# Patient Record
Sex: FEMALE | Race: BLACK OR AFRICAN AMERICAN | NOT HISPANIC OR LATINO | Employment: FULL TIME | URBAN - METROPOLITAN AREA
[De-identification: names, ages, dates, MRNs, and addresses within clinical notes are randomized per-mention and may not be internally consistent; named-entity substitution may affect disease eponyms.]

---

## 2017-01-05 ENCOUNTER — LAB (OUTPATIENT)
Dept: LAB | Facility: HOSPITAL | Age: 37
End: 2017-01-05

## 2017-01-05 ENCOUNTER — OFFICE VISIT (OUTPATIENT)
Dept: NEUROLOGY | Facility: CLINIC | Age: 37
End: 2017-01-05

## 2017-01-05 VITALS
OXYGEN SATURATION: 99 % | BODY MASS INDEX: 55.32 KG/M2 | HEIGHT: 61 IN | SYSTOLIC BLOOD PRESSURE: 120 MMHG | HEART RATE: 82 BPM | DIASTOLIC BLOOD PRESSURE: 82 MMHG | WEIGHT: 293 LBS

## 2017-01-05 DIAGNOSIS — H53.9 TRANSIENT VISION DISTURBANCE OF BOTH EYES: ICD-10-CM

## 2017-01-05 DIAGNOSIS — R20.2 PARESTHESIA: ICD-10-CM

## 2017-01-05 DIAGNOSIS — Z86.69 H/O ATYPICAL MIGRAINE: ICD-10-CM

## 2017-01-05 DIAGNOSIS — G43.809 MIGRAINE VARIANT WITH HEADACHE: Primary | ICD-10-CM

## 2017-01-05 DIAGNOSIS — G43.809 MIGRAINE VARIANT WITH HEADACHE: Primary | Chronic | ICD-10-CM

## 2017-01-05 LAB
ALBUMIN SERPL-MCNC: 4.2 G/DL (ref 3.2–4.8)
ALBUMIN/GLOB SERPL: 1.4 G/DL (ref 1.5–2.5)
ALP SERPL-CCNC: 83 U/L (ref 25–100)
ALT SERPL W P-5'-P-CCNC: 14 U/L (ref 7–40)
ANION GAP SERPL CALCULATED.3IONS-SCNC: 3 MMOL/L (ref 3–11)
AST SERPL-CCNC: 16 U/L (ref 0–33)
BASOPHILS # BLD AUTO: 0.02 10*3/MM3 (ref 0–0.2)
BASOPHILS NFR BLD AUTO: 0.3 % (ref 0–1)
BILIRUB SERPL-MCNC: 0.4 MG/DL (ref 0.3–1.2)
BUN BLD-MCNC: 13 MG/DL (ref 9–23)
BUN/CREAT SERPL: 18.6 (ref 7–25)
CALCIUM SPEC-SCNC: 9.9 MG/DL (ref 8.7–10.4)
CHLORIDE SERPL-SCNC: 106 MMOL/L (ref 99–109)
CO2 SERPL-SCNC: 28 MMOL/L (ref 20–31)
CREAT BLD-MCNC: 0.7 MG/DL (ref 0.6–1.3)
DEPRECATED RDW RBC AUTO: 49.6 FL (ref 37–54)
EOSINOPHIL # BLD AUTO: 0.06 10*3/MM3 (ref 0.1–0.3)
EOSINOPHIL NFR BLD AUTO: 0.8 % (ref 0–3)
ERYTHROCYTE [DISTWIDTH] IN BLOOD BY AUTOMATED COUNT: 19.3 % (ref 11.3–14.5)
GFR SERPL CREATININE-BSD FRML MDRD: 115 ML/MIN/1.73
GLOBULIN UR ELPH-MCNC: 3 GM/DL
GLUCOSE BLD-MCNC: 99 MG/DL (ref 70–100)
HCT VFR BLD AUTO: 33.1 % (ref 34.5–44)
HGB BLD-MCNC: 10.3 G/DL (ref 11.5–15.5)
IMM GRANULOCYTES # BLD: 0.02 10*3/MM3 (ref 0–0.03)
IMM GRANULOCYTES NFR BLD: 0.3 % (ref 0–0.6)
LYMPHOCYTES # BLD AUTO: 2.27 10*3/MM3 (ref 0.6–4.8)
LYMPHOCYTES NFR BLD AUTO: 32.1 % (ref 24–44)
MCH RBC QN AUTO: 21.7 PG (ref 27–31)
MCHC RBC AUTO-ENTMCNC: 31.1 G/DL (ref 32–36)
MCV RBC AUTO: 69.8 FL (ref 80–99)
MONOCYTES # BLD AUTO: 0.59 10*3/MM3 (ref 0–1)
MONOCYTES NFR BLD AUTO: 8.3 % (ref 0–12)
NEUTROPHILS # BLD AUTO: 4.12 10*3/MM3 (ref 1.5–8.3)
NEUTROPHILS NFR BLD AUTO: 58.2 % (ref 41–71)
PLATELET # BLD AUTO: 346 10*3/MM3 (ref 150–450)
PMV BLD AUTO: 10.1 FL (ref 6–12)
POTASSIUM BLD-SCNC: 4.5 MMOL/L (ref 3.5–5.5)
PROT SERPL-MCNC: 7.2 G/DL (ref 5.7–8.2)
RBC # BLD AUTO: 4.74 10*6/MM3 (ref 3.89–5.14)
SODIUM BLD-SCNC: 137 MMOL/L (ref 132–146)
VIT B12 BLD-MCNC: 495 PG/ML (ref 211–911)
WBC NRBC COR # BLD: 7.08 10*3/MM3 (ref 3.5–10.8)

## 2017-01-05 PROCEDURE — 83921 ORGANIC ACID SINGLE QUANT: CPT | Performed by: NURSE PRACTITIONER

## 2017-01-05 PROCEDURE — 83550 IRON BINDING TEST: CPT | Performed by: NURSE PRACTITIONER

## 2017-01-05 PROCEDURE — 99214 OFFICE O/P EST MOD 30 MIN: CPT | Performed by: NURSE PRACTITIONER

## 2017-01-05 PROCEDURE — 85025 COMPLETE CBC W/AUTO DIFF WBC: CPT | Performed by: NURSE PRACTITIONER

## 2017-01-05 PROCEDURE — 36415 COLL VENOUS BLD VENIPUNCTURE: CPT | Performed by: NURSE PRACTITIONER

## 2017-01-05 PROCEDURE — 82728 ASSAY OF FERRITIN: CPT | Performed by: NURSE PRACTITIONER

## 2017-01-05 PROCEDURE — 83540 ASSAY OF IRON: CPT | Performed by: NURSE PRACTITIONER

## 2017-01-05 PROCEDURE — 80053 COMPREHEN METABOLIC PANEL: CPT | Performed by: NURSE PRACTITIONER

## 2017-01-05 PROCEDURE — 82607 VITAMIN B-12: CPT | Performed by: NURSE PRACTITIONER

## 2017-01-05 RX ORDER — SUMATRIPTAN 50 MG/1
50 TABLET, FILM COATED ORAL ONCE AS NEEDED
Qty: 9 TABLET | Refills: 5 | Status: SHIPPED | OUTPATIENT
Start: 2017-01-05 | End: 2017-09-22 | Stop reason: SDUPTHER

## 2017-01-05 RX ORDER — ERGOCALCIFEROL 1.25 MG/1
50000 CAPSULE ORAL WEEKLY
COMMUNITY
End: 2017-04-06

## 2017-01-05 RX ORDER — TOPIRAMATE 50 MG/1
50 TABLET, FILM COATED ORAL NIGHTLY
Qty: 30 TABLET | Refills: 5 | Status: SHIPPED | OUTPATIENT
Start: 2017-01-05 | End: 2017-03-21

## 2017-01-05 RX ORDER — PREDNISONE 10 MG/1
TABLET ORAL
Qty: 42 TABLET | Refills: 0 | Status: SHIPPED | OUTPATIENT
Start: 2017-01-05 | End: 2017-01-09

## 2017-01-05 NOTE — PROGRESS NOTES
Subjective:     Patient ID: Nury Pineda is a 36 y.o. female.    History of Present Illness     Here for 5.5 month follow up on migraines with a history of complex migraines in 2015. She was seen in our office on 7/21/2016 for acute intractable headache for 1 month and had an OP migraine infusion and then she did not return for follow up until today. She also missed her Opthalmology appointment to evaluate her intermittent blurred vision in both eyes. She has not been taking her aspirin. She had about 1 month of bad headaches starting from right temple to across forehead and straight back with photophobia and phonophobia but no N/V again from late November 2016 to last week and she also had tingling in hands and feet with the pain as well as once in her face which tingling in face resolved in 30 minutes and she took Ibuprofen or Advil and this week her headache has been only minimal. She feels like overall her headaches are getting worse. She has a minimal headache today mild with no other symptoms. She has not taken a preventive migraine medication. Prior imaging studies have showed no acute process and MRIs have been limited due to artifact from braces and plans to repeat MRI/MRA brain once braces removed soon. She has been diagnosed with mild sleep apnea and was told to try and lose weight or she could use an oral appliance. She continues to have frequent numbness and tingling in bilateral hands and feet with known CTS.     The following portions of the patient's history were reviewed and updated as appropriate: allergies, current medications, past family history, past medical history, past social history, past surgical history and problem list.    Review of Systems   Constitutional: Negative for chills, fatigue, fever and unexpected weight change.   HENT: Positive for voice change. Negative for ear pain, hearing loss, nosebleeds, rhinorrhea and sore throat.    Eyes: Negative for photophobia, pain, discharge,  itching and visual disturbance.   Respiratory: Negative for cough, chest tightness, shortness of breath and wheezing.    Cardiovascular: Negative for chest pain, palpitations and leg swelling.   Gastrointestinal: Negative for abdominal pain, blood in stool, constipation, diarrhea, nausea and vomiting.   Genitourinary: Negative for dysuria, frequency, hematuria and urgency.   Musculoskeletal: Positive for joint swelling (& STIFFNESS). Negative for arthralgias, back pain, gait problem, myalgias, neck pain and neck stiffness.   Skin: Negative for rash and wound.   Allergic/Immunologic: Negative for environmental allergies and food allergies.   Neurological: Positive for dizziness, numbness and headaches. Negative for tremors, seizures, syncope, speech difficulty, weakness and light-headedness.        TINGLING   Hematological: Negative for adenopathy. Does not bruise/bleed easily.   Psychiatric/Behavioral: Negative for agitation, confusion, decreased concentration, hallucinations, sleep disturbance and suicidal ideas. The patient is not nervous/anxious.         Objective:    Neurologic Exam     Mental Status   Oriented to person, place, and time.   Registration: recalls 3 of 3 objects. Recall at 5 minutes: recalls 3 of 3 objects. Follows 3 step commands.   Attention: normal. Concentration: normal.   Speech: speech is normal   Level of consciousness: alert  Knowledge: good and consistent with education. Able to perform simple calculations.   Able to name object. Able to read. Able to repeat. Able to write. Normal comprehension.     Cranial Nerves   Cranial nerves II through XII intact.     Motor Exam   Muscle bulk: normal  Overall muscle tone: normal    Strength   Strength 5/5 throughout.     Sensory Exam   Light touch normal.   Vibration normal.   Proprioception normal.   Pinprick normal.     Gait, Coordination, and Reflexes     Gait  Gait: normal    Coordination   Romberg: negative  Finger to nose coordination:  normal  Heel to shin coordination: normal    Tremor   Resting tremor: absent  Intention tremor: absent  Action tremor: absent    Reflexes   Right brachioradialis: 2+  Left brachioradialis: 2+  Right biceps: 2+  Left biceps: 2+  Right triceps: 2+  Left triceps: 2+  Right patellar: 2+  Left patellar: 2+  Right achilles: 2+  Left achilles: 2+  Right : 2+  Left : 2+  Right plantar: normal  Left plantar: normal  Right White: absent  Left White: absent  Right ankle clonus: absent  Left ankle clonus: absent      Physical Exam   Constitutional: She is oriented to person, place, and time.   Eyes:   Fundoscopic exam:       The right eye shows no AV nicking, no exudate, no hemorrhage and no papilledema. The right eye shows red reflex.        The left eye shows no AV nicking, no exudate, no hemorrhage and no papilledema. The left eye shows red reflex.   Neurological: She is oriented to person, place, and time. She has normal strength. She has a normal Finger-Nose-Finger Test, a normal Heel to Harris Test and a normal Romberg Test. Gait normal.   Reflex Scores:       Tricep reflexes are 2+ on the right side and 2+ on the left side.       Bicep reflexes are 2+ on the right side and 2+ on the left side.       Brachioradialis reflexes are 2+ on the right side and 2+ on the left side.       Patellar reflexes are 2+ on the right side and 2+ on the left side.       Achilles reflexes are 2+ on the right side and 2+ on the left side.  Psychiatric: She has a normal mood and affect. Her speech is normal and behavior is normal. Judgment and thought content normal. Cognition and memory are normal.       Assessment/Plan:     Nury was seen today for headache.    Diagnoses and all orders for this visit:    Migraine variant with headache  -     topiramate (TOPAMAX) 50 MG tablet; Take 1 tablet by mouth Every Night.  -     Vitamin B12  -     Methylmalonic Acid, Serum  -     CBC & Differential  -     Fe+TIBC+Elmer  -     Comprehensive  Metabolic Panel  -     predniSONE (DELTASONE) 10 MG tablet; Take 6 tabs x 2 days, Take 5 tabs x 2 days, take 4 tabs x 2 days, take 3 tabs x 2 days, take 2 tabs x 2 days and 1 tab x 2 days and stop  -     SUMAtriptan (IMITREX) 50 MG tablet; Take 1 tablet by mouth 1 (One) Time As Needed for migraine for up to 1 dose. May repeat dose one time in 2 hours if headache not relieved.    H/O atypical migraine    Paresthesia  -     Vitamin B12  -     Methylmalonic Acid, Serum  -     CBC & Differential  -     Fe+TIBC+Elmer  -     Comprehensive Metabolic Panel    Transient vision disturbance of both eyes       Recommended she follow up with sleep medicine about treating her mild sleep apnea. Reschedule Opthalmology appointment. Start Topamax for migraine prevention. May use NSAIDs no more than 15 doses a month to prevent MOH. Imitrex PRN. Labs today. F/U in 3 months and hopefully she will have her braces off so we can repeat MRI/MRA brain (very poor quality studies due to artifact). CT head and CTA head 1/2016 were WNL. Reviewed medications, potential side effects and signs and symptoms to report. Discussed risk versus benefits of treatment plan with patient and/or family-including medications, labs and radiology that may be ordered. Addressed questions and concerns during visit. Patient and/or family verbalized understanding and agree with plan.    During this visit the following were done:  Labs Reviewed []    Labs Ordered [x]    Radiology Reports Reviewed []    Radiology Ordered []    PCP Records Reviewed []    Referring Provider Records Reviewed []    ER Records Reviewed []    Hospital Records Reviewed []    History Obtained From Family []    Radiology Images Reviewed []    Other Reviewed []    Records Requested []

## 2017-01-05 NOTE — LETTER
January 5, 2017     Acacia Lobato DO  [address]  [facility] [address]    Patient: Nury Pineda   YOB: 1980   Date of Visit: 1/5/2017       Dear Dr. Luis Alfredo DO:    Nury Pineda was in my office today. Below is a copy of my note.    If you have questions, please do not hesitate to call me. I look forward to following Nury along with you.         Sincerely,        SYBIL Macario        CC: No Recipients    Subjective:     Patient ID: Nury Pineda is a 36 y.o. female.    History of Present Illness     Here for 5.5 month follow up on migraines with a history of complex migraines in 2015. She was seen in our office on 7/21/2016 for acute intractable headache for 1 month and had an OP migraine infusion and then she did not return for follow up until today. She also missed her Opthalmology appointment to evaluate her intermittent blurred vision in both eyes. She has not been taking her aspirin. She had about 1 month of bad headaches starting from right temple to across forehead and straight back with photophobia and phonophobia but no N/V again from late November 2016 to last week and she also had tingling in hands and feet with the pain as well as once in her face which tingling in face resolved in 30 minutes and she took Ibuprofen or Advil and this week her headache has been only minimal. She feels like overall her headaches are getting worse. She has a minimal headache today mild with no other symptoms. She has not taken a preventive migraine medication. Prior imaging studies have showed no acute process and MRIs have been limited due to artifact from braces and plans to repeat MRI/MRA brain once braces removed soon. She has been diagnosed with mild sleep apnea and was told to try and lose weight or she could use an oral appliance. She continues to have frequent numbness and tingling in bilateral hands and feet with known CTS.     The following portions of the patient's  history were reviewed and updated as appropriate: allergies, current medications, past family history, past medical history, past social history, past surgical history and problem list.    Review of Systems   Constitutional: Negative for chills, fatigue, fever and unexpected weight change.   HENT: Positive for voice change. Negative for ear pain, hearing loss, nosebleeds, rhinorrhea and sore throat.    Eyes: Negative for photophobia, pain, discharge, itching and visual disturbance.   Respiratory: Negative for cough, chest tightness, shortness of breath and wheezing.    Cardiovascular: Negative for chest pain, palpitations and leg swelling.   Gastrointestinal: Negative for abdominal pain, blood in stool, constipation, diarrhea, nausea and vomiting.   Genitourinary: Negative for dysuria, frequency, hematuria and urgency.   Musculoskeletal: Positive for joint swelling (& STIFFNESS). Negative for arthralgias, back pain, gait problem, myalgias, neck pain and neck stiffness.   Skin: Negative for rash and wound.   Allergic/Immunologic: Negative for environmental allergies and food allergies.   Neurological: Positive for dizziness, numbness and headaches. Negative for tremors, seizures, syncope, speech difficulty, weakness and light-headedness.        TINGLING   Hematological: Negative for adenopathy. Does not bruise/bleed easily.   Psychiatric/Behavioral: Negative for agitation, confusion, decreased concentration, hallucinations, sleep disturbance and suicidal ideas. The patient is not nervous/anxious.         Objective:    Neurologic Exam     Mental Status   Oriented to person, place, and time.   Registration: recalls 3 of 3 objects. Recall at 5 minutes: recalls 3 of 3 objects. Follows 3 step commands.   Attention: normal. Concentration: normal.   Speech: speech is normal   Level of consciousness: alert  Knowledge: good and consistent with education. Able to perform simple calculations.   Able to name object. Able to  read. Able to repeat. Able to write. Normal comprehension.     Cranial Nerves   Cranial nerves II through XII intact.     Motor Exam   Muscle bulk: normal  Overall muscle tone: normal    Strength   Strength 5/5 throughout.     Sensory Exam   Light touch normal.   Vibration normal.   Proprioception normal.   Pinprick normal.     Gait, Coordination, and Reflexes     Gait  Gait: normal    Coordination   Romberg: negative  Finger to nose coordination: normal  Heel to shin coordination: normal    Tremor   Resting tremor: absent  Intention tremor: absent  Action tremor: absent    Reflexes   Right brachioradialis: 2+  Left brachioradialis: 2+  Right biceps: 2+  Left biceps: 2+  Right triceps: 2+  Left triceps: 2+  Right patellar: 2+  Left patellar: 2+  Right achilles: 2+  Left achilles: 2+  Right : 2+  Left : 2+  Right plantar: normal  Left plantar: normal  Right White: absent  Left White: absent  Right ankle clonus: absent  Left ankle clonus: absent      Physical Exam   Constitutional: She is oriented to person, place, and time.   Eyes:   Fundoscopic exam:       The right eye shows no AV nicking, no exudate, no hemorrhage and no papilledema. The right eye shows red reflex.        The left eye shows no AV nicking, no exudate, no hemorrhage and no papilledema. The left eye shows red reflex.   Neurological: She is oriented to person, place, and time. She has normal strength. She has a normal Finger-Nose-Finger Test, a normal Heel to Harris Test and a normal Romberg Test. Gait normal.   Reflex Scores:       Tricep reflexes are 2+ on the right side and 2+ on the left side.       Bicep reflexes are 2+ on the right side and 2+ on the left side.       Brachioradialis reflexes are 2+ on the right side and 2+ on the left side.       Patellar reflexes are 2+ on the right side and 2+ on the left side.       Achilles reflexes are 2+ on the right side and 2+ on the left side.  Psychiatric: She has a normal mood and affect.  Her speech is normal and behavior is normal. Judgment and thought content normal. Cognition and memory are normal.       Assessment/Plan:     Nury was seen today for headache.    Diagnoses and all orders for this visit:    Migraine variant with headache  -     topiramate (TOPAMAX) 50 MG tablet; Take 1 tablet by mouth Every Night.  -     Vitamin B12  -     Methylmalonic Acid, Serum  -     CBC & Differential  -     Fe+TIBC+Elmer  -     Comprehensive Metabolic Panel  -     predniSONE (DELTASONE) 10 MG tablet; Take 6 tabs x 2 days, Take 5 tabs x 2 days, take 4 tabs x 2 days, take 3 tabs x 2 days, take 2 tabs x 2 days and 1 tab x 2 days and stop  -     SUMAtriptan (IMITREX) 50 MG tablet; Take 1 tablet by mouth 1 (One) Time As Needed for migraine for up to 1 dose. May repeat dose one time in 2 hours if headache not relieved.    H/O atypical migraine    Paresthesia  -     Vitamin B12  -     Methylmalonic Acid, Serum  -     CBC & Differential  -     Fe+TIBC+Elmer  -     Comprehensive Metabolic Panel    Transient vision disturbance of both eyes       Recommended she follow up with sleep medicine about treating her mild sleep apnea. Reschedule Opthalmology appointment. Start Topamax for migraine prevention. May use NSAIDs no more than 15 doses a month to prevent MOH. Imitrex PRN. Labs today. F/U in 3 months and hopefully she will have her braces off so we can repeat MRI/MRA brain (very poor quality studies due to artifact). CT head and CTA head 1/2016 were WNL. Reviewed medications, potential side effects and signs and symptoms to report. Discussed risk versus benefits of treatment plan with patient and/or family-including medications, labs and radiology that may be ordered. Addressed questions and concerns during visit. Patient and/or family verbalized understanding and agree with plan.    During this visit the following were done:  Labs Reviewed []    Labs Ordered [x]    Radiology Reports Reviewed []    Radiology Ordered []     PCP Records Reviewed []    Referring Provider Records Reviewed []    ER Records Reviewed []    Hospital Records Reviewed []    History Obtained From Family []    Radiology Images Reviewed []    Other Reviewed []    Records Requested []

## 2017-01-05 NOTE — Clinical Note
January 5, 2017     Acacia Lobato DO  [address] [address]    Patient: Nury Pineda   YOB: 1980   Date of Visit: 1/5/2017       Dear Dr. Luis Alfredo DO:    Thank you for referring Nury Pineda to me for evaluation. Below are the relevant portions of my assessment and plan of care.       Nury was seen today for headache.    Diagnoses and all orders for this visit:    Migraine variant with headache  -     topiramate (TOPAMAX) 50 MG tablet; Take 1 tablet by mouth Every Night.  -     Vitamin B12  -     Methylmalonic Acid, Serum  -     CBC & Differential  -     Fe+TIBC+Elmer  -     Comprehensive Metabolic Panel  -     predniSONE (DELTASONE) 10 MG tablet; Take 6 tabs x 2 days, Take 5 tabs x 2 days, take 4 tabs x 2 days, take 3 tabs x 2 days, take 2 tabs x 2 days and 1 tab x 2 days and stop  -     SUMAtriptan (IMITREX) 50 MG tablet; Take 1 tablet by mouth 1 (One) Time As Needed for migraine for up to 1 dose. May repeat dose one time in 2 hours if headache not relieved.    H/O atypical migraine    Paresthesia  -     Vitamin B12  -     Methylmalonic Acid, Serum  -     CBC & Differential  -     Fe+TIBC+Elmer  -     Comprehensive Metabolic Panel               If you have questions, please do not hesitate to call me. I look forward to following Nury along with you.         Sincerely,        SYBIL Macario        CC: No Recipients

## 2017-01-05 NOTE — MR AVS SNAPSHOT
Nury Pineda   1/5/2017 8:30 AM   Office Visit    Dept Phone:  [phone]   Encounter #:  [id number]    Provider:  SYBIL Macario   Department:  [facility] NEUROLOGY                Your Full Care Plan              Today's Medication Changes          These changes are accurate as of: 1/5/17  9:10 AM.  If you have any questions, ask your nurse or doctor.               New Medication(s)Ordered:     predniSONE 10 MG tablet   Commonly known as:  DELTASONE   Take 6 tabs x 2 days, Take 5 tabs x 2 days, take 4 tabs x 2 days, take 3 tabs x 2 days, take 2 tabs x 2 days and 1 tab x 2 days and stop   Replaces:  PredniSONE 10 MG (21) tablet pack   Started by:  SYBIL Macario       SUMAtriptan 50 MG tablet   Commonly known as:  IMITREX   Take 1 tablet by mouth 1 (One) Time As Needed for migraine for up to 1 dose. May repeat dose one time in 2 hours if headache not relieved.   Started by:  SYBIL Macario       topiramate 50 MG tablet   Commonly known as:  TOPAMAX   Take 1 tablet by mouth Every Night.   Started by:  SYBIL Macario         Stop taking medication(s)listed here:     budesonide-formoterol 160-4.5 MCG/ACT inhaler   Commonly known as:  SYMBICORT   Stopped by:  SYBIL Macario           cetirizine 10 MG tablet   Commonly known as:  zyrTEC   Stopped by:  SYBIL Macario           MULTIVITAMIN ADULT PO   Stopped by:  SYBIL Macario           PredniSONE 10 MG (21) tablet pack   Commonly known as:  DELTASONE   Replaced by:  predniSONE 10 MG tablet   Stopped by:  SYBIL Macario                Where to Get Your Medications      These medications were sent to [facility] [place], [place] - [address] [facility] - [phone]  - [phone]   [address] [facility] [address]     Phone:  [phone]     predniSONE 10 MG tablet    SUMAtriptan 50 MG tablet    topiramate 50 MG tablet                  Your Updated Medication List          This list is accurate as of: 1/5/17  9:10 AM.  Always use your most recent med list.                aspirin 81 MG tablet       montelukast 10 MG tablet   Commonly known as:  SINGULAIR   Take 1 tablet by mouth every night.       predniSONE 10 MG tablet   Commonly known as:  DELTASONE   Take 6 tabs x 2 days, Take 5 tabs x 2 days, take 4 tabs x 2 days, take 3 tabs x 2 days, take 2 tabs x 2 days and 1 tab x 2 days and stop       SUMAtriptan 50 MG tablet   Commonly known as:  IMITREX   Take 1 tablet by mouth 1 (One) Time As Needed for migraine for up to 1 dose. May repeat dose one time in 2 hours if headache not relieved.       topiramate 50 MG tablet   Commonly known as:  TOPAMAX   Take 1 tablet by mouth Every Night.       vitamin D 04310 UNITS capsule capsule   Commonly known as:  ERGOCALCIFEROL               We Performed the Following     CBC & Differential     Comprehensive Metabolic Panel     Fe+TIBC+Elmer     Methylmalonic Acid, Serum     Vitamin B12       You Were Diagnosed With        Codes Comments    Migraine variant with headache    -  Primary ICD-10-CM: G43.809  ICD-9-CM: 346.20     H/O atypical migraine     ICD-10-CM: Z86.69  ICD-9-CM: V12.59     Paresthesia     ICD-10-CM: R20.2  ICD-9-CM: 782.0       Instructions     None    Patient Instructions History      Upcoming Appointments     Visit Type Date Time Department    FOLLOW UP 1/5/2017  8:30 AM [facility]    FOLLOW UP 4/6/2017  3:00 PM [facility]    PHYSICAL 9/25/2017  9:00 AM [facility]      DLShart Signup     Our records indicate that you have declined [facility] [employer] signup. If you would like to sign up for [facility], please email [email or web address] or call [phone] to obtain an activation code.             Other Info from Your Visit           Your Appointments     Apr 06, 2017  3:00 PM EDT   Follow Up with Ele Thorne,  "SYBIL   [facility] NEUROLOGY (--)    [address]  [facility] [address] [phone]           Arrive 15 minutes prior to appointment.            Sep 25, 2017  9:00 AM EDT   Physical with Acacia Lobato DO   [facility] (--)    [address]  [facility] [address] [phone]           Arrive 15 minutes prior to appointment.              Allergies     Amoxicillin      TABS.    Penicillins        Reason for Visit     Headache           Vital Signs     Blood Pressure Pulse Height Weight Oxygen Saturation Body Mass Index    120/82 82 61\" (154.9 cm) 310 lb (141 kg) 99% 58.57 kg/m2    Smoking Status                   Never Smoker           Problems and Diagnoses Noted     H/O atypical migraine    Migraine variant with headache    Numbness and tingling        "

## 2017-01-06 ENCOUNTER — TELEPHONE (OUTPATIENT)
Dept: NEUROLOGY | Facility: CLINIC | Age: 37
End: 2017-01-06

## 2017-01-06 LAB
FERRITIN SERPL-MCNC: 39 NG/ML (ref 10–291)
IRON 24H UR-MRATE: 11 MCG/DL (ref 50–175)
IRON SATN MFR SERPL: 3 % (ref 15–50)
TIBC SERPL-MCNC: 351 MCG/DL (ref 250–450)

## 2017-01-07 LAB — METHYLMALONATE SERPL-SCNC: 135 NMOL/L (ref 0–378)

## 2017-01-09 ENCOUNTER — OFFICE VISIT (OUTPATIENT)
Dept: INTERNAL MEDICINE | Facility: CLINIC | Age: 37
End: 2017-01-09

## 2017-01-09 VITALS
SYSTOLIC BLOOD PRESSURE: 90 MMHG | HEART RATE: 79 BPM | OXYGEN SATURATION: 100 % | WEIGHT: 293 LBS | DIASTOLIC BLOOD PRESSURE: 70 MMHG | BODY MASS INDEX: 57.23 KG/M2

## 2017-01-09 DIAGNOSIS — M25.561 ACUTE PAIN OF RIGHT KNEE: Primary | ICD-10-CM

## 2017-01-09 DIAGNOSIS — D50.9 IRON DEFICIENCY ANEMIA, UNSPECIFIED IRON DEFICIENCY ANEMIA TYPE: Primary | ICD-10-CM

## 2017-01-09 PROCEDURE — 99213 OFFICE O/P EST LOW 20 MIN: CPT | Performed by: INTERNAL MEDICINE

## 2017-01-09 NOTE — PROGRESS NOTES
Subjective   Nury Pineda is a 36 y.o. female.   Chief Complaint   Patient presents with   • Right Knee Pain       History of Present Illness   6 week hx of right knee pain. Seen at [facility] 2.5 weeks ago. Xray done. Given Nsaids and steroids. Pain getting worst. Trouble bending it. Gives out when walking.  The following portions of the patient's history were reviewed and updated as appropriate: allergies, current medications, past family history, past medical history, past social history, past surgical history and problem list.    Review of Systems   Constitutional: Negative for activity change, appetite change, chills, diaphoresis, fatigue, fever and unexpected weight change.   HENT: Negative for congestion, ear discharge, ear pain, mouth sores, nosebleeds, sinus pressure, sneezing and sore throat.    Eyes: Negative for pain, discharge and itching.   Respiratory: Negative for cough, chest tightness, shortness of breath and wheezing.    Cardiovascular: Negative for chest pain, palpitations and leg swelling.   Gastrointestinal: Negative for abdominal pain, constipation, diarrhea, nausea and vomiting.   Endocrine: Negative for cold intolerance, heat intolerance, polydipsia and polyphagia.   Genitourinary: Negative for dysuria, flank pain, frequency, hematuria and urgency.   Musculoskeletal: Negative for arthralgias, back pain, gait problem, myalgias, neck pain and neck stiffness.        Knee pain   Skin: Negative for color change, pallor and rash.   Neurological: Negative for seizures, speech difficulty, numbness and headaches.   Psychiatric/Behavioral: Negative for agitation, confusion, decreased concentration and sleep disturbance. The patient is not nervous/anxious.      Visit Vitals   • BP 90/70   • Pulse 79   • Wt (!) 302 lb 14.4 oz (137 kg)   • SpO2 100%   • BMI 57.23 kg/m2         Objective   Physical Exam   Constitutional: She is oriented to person, place, and time. She appears well-developed.   HENT:   Head:  Normocephalic.   Right Ear: External ear normal.   Left Ear: External ear normal.   Nose: Nose normal.   Mouth/Throat: Oropharynx is clear and moist.   Eyes: Conjunctivae are normal. Pupils are equal, round, and reactive to light.   Neck: No JVD present. No thyromegaly present.   Cardiovascular: Normal rate, regular rhythm and normal heart sounds.  Exam reveals no friction rub.    No murmur heard.  Pulmonary/Chest: Effort normal and breath sounds normal. No respiratory distress. She has no wheezes. She has no rales.   Abdominal: Soft. Bowel sounds are normal. She exhibits no distension. There is no tenderness. There is no guarding.   Musculoskeletal: She exhibits no edema or tenderness.   Lymphadenopathy:     She has no cervical adenopathy.   Neurological: She is alert and oriented to person, place, and time. She has normal reflexes. She displays normal reflexes. No cranial nerve deficit.   Skin: No rash noted.   Psychiatric: She has a normal mood and affect. Her behavior is normal.   Nursing note and vitals reviewed.      Assessment/Plan   Nury was seen today for right knee pain.    Diagnoses and all orders for this visit:    Acute pain of right knee  -     MRI Knee Right Without Contrast; Future

## 2017-01-09 NOTE — MR AVS SNAPSHOT
Nury Edwin   1/9/2017 8:00 AM   Office Visit    Dept Phone:  [phone]   Encounter #:  [id number]    Provider:  Acacia Lobato DO   Department:  [facility] INTERNAL MEDICINE AND ENDOCRINOLOGY [place]                Your Full Care Plan              Today's Medication Changes          These changes are accurate as of: 1/9/17  8:17 AM.  If you have any questions, ask your nurse or doctor.               Stop taking medication(s)listed here:     predniSONE 10 MG tablet   Commonly known as:  DELTASONE   Stopped by:  Acacia Lobato DO           sulfamethoxazole-trimethoprim 800-160 MG per tablet   Commonly known as:  BACTRIM DS,SEPTRA DS   Stopped by:  Acacia Lobato DO                      Your Updated Medication List          This list is accurate as of: 1/9/17  8:17 AM.  Always use your most recent med list.                aspirin 81 MG tablet       montelukast 10 MG tablet   Commonly known as:  SINGULAIR   Take 1 tablet by mouth every night.       SUMAtriptan 50 MG tablet   Commonly known as:  IMITREX   Take 1 tablet by mouth 1 (One) Time As Needed for migraine for up to 1 dose. May repeat dose one time in 2 hours if headache not relieved.       topiramate 50 MG tablet   Commonly known as:  TOPAMAX   Take 1 tablet by mouth Every Night.       vitamin D 76888 UNITS capsule capsule   Commonly known as:  ERGOCALCIFEROL               You Were Diagnosed With        Codes Comments    Acute pain of right knee    -  Primary ICD-10-CM: M25.561  ICD-9-CM: 719.46       Instructions     None    Patient Instructions History      Upcoming Appointments     Visit Type Date Time Department    FOLLOW UP 1/9/2017  8:00 AM [facility]    FOLLOW UP 4/6/2017  3:00 PM [facility]    PHYSICAL 9/25/2017  9:00 AM [facility]      MyChart Signup     Our records indicate that you have declined [facility] MyChart signup. If you would like to sign up for [facility], please email  [email or web address] or call [phone] to obtain an activation code.             Other Info from Your Visit           Your Appointments     Apr 06, 2017  3:00 PM EDT   Follow Up with SYBIL Macario   [facility] (--)    [address]  [facility] [id number] [phone]           Arrive 15 minutes prior to appointment.            Sep 25, 2017  9:00 AM EDT   Physical with Acacia Lobato DO   [facility] INTER[facility] MEDICINE AND ENDOCRINOLOGY [place] (--)    [address]  [facility] [address] [phone]           Arrive 15 minutes prior to appointment.              Allergies     Amoxicillin      TABS.    Penicillins        Reason for Visit     Right Knee Pain           Vital Signs     Blood Pressure Pulse Weight Oxygen Saturation Body Mass Index Smoking Status    90/70 79 302 lb 14.4 oz (137 kg) 100% 57.23 kg/m2 Never Smoker      Problems and Diagnoses Noted     Acute pain of right knee    -  Primary

## 2017-01-16 PROBLEM — D50.9 IRON DEFICIENCY ANEMIA: Status: ACTIVE | Noted: 2017-01-16

## 2017-01-17 ENCOUNTER — CONSULT (OUTPATIENT)
Dept: ONCOLOGY | Facility: CLINIC | Age: 37
End: 2017-01-17

## 2017-01-17 VITALS
RESPIRATION RATE: 24 BRPM | DIASTOLIC BLOOD PRESSURE: 74 MMHG | HEART RATE: 82 BPM | TEMPERATURE: 97.8 F | HEIGHT: 60 IN | WEIGHT: 293 LBS | SYSTOLIC BLOOD PRESSURE: 150 MMHG | OXYGEN SATURATION: 100 % | BODY MASS INDEX: 57.52 KG/M2

## 2017-01-17 DIAGNOSIS — D50.0 IRON DEFICIENCY ANEMIA DUE TO CHRONIC BLOOD LOSS: Primary | ICD-10-CM

## 2017-01-17 PROCEDURE — 99214 OFFICE O/P EST MOD 30 MIN: CPT | Performed by: INTERNAL MEDICINE

## 2017-01-17 RX ORDER — SODIUM CHLORIDE 9 MG/ML
250 INJECTION, SOLUTION INTRAVENOUS ONCE
Status: CANCELLED | OUTPATIENT
Start: 2017-01-31

## 2017-01-17 RX ORDER — SODIUM CHLORIDE 9 MG/ML
250 INJECTION, SOLUTION INTRAVENOUS ONCE
Status: CANCELLED | OUTPATIENT
Start: 2017-01-24

## 2017-01-17 NOTE — MR AVS SNAPSHOT
Nury Pineda   1/17/2017 10:30 AM   Consult    Dept Phone:  [phone]   Encounter #:  [id number]    Provider:  Vick Swanson MD   Department:  [facility] HEMATOLOGY  AND ONCOLOGY                Your Full Care Plan              Your Updated Medication List          This list is accurate as of: 1/17/17 11:18 AM.  Always use your most recent med list.                aspirin 81 MG tablet       montelukast 10 MG tablet   Commonly known as:  SINGULAIR   Take 1 tablet by mouth every night.       SUMAtriptan 50 MG tablet   Commonly known as:  IMITREX   Take 1 tablet by mouth 1 (One) Time As Needed for migraine for up to 1 dose. May repeat dose one time in 2 hours if headache not relieved.       topiramate 50 MG tablet   Commonly known as:  TOPAMAX   Take 1 tablet by mouth Every Night.       vitamin D 41883 UNITS capsule capsule   Commonly known as:  ERGOCALCIFEROL               Instructions     None    Patient Instructions History      MyChart Signup     Our records indicate that you have declined [facility] signup. If you would like to sign up for [email or web address], please email [email or web address] or call [phone] to obtain an activation code.             Other Info from Your Visit           Your appointments     Date & Time Provider Appointment Department    Jan 24, 2017  8:00 AM EST CHAIR 8 INFUSION [facility] OUTPATIENT ONCOLOGY    Mar 21, 2017  9:45 AM EDT Vick Swanson MD FOLLOW UP [facility] HEMATOLOGY  AND ONCOLOGY    Apr 06, 2017  3:00 PM EDT SYBIL Macario Follow Up [facility] NEUROLOGY    Arrive 15 minutes prior to appointment.    Sep 25, 2017  9:00 AM EDT Acacia Lobato DO Physical [facility] INTERNAL MEDICINE AND ENDOCRINOLOGY [place]    Arrive 15 minutes prior to appointment.        [facility] OUTPATIENT ONCOLOGY  [facility]  [address]  [address]  [facility] [id number] [phone] [facility] HEMATOLOGY  AND ONCOLOGY  [place]  [address]  [facility] [id number] [phone] [facility] NEUROLOGY  [address]  [facility] [id number] [phone]        [facility] [place] INTERNAL MEDICINE AND ENDOCRINOLOGY [place]  [address]  [facility] [id number] [phone]            Vital Signs     Blood Pressure Pulse Temperature Respirations Height Weight    150/74 82 97.8 °F (36.6 °C) (Temporal Artery ) 24 60\" (152.4 cm) 304 lb (138 kg)    Oxygen Saturation Body Mass Index Smoking Status             100% 59.37 kg/m2 Never Smoker         Problems and Diagnoses Noted     Iron deficiency anemia    Iron deficiency anemia due to chronic blood loss        "

## 2017-01-17 NOTE — LETTER
January 17, 2017     No Recipients    Patient: Nury Pineda   YOB: 1980   Date of Visit: 1/17/2017       Dear Dr. Ragsdale Recipients:    Nury Pineda was in my office today. Below is a copy of my note.    If you have questions, please do not hesitate to call me. I look forward to following Nury along with you.         Sincerely,        Vick Swanson MD        CC: No Recipients    CHIEF COMPLAINT: Microcytic anemia   REASON FOR REFERRAL: Same      RECORDS OBTAINED  Records of the patients history including those obtained from  Acacia Lobato were reviewed and summarized in detail.    HISTORY OF PRESENT ILLNESS:  The patient is a 36 y.o.  female, referred for microcytic anemia.  She's been feeling fine and says she's been anemic her entire adult life as far back as she can remember.  She hasn't a 3 day menstrual cycle.  The first day as light the second is medium in the third day as light.  He is tried oral iron in the past along with MiraLAX and despite that she became obstipated.On 1/5/17 her hemoglobin was 10.3 with MCV of 69.8.  On 3/22/16 her hemoglobin was 9.6 with an MCV of 71.2.  On 1/5/17, her iron was low at 11 with an iron saturation of 3 and a ferritin on the lower end of the range of normal at 39 with a normal total iron binding capacity 351.  Her methylmalonic acid level was normal as was her B12 level.  Her CMP was normal.  Her differential is normal.  Denies any change in the color caliber consistency of her stools.  Had a colonoscopy last year that was benign according to the patient.  She has a history of complicated migraines hard to control with asymptomatic anemia of iron deficiency and intolerance to oral iron and hence comes for discussion of IV iron.    REVIEW OF SYSTEMS:  A 14 point review of systems was performed and is negative except as noted above.    Past Medical History   Diagnosis Date   • Absolute anemia    • Carpal tunnel syndrome      RIGHT WRIST   • Cholelithiasis     Nausea related to stones has phenergan. Also has related RUQ pain.   • Dizziness    • Elevated C-reactive protein (CRP)    • Elevated erythrocyte sedimentation rate    • Influenza    • Iron deficiency      Will not take iron supplement but will take MVI   • Left hip pain    • Numbness and tingling in right hand      Has nerve conductions 3/23/2016, awaiting results. Along with Paresthesia.   • Pain and swelling of right forearm      Check u/s to r/o clot. Rash and redness are resolving, will continue to monitor.   • Pain of left arm    • Sleep disturbances    • Vitamin D deficiency      25 oh.     Past Surgical History   Procedure Laterality Date   • Tonsillectomy     • Urachal cyst incision       History of Excision Of Urachal Cyst.   •  section     • Cholecystectomy     • Cyst removal       behind belly button   • Colonoscopy         Current Outpatient Prescriptions on File Prior to Visit   Medication Sig Dispense Refill   • aspirin 81 MG tablet Take 81 mg by mouth Daily.     • montelukast (SINGULAIR) 10 MG tablet Take 1 tablet by mouth every night. 30 tablet 0   • SUMAtriptan (IMITREX) 50 MG tablet Take 1 tablet by mouth 1 (One) Time As Needed for migraine for up to 1 dose. May repeat dose one time in 2 hours if headache not relieved. 9 tablet 5   • topiramate (TOPAMAX) 50 MG tablet Take 1 tablet by mouth Every Night. 30 tablet 5   • vitamin D (ERGOCALCIFEROL) 55407 UNITS capsule capsule Take 50,000 Units by mouth 1 (One) Time Per Week.       No current facility-administered medications on file prior to visit.        Allergies   Allergen Reactions   • Amoxicillin      TABS.   • Penicillins        Social History     Social History   • Marital status: Single     Spouse name: N/A   • Number of children: N/A   • Years of education: N/A     Social History Main Topics   • Smoking status: Never Smoker   • Smokeless tobacco: Never Used   • Alcohol use 1.2 oz/week     2 Glasses of wine per week  "  • Drug use: No   • Sexual activity: Not Currently     Other Topics Concern   • None     Social History Narrative    single       Family History   Problem Relation Age of Onset   • Stroke Mother    • Hypertension Mother    • Migraines Mother    • Osteoarthritis Mother    • Cervical cancer Mother    • Heart disease Mother    • Hyperlipidemia Maternal Grandmother    • Osteoarthritis Maternal Grandmother    • Birth defects Maternal Grandmother    • Ovarian cancer Maternal Grandmother      PREMENOPAUSAL    • Stroke Other      CVA x 2. and TIA   • Cancer Other      Cervical, malignant neoplasm x2 , ovarian   • Hypertension Other    • Migraines Other    • Osteoarthritis Other    • Transient ischemic attack Other        PHYSICAL EXAM:    Visit Vitals   • /74   • Pulse 82   • Temp 97.8 °F (36.6 °C) (Temporal Artery )   • Resp 24   • Ht 60\" (152.4 cm)   • Wt (!) 304 lb (138 kg)   • SpO2 100%   • BMI 59.37 kg/m2       ECOG: (0) Fully active, able to carry on all predisease performance without restriction  General: well appearing obese black female in no acute distress  HEENT: sclera anicteric, oropharynx clear  Lymphatics: no cervical, supraclavicular, inguinal, or axillary adenopathy  Cardiovascular: regular rate and rhythm, no murmurs  Neck: Supple; No thyromegaly  Lungs: clear to auscultation bilaterally. No respiratory distress.   Abdomen: soft, nontender, nondistended.  No palpable organomegaly  Extremities: no cyanosis, clubbing, edema, or cords  Skin: no rashes, lesions, bruising, or petechiae  Neuro: Alert and oriented x 4; Moving all extremities.  Psych: No anxiety or depression        Assessment/Plan     1. Microcytic anemia   2. Iron deficiency   3. Migraines     Discussion: We discussed the 1% risk of anaphylaxis from IV iron.  There is no such risk with oral iron but oral iron definitively and quite symptomatically caused obstipation despite MiraLAX.  Her hemoglobin is modestly low but she is completely " asymptomatic for that and given there is a minor risk of anaphylaxis from IV iron I'll emphatically made her aware of this potential life-threatening albeit rare risk.  Nonetheless she is significantly iron deficient and her neurologist and primary care feel that might improve the migraine side of life if we were able to bring her hemoglobin up a bit.  The degree of iron deficiency is a little unusual given the modest menses.  It is reassuring that she's had an endoscopy of her colon.  We will give her Feraheme day 1 and day 8 and repeat her blood counts and iron indices and a couple of months and if her iron stores are replete but her anemia and microcytosis does not respond, then we may need to look for hemoglobinopathies such as thalassemia that could cause this degree of microcytosis with mild anemia.    Errors in dictation may reflect use of voice recognition software and not all errors in transcription may have been detected prior to signing.    Vick Swanson MD    1/17/2017

## 2017-01-17 NOTE — PROGRESS NOTES
CHIEF COMPLAINT: Microcytic anemia   REASON FOR REFERRAL: Same      RECORDS OBTAINED  Records of the patients history including those obtained from  Acacia Lobato were reviewed and summarized in detail.    HISTORY OF PRESENT ILLNESS:  The patient is a 36 y.o.  female, referred for microcytic anemia.  She's been feeling fine and says she's been anemic her entire adult life as far back as she can remember.  She hasn't a 3 day menstrual cycle.  The first day as light the second is medium in the third day as light.  He is tried oral iron in the past along with MiraLAX and despite that she became obstipated.On 1/5/17 her hemoglobin was 10.3 with MCV of 69.8.  On 3/22/16 her hemoglobin was 9.6 with an MCV of 71.2.  On 1/5/17, her iron was low at 11 with an iron saturation of 3 and a ferritin on the lower end of the range of normal at 39 with a normal total iron binding capacity 351.  Her methylmalonic acid level was normal as was her B12 level.  Her CMP was normal.  Her differential is normal.  Denies any change in the color caliber consistency of her stools.  Had a colonoscopy last year that was benign according to the patient.  She has a history of complicated migraines hard to control with asymptomatic anemia of iron deficiency and intolerance to oral iron and hence comes for discussion of IV iron.    REVIEW OF SYSTEMS:  A 14 point review of systems was performed and is negative except as noted above.    Past Medical History   Diagnosis Date   • Absolute anemia    • Carpal tunnel syndrome      RIGHT WRIST   • Cholelithiasis      Nausea related to stones has phenergan. Also has related RUQ pain.   • Dizziness    • Elevated C-reactive protein (CRP)    • Elevated erythrocyte sedimentation rate    • Influenza    • Iron deficiency      Will not take iron supplement but will take MVI   • Left hip pain    • Numbness and tingling in right hand      Has nerve conductions 3/23/2016, awaiting results. Along with Paresthesia.    • Pain and swelling of right forearm      Check u/s to r/o clot. Rash and redness are resolving, will continue to monitor.   • Pain of left arm    • Sleep disturbances    • Vitamin D deficiency      25 oh.     Past Surgical History   Procedure Laterality Date   • Tonsillectomy     • Urachal cyst incision       History of Excision Of Urachal Cyst.   •  section     • Cholecystectomy     • Cyst removal       behind belly button   • Colonoscopy         Current Outpatient Prescriptions on File Prior to Visit   Medication Sig Dispense Refill   • aspirin 81 MG tablet Take 81 mg by mouth Daily.     • montelukast (SINGULAIR) 10 MG tablet Take 1 tablet by mouth every night. 30 tablet 0   • SUMAtriptan (IMITREX) 50 MG tablet Take 1 tablet by mouth 1 (One) Time As Needed for migraine for up to 1 dose. May repeat dose one time in 2 hours if headache not relieved. 9 tablet 5   • topiramate (TOPAMAX) 50 MG tablet Take 1 tablet by mouth Every Night. 30 tablet 5   • vitamin D (ERGOCALCIFEROL) 18239 UNITS capsule capsule Take 50,000 Units by mouth 1 (One) Time Per Week.       No current facility-administered medications on file prior to visit.        Allergies   Allergen Reactions   • Amoxicillin      TABS.   • Penicillins        Social History     Social History   • Marital status: Single     Spouse name: N/A   • Number of children: N/A   • Years of education: N/A     Social History Main Topics   • Smoking status: Never Smoker   • Smokeless tobacco: Never Used   • Alcohol use 1.2 oz/week     2 Glasses of wine per week   • Drug use: No   • Sexual activity: Not Currently     Other Topics Concern   • None     Social History Narrative    single       Family History   Problem Relation Age of Onset   • Stroke Mother    • Hypertension Mother    • Migraines Mother    • Osteoarthritis Mother    • Cervical cancer Mother    • Heart disease Mother    • Hyperlipidemia Maternal Grandmother    • Osteoarthritis  "Maternal Grandmother    • Birth defects Maternal Grandmother    • Ovarian cancer Maternal Grandmother      PREMENOPAUSAL    • Stroke Other      CVA x 2. and TIA   • Cancer Other      Cervical, malignant neoplasm x2 , ovarian   • Hypertension Other    • Migraines Other    • Osteoarthritis Other    • Transient ischemic attack Other        PHYSICAL EXAM:    Visit Vitals   • /74   • Pulse 82   • Temp 97.8 °F (36.6 °C) (Temporal Artery )   • Resp 24   • Ht 60\" (152.4 cm)   • Wt (!) 304 lb (138 kg)   • SpO2 100%   • BMI 59.37 kg/m2       ECOG: (0) Fully active, able to carry on all predisease performance without restriction  General: well appearing obese black female in no acute distress  HEENT: sclera anicteric, oropharynx clear  Lymphatics: no cervical, supraclavicular, inguinal, or axillary adenopathy  Cardiovascular: regular rate and rhythm, no murmurs  Neck: Supple; No thyromegaly  Lungs: clear to auscultation bilaterally. No respiratory distress.   Abdomen: soft, nontender, nondistended.  No palpable organomegaly  Extremities: no cyanosis, clubbing, edema, or cords  Skin: no rashes, lesions, bruising, or petechiae  Neuro: Alert and oriented x 4; Moving all extremities.  Psych: No anxiety or depression        Assessment/Plan     1. Microcytic anemia   2. Iron deficiency   3. Migraines     Discussion: We discussed the 1% risk of anaphylaxis from IV iron.  There is no such risk with oral iron but oral iron definitively and quite symptomatically caused obstipation despite MiraLAX.  Her hemoglobin is modestly low but she is completely asymptomatic for that and given there is a minor risk of anaphylaxis from IV iron I'll emphatically made her aware of this potential life-threatening albeit rare risk.  Nonetheless she is significantly iron deficient and her neurologist and primary care feel that might improve the migraine side of life if we were able to bring her hemoglobin up a bit.  The degree of iron deficiency is " a little unusual given the modest menses.  It is reassuring that she's had an endoscopy of her colon.  We will give her Feraheme day 1 and day 8 and repeat her blood counts and iron indices and a couple of months and if her iron stores are replete but her anemia and microcytosis does not respond, then we may need to look for hemoglobinopathies such as thalassemia that could cause this degree of microcytosis with mild anemia.    Errors in dictation may reflect use of voice recognition software and not all errors in transcription may have been detected prior to signing.    Vick Swanson MD    1/17/2017

## 2017-01-18 DIAGNOSIS — M25.561 ACUTE PAIN OF RIGHT KNEE: Primary | ICD-10-CM

## 2017-01-24 ENCOUNTER — INFUSION (OUTPATIENT)
Dept: ONCOLOGY | Facility: HOSPITAL | Age: 37
End: 2017-01-24

## 2017-01-24 VITALS
HEIGHT: 60 IN | BODY MASS INDEX: 57.52 KG/M2 | TEMPERATURE: 97 F | WEIGHT: 293 LBS | HEART RATE: 80 BPM | SYSTOLIC BLOOD PRESSURE: 123 MMHG | DIASTOLIC BLOOD PRESSURE: 63 MMHG | RESPIRATION RATE: 20 BRPM

## 2017-01-24 DIAGNOSIS — D50.0 IRON DEFICIENCY ANEMIA DUE TO CHRONIC BLOOD LOSS: Primary | ICD-10-CM

## 2017-01-24 PROCEDURE — 25010000002 FERUMOXYTOL 510 MG/17ML SOLUTION 510 MG VIAL: Performed by: INTERNAL MEDICINE

## 2017-01-24 PROCEDURE — 96374 THER/PROPH/DIAG INJ IV PUSH: CPT

## 2017-01-24 RX ORDER — SODIUM CHLORIDE 9 MG/ML
250 INJECTION, SOLUTION INTRAVENOUS ONCE
Status: COMPLETED | OUTPATIENT
Start: 2017-01-24 | End: 2017-01-24

## 2017-01-24 RX ADMIN — FERUMOXYTOL 510 MG: 510 INJECTION INTRAVENOUS at 08:50

## 2017-01-24 RX ADMIN — SODIUM CHLORIDE 250 ML: 9 INJECTION, SOLUTION INTRAVENOUS at 08:50

## 2017-01-31 ENCOUNTER — INFUSION (OUTPATIENT)
Dept: ONCOLOGY | Facility: HOSPITAL | Age: 37
End: 2017-01-31

## 2017-01-31 ENCOUNTER — APPOINTMENT (OUTPATIENT)
Dept: ONCOLOGY | Facility: HOSPITAL | Age: 37
End: 2017-01-31

## 2017-01-31 VITALS
DIASTOLIC BLOOD PRESSURE: 75 MMHG | HEART RATE: 73 BPM | RESPIRATION RATE: 18 BRPM | TEMPERATURE: 97.1 F | SYSTOLIC BLOOD PRESSURE: 134 MMHG

## 2017-01-31 DIAGNOSIS — D50.0 IRON DEFICIENCY ANEMIA DUE TO CHRONIC BLOOD LOSS: Primary | ICD-10-CM

## 2017-01-31 PROCEDURE — 25010000002 FERUMOXYTOL 510 MG/17ML SOLUTION 510 MG VIAL: Performed by: INTERNAL MEDICINE

## 2017-01-31 PROCEDURE — 96374 THER/PROPH/DIAG INJ IV PUSH: CPT

## 2017-01-31 RX ADMIN — FERUMOXYTOL 510 MG: 510 INJECTION INTRAVENOUS at 08:56

## 2017-02-28 ENCOUNTER — TELEPHONE (OUTPATIENT)
Dept: OBSTETRICS AND GYNECOLOGY | Facility: CLINIC | Age: 37
End: 2017-02-28

## 2017-02-28 RX ORDER — PROMETHAZINE HYDROCHLORIDE 25 MG/1
25 TABLET ORAL EVERY 6 HOURS PRN
Qty: 12 TABLET | Refills: 0 | OUTPATIENT
Start: 2017-02-28 | End: 2017-04-06

## 2017-02-28 NOTE — TELEPHONE ENCOUNTER
Former Dr. Cotter patient  [phone] Patient called stating she woke up this morning with nausea and vomiting not sure of last menstrual period. She will call back when she is feeling a little better to schedule an appointment with Dr. Hughes. Pharmacy [facility] [address] Phenergan 25mg Q 6 hrs as needed for nausea #12 no refills

## 2017-03-13 ENCOUNTER — TELEPHONE (OUTPATIENT)
Dept: OBSTETRICS AND GYNECOLOGY | Facility: CLINIC | Age: 37
End: 2017-03-13

## 2017-03-13 DIAGNOSIS — B37.9 YEAST INFECTION: Primary | ICD-10-CM

## 2017-03-13 RX ORDER — FLUCONAZOLE 150 MG/1
150 TABLET ORAL DAILY
Qty: 1 TABLET | Refills: 0 | Status: SHIPPED | OUTPATIENT
Start: 2017-03-13 | End: 2017-03-21
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
## 2017-03-21 ENCOUNTER — LAB (OUTPATIENT)
Dept: LAB | Facility: HOSPITAL | Age: 37
End: 2017-03-21

## 2017-03-21 ENCOUNTER — OFFICE VISIT (OUTPATIENT)
Dept: ONCOLOGY | Facility: CLINIC | Age: 37
End: 2017-03-21

## 2017-03-21 VITALS
DIASTOLIC BLOOD PRESSURE: 70 MMHG | BODY MASS INDEX: 57.52 KG/M2 | HEIGHT: 60 IN | SYSTOLIC BLOOD PRESSURE: 116 MMHG | HEART RATE: 77 BPM | RESPIRATION RATE: 18 BRPM | TEMPERATURE: 97.1 F | WEIGHT: 293 LBS

## 2017-03-21 DIAGNOSIS — D50.0 IRON DEFICIENCY ANEMIA DUE TO CHRONIC BLOOD LOSS: Primary | ICD-10-CM

## 2017-03-21 DIAGNOSIS — D50.0 IRON DEFICIENCY ANEMIA DUE TO CHRONIC BLOOD LOSS: ICD-10-CM

## 2017-03-21 LAB
ALBUMIN SERPL-MCNC: 4.1 G/DL (ref 3.2–4.8)
ALBUMIN/GLOB SERPL: 1.2 G/DL (ref 1.5–2.5)
ALP SERPL-CCNC: 80 U/L (ref 25–100)
ALT SERPL W P-5'-P-CCNC: 17 U/L (ref 7–40)
ANION GAP SERPL CALCULATED.3IONS-SCNC: 0 MMOL/L (ref 3–11)
AST SERPL-CCNC: 18 U/L (ref 0–33)
BILIRUB SERPL-MCNC: 0.5 MG/DL (ref 0.3–1.2)
BUN BLD-MCNC: 9 MG/DL (ref 9–23)
BUN/CREAT SERPL: 15 (ref 7–25)
CALCIUM SPEC-SCNC: 9.8 MG/DL (ref 8.7–10.4)
CHLORIDE SERPL-SCNC: 105 MMOL/L (ref 99–109)
CO2 SERPL-SCNC: 34 MMOL/L (ref 20–31)
CREAT BLD-MCNC: 0.6 MG/DL (ref 0.6–1.3)
ERYTHROCYTE [DISTWIDTH] IN BLOOD BY AUTOMATED COUNT: 23.1 % (ref 11.3–14.5)
FERRITIN SERPL-MCNC: 262 NG/ML (ref 10–291)
GFR SERPL CREATININE-BSD FRML MDRD: 138 ML/MIN/1.73
GLOBULIN UR ELPH-MCNC: 3.3 GM/DL
GLUCOSE BLD-MCNC: 93 MG/DL (ref 70–100)
HCT VFR BLD AUTO: 34.7 % (ref 34.5–44)
HGB BLD-MCNC: 10.8 G/DL (ref 11.5–15.5)
IRON 24H UR-MRATE: 42 MCG/DL (ref 50–175)
IRON 24H UR-MRATE: 42 MCG/DL (ref 50–175)
IRON SATN MFR SERPL: 16 % (ref 15–50)
IRON SATN SFR SERPL: 16.41 % (ref 15–50)
LYMPHOCYTES # BLD AUTO: 2.6 10*3/MM3 (ref 0.6–4.8)
LYMPHOCYTES NFR BLD AUTO: 39.2 % (ref 24–44)
MCH RBC QN AUTO: 23 PG (ref 27–31)
MCHC RBC AUTO-ENTMCNC: 31.1 G/DL (ref 32–36)
MCV RBC AUTO: 73.7 FL (ref 80–99)
MONOCYTES # BLD AUTO: 0.3 10*3/MM3 (ref 0–1)
MONOCYTES NFR BLD AUTO: 5.1 % (ref 0–12)
NEUTROPHILS # BLD AUTO: 3.7 10*3/MM3 (ref 1.5–8.3)
NEUTROPHILS NFR BLD AUTO: 55.7 % (ref 41–71)
PLATELET # BLD AUTO: 257 10*3/MM3 (ref 150–450)
PMV BLD AUTO: 9.3 FL (ref 6–12)
POTASSIUM BLD-SCNC: 4.4 MMOL/L (ref 3.5–5.5)
PROT SERPL-MCNC: 7.4 G/DL (ref 5.7–8.2)
RBC # BLD AUTO: 4.71 10*6/MM3 (ref 3.89–5.14)
SODIUM BLD-SCNC: 139 MMOL/L (ref 132–146)
TIBC SERPL-MCNC: 255 MCG/DL (ref 250–450)
TIBC SERPL-MCNC: 256 MCG/DL (ref 250–450)
WBC NRBC COR # BLD: 6.6 10*3/MM3 (ref 3.5–10.8)

## 2017-03-21 PROCEDURE — 83540 ASSAY OF IRON: CPT | Performed by: INTERNAL MEDICINE

## 2017-03-21 PROCEDURE — 99213 OFFICE O/P EST LOW 20 MIN: CPT | Performed by: INTERNAL MEDICINE

## 2017-03-21 PROCEDURE — 36415 COLL VENOUS BLD VENIPUNCTURE: CPT

## 2017-03-21 PROCEDURE — 83550 IRON BINDING TEST: CPT | Performed by: INTERNAL MEDICINE

## 2017-03-21 PROCEDURE — 85025 COMPLETE CBC W/AUTO DIFF WBC: CPT

## 2017-03-21 PROCEDURE — 82728 ASSAY OF FERRITIN: CPT | Performed by: INTERNAL MEDICINE

## 2017-03-21 PROCEDURE — 80053 COMPREHEN METABOLIC PANEL: CPT | Performed by: INTERNAL MEDICINE

## 2017-03-21 NOTE — PROGRESS NOTES
CHIEF COMPLAINT: Microcytic anemia    Problem List:  1.)  Microcytic anemia:  · Hemoglobin 10.3 on 17 with MCV of 70 iron low at 11 with iron saturation low at 3% and ferritin on the low end of normal at 39 with normal total iron binding capacity of 351 and normal B12 495 and normal methylmalonic acid level of 135.  Received Feraheme on  and 17 without complication.  CBC on 3/21/17 10.8    HISTORY OF PRESENT ILLNESS:  The patient is a 36 y.o. female, here for follow up on management of iron deficiency anemia.  She had a period just before her first dose of Feraheme and then had 1 immediately following the first dose of Feraheme and then again following the second week of Feraheme.  Since then her periods are back to being regular.  Usually they just last 3 days.  Past Medical History   Diagnosis Date   • Absolute anemia    • Carpal tunnel syndrome      RIGHT WRIST   • Cholelithiasis      Nausea related to stones has phenergan. Also has related RUQ pain.   • Dizziness    • Elevated C-reactive protein (CRP)    • Elevated erythrocyte sedimentation rate    • Influenza    • Iron deficiency      Will not take iron supplement but will take MVI   • Left hip pain    • Numbness and tingling in right hand      Has nerve conductions 3/23/2016, awaiting results. Along with Paresthesia.   • Pain and swelling of right forearm      Check u/s to r/o clot. Rash and redness are resolving, will continue to monitor.   • Pain of left arm    • Sleep disturbances    • Vitamin D deficiency      25 oh.     Past Surgical History   Procedure Laterality Date   • Tonsillectomy     • Urachal cyst incision       History of Excision Of Urachal Cyst.   •  section     • Cholecystectomy     • Cyst removal       behind belly button   • Colonoscopy  2016       Allergies   Allergen Reactions   • Amoxicillin      TABS.   • Penicillins        Family History and Social History reviewed and changed as  "necessary      REVIEW OF SYSTEM:   Review of Systems   Constitutional: Negative for appetite change, chills, diaphoresis, fatigue, fever and unexpected weight change.   HENT:   Negative for mouth sores, sore throat and trouble swallowing.    Eyes: Negative for icterus.   Respiratory: Negative for cough, hemoptysis and shortness of breath.    Cardiovascular: Negative for chest pain, leg swelling and palpitations.   Gastrointestinal: Negative for abdominal distention, abdominal pain, blood in stool, constipation, diarrhea, nausea and vomiting.   Endocrine: Negative for hot flashes.   Genitourinary: Negative for bladder incontinence, difficulty urinating, dysuria, frequency and hematuria.    Musculoskeletal: Negative for gait problem, neck pain and neck stiffness.   Skin: Negative for rash.   Neurological: Negative for dizziness, gait problem, headaches, light-headedness and numbness.   Hematological: Negative for adenopathy. Does not bruise/bleed easily.   Psychiatric/Behavioral: Negative for depression. The patient is not nervous/anxious.    All other systems reviewed and are negative.       PHYSICAL EXAM    Vitals:    03/21/17 1023   BP: 116/70   Pulse: 77   Resp: 18   Temp: 97.1 °F (36.2 °C)   Weight: (!) 301 lb (137 kg)   Height: 60\" (152.4 cm)     Constitutional: Appears well-developed and well-nourished. No distress.   ECOG: (1) Restricted in physically strenuous activity, ambulatory and able to do work of light nature  HENT:   Head: Normocephalic.   Mouth/Throat: Oropharynx is clear and moist.   Eyes: Conjunctivae are normal. Pupils are equal, round, and reactive to light. No scleral icterus.   Neck: Neck supple. No JVD present. No thyromegaly present.   Cardiovascular: Normal rate, regular rhythm and normal heart sounds.    Pulmonary/Chest: Breath sounds normal. No respiratory distress.   Abdominal: Soft. Exhibits no distension and no mass. There is no hepatosplenomegaly. There is no tenderness. There is no " rebound and no guarding.   Musculoskeletal:Exhibits no edema, tenderness or deformity.   Neurological: Alert and oriented to person, place, and time. Exhibits normal muscle tone.   Skin: No ecchymosis, no petechiae and no rash noted. Not diaphoretic. No cyanosis. Nails show no clubbing.   Psychiatric: Normal mood and affect.   Vitals reviewed.      Lab on 03/21/2017   Component Date Value Ref Range Status   • WBC 03/21/2017 6.60  3.50 - 10.80 10*3/mm3 Final   • RBC 03/21/2017 4.71  3.89 - 5.14 10*6/mm3 Final   • Hemoglobin 03/21/2017 10.8* 11.5 - 15.5 g/dL Final   • Hematocrit 03/21/2017 34.7  34.5 - 44.0 % Final   • RDW 03/21/2017 23.1* 11.3 - 14.5 % Final   • MCV 03/21/2017 73.7* 80.0 - 99.0 fL Final   • MCH 03/21/2017 23.0* 27.0 - 31.0 pg Final   • MCHC 03/21/2017 31.1* 32.0 - 36.0 g/dL Final   • MPV 03/21/2017 9.3  6.0 - 12.0 fL Final   • Platelets 03/21/2017 257  150 - 450 10*3/mm3 Final   • Neutrophil % 03/21/2017 55.7  41.0 - 71.0 % Final   • Lymphocyte % 03/21/2017 39.2  24.0 - 44.0 % Final   • Monocyte % 03/21/2017 5.1  0.0 - 12.0 % Final   • Neutrophils, Absolute 03/21/2017 3.70  1.50 - 8.30 10*3/mm3 Final   • Lymphocytes, Absolute 03/21/2017 2.60  0.60 - 4.80 10*3/mm3 Final   • Monocytes, Absolute 03/21/2017 0.30  0.00 - 1.00 10*3/mm3 Final       Assessment/Plan     1.  Iron deficiency anemia  2. Migraine headaches    Discussion: She will continue to follow with neurology regarding her migraines.  I'm going to get her iron indices today and given her MCV is still 74 albeit that's better than it was previously and the hemoglobin is a little higher, if her iron levels have not started to come up then we may will need to evaluate for hemoglobinopathies and thalassemia..  She states that her periods are not particularly heavy and usually only last about 3 days.  However, some of the benefit of Feraheme may have been blunted by the extra days of menses following her Feraheme.  I'm not aware of this being a  side effect of Feraheme.       Errors in dictation may reflect use of voice recognition software and not all errors in transcription may have been detected prior to signing.    Vick Swanson MD    03/21/2017

## 2017-03-22 PROCEDURE — 99283 EMERGENCY DEPT VISIT LOW MDM: CPT

## 2017-03-23 ENCOUNTER — HOSPITAL ENCOUNTER (EMERGENCY)
Facility: HOSPITAL | Age: 37
Discharge: HOME | End: 2017-03-23
Attending: EMERGENCY MEDICINE | Admitting: EMERGENCY MEDICINE

## 2017-03-23 VITALS
HEART RATE: 77 BPM | DIASTOLIC BLOOD PRESSURE: 51 MMHG | HEIGHT: 61 IN | SYSTOLIC BLOOD PRESSURE: 100 MMHG | TEMPERATURE: 97.5 F | BODY MASS INDEX: 55.32 KG/M2 | OXYGEN SATURATION: 99 % | WEIGHT: 293 LBS | RESPIRATION RATE: 18 BRPM

## 2017-03-23 DIAGNOSIS — M25.561 ACUTE PAIN OF RIGHT KNEE: Primary | ICD-10-CM

## 2017-03-23 RX ORDER — TRAMADOL HYDROCHLORIDE 50 MG/1
50 TABLET ORAL EVERY 6 HOURS PRN
Qty: 20 TABLET | Refills: 0 | Status: SHIPPED | OUTPATIENT
Start: 2017-03-23 | End: 2017-04-06

## 2017-03-23 NOTE — ED PROVIDER NOTES
Subjective   HPI Comments: 36 y.o. female presents to the ED w/ c/o leg pain and swelling for 3 months. Pt has had pain and swelling behind the right knee which has been constant since onset. She has had gradually worsening pain behind and around the knee with increased surrounding swelling. Pt has been seen by PCP for this with negative xrays. Seen at [facility] today and advised to present to the ED for ultrasound.    Pt works as a  and is on her feet most of the day.    No history of DVT/PE. Pt is not on BCP. No recent immobilizations.    Patient is a 36 y.o. female presenting with lower extremity pain.   History provided by:  Patient  Lower Extremity Issue   Location:  Knee  Time since incident:  3 months  Injury: no    Knee location:  R knee  Pain details:     Radiates to:  Does not radiate    Severity:  Moderate    Onset quality:  Gradual    Duration:  3 months    Timing:  Constant    Progression:  Worsening  Chronicity:  New  Dislocation: no    Prior injury to area:  No  Associated symptoms: swelling    Associated symptoms: no fever, no muscle weakness, no numbness and no tingling    Risk factors: obesity        Review of Systems   Constitutional: Negative for chills and fever.   Musculoskeletal: Positive for arthralgias and joint swelling.   All other systems reviewed and are negative.      Past Medical History:   Diagnosis Date   • Absolute anemia    • Carpal tunnel syndrome     RIGHT WRIST   • Cholelithiasis     Nausea related to stones has phenergan. Also has related RUQ pain.   • Dizziness    • Elevated C-reactive protein (CRP)    • Elevated erythrocyte sedimentation rate    • Influenza    • Iron deficiency     Will not take iron supplement but will take MVI   • Left hip pain    • Numbness and tingling in right hand     Has nerve conductions 3/23/2016, awaiting results. Along with Paresthesia.   • Pain and swelling of right forearm     Check u/s to r/o clot. Rash and redness are resolving, will  continue to monitor.   • Pain of left arm    • Sleep disturbances    • Vitamin D deficiency     25 oh.       Allergies   Allergen Reactions   • Amoxicillin      TABS.   • Penicillins        Past Surgical History:   Procedure Laterality Date   •  SECTION     • CHOLECYSTECTOMY     • COLONOSCOPY  2016   • CYST REMOVAL      behind belly button   • TONSILLECTOMY     • URACHAL CYST INCISION      History of Excision Of Urachal Cyst.       Family History   Problem Relation Age of Onset   • Stroke Mother    • Hypertension Mother    • Migraines Mother    • Osteoarthritis Mother    • Cervical cancer Mother    • Heart disease Mother    • Hyperlipidemia Maternal Grandmother    • Osteoarthritis Maternal Grandmother    • Birth defects Maternal Grandmother    • Ovarian cancer Maternal Grandmother      PREMENOPAUSAL    • Stroke Other      CVA x 2. and TIA   • Cancer Other      Cervical, malignant neoplasm x2 , ovarian   • Hypertension Other    • Migraines Other    • Osteoarthritis Other    • Transient ischemic attack Other        Social History     Social History   • Marital status: Single     Spouse name: N/A   • Number of children: N/A   • Years of education: N/A     Social History Main Topics   • Smoking status: Never Smoker   • Smokeless tobacco: Never Used   • Alcohol use 1.2 oz/week     2 Glasses of wine per week   • Drug use: No   • Sexual activity: Not Currently     Other Topics Concern   • None     Social History Narrative    single         Objective   Physical Exam   Constitutional: She is oriented to person, place, and time. She appears well-developed and well-nourished.   HENT:   Head: Normocephalic and atraumatic.   Eyes: Conjunctivae are normal. Pupils are equal, round, and reactive to light.   Neck: Normal range of motion. Neck supple.   Cardiovascular: Normal rate, regular rhythm and normal heart sounds.    Pulmonary/Chest: Effort normal and breath sounds normal. No respiratory distress. She  "exhibits no tenderness.   Abdominal: Soft. There is no tenderness.   Musculoskeletal: She exhibits edema (1+ BLE with equal calf sizes) and tenderness (Tenderness over the medial and lateral right knee at areas of tendon attachment.).   No palpable cords over popliteal region or posterior calf.   Neurological: She is alert and oriented to person, place, and time.   Skin: Skin is warm and dry.   Nursing note and vitals reviewed.      Procedures  Bedside Ultrasound  Date/Time: 1:29 AM  Performed by: Fabian Castillo MD  Authorized by: Fabian Castillo MD    Procedure details:  Bedside ultrasound reveals easily compressible popliteal and femoral veins with good flow. No Slater cysts.         ED Course  ED Course       No results found for this or any previous visit (from the past 24 hour(s)).  Note: In addition to lab results from this visit, the labs listed above may include labs taken at another facility or during a different encounter within the last 24 hours. Please correlate lab times with ED admission and discharge times for further clarification of the services performed during this visit.    No orders to display     Vitals:    03/22/17 2131 03/23/17 0033   BP: 171/84 121/64   BP Location: Left arm    Patient Position: Sitting    Pulse: 80 77   Resp: 16 18   Temp: 97.5 °F (36.4 °C)    TempSrc: Oral    SpO2: 100% 99%   Weight: 300 lb (136 kg)    Height: 61\" (154.9 cm)      Medications - No data to display  ECG/EMG Results (last 24 hours)     ** No results found for the last 24 hours. **                      MDM  Number of Diagnoses or Management Options  Acute pain of right knee: new and requires workup  Diagnosis management comments: Patient advised to decrease weight bearing on right knee, due to concern for arthritis as contributor.     Bedside US showed easily compressible right femoral and popliteal veins, good flow.     DC home with ultram for pain.        Amount and/or Complexity of Data " Reviewed  Review and summarize past medical records: yes  Independent visualization of images, tracings, or specimens: yes    Patient Progress  Patient progress: stable      Final diagnoses:   Acute pain of right knee       Documentation assistance provided by grady Stringer.  Information recorded by the scribe was done at my direction and has been verified and validated by me.     Sylvester Stringer  03/23/17 0141       Fabian Castillo MD  03/23/17 0203

## 2017-03-28 ENCOUNTER — OFFICE VISIT (OUTPATIENT)
Dept: ONCOLOGY | Facility: CLINIC | Age: 37
End: 2017-03-28

## 2017-03-28 ENCOUNTER — LAB (OUTPATIENT)
Dept: LAB | Facility: HOSPITAL | Age: 37
End: 2017-03-28

## 2017-03-28 VITALS
HEIGHT: 61 IN | SYSTOLIC BLOOD PRESSURE: 134 MMHG | RESPIRATION RATE: 16 BRPM | HEART RATE: 78 BPM | WEIGHT: 293 LBS | DIASTOLIC BLOOD PRESSURE: 74 MMHG | BODY MASS INDEX: 55.32 KG/M2 | TEMPERATURE: 97.8 F

## 2017-03-28 DIAGNOSIS — D50.0 IRON DEFICIENCY ANEMIA DUE TO CHRONIC BLOOD LOSS: ICD-10-CM

## 2017-03-28 DIAGNOSIS — G89.29 CHRONIC PAIN OF RIGHT KNEE: Primary | ICD-10-CM

## 2017-03-28 DIAGNOSIS — M25.561 CHRONIC PAIN OF RIGHT KNEE: Primary | ICD-10-CM

## 2017-03-28 PROBLEM — M25.569 KNEE PAIN: Status: ACTIVE | Noted: 2017-03-28

## 2017-03-28 LAB
FERRITIN SERPL-MCNC: 219 NG/ML (ref 10–291)
IRON 24H UR-MRATE: 43 MCG/DL (ref 50–175)
IRON SATN MFR SERPL: 16 % (ref 15–50)
TIBC SERPL-MCNC: 272 MCG/DL (ref 250–450)

## 2017-03-28 PROCEDURE — 36415 COLL VENOUS BLD VENIPUNCTURE: CPT

## 2017-03-28 PROCEDURE — 82728 ASSAY OF FERRITIN: CPT | Performed by: INTERNAL MEDICINE

## 2017-03-28 PROCEDURE — 99214 OFFICE O/P EST MOD 30 MIN: CPT | Performed by: INTERNAL MEDICINE

## 2017-03-28 PROCEDURE — 83540 ASSAY OF IRON: CPT | Performed by: INTERNAL MEDICINE

## 2017-03-28 PROCEDURE — 83550 IRON BINDING TEST: CPT | Performed by: INTERNAL MEDICINE

## 2017-03-28 NOTE — PROGRESS NOTES
CHIEF COMPLAINT: Right knee pain   Problem List:  1.) Microcytic anemia:  · Hemoglobin 10.3 on 1/5/17 with MCV of 70 iron low at 11 with iron saturation low at 3% and ferritin on the low end of normal at 39 with normal total iron binding capacity of 351 and normal B12 495 and normal methylmalonic acid level of 135. Received Feraheme on 1/24 and 1/31/17 without complication. CBC on 3/21/17 10.8 after the Feraheme this iron was up to 42 though still on the low end of normal and the ferritin was up to 262 with an iron saturation still low at 16 and a total iron binding capacity normal at 256.    HISTORY OF PRESENT ILLNESS:  The patient is a 36 y.o. female, here for follow up on management of partially treated iron deficiency.  Her main complaint is of right knee pain she's had for 4 months.  She went to the emergency room and had an ultrasound and x-ray according to her report to me that were unremarkable.I do not have those reports but I do see an x-ray of her knee from December that showed osteoarthritis.  I see where Acacia Koehler tried to order an MRI of her knee back in January but that was never done that I could see.  I do not see evidence of an ultrasound at least from our system.      Past Medical History:   Diagnosis Date   • Absolute anemia    • Carpal tunnel syndrome     RIGHT WRIST   • Cholelithiasis     Nausea related to stones has phenergan. Also has related RUQ pain.   • Dizziness    • Elevated C-reactive protein (CRP)    • Elevated erythrocyte sedimentation rate    • Influenza    • Iron deficiency     Will not take iron supplement but will take MVI   • Left hip pain    • Numbness and tingling in right hand     Has nerve conductions 3/23/2016, awaiting results. Along with Paresthesia.   • Pain and swelling of right forearm     Check u/s to r/o clot. Rash and redness are resolving, will continue to monitor.   • Pain of left arm    • Sleep disturbances    • Vitamin D deficiency     25 oh.     Past  "Surgical History:   Procedure Laterality Date   •  SECTION  2006   • CHOLECYSTECTOMY  2014   • COLONOSCOPY  2016   • CYST REMOVAL  2011    behind belly button   • TONSILLECTOMY     • URACHAL CYST INCISION      History of Excision Of Urachal Cyst.       Allergies   Allergen Reactions   • Amoxicillin      TABS.   • Penicillins        Family History and Social History reviewed and changed as necessary      REVIEW OF SYSTEM:   Review of Systems   Constitutional: Negative for appetite change, chills, diaphoresis, fatigue, fever and unexpected weight change.   HENT:   Negative for mouth sores, sore throat and trouble swallowing.    Eyes: Negative for icterus.   Respiratory: Negative for cough, hemoptysis and shortness of breath.    Cardiovascular: Negative for chest pain, leg swelling and palpitations.   Gastrointestinal: Negative for abdominal distention, abdominal pain, blood in stool, constipation, diarrhea, nausea and vomiting.   Endocrine: Negative for hot flashes.   Genitourinary: Negative for bladder incontinence, difficulty urinating, dysuria, frequency and hematuria.    Musculoskeletal: Negative for gait problem, neck pain and neck stiffness.   Skin: Negative for rash.   Neurological: Negative for dizziness, gait problem, headaches, light-headedness and numbness.   Hematological: Negative for adenopathy. Does not bruise/bleed easily.   Psychiatric/Behavioral: Negative for depression. The patient is not nervous/anxious.    All other systems reviewed and are negative.       PHYSICAL EXAM    Vitals:    17 1450   BP: 134/74   Pulse: 78   Resp: 16   Temp: 97.8 °F (36.6 °C)   TempSrc: Temporal Artery    Weight: 300 lb (136 kg)   Height: 61\" (154.9 cm)     Constitutional: Appears well-developed and overly nourished. No distress.   ECOG: (2) Ambulatory and capable of self care, unable to carry out work activity, up and about > 50% or waking hours  HENT:   Head: Normocephalic.   Mouth/Throat: Oropharynx " is clear and moist.   Eyes: Conjunctivae are normal. Pupils are equal, round, and reactive to light. No scleral icterus.   Neck: Neck supple. No JVD present. No thyromegaly present.   Cardiovascular: Normal rate, regular rhythm and normal heart sounds.    Pulmonary/Chest: Breath sounds normal. No respiratory distress.   Abdominal: Soft. Exhibits no distension and no mass. There is no hepatosplenomegaly. There is no tenderness. There is no rebound and no guarding.   Musculoskeletal:Exhibits no edema, tenderness or deformity.   Neurological: Alert and oriented to person, place, and time. Exhibits normal muscle tone.   Skin: No ecchymosis, no petechiae and no rash noted. Not diaphoretic. No cyanosis. Nails show no clubbing.   Psychiatric: Normal mood and affect.   Vitals reviewed.      Lab on 03/21/2017   Component Date Value Ref Range Status   • Glucose 03/21/2017 93  70 - 100 mg/dL Final   • BUN 03/21/2017 9  9 - 23 mg/dL Final   • Creatinine 03/21/2017 0.60  0.60 - 1.30 mg/dL Final   • Sodium 03/21/2017 139  132 - 146 mmol/L Final   • Potassium 03/21/2017 4.4  3.5 - 5.5 mmol/L Final   • Chloride 03/21/2017 105  99 - 109 mmol/L Final   • CO2 03/21/2017 34.0* 20.0 - 31.0 mmol/L Final   • Calcium 03/21/2017 9.8  8.7 - 10.4 mg/dL Final   • Total Protein 03/21/2017 7.4  5.7 - 8.2 g/dL Final   • Albumin 03/21/2017 4.10  3.20 - 4.80 g/dL Final   • ALT (SGPT) 03/21/2017 17  7 - 40 U/L Final   • AST (SGOT) 03/21/2017 18  0 - 33 U/L Final   • Alkaline Phosphatase 03/21/2017 80  25 - 100 U/L Final   • Total Bilirubin 03/21/2017 0.5  0.3 - 1.2 mg/dL Final   • eGFR  African Amer 03/21/2017 138  >60 mL/min/1.73 Final   • Globulin 03/21/2017 3.3  gm/dL Final   • A/G Ratio 03/21/2017 1.2* 1.5 - 2.5 g/dL Final   • BUN/Creatinine Ratio 03/21/2017 15.0  7.0 - 25.0 Final   • Anion Gap 03/21/2017 0.0* 3.0 - 11.0 mmol/L Final   • Ferritin 03/21/2017 262.00  10.00 - 291.00 ng/mL Final   • Iron 03/21/2017 42* 50 - 175 mcg/dL Final   • TIBC  03/21/2017 255  250 - 450 mcg/dL Final   • Iron Saturation 03/21/2017 16  15 - 50 % Final   • WBC 03/21/2017 6.60  3.50 - 10.80 10*3/mm3 Final   • RBC 03/21/2017 4.71  3.89 - 5.14 10*6/mm3 Final   • Hemoglobin 03/21/2017 10.8* 11.5 - 15.5 g/dL Final   • Hematocrit 03/21/2017 34.7  34.5 - 44.0 % Final   • RDW 03/21/2017 23.1* 11.3 - 14.5 % Final   • MCV 03/21/2017 73.7* 80.0 - 99.0 fL Final   • MCH 03/21/2017 23.0* 27.0 - 31.0 pg Final   • MCHC 03/21/2017 31.1* 32.0 - 36.0 g/dL Final   • MPV 03/21/2017 9.3  6.0 - 12.0 fL Final   • Platelets 03/21/2017 257  150 - 450 10*3/mm3 Final   • Neutrophil % 03/21/2017 55.7  41.0 - 71.0 % Final   • Lymphocyte % 03/21/2017 39.2  24.0 - 44.0 % Final   • Monocyte % 03/21/2017 5.1  0.0 - 12.0 % Final   • Neutrophils, Absolute 03/21/2017 3.70  1.50 - 8.30 10*3/mm3 Final   • Lymphocytes, Absolute 03/21/2017 2.60  0.60 - 4.80 10*3/mm3 Final   • Monocytes, Absolute 03/21/2017 0.30  0.00 - 1.00 10*3/mm3 Final   • Iron 03/21/2017 42* 50 - 175 mcg/dL Final   • TIBC 03/21/2017 256  250 - 450 mcg/dL Final   • Transferrin % 03/21/2017 16.41  15.00 - 50.00 % Final       Assessment/Plan     1.  Right knee pain: I'll get the MRI of her knee and a Doppler of her lower extremity.  2. Iron deficiency: I still think her microcytosis is due to iron deficiency anemia given the slow rise of her iron and ferritin.  I will give her another infusion of Feraheme to see if this continues to help given that her microcytosis has improved and her hemoglobin has improved slightly.  I will have her see my nurse practitioner back in a week to go over the MRI of her knee and the Doppler of her lower extremity at [facility] and to give her Feraheme.  This will be day 1 and day 15 of this course.  We'll then repeat her iron indices, ferritin, and CBC about 2 months out from that.       Errors in dictation may reflect use of voice recognition software and not all errors in transcription may have been  detected prior to signing.    Vick Swanson MD    03/28/2017

## 2017-03-29 ENCOUNTER — TELEPHONE (OUTPATIENT)
Dept: NEUROLOGY | Facility: CLINIC | Age: 37
End: 2017-03-29

## 2017-03-29 RX ORDER — ASPIRIN 81 MG/1
81 TABLET ORAL DAILY
Qty: 30 TABLET | Refills: 11 | Status: SHIPPED | OUTPATIENT
Start: 2017-03-29 | End: 2018-03-29

## 2017-03-29 NOTE — TELEPHONE ENCOUNTER
----- Message from Shira Lizama sent at 3/28/2017  2:03 PM EDT -----  Regarding: KARMA  Contact: [phone]  Please call patient regarding her aspirin refill.

## 2017-04-03 ENCOUNTER — HOSPITAL ENCOUNTER (OUTPATIENT)
Dept: MRI IMAGING | Facility: HOSPITAL | Age: 37
Discharge: HOME | End: 2017-04-03
Attending: INTERNAL MEDICINE

## 2017-04-03 DIAGNOSIS — D50.0 IRON DEFICIENCY ANEMIA DUE TO CHRONIC BLOOD LOSS: ICD-10-CM

## 2017-04-03 RX ORDER — SODIUM CHLORIDE 9 MG/ML
250 INJECTION, SOLUTION INTRAVENOUS ONCE
Status: CANCELLED | OUTPATIENT
Start: 2017-04-11

## 2017-04-03 RX ORDER — SODIUM CHLORIDE 9 MG/ML
250 INJECTION, SOLUTION INTRAVENOUS ONCE
Status: CANCELLED | OUTPATIENT
Start: 2017-04-05

## 2017-04-04 ENCOUNTER — INFUSION (OUTPATIENT)
Dept: ONCOLOGY | Facility: HOSPITAL | Age: 37
End: 2017-04-04

## 2017-04-04 ENCOUNTER — OFFICE VISIT (OUTPATIENT)
Dept: ONCOLOGY | Facility: CLINIC | Age: 37
End: 2017-04-04

## 2017-04-04 VITALS
RESPIRATION RATE: 18 BRPM | WEIGHT: 293 LBS | BODY MASS INDEX: 55.32 KG/M2 | HEIGHT: 61 IN | SYSTOLIC BLOOD PRESSURE: 133 MMHG | HEART RATE: 74 BPM | DIASTOLIC BLOOD PRESSURE: 73 MMHG | TEMPERATURE: 97.2 F

## 2017-04-04 VITALS
BODY MASS INDEX: 55.32 KG/M2 | HEIGHT: 61 IN | SYSTOLIC BLOOD PRESSURE: 133 MMHG | HEART RATE: 74 BPM | RESPIRATION RATE: 18 BRPM | DIASTOLIC BLOOD PRESSURE: 73 MMHG | TEMPERATURE: 97.2 F | WEIGHT: 293 LBS

## 2017-04-04 DIAGNOSIS — G89.29 CHRONIC PAIN OF RIGHT KNEE: Primary | ICD-10-CM

## 2017-04-04 DIAGNOSIS — M25.561 CHRONIC PAIN OF RIGHT KNEE: Primary | ICD-10-CM

## 2017-04-04 DIAGNOSIS — D50.0 IRON DEFICIENCY ANEMIA DUE TO CHRONIC BLOOD LOSS: ICD-10-CM

## 2017-04-04 DIAGNOSIS — D50.0 IRON DEFICIENCY ANEMIA DUE TO CHRONIC BLOOD LOSS: Primary | ICD-10-CM

## 2017-04-04 PROCEDURE — 25010000002 FERUMOXYTOL 510 MG/17ML SOLUTION 510 MG VIAL: Performed by: INTERNAL MEDICINE

## 2017-04-04 PROCEDURE — 96374 THER/PROPH/DIAG INJ IV PUSH: CPT

## 2017-04-04 PROCEDURE — 99213 OFFICE O/P EST LOW 20 MIN: CPT | Performed by: INTERNAL MEDICINE

## 2017-04-04 RX ORDER — SODIUM CHLORIDE 9 MG/ML
250 INJECTION, SOLUTION INTRAVENOUS ONCE
Status: COMPLETED | OUTPATIENT
Start: 2017-04-04 | End: 2017-04-04

## 2017-04-04 RX ADMIN — FERUMOXYTOL 510 MG: 510 INJECTION INTRAVENOUS at 10:36

## 2017-04-04 RX ADMIN — SODIUM CHLORIDE 250 ML: 9 INJECTION, SOLUTION INTRAVENOUS at 10:36

## 2017-04-04 NOTE — PROGRESS NOTES
CHIEF COMPLAINT: Right knee pain    HISTORY OF PRESENT ILLNESS:  The patient is a 36 y.o. female, here for follow up on management of iron deficiency anemia and knee pain.  Her ultrasound of the lower extremities did not show clot but does have chronic venous valvular insufficiency.  MRI was ordered for yesterday but it has not been done by the patient yet.  I rescheduled.  She's received day 1 of course 2 of Feraheme today.  Past Medical History:   Diagnosis Date   • Absolute anemia    • Carpal tunnel syndrome     RIGHT WRIST   • Cholelithiasis     Nausea related to stones has phenergan. Also has related RUQ pain.   • Dizziness    • Elevated C-reactive protein (CRP)    • Elevated erythrocyte sedimentation rate    • Influenza    • Iron deficiency     Will not take iron supplement but will take MVI   • Left hip pain    • Numbness and tingling in right hand     Has nerve conductions 3/23/2016, awaiting results. Along with Paresthesia.   • Pain and swelling of right forearm     Check u/s to r/o clot. Rash and redness are resolving, will continue to monitor.   • Pain of left arm    • Sleep disturbances    • Vitamin D deficiency     25 oh.     Past Surgical History:   Procedure Laterality Date   •  SECTION     • CHOLECYSTECTOMY     • COLONOSCOPY  2016   • CYST REMOVAL      behind belly button   • TONSILLECTOMY     • URACHAL CYST INCISION      History of Excision Of Urachal Cyst.       Allergies   Allergen Reactions   • Amoxicillin      TABS.   • Penicillins        Family History and Social History reviewed and changed as necessary      REVIEW OF SYSTEM:   Review of Systems   Constitutional: Negative for appetite change, chills, diaphoresis, fatigue, fever and unexpected weight change.   HENT:   Negative for mouth sores, sore throat and trouble swallowing.    Eyes: Negative for icterus.   Respiratory: Negative for cough, hemoptysis and shortness of breath.    Cardiovascular: Negative for chest  "pain, leg swelling and palpitations.   Gastrointestinal: Negative for abdominal distention, abdominal pain, blood in stool, constipation, diarrhea, nausea and vomiting.   Endocrine: Negative for hot flashes.   Genitourinary: Negative for bladder incontinence, difficulty urinating, dysuria, frequency and hematuria.    Musculoskeletal: Negative for gait problem, neck pain and neck stiffness.   Skin: Negative for rash.   Neurological: Negative for dizziness, gait problem, headaches, light-headedness and numbness.   Hematological: Negative for adenopathy. Does not bruise/bleed easily.   Psychiatric/Behavioral: Negative for depression. The patient is not nervous/anxious.    All other systems reviewed and are negative.       PHYSICAL EXAM    Vitals:    04/04/17 1301   BP: 133/73   Pulse: 74   Resp: 18   Temp: 97.2 °F (36.2 °C)   Weight: 298 lb (135 kg)   Height: 61\" (154.9 cm)     Constitutional: Appears well-developed and well-nourished. No distress.   ECOG: (1) Restricted in physically strenuous activity, ambulatory and able to do work of light nature  HENT:   Head: Normocephalic.   Mouth/Throat: Oropharynx is clear and moist.   Eyes: Conjunctivae are normal. Pupils are equal, round, and reactive to light. No scleral icterus.   Neck: Neck supple. No JVD present. No thyromegaly present.   Cardiovascular: Normal rate, regular rhythm and normal heart sounds.    Pulmonary/Chest: Breath sounds normal. No respiratory distress.   Abdominal: Soft. Exhibits no distension and no mass. There is no hepatosplenomegaly. There is no tenderness. There is no rebound and no guarding.   Musculoskeletal:Exhibits no edema, tenderness or deformity.   Neurological: Alert and oriented to person, place, and time. Exhibits normal muscle tone.   Skin: No ecchymosis, no petechiae and no rash noted. Not diaphoretic. No cyanosis. Nails show no clubbing.   Psychiatric: Normal mood and affect.   Vitals reviewed.      Lab on 03/28/2017   Component Date " Value Ref Range Status   • Iron 03/28/2017 43* 50 - 175 mcg/dL Final   • TIBC 03/28/2017 272  250 - 450 mcg/dL Final   • Iron Saturation 03/28/2017 16  15 - 50 % Final   • Ferritin 03/28/2017 219.00  10.00 - 291.00 ng/mL Final   Lab on 03/21/2017   Component Date Value Ref Range Status   • Glucose 03/21/2017 93  70 - 100 mg/dL Final   • BUN 03/21/2017 9  9 - 23 mg/dL Final   • Creatinine 03/21/2017 0.60  0.60 - 1.30 mg/dL Final   • Sodium 03/21/2017 139  132 - 146 mmol/L Final   • Potassium 03/21/2017 4.4  3.5 - 5.5 mmol/L Final   • Chloride 03/21/2017 105  99 - 109 mmol/L Final   • CO2 03/21/2017 34.0* 20.0 - 31.0 mmol/L Final   • Calcium 03/21/2017 9.8  8.7 - 10.4 mg/dL Final   • Total Protein 03/21/2017 7.4  5.7 - 8.2 g/dL Final   • Albumin 03/21/2017 4.10  3.20 - 4.80 g/dL Final   • ALT (SGPT) 03/21/2017 17  7 - 40 U/L Final   • AST (SGOT) 03/21/2017 18  0 - 33 U/L Final   • Alkaline Phosphatase 03/21/2017 80  25 - 100 U/L Final   • Total Bilirubin 03/21/2017 0.5  0.3 - 1.2 mg/dL Final   • eGFR  African Amer 03/21/2017 138  >60 mL/min/1.73 Final   • Globulin 03/21/2017 3.3  gm/dL Final   • A/G Ratio 03/21/2017 1.2* 1.5 - 2.5 g/dL Final   • BUN/Creatinine Ratio 03/21/2017 15.0  7.0 - 25.0 Final   • Anion Gap 03/21/2017 0.0* 3.0 - 11.0 mmol/L Final   • Ferritin 03/21/2017 262.00  10.00 - 291.00 ng/mL Final   • Iron 03/21/2017 42* 50 - 175 mcg/dL Final   • TIBC 03/21/2017 255  250 - 450 mcg/dL Final   • Iron Saturation 03/21/2017 16  15 - 50 % Final   • WBC 03/21/2017 6.60  3.50 - 10.80 10*3/mm3 Final   • RBC 03/21/2017 4.71  3.89 - 5.14 10*6/mm3 Final   • Hemoglobin 03/21/2017 10.8* 11.5 - 15.5 g/dL Final   • Hematocrit 03/21/2017 34.7  34.5 - 44.0 % Final   • RDW 03/21/2017 23.1* 11.3 - 14.5 % Final   • MCV 03/21/2017 73.7* 80.0 - 99.0 fL Final   • MCH 03/21/2017 23.0* 27.0 - 31.0 pg Final   • MCHC 03/21/2017 31.1* 32.0 - 36.0 g/dL Final   • MPV 03/21/2017 9.3  6.0 - 12.0 fL Final   • Platelets 03/21/2017 257   150 - 450 10*3/mm3 Final   • Neutrophil % 03/21/2017 55.7  41.0 - 71.0 % Final   • Lymphocyte % 03/21/2017 39.2  24.0 - 44.0 % Final   • Monocyte % 03/21/2017 5.1  0.0 - 12.0 % Final   • Neutrophils, Absolute 03/21/2017 3.70  1.50 - 8.30 10*3/mm3 Final   • Lymphocytes, Absolute 03/21/2017 2.60  0.60 - 4.80 10*3/mm3 Final   • Monocytes, Absolute 03/21/2017 0.30  0.00 - 1.00 10*3/mm3 Final   • Iron 03/21/2017 42* 50 - 175 mcg/dL Final   • TIBC 03/21/2017 256  250 - 450 mcg/dL Final   • Transferrin % 03/21/2017 16.41  15.00 - 50.00 % Final       Assessment/Plan     1.  Iron deficiency anemia  2. Right knee pain    Discussion: She does have venous valvular insufficiency and that could give her her chronic pain in the legs.  We will check an MRI of her knee for completeness sake and I been trying to get that done is actually scheduled for yesterday but the patient did not get it done and I reschedule that.  We will see her back in a week or 2 to go over the MRI and in the meantime she is finishing up her iron next week.  This will be day 8 of course 2 of Feraheme.       Errors in dictation may reflect use of voice recognition software and not all errors in transcription may have been detected prior to signing.    Vick Swanson MD    04/04/2017

## 2017-04-05 ENCOUNTER — APPOINTMENT (OUTPATIENT)
Dept: ONCOLOGY | Facility: HOSPITAL | Age: 37
End: 2017-04-05

## 2017-04-06 ENCOUNTER — OFFICE VISIT (OUTPATIENT)
Dept: NEUROLOGY | Facility: CLINIC | Age: 37
End: 2017-04-06

## 2017-04-06 ENCOUNTER — HOSPITAL ENCOUNTER (OUTPATIENT)
Dept: MRI IMAGING | Facility: HOSPITAL | Age: 37
Discharge: HOME | End: 2017-04-06
Attending: INTERNAL MEDICINE | Admitting: INTERNAL MEDICINE

## 2017-04-06 VITALS
WEIGHT: 293 LBS | OXYGEN SATURATION: 99 % | BODY MASS INDEX: 55.32 KG/M2 | HEIGHT: 61 IN | HEART RATE: 81 BPM | DIASTOLIC BLOOD PRESSURE: 82 MMHG | SYSTOLIC BLOOD PRESSURE: 130 MMHG

## 2017-04-06 DIAGNOSIS — M25.561 CHRONIC PAIN OF RIGHT KNEE: ICD-10-CM

## 2017-04-06 DIAGNOSIS — G89.29 CHRONIC PAIN OF RIGHT KNEE: ICD-10-CM

## 2017-04-06 DIAGNOSIS — G43.809 MIGRAINE VARIANT WITH HEADACHE: Primary | Chronic | ICD-10-CM

## 2017-04-06 PROCEDURE — A9577 INJ MULTIHANCE: HCPCS | Performed by: INTERNAL MEDICINE

## 2017-04-06 PROCEDURE — 0 GADOBENATE DIMEGLUMINE 529 MG/ML SOLUTION: Performed by: INTERNAL MEDICINE

## 2017-04-06 PROCEDURE — 73723 MRI JOINT LWR EXTR W/O&W/DYE: CPT

## 2017-04-06 PROCEDURE — 99213 OFFICE O/P EST LOW 20 MIN: CPT | Performed by: NURSE PRACTITIONER

## 2017-04-06 RX ADMIN — GADOBENATE DIMEGLUMINE 20 ML: 529 INJECTION, SOLUTION INTRAVENOUS at 18:15

## 2017-04-06 NOTE — PROGRESS NOTES
Subjective:     Patient ID: Nury Pineda is a 36 y.o. female.    History of Present Illness   Here for 3 month follow up on a history of complex migraines in 2015 with episodic migraines. She tells me she continues to have a very slight headache daily, but she has had no migraines in the past 3 months. She did not start the Topamax ordered last visit, since her migraines resolved with the Prednisone taper. She has not used the Imitrex in the past 3 months. She has gotten 3 Iron Infusions and she does get a headache with these, but they resolve with Tylenol and rest. She still has her braces and is not sure when these will be taken off. She is followed by Dr. Vick Swanson for ROWDY and is undergoing further testing. She does tell me over the past 1.5 weeks she has had 2 second pain from right neck to right lower head and to neck again and this is quick and has only occurred 9 times and goes away very quickly. She is wondering if she slept on her neck wrong. Has no other symptoms. Prior imaging studies have showed no acute process and MRIs have been limited due to artifact from braces and plans to repeat MRI/MRA brain once braces removed soon.    The following portions of the patient's history were reviewed and updated as appropriate: allergies, current medications, past family history, past medical history, past social history, past surgical history and problem list.    Review of Systems   Constitutional: Negative for chills, fatigue, fever and unexpected weight change.   HENT: Positive for rhinorrhea and sore throat. Negative for ear pain, hearing loss and nosebleeds.    Eyes: Positive for itching. Negative for photophobia, pain, discharge and visual disturbance.   Respiratory: Positive for cough. Negative for chest tightness, shortness of breath and wheezing.    Cardiovascular: Negative for chest pain, palpitations and leg swelling.   Gastrointestinal: Negative for abdominal pain, blood in stool, constipation, diarrhea,  nausea and vomiting.   Genitourinary: Negative for dysuria, frequency, hematuria and urgency.   Musculoskeletal: Negative for arthralgias, back pain, gait problem, joint swelling, myalgias, neck pain and neck stiffness.        JOINT STIFFNESS, LIMB PAIN & SWELLING   Skin: Negative for rash and wound.   Allergic/Immunologic: Negative for environmental allergies and food allergies.   Neurological: Positive for dizziness, numbness and headaches. Negative for tremors, seizures, syncope, speech difficulty, weakness and light-headedness.        TINGLING   Hematological: Negative for adenopathy. Does not bruise/bleed easily.   Psychiatric/Behavioral: Negative for agitation, confusion, decreased concentration, hallucinations, sleep disturbance and suicidal ideas. The patient is not nervous/anxious.         Objective:    Neurologic Exam     Mental Status   Oriented to person, place, and time.   Registration: recalls 3 of 3 objects. Recall at 5 minutes: recalls 3 of 3 objects. Follows 3 step commands.   Attention: normal. Concentration: normal.   Speech: speech is normal   Level of consciousness: alert  Knowledge: good and consistent with education. Able to perform simple calculations.   Able to name object. Able to read. Able to repeat. Able to write. Normal comprehension.     Cranial Nerves   Cranial nerves II through XII intact.     Motor Exam   Muscle bulk: normal  Overall muscle tone: normal    Strength   Strength 5/5 throughout.     Sensory Exam   Light touch normal.   Vibration normal.   Proprioception normal.   Pinprick normal.     Gait, Coordination, and Reflexes     Gait  Gait: normal    Coordination   Romberg: negative  Finger to nose coordination: normal  Heel to shin coordination: normal    Tremor   Resting tremor: absent  Intention tremor: absent  Action tremor: absent    Reflexes   Right brachioradialis: 2+  Left brachioradialis: 2+  Right biceps: 2+  Left biceps: 2+  Right triceps: 2+  Left triceps: 2+  Right  patellar: 2+  Left patellar: 2+  Right achilles: 2+  Left achilles: 2+  Right : 2+  Left : 2+  Right plantar: normal  Left plantar: normal  Right White: absent  Left White: absent  Right ankle clonus: absent  Left ankle clonus: absent      Physical Exam   Constitutional: She is oriented to person, place, and time.   Neurological: She is oriented to person, place, and time. She has normal strength. She has a normal Finger-Nose-Finger Test, a normal Heel to Harris Test and a normal Romberg Test. Gait normal.   Reflex Scores:       Tricep reflexes are 2+ on the right side and 2+ on the left side.       Bicep reflexes are 2+ on the right side and 2+ on the left side.       Brachioradialis reflexes are 2+ on the right side and 2+ on the left side.       Patellar reflexes are 2+ on the right side and 2+ on the left side.       Achilles reflexes are 2+ on the right side and 2+ on the left side.  Psychiatric: Her speech is normal.       Assessment/Plan:     Nury was seen today for headache.    Diagnoses and all orders for this visit:    Migraine variant with headache         Continue Imitrex PRN. She is to call if this does not resolve her migraine in 24-48 hours. She will f/u in 4 months. Will reorder MRI/MRA once braces are off completely. F/U with PCP and Hem/Onc as scheduled for iron infusions. Reviewed medications, potential side effects and signs and symptoms to report. Discussed risk versus benefits of treatment plan with patient and/or family-including medications, labs and radiology that may be ordered. Addressed questions and concerns during visit. Patient and/or family verbalized understanding and agree with plan.

## 2017-04-10 ENCOUNTER — OFFICE VISIT (OUTPATIENT)
Dept: ONCOLOGY | Facility: CLINIC | Age: 37
End: 2017-04-10

## 2017-04-10 VITALS
SYSTOLIC BLOOD PRESSURE: 135 MMHG | HEART RATE: 80 BPM | TEMPERATURE: 97.2 F | DIASTOLIC BLOOD PRESSURE: 73 MMHG | BODY MASS INDEX: 56.5 KG/M2 | WEIGHT: 293 LBS | RESPIRATION RATE: 18 BRPM

## 2017-04-10 DIAGNOSIS — G89.29 CHRONIC PAIN OF RIGHT KNEE: ICD-10-CM

## 2017-04-10 DIAGNOSIS — M25.561 CHRONIC PAIN OF RIGHT KNEE: ICD-10-CM

## 2017-04-10 DIAGNOSIS — D50.0 IRON DEFICIENCY ANEMIA DUE TO CHRONIC BLOOD LOSS: Primary | ICD-10-CM

## 2017-04-10 PROCEDURE — 99213 OFFICE O/P EST LOW 20 MIN: CPT | Performed by: INTERNAL MEDICINE

## 2017-04-10 NOTE — PROGRESS NOTES
CHIEF COMPLAINT: Anemia and knee pain      HISTORY OF PRESENT ILLNESS:  The patient is a 36 y.o. female, here for follow up on management of anemia and knee pain.  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  She is due for another dose of Feraheme tomorrow.      Past Medical History:   Diagnosis Date   • Absolute anemia    • Carpal tunnel syndrome     RIGHT WRIST   • Cholelithiasis     Nausea related to stones has phenergan. Also has related RUQ pain.   • Dizziness    • Elevated C-reactive protein (CRP)    • Elevated erythrocyte sedimentation rate    • Influenza    • Iron deficiency     Will not take iron supplement but will take MVI   • Left hip pain    • Numbness and tingling in right hand     Has nerve conductions 3/23/2016, awaiting results. Along with Paresthesia.   • Pain and swelling of right forearm     Check u/s to r/o clot. Rash and redness are resolving, will continue to monitor.   • Pain of left arm    • Sleep disturbances    • Vitamin D deficiency     25 oh.     Past Surgical History:   Procedure Laterality Date   •  SECTION     • CHOLECYSTECTOMY     • COLONOSCOPY     • CYST REMOVAL      behind belly button   • TONSILLECTOMY     • URACHAL CYST INCISION      History of Excision Of Urachal Cyst.       Allergies   Allergen Reactions   • Amoxicillin      TABS.   • Penicillins        Family History and Social History reviewed and changed as necessary      REVIEW OF SYSTEM:   Review of Systems   Constitutional: Negative for appetite change, chills, diaphoresis, fatigue, fever and unexpected weight change.   HENT:   Negative for mouth sores, sore throat and trouble swallowing.    Eyes: Negative for icterus.   Respiratory: Negative for cough, hemoptysis and shortness of breath.    Cardiovascular: Negative for chest pain, leg swelling and palpitations.   Gastrointestinal: Negative for abdominal distention, abdominal pain, blood in stool, constipation, diarrhea,  nausea and vomiting.   Endocrine: Negative for hot flashes.   Genitourinary: Negative for bladder incontinence, difficulty urinating, dysuria, frequency and hematuria.    Musculoskeletal: Negative for gait problem, neck pain and neck stiffness.   Skin: Negative for rash.   Neurological: Negative for dizziness, gait problem, headaches, light-headedness and numbness.   Hematological: Negative for adenopathy. Does not bruise/bleed easily.   Psychiatric/Behavioral: Negative for depression. The patient is not nervous/anxious.    All other systems reviewed and are negative.       PHYSICAL EXAM    Vitals:    04/10/17 1559   BP: 135/73   Pulse: 80   Resp: 18   Temp: 97.2 °F (36.2 °C)   TempSrc: Temporal Artery    Weight: 299 lb (136 kg)     Constitutional: Appears well-developed and well-nourished. No distress.   ECOG: (1) Restricted in physically strenuous activity, ambulatory and able to do work of light nature  HENT:   Head: Normocephalic.   Mouth/Throat: Oropharynx is clear and moist.   Eyes: Conjunctivae are normal. Pupils are equal, round, and reactive to light. No scleral icterus.   Neck: Neck supple. No JVD present. No thyromegaly present.   Cardiovascular: Normal rate, regular rhythm and normal heart sounds.    Pulmonary/Chest: Breath sounds normal. No respiratory distress.   Abdominal: Soft. Exhibits no distension and no mass. There is no hepatosplenomegaly. There is no tenderness. There is no rebound and no guarding.   Musculoskeletal:Exhibits no edema, tenderness or deformity.   Neurological: Alert and oriented to person, place, and time. Exhibits normal muscle tone.   Skin: No ecchymosis, no petechiae and no rash noted. Not diaphoretic. No cyanosis. Nails show no clubbing.   Psychiatric: Normal mood and affect.   Vitals reviewed.      Lab on 03/28/2017   Component Date Value Ref Range Status   • Iron 03/28/2017 43* 50 - 175 mcg/dL Final   • TIBC 03/28/2017 272  250 - 450 mcg/dL Final   • Iron Saturation  03/28/2017 16  15 - 50 % Final   • Ferritin 03/28/2017 219.00  10.00 - 291.00 ng/mL Final   Lab on 03/21/2017   Component Date Value Ref Range Status   • Glucose 03/21/2017 93  70 - 100 mg/dL Final   • BUN 03/21/2017 9  9 - 23 mg/dL Final   • Creatinine 03/21/2017 0.60  0.60 - 1.30 mg/dL Final   • Sodium 03/21/2017 139  132 - 146 mmol/L Final   • Potassium 03/21/2017 4.4  3.5 - 5.5 mmol/L Final   • Chloride 03/21/2017 105  99 - 109 mmol/L Final   • CO2 03/21/2017 34.0* 20.0 - 31.0 mmol/L Final   • Calcium 03/21/2017 9.8  8.7 - 10.4 mg/dL Final   • Total Protein 03/21/2017 7.4  5.7 - 8.2 g/dL Final   • Albumin 03/21/2017 4.10  3.20 - 4.80 g/dL Final   • ALT (SGPT) 03/21/2017 17  7 - 40 U/L Final   • AST (SGOT) 03/21/2017 18  0 - 33 U/L Final   • Alkaline Phosphatase 03/21/2017 80  25 - 100 U/L Final   • Total Bilirubin 03/21/2017 0.5  0.3 - 1.2 mg/dL Final   • eGFR  African Amer 03/21/2017 138  >60 mL/min/1.73 Final   • Globulin 03/21/2017 3.3  gm/dL Final   • A/G Ratio 03/21/2017 1.2* 1.5 - 2.5 g/dL Final   • BUN/Creatinine Ratio 03/21/2017 15.0  7.0 - 25.0 Final   • Anion Gap 03/21/2017 0.0* 3.0 - 11.0 mmol/L Final   • Ferritin 03/21/2017 262.00  10.00 - 291.00 ng/mL Final   • Iron 03/21/2017 42* 50 - 175 mcg/dL Final   • TIBC 03/21/2017 255  250 - 450 mcg/dL Final   • Iron Saturation 03/21/2017 16  15 - 50 % Final   • WBC 03/21/2017 6.60  3.50 - 10.80 10*3/mm3 Final   • RBC 03/21/2017 4.71  3.89 - 5.14 10*6/mm3 Final   • Hemoglobin 03/21/2017 10.8* 11.5 - 15.5 g/dL Final   • Hematocrit 03/21/2017 34.7  34.5 - 44.0 % Final   • RDW 03/21/2017 23.1* 11.3 - 14.5 % Final   • MCV 03/21/2017 73.7* 80.0 - 99.0 fL Final   • MCH 03/21/2017 23.0* 27.0 - 31.0 pg Final   • MCHC 03/21/2017 31.1* 32.0 - 36.0 g/dL Final   • MPV 03/21/2017 9.3  6.0 - 12.0 fL Final   • Platelets 03/21/2017 257  150 - 450 10*3/mm3 Final   • Neutrophil % 03/21/2017 55.7  41.0 - 71.0 % Final   • Lymphocyte % 03/21/2017 39.2  24.0 - 44.0 % Final   •  Monocyte % 03/21/2017 5.1  0.0 - 12.0 % Final   • Neutrophils, Absolute 03/21/2017 3.70  1.50 - 8.30 10*3/mm3 Final   • Lymphocytes, Absolute 03/21/2017 2.60  0.60 - 4.80 10*3/mm3 Final   • Monocytes, Absolute 03/21/2017 0.30  0.00 - 1.00 10*3/mm3 Final   • Iron 03/21/2017 42* 50 - 175 mcg/dL Final   • TIBC 03/21/2017 256  250 - 450 mcg/dL Final   • Transferrin % 03/21/2017 16.41  15.00 - 50.00 % Final       Assessment/Plan     1.  Right knee pain  2. Anemia    Discussion: We'll get her iron indices and blood counts again in a couple of months and hopefully at that point her hemoglobin has improved along with her MCV and iron indices.  In the meantime, I will get her to [facility] regarding her knee problems.       Errors in dictation may reflect use of voice recognition software and not all errors in transcription may have been detected prior to signing.    Vick Swanson MD    04/10/2017

## 2017-04-11 ENCOUNTER — INFUSION (OUTPATIENT)
Dept: ONCOLOGY | Facility: HOSPITAL | Age: 37
End: 2017-04-11

## 2017-04-11 VITALS
DIASTOLIC BLOOD PRESSURE: 81 MMHG | TEMPERATURE: 96.9 F | RESPIRATION RATE: 16 BRPM | HEIGHT: 61 IN | SYSTOLIC BLOOD PRESSURE: 136 MMHG | BODY MASS INDEX: 55.32 KG/M2 | HEART RATE: 78 BPM | WEIGHT: 293 LBS

## 2017-04-11 DIAGNOSIS — D50.0 IRON DEFICIENCY ANEMIA DUE TO CHRONIC BLOOD LOSS: Primary | ICD-10-CM

## 2017-04-11 PROCEDURE — 96365 THER/PROPH/DIAG IV INF INIT: CPT

## 2017-04-11 PROCEDURE — 96374 THER/PROPH/DIAG INJ IV PUSH: CPT

## 2017-04-11 PROCEDURE — 25010000002 FERUMOXYTOL 510 MG/17ML SOLUTION 510 MG VIAL: Performed by: INTERNAL MEDICINE

## 2017-04-11 RX ORDER — SODIUM CHLORIDE 9 MG/ML
250 INJECTION, SOLUTION INTRAVENOUS ONCE
Status: COMPLETED | OUTPATIENT
Start: 2017-04-11 | End: 2017-04-11

## 2017-04-11 RX ADMIN — SODIUM CHLORIDE 250 ML: 9 INJECTION, SOLUTION INTRAVENOUS at 10:25

## 2017-04-11 RX ADMIN — FERUMOXYTOL 510 MG: 510 INJECTION INTRAVENOUS at 10:26

## 2017-06-12 ENCOUNTER — TELEPHONE (OUTPATIENT)
Dept: NEUROLOGY | Facility: CLINIC | Age: 37
End: 2017-06-12

## 2017-06-12 DIAGNOSIS — G43.809 MIGRAINE VARIANT WITH HEADACHE: Primary | Chronic | ICD-10-CM

## 2017-06-14 NOTE — TELEPHONE ENCOUNTER
Ok she can start the topamax, but it is not fast acting. Does she want to try the prednisone too and I can send this to pharmacy? Then you need to schedule her a sooner appt. For when she can come in. thanks

## 2017-06-14 NOTE — TELEPHONE ENCOUNTER
CALLED PATIENT AND SHE CAN'T COME IN TOMORROW BECAUSE HER BEST FRIEND IS HAVING SURGERY TOMORROW. SHE STATED THAT SHE IS GOING TO START THE TOPAMAX

## 2017-06-14 NOTE — TELEPHONE ENCOUNTER
She can either start taking the Preventive Migraine medication I have recommended she take-Topamax which she has never started. She can have a prednisone taper called in OR she can have an OP migraine infusion scheduled. Let me know which she wants. If she develops weakness she needs to go to ER. Thanks!

## 2017-06-14 NOTE — TELEPHONE ENCOUNTER
CALLED PT BACK AND SHE STATED THAT'S SHE'S HAD A HEADACHE FOR THE PAST 7 DAYS SHE CAN TOLERATE IT. PT ALSO STATES THAT HER LEFT FOOT IS TINGLING AND LEFT SIDE OF HER FACE/CHEEK IS GOING NUMB. PLEASE ADVISE.

## 2017-06-15 RX ORDER — PREDNISONE 10 MG/1
TABLET ORAL
Qty: 42 TABLET | Refills: 0 | Status: SHIPPED | OUTPATIENT
Start: 2017-06-15 | End: 2017-07-03

## 2017-06-15 NOTE — TELEPHONE ENCOUNTER
CALLED PT BACK AND AND LET HER KNOW THAT SHE CAN START THE TOPAMAX. PT ALSO STATED THAT SHE WOULD TRY THE PREDNISONE AS WELL. RESCHEDULED PT FOR SOONER APPOINTMENT.

## 2017-06-15 NOTE — TELEPHONE ENCOUNTER
Sent prednisone to the pharmacy for patient to take for acute migraine. Then she should start the topamax and we will see her for sooner appointment as scheduled. thanks

## 2017-07-03 ENCOUNTER — OFFICE VISIT (OUTPATIENT)
Dept: NEUROLOGY | Facility: CLINIC | Age: 37
End: 2017-07-03

## 2017-07-03 VITALS
HEIGHT: 61 IN | BODY MASS INDEX: 55.32 KG/M2 | SYSTOLIC BLOOD PRESSURE: 122 MMHG | RESPIRATION RATE: 18 BRPM | DIASTOLIC BLOOD PRESSURE: 62 MMHG | WEIGHT: 293 LBS

## 2017-07-03 DIAGNOSIS — G43.809 MIGRAINE VARIANT WITH HEADACHE: Primary | Chronic | ICD-10-CM

## 2017-07-03 PROCEDURE — 99214 OFFICE O/P EST MOD 30 MIN: CPT | Performed by: NURSE PRACTITIONER

## 2017-07-03 RX ORDER — AMPICILLIN TRIHYDRATE 250 MG
200 CAPSULE ORAL NIGHTLY
Qty: 30 CAPSULE | Refills: 5 | Status: SHIPPED | OUTPATIENT
Start: 2017-07-03 | End: 2017-09-22

## 2017-07-03 RX ORDER — METOCLOPRAMIDE 10 MG/1
5-10 TABLET ORAL EVERY 6 HOURS PRN
Qty: 30 TABLET | Refills: 1 | Status: SHIPPED | OUTPATIENT
Start: 2017-07-03 | End: 2017-09-25

## 2017-07-03 RX ORDER — TOPIRAMATE 50 MG/1
TABLET, FILM COATED ORAL
Qty: 30 TABLET | Refills: 5 | Status: SHIPPED | OUTPATIENT
Start: 2017-07-03 | End: 2017-09-22

## 2017-07-03 RX ORDER — MAGNESIUM OXIDE 400 MG/1
400 TABLET ORAL NIGHTLY
Qty: 30 TABLET | Refills: 5 | Status: SHIPPED | OUTPATIENT
Start: 2017-07-03 | End: 2017-09-22

## 2017-07-03 NOTE — PATIENT INSTRUCTIONS
TOPIRAMATE 50MG TAKE 1/2 TABLET AT NIGHT FOR 1 WEEK THEN INCREASE TO 1 TABLET AT NIGHT AND CONTINUE-FOR MIGRAINE PREVENTION EVERY DAY.    MAGNESIUM 400MG, RIBOFLAVIN 200MG AND COQ10 200MG 1 PILL OF EACH EVERY NIGHT. MIGRAINE PREVENTION.    AS NEEDED OPTIONS FOR MIGRAINES:    SUMATRIPTAN (IMITREX) 50MG TAKE 1 TABLET AT ONSET OF MIGRAINE. MAY REPEAT ONCE IN 2 HOURS A MAX 2/DAY AND MAX 9/MONTH ONLY AS NEEDED.    CAMBIA POWDER 50MG MIX WITH 1-2 OUNCES OF WATER AT ONSET OF MIGRAINE. MAY REPEAT ONCE IN 12 HOURS. MAX 3 TIMES A WEEK AND 9 IN A MONTH.    METOCLOPRAMIDE (REGLAN)-TAKE 1/2-1 TABLET AT ONSET OF MIGRAINE SYMPTOMS. MAY REPEAT ONCE IN 6 HOURS. MAX 3 TIMES A WEEK.

## 2017-07-03 NOTE — PROGRESS NOTES
Subjective:     Patient ID: Nury Pineda is a 36 y.o. female.    History of Present Illness   Here for 3 month follow up on episodic migraine headaches with a history of complex migraines in 2015 with hospitalization at . Most recently she had a 17 day migraine with left temporal mild to moderate pressure, throbbing, no N/V, +photophobia and +phonophobia and visual disturbances with central sensitization with left arm tingling sensations. She tells me she took no medications for her migraines. She has not started the Topamax for migraine prevention because she read the possible side effects and was afraid to try this. She also has not taken the Imitrex which is PRN. She did finally call our office about 3 weeks ago and had another Prednisone taper and this resolved the migraine. She also Prior imaging studies have showed no acute process and MRIs have been limited due to artifact from braces. Will need imaging repeated once braces are removed. She now for the past 6 days has had an intermittent left arm twitch which comes and goes and only last 2-3 minutes and resolves on its own with no other symptoms. She is drinking water well and eating well.     The following portions of the patient's history were reviewed and updated as appropriate: allergies, current medications, past family history, past medical history, past social history, past surgical history and problem list.    Review of Systems   Constitutional: Positive for appetite change. Negative for chills, fatigue, fever and unexpected weight change.   HENT: Negative for ear pain, hearing loss, nosebleeds, rhinorrhea and sore throat.    Eyes: Negative for photophobia, pain, discharge, itching and visual disturbance.   Respiratory: Negative for cough, chest tightness, shortness of breath and wheezing.    Cardiovascular: Negative for chest pain, palpitations and leg swelling.   Gastrointestinal: Negative for abdominal pain, blood in stool, constipation,  diarrhea, nausea and vomiting.   Endocrine: Positive for polydipsia.   Genitourinary: Negative for dysuria, frequency, hematuria and urgency.   Musculoskeletal: Negative for arthralgias, back pain, gait problem, joint swelling, myalgias, neck pain and neck stiffness.   Skin: Negative for rash and wound.   Allergic/Immunologic: Negative for environmental allergies and food allergies.   Neurological: Positive for tremors, light-headedness, numbness and headaches. Negative for dizziness, seizures, syncope, speech difficulty and weakness.   Hematological: Negative for adenopathy. Does not bruise/bleed easily.   Psychiatric/Behavioral: Negative for agitation, confusion, decreased concentration, hallucinations, sleep disturbance and suicidal ideas. The patient is not nervous/anxious.         Objective:    Neurologic Exam     Mental Status   Oriented to person, place, and time.   Level of consciousness: alert    Cranial Nerves   Cranial nerves II through XII intact.     Motor Exam   Muscle bulk: normal  Overall muscle tone: normal    Strength   Strength 5/5 throughout.     Gait, Coordination, and Reflexes     Gait  Gait: normal    Coordination   Romberg: negative  Finger to nose coordination: normal  Heel to shin coordination: normal    Tremor   Resting tremor: absent  Intention tremor: absent  Action tremor: absent    Reflexes   Right brachioradialis: 2+  Left brachioradialis: 2+  Right biceps: 2+  Left biceps: 2+  Right triceps: 2+  Left triceps: 2+  Right patellar: 2+  Left patellar: 2+  Right achilles: 2+  Left achilles: 2+  Right : 2+  Left : 2+      Physical Exam   Constitutional: She is oriented to person, place, and time.   Neurological: She is oriented to person, place, and time. She has normal strength. She has a normal Finger-Nose-Finger Test, a normal Heel to Harris Test and a normal Romberg Test. Gait normal.   Reflex Scores:       Tricep reflexes are 2+ on the right side and 2+ on the left side.        Bicep reflexes are 2+ on the right side and 2+ on the left side.       Brachioradialis reflexes are 2+ on the right side and 2+ on the left side.       Patellar reflexes are 2+ on the right side and 2+ on the left side.       Achilles reflexes are 2+ on the right side and 2+ on the left side.      Assessment/Plan:     Nury was seen today for migraine.    Diagnoses and all orders for this visit:    Migraine variant with headache  -     topiramate (TOPAMAX) 50 MG tablet; TAKE 1/2 TABLET BY MOUTH EVERY NIGHT FOR 1 WEEK AND THEN INCREASE TO 1 TABLET BY MOUTH EVERY NIGHT  -     magnesium oxide (MAG-OX) 400 MG tablet; Take 1 tablet by mouth Every Night.  -     Riboflavin 100 MG tablet; Take 200 mg by mouth Every Night.  -     Coenzyme Q10 (COQ10) 200 MG capsule; Take 200 mg by mouth Every Night.  -     metoclopramide (REGLAN) 10 MG tablet; Take 0.5-1 tablets by mouth Every 6 (Six) Hours As Needed (migraines).         We have talked in depth about the importance of migraine prevention and the benefits of this. She is now willing to try preventive medications. I have typed very specific instructions for her to take the medications daily, plus preventive options. Gave her 5 samples of Cambia Powder as well. She will f/u in 5-6 weeks for re-evaluation. We will order MRI/MRA brain and consider MRI C spine once braces are removed or if symptoms do not improve. We reviewed possible headache triggers, stress relief techniques, and alternative treatments for headaches. The patient was in understanding and all questions were addressed. We reviewed the diagnosis and treatment plan and medication side effect profile. The patient will follow up as scheduled. Total time of visit 25 minutes face to face with 20 minutes spent in discussion and counseling on POC. She would also benefit from a sleep study d/t obesity and she was scheduled for this in 2016 and did not go to appointment; will revisit this recommendation next visit as  well.      TOPIRAMATE 50MG TAKE 1/2 TABLET AT NIGHT FOR 1 WEEK THEN INCREASE TO 1 TABLET AT NIGHT AND CONTINUE-FOR MIGRAINE PREVENTION EVERY DAY.    MAGNESIUM 400MG, RIBOFLAVIN 200MG AND COQ10 200MG 1 PILL OF EACH EVERY NIGHT. MIGRAINE PREVENTION.    AS NEEDED OPTIONS FOR MIGRAINES:    SUMATRIPTAN (IMITREX) 50MG TAKE 1 TABLET AT ONSET OF MIGRAINE. MAY REPEAT ONCE IN 2 HOURS A MAX 2/DAY AND MAX 9/MONTH ONLY AS NEEDED.    CAMBIA POWDER 50MG MIX WITH 1-2 OUNCES OF WATER AT ONSET OF MIGRAINE. MAY REPEAT ONCE IN 12 HOURS. MAX 3 TIMES A WEEK AND 9 IN A MONTH.    METOCLOPRAMIDE (REGLAN)-TAKE 1/2-1 TABLET AT ONSET OF MIGRAINE SYMPTOMS. MAY REPEAT ONCE IN 6 HOURS. MAX 3 TIMES A WEEK.

## 2017-07-05 ENCOUNTER — TELEPHONE (OUTPATIENT)
Dept: NEUROLOGY | Facility: CLINIC | Age: 37
End: 2017-07-05

## 2017-07-05 NOTE — TELEPHONE ENCOUNTER
[facility] called, stated that they could not get the riboflavin and was wondering if you wanted to change it to something else. Please advise. Thanks!!

## 2017-07-06 NOTE — TELEPHONE ENCOUNTER
Called patient pharmacy, spoke with Antwon, advised him that the provider stated she could just take the magnesium and coq10. Verbalized understanding.

## 2017-07-18 ENCOUNTER — OFFICE VISIT (OUTPATIENT)
Dept: ONCOLOGY | Facility: CLINIC | Age: 37
End: 2017-07-18

## 2017-07-18 ENCOUNTER — LAB (OUTPATIENT)
Dept: LAB | Facility: HOSPITAL | Age: 37
End: 2017-07-18

## 2017-07-18 VITALS
DIASTOLIC BLOOD PRESSURE: 64 MMHG | RESPIRATION RATE: 18 BRPM | SYSTOLIC BLOOD PRESSURE: 117 MMHG | BODY MASS INDEX: 55.32 KG/M2 | TEMPERATURE: 97.9 F | HEART RATE: 84 BPM | HEIGHT: 61 IN | WEIGHT: 293 LBS

## 2017-07-18 DIAGNOSIS — D50.0 IRON DEFICIENCY ANEMIA DUE TO CHRONIC BLOOD LOSS: Primary | ICD-10-CM

## 2017-07-18 DIAGNOSIS — D50.0 IRON DEFICIENCY ANEMIA DUE TO CHRONIC BLOOD LOSS: ICD-10-CM

## 2017-07-18 DIAGNOSIS — G89.29 CHRONIC PAIN OF RIGHT KNEE: ICD-10-CM

## 2017-07-18 DIAGNOSIS — M25.561 CHRONIC PAIN OF RIGHT KNEE: ICD-10-CM

## 2017-07-18 LAB
ALBUMIN SERPL-MCNC: 3.8 G/DL (ref 3.2–4.8)
ALBUMIN/GLOB SERPL: 1.1 G/DL (ref 1.5–2.5)
ALP SERPL-CCNC: 85 U/L (ref 25–100)
ALT SERPL W P-5'-P-CCNC: 13 U/L (ref 7–40)
ANION GAP SERPL CALCULATED.3IONS-SCNC: 1 MMOL/L (ref 3–11)
AST SERPL-CCNC: 13 U/L (ref 0–33)
BILIRUB SERPL-MCNC: 0.5 MG/DL (ref 0.3–1.2)
BUN BLD-MCNC: 10 MG/DL (ref 9–23)
BUN/CREAT SERPL: 16.7 (ref 7–25)
CALCIUM SPEC-SCNC: 9.6 MG/DL (ref 8.7–10.4)
CHLORIDE SERPL-SCNC: 103 MMOL/L (ref 99–109)
CO2 SERPL-SCNC: 33 MMOL/L (ref 20–31)
CREAT BLD-MCNC: 0.6 MG/DL (ref 0.6–1.3)
ERYTHROCYTE [DISTWIDTH] IN BLOOD BY AUTOMATED COUNT: 15.2 % (ref 11.3–14.5)
FERRITIN SERPL-MCNC: 443 NG/ML (ref 10–291)
GFR SERPL CREATININE-BSD FRML MDRD: 138 ML/MIN/1.73
GLOBULIN UR ELPH-MCNC: 3.4 GM/DL
GLUCOSE BLD-MCNC: 87 MG/DL (ref 70–100)
HCT VFR BLD AUTO: 34.5 % (ref 34.5–44)
HGB BLD-MCNC: 10.8 G/DL (ref 11.5–15.5)
IRON 24H UR-MRATE: 51 MCG/DL (ref 50–175)
IRON SATN MFR SERPL: 22 % (ref 15–50)
LYMPHOCYTES # BLD AUTO: 2.6 10*3/MM3 (ref 0.6–4.8)
LYMPHOCYTES NFR BLD AUTO: 34.1 % (ref 24–44)
MCH RBC QN AUTO: 25.4 PG (ref 27–31)
MCHC RBC AUTO-ENTMCNC: 31.4 G/DL (ref 32–36)
MCV RBC AUTO: 80.9 FL (ref 80–99)
MONOCYTES # BLD AUTO: 0.5 10*3/MM3 (ref 0–1)
MONOCYTES NFR BLD AUTO: 6.1 % (ref 0–12)
NEUTROPHILS # BLD AUTO: 4.5 10*3/MM3 (ref 1.5–8.3)
NEUTROPHILS NFR BLD AUTO: 59.8 % (ref 41–71)
PLATELET # BLD AUTO: 253 10*3/MM3 (ref 150–450)
PMV BLD AUTO: 8.7 FL (ref 6–12)
POTASSIUM BLD-SCNC: 4.5 MMOL/L (ref 3.5–5.5)
PROT SERPL-MCNC: 7.2 G/DL (ref 5.7–8.2)
RBC # BLD AUTO: 4.26 10*6/MM3 (ref 3.89–5.14)
SODIUM BLD-SCNC: 137 MMOL/L (ref 132–146)
TIBC SERPL-MCNC: 229 MCG/DL (ref 250–450)
WBC NRBC COR # BLD: 7.5 10*3/MM3 (ref 3.5–10.8)

## 2017-07-18 PROCEDURE — 80053 COMPREHEN METABOLIC PANEL: CPT | Performed by: INTERNAL MEDICINE

## 2017-07-18 PROCEDURE — 82728 ASSAY OF FERRITIN: CPT | Performed by: INTERNAL MEDICINE

## 2017-07-18 PROCEDURE — 83550 IRON BINDING TEST: CPT | Performed by: INTERNAL MEDICINE

## 2017-07-18 PROCEDURE — 99213 OFFICE O/P EST LOW 20 MIN: CPT | Performed by: NURSE PRACTITIONER

## 2017-07-18 PROCEDURE — 85025 COMPLETE CBC W/AUTO DIFF WBC: CPT

## 2017-07-18 PROCEDURE — 83540 ASSAY OF IRON: CPT | Performed by: INTERNAL MEDICINE

## 2017-07-18 PROCEDURE — 36415 COLL VENOUS BLD VENIPUNCTURE: CPT

## 2017-07-18 RX ORDER — CETIRIZINE HYDROCHLORIDE 10 MG/1
TABLET ORAL
Refills: 0 | COMMUNITY
Start: 2017-06-30 | End: 2017-09-22

## 2017-07-18 NOTE — PROGRESS NOTES
CHIEF COMPLAINT: Follow-up for iron deficiency anemia    Problem List:  Oncology/Hematology History    1.  Iron deficiency anemia intolerant of oral iron   a) colonoscopy in 2016        Iron deficiency anemia     Initial Diagnosis    Iron deficiency anemia      2017 - 2017 Treatment    Received Feraheme infusion 2017, 2017, 2017, 2017.         HISTORY OF PRESENT ILLNESS:  The patient is a 36 y.o. female, here for follow up on management of Iron deficiency anemia.  The patient states that after receiving 2 courses of Feraheme she feels a little bit better.  She still reports that she has occasions where she is more fatigued that last about 5 days, these are not associated with her menstrual cycle or any other condition.  They resolved spontaneously.  She has not had any unusual cough or shortness of breath.  Menstrual cycles remain regular and reportedly not heavy.  States that her knee pain has actually improved.  Has had no intervention.      Past Medical History:   Diagnosis Date   • Absolute anemia    • Carpal tunnel syndrome     RIGHT WRIST   • Cholelithiasis     Nausea related to stones has phenergan. Also has related RUQ pain.   • Dizziness    • Elevated C-reactive protein (CRP)    • Elevated erythrocyte sedimentation rate    • Influenza    • Iron deficiency     Will not take iron supplement but will take MVI   • Left hip pain    • Numbness and tingling in right hand     Has nerve conductions 3/23/2016, awaiting results. Along with Paresthesia.   • Pain and swelling of right forearm     Check u/s to r/o clot. Rash and redness are resolving, will continue to monitor.   • Pain of left arm    • Sleep disturbances    • Vitamin D deficiency     25 oh.     Past Surgical History:   Procedure Laterality Date   •  SECTION     • CHOLECYSTECTOMY     • COLONOSCOPY     • CYST REMOVAL      behind belly button   • TONSILLECTOMY     • URACHAL CYST INCISION      History  "of Excision Of Urachal Cyst.       Allergies   Allergen Reactions   • Amoxicillin      TABS.   • Penicillins        Family History and Social History reviewed and changed as necessary      REVIEW OF SYSTEM:   Review of Systems   Constitutional: Negative for appetite change, chills, diaphoresis, fatigue, fever and unexpected weight change.   HENT:   Negative for mouth sores, sore throat and trouble swallowing.    Eyes: Negative for icterus.   Respiratory: Negative for cough, hemoptysis and shortness of breath.    Cardiovascular: Negative for chest pain, leg swelling and palpitations.   Gastrointestinal: Negative for abdominal distention, abdominal pain, blood in stool, constipation, diarrhea, nausea and vomiting.   Endocrine: Negative for hot flashes.   Genitourinary: Negative for bladder incontinence, difficulty urinating, dysuria, frequency and hematuria.    Musculoskeletal: Negative for gait problem, neck pain and neck stiffness.   Skin: Negative for rash.   Neurological: Negative for dizziness, gait problem, headaches, light-headedness and numbness.   Hematological: Negative for adenopathy. Does not bruise/bleed easily.   Psychiatric/Behavioral: Negative for depression. The patient is not nervous/anxious.    All other systems reviewed and are negative.       PHYSICAL EXAM    Vitals:    07/18/17 1128   BP: 117/64   Pulse: 84   Resp: 18   Temp: 97.9 °F (36.6 °C)   Weight: (!) 302 lb (137 kg)   Height: 61\" (154.9 cm)     Constitutional: Appears well-developed and well-nourished. No distress.   ECOG: (0) Fully active, able to carry on all predisease performance without restriction  HENT:   Head: Normocephalic.   Mouth/Throat: Oropharynx is clear and moist.   Eyes: Conjunctivae are normal. Pupils are equal, round, and reactive to light. No scleral icterus.   Neck: Neck supple. No JVD present. No thyromegaly present.   Cardiovascular: Normal rate, regular rhythm and normal heart sounds.    Pulmonary/Chest: Breath " sounds normal. No respiratory distress.   Abdominal: Soft. Exhibits no distension and no mass. There is no hepatosplenomegaly. There is no tenderness. There is no rebound and no guarding.   Musculoskeletal:Exhibits no edema, tenderness or deformity.   Neurological: Alert and oriented to person, place, and time. Exhibits normal muscle tone.   Skin: No ecchymosis, no petechiae and no rash noted. Not diaphoretic. No cyanosis. Nails show no clubbing.   Psychiatric: Normal mood and affect.   Vitals reviewed.    Diagnostic data: All previous office notes and current laboratory data were reviewed at time of visit.  CBC 7/18/2017 WBC 7500, hemoglobin 10.8, hematocrit 34.5%, MCV 80.9, MCH 25.4, MCHC 31.4.  Ferritin and iron indices pending at time of visit.    Assessment/Plan     1.  Microcytic anemia  2.  Iron deficiency    Discussion: The patient has received 2 courses of Feraheme, once in January and again in April.  Current CBC shows hemoglobin stable at 10.8, hematocrit normal at 34.5% with an improved MCV of 80.9.  Her ferritin and iron indices are pending today however previous ferritin in March was up to 219.  She is overall feeling well with no new concerns.  We will hold off on further Feraheme, I will call her with the results of her ferritin today.  We will see her back in 4 months for follow-up with CBC, ferritin and iron indices on return.  She will notify us in the interim if she has any symptoms such as fatigue or shortness of breath and we will check her lab sooner.      Andra Roberts, SYBIL    07/18/2017

## 2017-09-22 ENCOUNTER — OFFICE VISIT (OUTPATIENT)
Dept: NEUROLOGY | Facility: CLINIC | Age: 37
End: 2017-09-22

## 2017-09-22 VITALS
WEIGHT: 293 LBS | BODY MASS INDEX: 55.32 KG/M2 | RESPIRATION RATE: 18 BRPM | HEIGHT: 61 IN | DIASTOLIC BLOOD PRESSURE: 68 MMHG | SYSTOLIC BLOOD PRESSURE: 103 MMHG | HEART RATE: 79 BPM

## 2017-09-22 DIAGNOSIS — R42 DIZZINESS, NONSPECIFIC: ICD-10-CM

## 2017-09-22 DIAGNOSIS — Z86.69 H/O ATYPICAL MIGRAINE: ICD-10-CM

## 2017-09-22 DIAGNOSIS — G43.809 MIGRAINE VARIANT WITH HEADACHE: Primary | Chronic | ICD-10-CM

## 2017-09-22 PROCEDURE — 99213 OFFICE O/P EST LOW 20 MIN: CPT | Performed by: NURSE PRACTITIONER

## 2017-09-22 RX ORDER — SUMATRIPTAN 50 MG/1
50 TABLET, FILM COATED ORAL ONCE AS NEEDED
Qty: 9 TABLET | Refills: 5 | Status: SHIPPED | OUTPATIENT
Start: 2017-09-22 | End: 2019-01-09

## 2017-09-22 RX ORDER — TOPIRAMATE 50 MG/1
50 CAPSULE, EXTENDED RELEASE ORAL NIGHTLY
Qty: 30 CAPSULE | Refills: 11 | Status: SHIPPED | OUTPATIENT
Start: 2017-09-22 | End: 2019-01-09 | Stop reason: SINTOL

## 2017-09-22 NOTE — PROGRESS NOTES
Subjective:     Patient ID: Nury Pineda is a 37 y.o. female.    HPI:   History of Present Illness   Here for 2.5 month follow up on episodic migraine headaches with a history of complex migraines in 2015 with hospitalization at . Most migraines occur in left temporal mild to moderate pressure, throbbing, no N/V, +photophobia and +phonophobia and visual disturbances with central sensitization with left arm tingling sensations, normally lasts several hours to several days previously.  After this last visit she did finally decided to take the topiramate 50 mg at night and she has been taking this consistently with only a few missed doses.  She tells me that it does make her drowsy and she sleeps well through the night.  She tells me she has had no migraines in the past 2-1/2 months.  She has had a few very mild headaches.  She did have one day where she had a sudden onset sharp pain in the right occipital region which lasted about 20 minutes and resolved on its own.  She has had no more of these symptoms.  She does complain of chronic, intermittent dizziness and feeling off balance.  She has had these symptoms for several years on and off.  She does not wish to pursue physical therapy for gait training at this time.  She tells me that some days she has very mild symptoms and has only had one day where she felt dizzy throughout the day.  She tells me it's regardless of positioning.  She drinks and eats well.  She feels like the topiramate is helping but her dizziness may slightly be more significant on the medication.     Prior imaging studies have showed no acute process and MRIs brain/C spine and MRA brain have been limited due to artifact from braces 1/2016 & 12/2015. Will need imaging repeated once braces are removed. She still has braces at this time.    The following portions of the patient's history were reviewed and updated as appropriate: allergies, current medications, past family history, past medical  history, past social history, past surgical history and problem list.    Past Medical History:   Diagnosis Date   • Absolute anemia    • Carpal tunnel syndrome     RIGHT WRIST   • Cholelithiasis     Nausea related to stones has phenergan. Also has related RUQ pain.   • Dizziness    • Elevated C-reactive protein (CRP)    • Elevated erythrocyte sedimentation rate    • Influenza    • Iron deficiency     Will not take iron supplement but will take MVI   • Left hip pain    • Numbness and tingling in right hand     Has nerve conductions 3/23/2016, awaiting results. Along with Paresthesia.   • Pain and swelling of right forearm     Check u/s to r/o clot. Rash and redness are resolving, will continue to monitor.   • Pain of left arm    • Sleep disturbances    • Vitamin D deficiency     25 oh.       Past Surgical History:   Procedure Laterality Date   •  SECTION     • CHOLECYSTECTOMY     • COLONOSCOPY     • CYST REMOVAL      behind belly button   • TONSILLECTOMY     • URACHAL CYST INCISION      History of Excision Of Urachal Cyst.       Social History     Social History   • Marital status: Single     Spouse name: N/A   • Number of children: N/A   • Years of education: N/A     Occupational History   • Not on file.     Social History Main Topics   • Smoking status: Never Smoker   • Smokeless tobacco: Never Used   • Alcohol use 1.2 oz/week     2 Glasses of wine per week   • Drug use: No   • Sexual activity: Not Currently     Other Topics Concern   • Not on file     Social History Narrative    single       Family History   Problem Relation Age of Onset   • Stroke Mother    • Hypertension Mother    • Migraines Mother    • Osteoarthritis Mother    • Cervical cancer Mother    • Heart disease Mother    • Hyperlipidemia Maternal Grandmother    • Osteoarthritis Maternal Grandmother    • Birth defects Maternal Grandmother    • Ovarian cancer Maternal Grandmother      PREMENOPAUSAL    • Stroke Other      CVA  x 2. and TIA   • Cancer Other      Cervical, malignant neoplasm x2 , ovarian   • Hypertension Other    • Migraines Other    • Osteoarthritis Other    • Transient ischemic attack Other         Review of Systems   Constitutional: Negative for chills, fatigue, fever and unexpected weight change.   HENT: Negative for ear pain, hearing loss, nosebleeds, rhinorrhea and sore throat.    Eyes: Negative for photophobia, pain, discharge, itching and visual disturbance.   Respiratory: Negative for cough, chest tightness, shortness of breath and wheezing.    Cardiovascular: Negative for chest pain, palpitations and leg swelling.   Gastrointestinal: Negative for abdominal pain, blood in stool, constipation, diarrhea, nausea and vomiting.   Genitourinary: Negative for dysuria, frequency, hematuria and urgency.   Musculoskeletal: Positive for gait problem. Negative for arthralgias, back pain, joint swelling, myalgias, neck pain and neck stiffness.   Skin: Negative for rash and wound.   Allergic/Immunologic: Negative for environmental allergies and food allergies.   Neurological: Positive for dizziness and light-headedness. Negative for tremors, seizures, syncope, speech difficulty, weakness, numbness and headaches.        OFF BALANCE   Hematological: Negative for adenopathy. Does not bruise/bleed easily.   Psychiatric/Behavioral: Negative for agitation, confusion, decreased concentration, hallucinations, sleep disturbance and suicidal ideas. The patient is not nervous/anxious.         Objective:    Neurologic Exam     Mental Status   Oriented to person, place, and time.   Level of consciousness: alert    Cranial Nerves   Cranial nerves II through XII intact.     Motor Exam   Muscle bulk: normal  Overall muscle tone: normal    Strength   Strength 5/5 throughout.     Gait, Coordination, and Reflexes     Gait  Gait: normal    Coordination   Romberg: negative (feels dizzy and off balance but is able to maintain her balance during  tests)  Finger to nose coordination: normal  Heel to shin coordination: normal  Tandem walking coordination: normal    Tremor   Resting tremor: absent  Intention tremor: absent  Action tremor: absent    Reflexes   Right brachioradialis: 2+  Left brachioradialis: 2+  Right biceps: 2+  Left biceps: 2+  Right triceps: 2+  Left triceps: 2+  Right patellar: 2+  Left patellar: 2+  Right achilles: 2+  Left achilles: 2+  Right : 2+  Left : 2+      Physical Exam   Constitutional: She is oriented to person, place, and time.   Neurological: She is oriented to person, place, and time. She has normal strength. She has a normal Finger-Nose-Finger Test, a normal Heel to Shin Test, a normal Romberg Test (feels dizzy and off balance but is able to maintain her balance during tests) and a normal Tandem Gait Test. Gait normal.   Reflex Scores:       Tricep reflexes are 2+ on the right side and 2+ on the left side.       Bicep reflexes are 2+ on the right side and 2+ on the left side.       Brachioradialis reflexes are 2+ on the right side and 2+ on the left side.       Patellar reflexes are 2+ on the right side and 2+ on the left side.       Achilles reflexes are 2+ on the right side and 2+ on the left side.      Assessment/Plan:       Nury was seen today for migraine.    Diagnoses and all orders for this visit:    Migraine variant with headache  -     Topiramate ER 50 MG capsule sustained-release 24 hr; Take 50 mg by mouth Every Night.  -     SUMAtriptan (IMITREX) 50 MG tablet; Take 1 tablet by mouth 1 (One) Time As Needed for Migraine for up to 1 dose. May repeat dose one time in 2 hours if headache not relieved.    Dizziness, nonspecific  Comments:  Intermittent. Continue to monitor. Declines PT or other treatments at this time.    H/O atypical migraine  -     Topiramate ER 50 MG capsule sustained-release 24 hr; Take 50 mg by mouth Every Night.         We will switch her to topiramate ER 50 mg at night to decrease side  effects.  Her migraines have significantly improved.  She still has not taken Imitrex but have encouraged her to use this at onset of any migraine symptoms.  For her dizziness and she prefers no treatment at this time and wants to just monitor for now.  She will let us to have her dizziness symptoms worsen.  What she does have removal of her braces we would recommend repeat MRI of the brain and MRA of the brain.  We'll follow-up in 5 months for reevaluation or sooner if needed. Reviewed medications, potential side effects and signs and symptoms to report. Discussed risk versus benefits of treatment plan with patient and/or family-including medications, labs and radiology that may be ordered. Addressed questions and concerns during visit. Patient and/or family verbalized understanding and agree with plan.    EMR Dragon/Transcription Disclaimer:  Much of this encounter note is an electronic transcription of spoken language to printed text. Electronic transcription of spoken language may permit erroneous words or phrases to be inadvertently transcribed. Although I have reviewed the note for such errors, some may still exist in this documentation.      Ele Thorne, APRN  9/22/2017

## 2017-09-25 ENCOUNTER — OFFICE VISIT (OUTPATIENT)
Dept: INTERNAL MEDICINE | Facility: CLINIC | Age: 37
End: 2017-09-25

## 2017-09-25 VITALS
HEART RATE: 79 BPM | WEIGHT: 293 LBS | BODY MASS INDEX: 53.92 KG/M2 | HEIGHT: 62 IN | OXYGEN SATURATION: 99 % | DIASTOLIC BLOOD PRESSURE: 70 MMHG | SYSTOLIC BLOOD PRESSURE: 90 MMHG

## 2017-09-25 DIAGNOSIS — E66.01 MORBID OBESITY DUE TO EXCESS CALORIES (HCC): ICD-10-CM

## 2017-09-25 DIAGNOSIS — Z00.00 HEALTH MAINTENANCE EXAMINATION: Primary | ICD-10-CM

## 2017-09-25 LAB
25(OH)D3 SERPL-MCNC: 11.3 NG/ML
ALBUMIN SERPL-MCNC: 4.2 G/DL (ref 3.2–4.8)
ALBUMIN/GLOB SERPL: 1.3 G/DL (ref 1.5–2.5)
ALP SERPL-CCNC: 92 U/L (ref 25–100)
ALT SERPL W P-5'-P-CCNC: 17 U/L (ref 7–40)
ANION GAP SERPL CALCULATED.3IONS-SCNC: 5 MMOL/L (ref 3–11)
ARTICHOKE IGE QN: 90 MG/DL (ref 0–130)
AST SERPL-CCNC: 17 U/L (ref 0–33)
BASOPHILS # BLD AUTO: 0.01 10*3/MM3 (ref 0–0.2)
BASOPHILS NFR BLD AUTO: 0.1 % (ref 0–1)
BILIRUB SERPL-MCNC: 0.6 MG/DL (ref 0.3–1.2)
BUN BLD-MCNC: 12 MG/DL (ref 9–23)
BUN/CREAT SERPL: 15 (ref 7–25)
CALCIUM SPEC-SCNC: 9.1 MG/DL (ref 8.7–10.4)
CHLORIDE SERPL-SCNC: 107 MMOL/L (ref 99–109)
CHOLEST SERPL-MCNC: 177 MG/DL (ref 0–200)
CO2 SERPL-SCNC: 27 MMOL/L (ref 20–31)
CREAT BLD-MCNC: 0.8 MG/DL (ref 0.6–1.3)
DEPRECATED RDW RBC AUTO: 43.8 FL (ref 37–54)
EOSINOPHIL # BLD AUTO: 0.1 10*3/MM3 (ref 0–0.3)
EOSINOPHIL NFR BLD AUTO: 1.4 % (ref 0–3)
ERYTHROCYTE [DISTWIDTH] IN BLOOD BY AUTOMATED COUNT: 14.6 % (ref 11.3–14.5)
GFR SERPL CREATININE-BSD FRML MDRD: 98 ML/MIN/1.73
GLOBULIN UR ELPH-MCNC: 3.2 GM/DL
GLUCOSE BLD-MCNC: 95 MG/DL (ref 70–100)
HCT VFR BLD AUTO: 37.4 % (ref 34.5–44)
HDLC SERPL-MCNC: 78 MG/DL (ref 40–60)
HGB BLD-MCNC: 11.9 G/DL (ref 11.5–15.5)
IMM GRANULOCYTES # BLD: 0.01 10*3/MM3 (ref 0–0.03)
IMM GRANULOCYTES NFR BLD: 0.1 % (ref 0–0.6)
LYMPHOCYTES # BLD AUTO: 2.33 10*3/MM3 (ref 0.6–4.8)
LYMPHOCYTES NFR BLD AUTO: 32.1 % (ref 24–44)
MCH RBC QN AUTO: 26.2 PG (ref 27–31)
MCHC RBC AUTO-ENTMCNC: 31.8 G/DL (ref 32–36)
MCV RBC AUTO: 82.4 FL (ref 80–99)
MONOCYTES # BLD AUTO: 0.74 10*3/MM3 (ref 0–1)
MONOCYTES NFR BLD AUTO: 10.2 % (ref 0–12)
NEUTROPHILS # BLD AUTO: 4.06 10*3/MM3 (ref 1.5–8.3)
NEUTROPHILS NFR BLD AUTO: 56.1 % (ref 41–71)
PLATELET # BLD AUTO: 272 10*3/MM3 (ref 150–450)
PMV BLD AUTO: 11 FL (ref 6–12)
POTASSIUM BLD-SCNC: 4.2 MMOL/L (ref 3.5–5.5)
PROT SERPL-MCNC: 7.4 G/DL (ref 5.7–8.2)
RBC # BLD AUTO: 4.54 10*6/MM3 (ref 3.89–5.14)
SODIUM BLD-SCNC: 139 MMOL/L (ref 132–146)
TRIGL SERPL-MCNC: 68 MG/DL (ref 0–150)
TSH SERPL DL<=0.05 MIU/L-ACNC: 1.43 MIU/ML (ref 0.35–5.35)
WBC NRBC COR # BLD: 7.25 10*3/MM3 (ref 3.5–10.8)

## 2017-09-25 PROCEDURE — 84443 ASSAY THYROID STIM HORMONE: CPT | Performed by: INTERNAL MEDICINE

## 2017-09-25 PROCEDURE — 80053 COMPREHEN METABOLIC PANEL: CPT | Performed by: INTERNAL MEDICINE

## 2017-09-25 PROCEDURE — 80061 LIPID PANEL: CPT | Performed by: INTERNAL MEDICINE

## 2017-09-25 PROCEDURE — 85025 COMPLETE CBC W/AUTO DIFF WBC: CPT | Performed by: INTERNAL MEDICINE

## 2017-09-25 PROCEDURE — 82306 VITAMIN D 25 HYDROXY: CPT | Performed by: INTERNAL MEDICINE

## 2017-09-25 PROCEDURE — 99395 PREV VISIT EST AGE 18-39: CPT | Performed by: INTERNAL MEDICINE

## 2017-09-25 RX ORDER — PHENTERMINE HYDROCHLORIDE 37.5 MG/1
37.5 CAPSULE ORAL EVERY MORNING
Qty: 30 CAPSULE | Refills: 0 | Status: SHIPPED | OUTPATIENT
Start: 2017-09-25 | End: 2017-10-23 | Stop reason: SDUPTHER

## 2017-09-25 RX ORDER — MONTELUKAST SODIUM 10 MG/1
10 TABLET ORAL NIGHTLY
Qty: 90 TABLET | Refills: 1 | Status: SHIPPED | OUTPATIENT
Start: 2017-09-25 | End: 2018-05-16 | Stop reason: SDUPTHER

## 2017-09-25 NOTE — PROGRESS NOTES
Chief Complaint   Patient presents with   • Annual Exam           Reported Health  Good Yes  FairNo  PoorNo      Dental,Vision,Hearing  Regular dental visitsYes  Vision ProblemsYes  Hearing LossNo      Immunization Status:  Up To DateNo        Lifestyle  Healthy DietYes  Weight ConcernsYes  Regular ExerciseNo  Tobacco UseNo  Alcohol UseNo  Drug AbuseNo      Screening  Cancer ScreeningYes  Metabolic ScreeningYes  Risk ScreeningYes  Past Medical History:   Diagnosis Date   • Absolute anemia    • Carpal tunnel syndrome     RIGHT WRIST   • Cholelithiasis     Nausea related to stones has phenergan. Also has related RUQ pain.   • Dizziness    • Elevated C-reactive protein (CRP)    • Elevated erythrocyte sedimentation rate    • Influenza    • Iron deficiency     Will not take iron supplement but will take MVI   • Left hip pain    • Numbness and tingling in right hand     Has nerve conductions 3/23/2016, awaiting results. Along with Paresthesia.   • Pain and swelling of right forearm     Check u/s to r/o clot. Rash and redness are resolving, will continue to monitor.   • Pain of left arm    • Sleep disturbances    • Vitamin D deficiency     25 oh.     Past Surgical History:   Procedure Laterality Date   •  SECTION     • CHOLECYSTECTOMY     • COLONOSCOPY     • CYST REMOVAL      behind belly button   • TONSILLECTOMY     • URACHAL CYST INCISION      History of Excision Of Urachal Cyst.     Family History   Problem Relation Age of Onset   • Stroke Mother    • Hypertension Mother    • Migraines Mother    • Osteoarthritis Mother    • Cervical cancer Mother    • Heart disease Mother    • Hyperlipidemia Maternal Grandmother    • Osteoarthritis Maternal Grandmother    • Birth defects Maternal Grandmother    • Ovarian cancer Maternal Grandmother      PREMENOPAUSAL    • Stroke Other      CVA x 2. and TIA   • Cancer Other      Cervical, malignant neoplasm x2 , ovarian   • Hypertension Other    • Migraines  "Other    • Osteoarthritis Other    • Transient ischemic attack Other        Social History     Social History   • Marital status: Single     Spouse name: N/A   • Number of children: N/A   • Years of education: N/A     Occupational History   • Not on file.     Social History Main Topics   • Smoking status: Never Smoker   • Smokeless tobacco: Never Used   • Alcohol use 1.2 oz/week     2 Glasses of wine per week   • Drug use: No   • Sexual activity: Not Currently     Other Topics Concern   • Not on file     Social History Narrative    single       Review of Systems   Constitutional: Negative for activity change, appetite change, chills, diaphoresis, fatigue, fever and unexpected weight change.   HENT: Negative for congestion, ear discharge, ear pain, mouth sores, nosebleeds, sinus pressure, sneezing and sore throat.    Eyes: Negative for pain, discharge and itching.   Respiratory: Negative for cough, chest tightness, shortness of breath and wheezing.    Cardiovascular: Negative for chest pain, palpitations and leg swelling.   Gastrointestinal: Negative for abdominal pain, constipation, diarrhea, nausea and vomiting.   Endocrine: Negative for cold intolerance, heat intolerance, polydipsia and polyphagia.   Genitourinary: Negative for dysuria, flank pain, frequency, hematuria and urgency.   Musculoskeletal: Negative for arthralgias, back pain, gait problem, myalgias, neck pain and neck stiffness.   Skin: Negative for color change, pallor and rash.   Neurological: Negative for seizures, speech difficulty, numbness and headaches.   Psychiatric/Behavioral: Negative for agitation, confusion, decreased concentration and sleep disturbance. The patient is not nervous/anxious.    BP 90/70  Pulse 79  Ht 61.5\" (156.2 cm)  Wt (!) 300 lb 1.6 oz (136 kg)  SpO2 99%  BMI 55.79 kg/m2      Physical Exam   Constitutional: She is oriented to person, place, and time.   Morbid obesity   HENT:   Head: Normocephalic.   Right Ear: External " ear normal.   Left Ear: External ear normal.   Nose: Nose normal.   Mouth/Throat: Oropharynx is clear and moist.   Eyes: Conjunctivae are normal. Pupils are equal, round, and reactive to light.   Neck: No JVD present. No thyromegaly present.   Cardiovascular: Normal rate, regular rhythm and normal heart sounds.  Exam reveals no friction rub.    No murmur heard.  Pulmonary/Chest: Effort normal and breath sounds normal. No respiratory distress. She has no wheezes. She has no rales.   Abdominal: Soft. Bowel sounds are normal. She exhibits no distension. There is no tenderness. There is no guarding.   Musculoskeletal: She exhibits no edema or tenderness.   Lymphadenopathy:     She has no cervical adenopathy.   Neurological: She is oriented to person, place, and time. She displays normal reflexes. No cranial nerve deficit.   Skin: No rash noted.   Psychiatric: Her behavior is normal.   Nursing note and vitals reviewed.                Diet and Exercise    Healthy Diet No  Adequate DietYes  Poor DietNo  Adequate Exercise RegimenNo  Inadequate Exercise RegimenYes      Cervical Cancer Screening    Risks and benefits discussedYes  Screening currentYes  PAP Done Today OrderedNo  Screening Not IndicatedNo  Pap Every 3 yearsYes  Screening Done By GYNYes    Breast Cancer screening  Not indicated til 40      STD Testing  ChlamydiaNo  GonorrheaNo  HIVNo      Osteoporosis Screening  Not indicated    Colorectal Cancer Screening  Had recent colonoscopy that was normal.     Metabolic Screening  GlucoseYes  LipidsYes  CBCYes  TSHYes  UAYes  CMPYes  25OHYes      Immunizations  Risks and benefits discussedYes  Immunizations Up To DateNo  Immunizations NeededYes  Immunizations Per OrdersNo  Patient DYeseclines      Preventative Counseling  NutritionYes  Aerobic ExerciseYes  Weight Bearing ExerciseYes  Weight LossYes  Calcium SupplementsYes  Vitamin D SupplementsYes  Reproductive HealthYes  Cardiovascular Risk ReductionYes  Tobacco  CessationNo  Alcohol UseYes  Sunscreen UseYes  Self Skin ExaminationNo  Helmet UseNo  Seat Belt UseYes  Fall Risk ReductionNo  Advanced Directive PlanningNo      Patient Discussion  PatientYes  FamilyNo  CounselingYes  Nury was seen today for annual exam.    Diagnoses and all orders for this visit:    Health maintenance examination  -     CBC & Differential  -     Comprehensive Metabolic Panel  -     Lipid Panel  -     Vitamin D 25 Hydroxy  -     TSH    Morbid obesity due to excess calories  -     phentermine 37.5 MG capsule; Take 1 capsule by mouth Every Morning.    Other orders  -     montelukast (SINGULAIR) 10 MG tablet; Take 1 tablet by mouth Every Night.  1 m f/u on weight.

## 2017-09-26 ENCOUNTER — TELEPHONE (OUTPATIENT)
Dept: INTERNAL MEDICINE | Facility: CLINIC | Age: 37
End: 2017-09-26

## 2017-09-26 RX ORDER — ERGOCALCIFEROL 1.25 MG/1
CAPSULE ORAL
Qty: 12 CAPSULE | Refills: 0 | Status: SHIPPED | OUTPATIENT
Start: 2017-09-26 | End: 2018-05-16

## 2017-09-26 NOTE — TELEPHONE ENCOUNTER
----- Message from Acacia Lobato DO sent at 9/26/2017  7:36 AM EDT -----  vitamin d very low. Call in 50,000 vit d2 once a week x 12 weeks.

## 2017-10-23 ENCOUNTER — OFFICE VISIT (OUTPATIENT)
Dept: INTERNAL MEDICINE | Facility: CLINIC | Age: 37
End: 2017-10-23

## 2017-10-23 VITALS
OXYGEN SATURATION: 99 % | BODY MASS INDEX: 54.22 KG/M2 | HEART RATE: 77 BPM | WEIGHT: 291.7 LBS | SYSTOLIC BLOOD PRESSURE: 100 MMHG | DIASTOLIC BLOOD PRESSURE: 70 MMHG

## 2017-10-23 DIAGNOSIS — E66.01 MORBID OBESITY WITH BMI OF 50.0-59.9, ADULT (HCC): Primary | Chronic | ICD-10-CM

## 2017-10-23 DIAGNOSIS — E66.01 MORBID OBESITY DUE TO EXCESS CALORIES (HCC): ICD-10-CM

## 2017-10-23 PROCEDURE — 99213 OFFICE O/P EST LOW 20 MIN: CPT | Performed by: INTERNAL MEDICINE

## 2017-10-23 RX ORDER — PHENTERMINE HYDROCHLORIDE 37.5 MG/1
37.5 CAPSULE ORAL EVERY MORNING
Qty: 30 CAPSULE | Refills: 0 | Status: SHIPPED | OUTPATIENT
Start: 2017-10-23 | End: 2017-11-22

## 2017-10-23 NOTE — PROGRESS NOTES
Subjective   Nury Pineda is a 37 y.o. female.   Chief Complaint   Patient presents with   • Obesity         Obesity   This is a chronic problem. The current episode started more than 1 year ago. Pertinent negatives include no abdominal pain, arthralgias, chest pain, chills, congestion, coughing, diaphoresis, fatigue, fever, headaches, myalgias, nausea, neck pain, numbness, rash, sore throat, vomiting or weakness. Treatments tried: phentermine. The treatment provided moderate relief.       Lost 9 lbs over last 4 weeks.   The following portions of the patient's history were reviewed and updated as appropriate: allergies, current medications, past family history, past medical history, past social history, past surgical history and problem list.    Review of Systems   Constitutional: Negative for activity change, appetite change, chills, diaphoresis, fatigue, fever and unexpected weight change.   HENT: Negative for congestion, ear discharge, ear pain, mouth sores, nosebleeds, sinus pressure, sneezing and sore throat.    Eyes: Negative for pain, discharge and itching.   Respiratory: Negative for cough, chest tightness, shortness of breath and wheezing.    Cardiovascular: Negative for chest pain, palpitations and leg swelling.   Gastrointestinal: Negative for abdominal pain, constipation, diarrhea, nausea and vomiting.   Endocrine: Negative for cold intolerance, heat intolerance, polydipsia and polyphagia.   Genitourinary: Negative for dysuria, flank pain, frequency, hematuria and urgency.   Musculoskeletal: Negative for arthralgias, back pain, gait problem, myalgias, neck pain and neck stiffness.   Skin: Negative for color change, pallor and rash.   Neurological: Negative for seizures, speech difficulty, weakness, numbness and headaches.   Psychiatric/Behavioral: Negative for agitation, confusion, decreased concentration and sleep disturbance. The patient is not nervous/anxious.      /70  Pulse 77  Wt 291 lb  11.2 oz (132 kg)  SpO2 99%  BMI 54.22 kg/m2    Objective   Physical Exam   Constitutional: She is oriented to person, place, and time. She appears well-developed.   HENT:   Head: Normocephalic.   Right Ear: External ear normal.   Left Ear: External ear normal.   Nose: Nose normal.   Mouth/Throat: Oropharynx is clear and moist.   Eyes: Conjunctivae are normal. Pupils are equal, round, and reactive to light.   Neck: No JVD present. No thyromegaly present.   Cardiovascular: Normal rate, regular rhythm and normal heart sounds.  Exam reveals no friction rub.    No murmur heard.  Pulmonary/Chest: Effort normal and breath sounds normal. No respiratory distress. She has no wheezes. She has no rales.   Abdominal: Soft. Bowel sounds are normal. She exhibits no distension. There is no tenderness. There is no guarding.   Musculoskeletal: She exhibits no edema or tenderness.   Lymphadenopathy:     She has no cervical adenopathy.   Neurological: She is oriented to person, place, and time. She displays normal reflexes. No cranial nerve deficit.   Skin: No rash noted.   Psychiatric: Her behavior is normal.   Nursing note and vitals reviewed.      Assessment/Plan   Diagnoses and all orders for this visit:    Morbid obesity due to excess calories  -     phentermine 37.5 MG capsule; Take 1 capsule by mouth Every Morning.      Refill month 2/3 of phentermine. 1 month f/u

## 2017-11-01 ENCOUNTER — RESULTS ENCOUNTER (OUTPATIENT)
Dept: ONCOLOGY | Facility: CLINIC | Age: 37
End: 2017-11-01

## 2017-11-01 DIAGNOSIS — D50.0 IRON DEFICIENCY ANEMIA DUE TO CHRONIC BLOOD LOSS: ICD-10-CM

## 2017-11-03 ENCOUNTER — TELEPHONE (OUTPATIENT)
Dept: INTERNAL MEDICINE | Facility: CLINIC | Age: 37
End: 2017-11-03

## 2017-11-03 NOTE — TELEPHONE ENCOUNTER
Advised pt that the pharmacy will not take them back and if we send in another prescription she will have to pay for it, suggested putting them in applesauce, she is also going to contact the pharmacy

## 2017-11-03 NOTE — TELEPHONE ENCOUNTER
PT CALLED REGARDING MEDICATION: PHENTERMINE.     PT PICKED HER MEDICATION UP AND SAID IT WAS SUPPOSED TO BE SENT IN AS A TABLET INSTEAD OF A CAPSULE BECAUSE THE CAPSULE IS TOO BIG FOR HER TO TAKE.     PLEASE ADVISE. THANKS.

## 2017-11-22 ENCOUNTER — OFFICE VISIT (OUTPATIENT)
Dept: INTERNAL MEDICINE | Facility: CLINIC | Age: 37
End: 2017-11-22

## 2017-11-22 VITALS
DIASTOLIC BLOOD PRESSURE: 78 MMHG | SYSTOLIC BLOOD PRESSURE: 130 MMHG | OXYGEN SATURATION: 99 % | RESPIRATION RATE: 18 BRPM | HEART RATE: 89 BPM | HEIGHT: 61 IN | BODY MASS INDEX: 55.32 KG/M2 | WEIGHT: 293 LBS

## 2017-11-22 DIAGNOSIS — E66.01 MORBID OBESITY WITH BMI OF 50.0-59.9, ADULT (HCC): Primary | Chronic | ICD-10-CM

## 2017-11-22 PROCEDURE — 99213 OFFICE O/P EST LOW 20 MIN: CPT | Performed by: INTERNAL MEDICINE

## 2017-11-22 RX ORDER — PHENTERMINE HYDROCHLORIDE 37.5 MG/1
37.5 TABLET ORAL
Qty: 30 TABLET | Refills: 0 | Status: SHIPPED | OUTPATIENT
Start: 2017-11-22 | End: 2018-01-15 | Stop reason: SDUPTHER

## 2017-11-22 NOTE — PROGRESS NOTES
"Subjective   Nury Pineda is a 37 y.o. female.   Chief Complaint   Patient presents with   • Obesity     4 week follow up         Obesity   This is a chronic problem. The current episode started more than 1 year ago. Pertinent negatives include no abdominal pain, arthralgias, chest pain, chills, congestion, coughing, diaphoresis, fatigue, fever, headaches, myalgias, nausea, neck pain, numbness, rash, sore throat, vomiting or weakness. Treatments tried: phentermine. The treatment provided moderate relief.      .   The following portions of the patient's history were reviewed and updated as appropriate: allergies, current medications, past family history, past medical history, past social history, past surgical history and problem list.    Review of Systems   Constitutional: Negative for activity change, appetite change, chills, diaphoresis, fatigue, fever and unexpected weight change.   HENT: Negative for congestion, ear discharge, ear pain, mouth sores, nosebleeds, sinus pressure, sneezing and sore throat.    Eyes: Negative for pain, discharge and itching.   Respiratory: Negative for cough, chest tightness, shortness of breath and wheezing.    Cardiovascular: Negative for chest pain, palpitations and leg swelling.   Gastrointestinal: Negative for abdominal pain, constipation, diarrhea, nausea and vomiting.   Endocrine: Negative for cold intolerance, heat intolerance, polydipsia and polyphagia.   Genitourinary: Negative for dysuria, flank pain, frequency, hematuria and urgency.   Musculoskeletal: Negative for arthralgias, back pain, gait problem, myalgias, neck pain and neck stiffness.   Skin: Negative for color change, pallor and rash.   Neurological: Negative for seizures, speech difficulty, weakness, numbness and headaches.   Psychiatric/Behavioral: Negative for agitation, confusion, decreased concentration and sleep disturbance. The patient is not nervous/anxious.      /78  Pulse 89  Resp 18  Ht 61\" " (154.9 cm)  Wt 296 lb (134 kg)  SpO2 99%  BMI 55.93 kg/m2    Objective   Physical Exam   Constitutional: She is oriented to person, place, and time. She appears well-developed.   HENT:   Head: Normocephalic.   Right Ear: External ear normal.   Left Ear: External ear normal.   Nose: Nose normal.   Mouth/Throat: Oropharynx is clear and moist.   Eyes: Conjunctivae are normal. Pupils are equal, round, and reactive to light.   Neck: No JVD present. No thyromegaly present.   Cardiovascular: Normal rate, regular rhythm and normal heart sounds.  Exam reveals no friction rub.    No murmur heard.  Pulmonary/Chest: Effort normal and breath sounds normal. No respiratory distress. She has no wheezes. She has no rales.   Abdominal: Soft. Bowel sounds are normal. She exhibits no distension. There is no tenderness. There is no guarding.   Musculoskeletal: She exhibits no edema or tenderness.   Lymphadenopathy:     She has no cervical adenopathy.   Neurological: She is oriented to person, place, and time. She displays normal reflexes. No cranial nerve deficit.   Skin: No rash noted.   Psychiatric: Her behavior is normal.   Nursing note and vitals reviewed.      Assessment/Plan   Nury was seen today for obesity.    Diagnoses and all orders for this visit:    Morbid obesity with BMI of 50.0-59.9, adult    Other orders  -     phentermine (ADIPEX-P) 37.5 MG tablet; Take 1 tablet by mouth Every Morning Before Breakfast.    Refill month 3/3 of phentermine. 1 month f/u

## 2017-11-30 ENCOUNTER — APPOINTMENT (OUTPATIENT)
Dept: LAB | Facility: HOSPITAL | Age: 37
End: 2017-11-30

## 2017-11-30 ENCOUNTER — TELEPHONE (OUTPATIENT)
Dept: NEUROLOGY | Facility: CLINIC | Age: 37
End: 2017-11-30

## 2017-11-30 ENCOUNTER — OFFICE VISIT (OUTPATIENT)
Dept: OBSTETRICS AND GYNECOLOGY | Facility: CLINIC | Age: 37
End: 2017-11-30

## 2017-11-30 VITALS
HEIGHT: 61 IN | SYSTOLIC BLOOD PRESSURE: 126 MMHG | RESPIRATION RATE: 14 BRPM | DIASTOLIC BLOOD PRESSURE: 100 MMHG | WEIGHT: 285.8 LBS | BODY MASS INDEX: 53.96 KG/M2

## 2017-11-30 DIAGNOSIS — R53.82 CHRONIC FATIGUE: ICD-10-CM

## 2017-11-30 DIAGNOSIS — Z01.419 ENCOUNTER FOR GYNECOLOGICAL EXAMINATION WITHOUT ABNORMAL FINDING: Primary | ICD-10-CM

## 2017-11-30 LAB
25(OH)D3 SERPL-MCNC: 6.6 NG/ML
DEPRECATED RDW RBC AUTO: 45.6 FL (ref 37–54)
ERYTHROCYTE [DISTWIDTH] IN BLOOD BY AUTOMATED COUNT: 15.2 % (ref 11.3–14.5)
FOLATE SERPL-MCNC: 5.27 NG/ML (ref 3.2–20)
HCT VFR BLD AUTO: 37.7 % (ref 34.5–44)
HGB BLD-MCNC: 12 G/DL (ref 11.5–15.5)
MCH RBC QN AUTO: 26.1 PG (ref 27–31)
MCHC RBC AUTO-ENTMCNC: 31.8 G/DL (ref 32–36)
MCV RBC AUTO: 82.1 FL (ref 80–99)
PLATELET # BLD AUTO: 308 10*3/MM3 (ref 150–450)
PMV BLD AUTO: 10.7 FL (ref 6–12)
RBC # BLD AUTO: 4.59 10*6/MM3 (ref 3.89–5.14)
VIT B12 BLD-MCNC: 482 PG/ML (ref 211–911)
WBC NRBC COR # BLD: 8.19 10*3/MM3 (ref 3.5–10.8)

## 2017-11-30 PROCEDURE — 82746 ASSAY OF FOLIC ACID SERUM: CPT | Performed by: OBSTETRICS & GYNECOLOGY

## 2017-11-30 PROCEDURE — 36415 COLL VENOUS BLD VENIPUNCTURE: CPT | Performed by: OBSTETRICS & GYNECOLOGY

## 2017-11-30 PROCEDURE — 82607 VITAMIN B-12: CPT | Performed by: OBSTETRICS & GYNECOLOGY

## 2017-11-30 PROCEDURE — 82306 VITAMIN D 25 HYDROXY: CPT | Performed by: OBSTETRICS & GYNECOLOGY

## 2017-11-30 PROCEDURE — 99395 PREV VISIT EST AGE 18-39: CPT | Performed by: OBSTETRICS & GYNECOLOGY

## 2017-11-30 PROCEDURE — 85027 COMPLETE CBC AUTOMATED: CPT | Performed by: OBSTETRICS & GYNECOLOGY

## 2017-11-30 NOTE — PROGRESS NOTES
"  Subjective     Chief Complaint   Patient presents with   • Gynecologic Exam     Tired and fatigue       Nury Pineda is a 37 y.o. female who presents for an annual exam. The patient has no complaints today. The patient is not currently sexually active. GYN screening history: last pap: was normal. The patient wears seatbelts: yes. The patient participates in regular exercise: no. Has the patient ever been transfused or tattooed?: yes. The patient reports that there is not domestic violence in her life.     Menstrual History:  OB History      Para Term  AB Living    2 1 1  1 1    SAB TAB Ectopic Multiple Live Births    1             Menarche age: 15  Patient's last menstrual period was 2017 (exact date).         The following portions of the patient's history were reviewed and updated as appropriate:vital signs, allergies, current medications, past medical history, past social history, past surgical history and problem list    Review of Systems   Pertinent items are noted in HPI.     Objective     /100  Resp 14  Ht 61\" (154.9 cm)  Wt 285 lb 12.8 oz (130 kg)  LMP 2017 (Exact Date)  Breastfeeding? No  BMI 54 kg/m2      Physical Exam    General:   alert, appears stated age, no distress and morbidly obese   Heart: regular rate and rhythm, S1, S2 normal, no murmur, click, rub or gallop   Lungs: clear to auscultation bilaterally   Breast: normal appearance, no masses or tenderness, Inspection negative, No nipple retraction or dimpling, No nipple discharge or bleeding, No axillary or supraclavicular adenopathy, Normal to palpation without dominant masses, Taught monthly breast self examination   Abdomen: {soft, non-tender. Bowel sounds normal. No masses,  no organomegaly   Vulva: normal, Bartholin's, Urethra, [place]'s normal, female escutcheon   Vagina: normal mucosa, normal discharge   Cervix: multiparous appearance, no bleeding following Pap, no cervical motion tenderness and no " lesions   Uterus: Non palpable   Adnexa: Non palpable   Rectal: Not performed today       Lab Review   Labs: No data reviewed     Imaging   No data reviewed    Assessment/Plan     ASSESSMENT  Healthy female exam.    1. Encounter for gynecological examination without abnormal finding    2. Chronic fatigue         PLAN  1.   Orders Placed This Encounter   Procedures   • CBC (No Diff)   • Vitamin D 25 Hydroxy   • Vitamin B12   • Folate       2. Medications prescribed this encounter:    No orders of the defined types were placed in this encounter.      3. Await pap results for plan of care    Follow up: 1 year(s)    Ta Hughes MD  11/30/2017

## 2017-12-01 ENCOUNTER — TELEPHONE (OUTPATIENT)
Dept: NEUROLOGY | Facility: CLINIC | Age: 37
End: 2017-12-01

## 2017-12-01 ENCOUNTER — TELEPHONE (OUTPATIENT)
Dept: OBSTETRICS AND GYNECOLOGY | Facility: CLINIC | Age: 37
End: 2017-12-01

## 2017-12-01 NOTE — TELEPHONE ENCOUNTER
Checked voicemail, patient left a message for a call back, Called patient back, spoke with the patient, patient stated that she has been having numbness and tingling in her hands. Stated that it started in both hands, feet, arms, and legs. Patient stated that it is now on the left side. I spoke with Dr Stearns, he stated that he would advise her to stop the toperimate ER and see if the symptoms go away and if not he would suggest her call back or go get check out. Once I got back on with the patient, advised the patient what Dr Stearns stated, patient then told me that she had stopped the medication a while ago. I advised the patient that she needed to go get checked out if her head was not hurting and she was having symptoms now on just the left side. Patient stated that she did not want to go to the ER due to them keeping her all night. Also stated that this happened once before when her Iron was low, patient had labs drawn today with her OB office and wanted to wait for those results. I advised her that she needed to go to ER or [facility] to get blood pressure checked and get a full check up just to be on the safe side if the labs do come back NORMAL. Patient verbalized understanding.

## 2017-12-01 NOTE — TELEPHONE ENCOUNTER
Pt stated that after taking the Imitrex her hands are still numb and she developed a slight headache

## 2017-12-03 NOTE — TELEPHONE ENCOUNTER
Did the Imitrex help her migraine? Is she doing better now? If yes, try taking one half dose next time, call for problems. Thanks.

## 2017-12-05 ENCOUNTER — TELEPHONE (OUTPATIENT)
Dept: NEUROLOGY | Facility: CLINIC | Age: 37
End: 2017-12-05

## 2017-12-05 DIAGNOSIS — G43.809 MIGRAINE VARIANT WITH HEADACHE: Primary | Chronic | ICD-10-CM

## 2017-12-05 DIAGNOSIS — G95.9 CERVICAL MYELOPATHY (HCC): Chronic | ICD-10-CM

## 2017-12-05 NOTE — TELEPHONE ENCOUNTER
It still has not completely stopped. She is not as tired but she is worried because it has been a couple days. She feels better. It is a little better.

## 2017-12-06 NOTE — TELEPHONE ENCOUNTER
Lvm letting pt know that CS would be calling her to schedule 3 MRI's and if she continued to feel bad/worse she needed to go to the ER.

## 2018-01-15 ENCOUNTER — OFFICE VISIT (OUTPATIENT)
Dept: INTERNAL MEDICINE | Facility: CLINIC | Age: 38
End: 2018-01-15

## 2018-01-15 VITALS
DIASTOLIC BLOOD PRESSURE: 80 MMHG | HEART RATE: 79 BPM | OXYGEN SATURATION: 99 % | BODY MASS INDEX: 52.19 KG/M2 | SYSTOLIC BLOOD PRESSURE: 110 MMHG | WEIGHT: 276.2 LBS

## 2018-01-15 DIAGNOSIS — E66.01 MORBID OBESITY WITH BMI OF 50.0-59.9, ADULT (HCC): Primary | Chronic | ICD-10-CM

## 2018-01-15 PROCEDURE — 99213 OFFICE O/P EST LOW 20 MIN: CPT | Performed by: INTERNAL MEDICINE

## 2018-01-15 RX ORDER — PHENTERMINE HYDROCHLORIDE 37.5 MG/1
37.5 TABLET ORAL
Qty: 30 TABLET | Refills: 0 | Status: SHIPPED | OUTPATIENT
Start: 2018-01-15 | End: 2018-05-16

## 2018-01-15 NOTE — PROGRESS NOTES
Subjective   Nury Pineda is a 37 y.o. female.   Chief Complaint   Patient presents with   • Obesity         History of Present Illness   Obesity and is working on diet and exercise. Taking phentermine short term.  The following portions of the patient's history were reviewed and updated as appropriate: allergies, current medications, past family history, past medical history, past social history, past surgical history and problem list.    Review of Systems   Constitutional: Negative for activity change, appetite change, chills, diaphoresis, fatigue, fever and unexpected weight change.   HENT: Negative for congestion, ear discharge, ear pain, mouth sores, nosebleeds, sinus pressure, sneezing and sore throat.    Eyes: Negative for pain, discharge and itching.   Respiratory: Negative for cough, chest tightness, shortness of breath and wheezing.    Cardiovascular: Negative for chest pain, palpitations and leg swelling.   Gastrointestinal: Negative for abdominal pain, constipation, diarrhea, nausea and vomiting.   Endocrine: Negative for cold intolerance, heat intolerance, polydipsia and polyphagia.   Genitourinary: Negative for dysuria, flank pain, frequency, hematuria and urgency.   Musculoskeletal: Negative for arthralgias, back pain, gait problem, myalgias, neck pain and neck stiffness.   Skin: Negative for color change, pallor and rash.   Neurological: Negative for seizures, speech difficulty, numbness and headaches.   Psychiatric/Behavioral: Negative for agitation, confusion, decreased concentration and sleep disturbance. The patient is not nervous/anxious.      /80  Pulse 79  Wt 125 kg (276 lb 3.2 oz)  SpO2 99%  BMI 52.19 kg/m2    Objective   Physical Exam   Constitutional: She appears well-developed.   HENT:   Head: Normocephalic.   Right Ear: External ear normal.   Left Ear: External ear normal.   Nose: Nose normal.   Mouth/Throat: Oropharynx is clear and moist.   Eyes: Conjunctivae are normal. Pupils  are equal, round, and reactive to light.   Neck: No JVD present. No thyromegaly present.   Cardiovascular: Normal rate, regular rhythm and normal heart sounds.  Exam reveals no friction rub.    No murmur heard.  Pulmonary/Chest: Effort normal and breath sounds normal. No respiratory distress. She has no wheezes. She has no rales.   Abdominal: Soft. Bowel sounds are normal. She exhibits no distension. There is no tenderness. There is no guarding.   Musculoskeletal: She exhibits no edema or tenderness.   Lymphadenopathy:     She has no cervical adenopathy.   Neurological: She displays normal reflexes. No cranial nerve deficit.   Skin: No rash noted.   Psychiatric: Her behavior is normal.   Nursing note and vitals reviewed.      Assessment/Plan   Nury was seen today for obesity.    Diagnoses and all orders for this visit:    Morbid obesity with BMI of 50.0-59.9, adult  Month 3 of 3 of phentermine  Other orders  -     phentermine (ADIPEX-P) 37.5 MG tablet; Take 1 tablet by mouth Every Morning Before Breakfast.

## 2018-03-15 ENCOUNTER — OFFICE VISIT (OUTPATIENT)
Dept: OBSTETRICS AND GYNECOLOGY | Facility: CLINIC | Age: 38
End: 2018-03-15

## 2018-03-15 ENCOUNTER — APPOINTMENT (OUTPATIENT)
Dept: LAB | Facility: HOSPITAL | Age: 38
End: 2018-03-15
Attending: OBSTETRICS & GYNECOLOGY

## 2018-03-15 VITALS
DIASTOLIC BLOOD PRESSURE: 74 MMHG | SYSTOLIC BLOOD PRESSURE: 120 MMHG | WEIGHT: 270.8 LBS | RESPIRATION RATE: 14 BRPM | HEART RATE: 83 BPM | HEIGHT: 61 IN | OXYGEN SATURATION: 99 % | BODY MASS INDEX: 51.13 KG/M2

## 2018-03-15 DIAGNOSIS — Z11.3 ROUTINE SCREENING FOR STI (SEXUALLY TRANSMITTED INFECTION): Primary | ICD-10-CM

## 2018-03-15 DIAGNOSIS — Z11.3 ROUTINE SCREENING FOR STI (SEXUALLY TRANSMITTED INFECTION): ICD-10-CM

## 2018-03-15 LAB
HBV SURFACE AG SERPL QL IA: NORMAL
HCV AB SER DONR QL: NORMAL
HIV1+2 AB SER QL: NORMAL

## 2018-03-15 PROCEDURE — G0432 EIA HIV-1/HIV-2 SCREEN: HCPCS | Performed by: OBSTETRICS & GYNECOLOGY

## 2018-03-15 PROCEDURE — 86592 SYPHILIS TEST NON-TREP QUAL: CPT | Performed by: OBSTETRICS & GYNECOLOGY

## 2018-03-15 PROCEDURE — 99214 OFFICE O/P EST MOD 30 MIN: CPT | Performed by: OBSTETRICS & GYNECOLOGY

## 2018-03-15 PROCEDURE — 36415 COLL VENOUS BLD VENIPUNCTURE: CPT | Performed by: OBSTETRICS & GYNECOLOGY

## 2018-03-15 PROCEDURE — 86803 HEPATITIS C AB TEST: CPT | Performed by: OBSTETRICS & GYNECOLOGY

## 2018-03-15 PROCEDURE — 87340 HEPATITIS B SURFACE AG IA: CPT | Performed by: OBSTETRICS & GYNECOLOGY

## 2018-03-16 LAB — RPR SER QL: NORMAL

## 2018-03-19 RX ORDER — METRONIDAZOLE 7.5 MG/G
GEL VAGINAL 2 TIMES DAILY
Qty: 70 G | Refills: 1 | Status: SHIPPED | OUTPATIENT
Start: 2018-03-19 | End: 2018-07-19

## 2018-03-20 NOTE — PROGRESS NOTES
"Subjective   Chief Complaint   Patient presents with   • Follow-up     Desires STD screen     Nury Pineda is a 37 y.o. year old .  No LMP recorded.  She presents to be seen for STI screening.  Patient recently discovered a former partner was diagnosed with HSV type II and desires STI screening she has no acute complaints    OTHER COMPLAINTS:  Nothing else    The following portions of the patient's history were reviewed and updated as appropriate:current medications and allergies    Smoking status: Never Smoker                                                              Smokeless tobacco: Never Used                        Review of Systems  Constitutional POS: nothing reported    NEG: anorexia or night sweats   Gastointestinal POS: nothing reported    NEG: bloating, change in bowel habits, melena or reflux symptoms   Genitourinary POS: nothing reported    NEG: dysuria or hematuria   Integument POS: nothing reported    NEG: moles that are changing in size, shape, color or rashes   Breast POS: nothing reported    NEG: persistent breast lump, skin dimpling or nipple discharge         Objective   /74   Pulse 83   Resp 14   Ht 154.9 cm (61\")   Wt 123 kg (270 lb 12.8 oz)   SpO2 99%   Breastfeeding? No   BMI 51.17 kg/m²     General:  well developed; well nourished  no acute distress   Skin:  No suspicious lesions seen   Thyroid: normal to inspection and palpation   Lungs:  breathing is unlabored   Heart:  regular rate and rhythm, S1, S2 normal, no murmur, click, rub or gallop   Breasts:  Not performed.   Abdomen: soft, non-tender; no masses  no umbilical or inginual hernias are present  no hepato-splenomegaly   Pelvis: Clinical staff was present for exam  External genitalia:  normal appearance of the external genitalia including Bartholin's and [place]'s glands.  :  urethral meatus normal;  Vaginal:  normal pink mucosa without prolapse or lesions. One swabs collected     Lab Review   No data " reviewed    Imaging   No data reviewed        Assessment   1. STI screening     Plan   1. Await blood and culture results for plan of care.  Patient will return to clinic as previously scheduled or on an as-needed basis      No orders of the defined types were placed in this encounter.         This note was electronically signed.    Ta Hughes M.D. [facility]

## 2018-03-21 ENCOUNTER — TELEPHONE (OUTPATIENT)
Dept: OBSTETRICS AND GYNECOLOGY | Facility: CLINIC | Age: 38
End: 2018-03-21

## 2018-04-09 ENCOUNTER — HOSPITAL ENCOUNTER (EMERGENCY)
Facility: HOSPITAL | Age: 38
Discharge: HOME | End: 2018-04-09
Attending: EMERGENCY MEDICINE | Admitting: EMERGENCY MEDICINE

## 2018-04-09 ENCOUNTER — APPOINTMENT (OUTPATIENT)
Dept: CT IMAGING | Facility: HOSPITAL | Age: 38
End: 2018-04-09

## 2018-04-09 VITALS
SYSTOLIC BLOOD PRESSURE: 109 MMHG | TEMPERATURE: 97.5 F | RESPIRATION RATE: 24 BRPM | BODY MASS INDEX: 49.09 KG/M2 | WEIGHT: 260 LBS | HEIGHT: 61 IN | DIASTOLIC BLOOD PRESSURE: 45 MMHG | HEART RATE: 94 BPM | OXYGEN SATURATION: 96 %

## 2018-04-09 DIAGNOSIS — R10.9 ABDOMINAL CRAMPING: Primary | ICD-10-CM

## 2018-04-09 LAB
ALBUMIN SERPL-MCNC: 4 G/DL (ref 3.2–4.8)
ALBUMIN/GLOB SERPL: 1.3 G/DL (ref 1.5–2.5)
ALP SERPL-CCNC: 71 U/L (ref 25–100)
ALT SERPL W P-5'-P-CCNC: 20 U/L (ref 7–40)
ANION GAP SERPL CALCULATED.3IONS-SCNC: 5 MMOL/L (ref 3–11)
AST SERPL-CCNC: 22 U/L (ref 0–33)
B-HCG UR QL: NEGATIVE
BASOPHILS # BLD AUTO: 0.02 10*3/MM3 (ref 0–0.2)
BASOPHILS NFR BLD AUTO: 0.2 % (ref 0–1)
BILIRUB SERPL-MCNC: 0.2 MG/DL (ref 0.3–1.2)
BILIRUB UR QL STRIP: NEGATIVE
BUN BLD-MCNC: 15 MG/DL (ref 9–23)
BUN/CREAT SERPL: 16.7 (ref 7–25)
CALCIUM SPEC-SCNC: 9.1 MG/DL (ref 8.7–10.4)
CHLORIDE SERPL-SCNC: 105 MMOL/L (ref 99–109)
CLARITY UR: CLEAR
CO2 SERPL-SCNC: 27 MMOL/L (ref 20–31)
COLOR UR: YELLOW
CREAT BLD-MCNC: 0.9 MG/DL (ref 0.6–1.3)
DEPRECATED RDW RBC AUTO: 46.8 FL (ref 37–54)
EOSINOPHIL # BLD AUTO: 0.06 10*3/MM3 (ref 0–0.3)
EOSINOPHIL NFR BLD AUTO: 0.7 % (ref 0–3)
ERYTHROCYTE [DISTWIDTH] IN BLOOD BY AUTOMATED COUNT: 15.8 % (ref 11.3–14.5)
GFR SERPL CREATININE-BSD FRML MDRD: 86 ML/MIN/1.73
GLOBULIN UR ELPH-MCNC: 3.1 GM/DL
GLUCOSE BLD-MCNC: 113 MG/DL (ref 70–100)
GLUCOSE UR STRIP-MCNC: NEGATIVE MG/DL
HCT VFR BLD AUTO: 36.2 % (ref 34.5–44)
HGB BLD-MCNC: 11.4 G/DL (ref 11.5–15.5)
HGB UR QL STRIP.AUTO: NEGATIVE
IMM GRANULOCYTES # BLD: 0.02 10*3/MM3 (ref 0–0.03)
IMM GRANULOCYTES NFR BLD: 0.2 % (ref 0–0.6)
INTERNAL NEGATIVE CONTROL: NEGATIVE
INTERNAL POSITIVE CONTROL: POSITIVE
KETONES UR QL STRIP: ABNORMAL
LEUKOCYTE ESTERASE UR QL STRIP.AUTO: NEGATIVE
LYMPHOCYTES # BLD AUTO: 2.63 10*3/MM3 (ref 0.6–4.8)
LYMPHOCYTES NFR BLD AUTO: 31.3 % (ref 24–44)
Lab: NORMAL
MCH RBC QN AUTO: 25.4 PG (ref 27–31)
MCHC RBC AUTO-ENTMCNC: 31.5 G/DL (ref 32–36)
MCV RBC AUTO: 80.6 FL (ref 80–99)
MONOCYTES # BLD AUTO: 0.62 10*3/MM3 (ref 0–1)
MONOCYTES NFR BLD AUTO: 7.4 % (ref 0–12)
NEUTROPHILS # BLD AUTO: 5.04 10*3/MM3 (ref 1.5–8.3)
NEUTROPHILS NFR BLD AUTO: 60.2 % (ref 41–71)
NITRITE UR QL STRIP: NEGATIVE
PH UR STRIP.AUTO: 6 [PH] (ref 5–8)
PLATELET # BLD AUTO: 246 10*3/MM3 (ref 150–450)
PMV BLD AUTO: 11.6 FL (ref 6–12)
POTASSIUM BLD-SCNC: 4.5 MMOL/L (ref 3.5–5.5)
PROT SERPL-MCNC: 7.1 G/DL (ref 5.7–8.2)
PROT UR QL STRIP: NEGATIVE
RBC # BLD AUTO: 4.49 10*6/MM3 (ref 3.89–5.14)
SODIUM BLD-SCNC: 137 MMOL/L (ref 132–146)
SP GR UR STRIP: >=1.03 (ref 1–1.03)
UROBILINOGEN UR QL STRIP: ABNORMAL
WBC NRBC COR # BLD: 8.39 10*3/MM3 (ref 3.5–10.8)

## 2018-04-09 PROCEDURE — 81003 URINALYSIS AUTO W/O SCOPE: CPT | Performed by: NURSE PRACTITIONER

## 2018-04-09 PROCEDURE — 25010000002 HYDROMORPHONE PER 4 MG: Performed by: EMERGENCY MEDICINE

## 2018-04-09 PROCEDURE — 25010000002 IOPAMIDOL 61 % SOLUTION: Performed by: EMERGENCY MEDICINE

## 2018-04-09 PROCEDURE — 99283 EMERGENCY DEPT VISIT LOW MDM: CPT

## 2018-04-09 PROCEDURE — 96361 HYDRATE IV INFUSION ADD-ON: CPT

## 2018-04-09 PROCEDURE — 25010000002 ONDANSETRON PER 1 MG: Performed by: NURSE PRACTITIONER

## 2018-04-09 PROCEDURE — 85025 COMPLETE CBC W/AUTO DIFF WBC: CPT | Performed by: NURSE PRACTITIONER

## 2018-04-09 PROCEDURE — 74177 CT ABD & PELVIS W/CONTRAST: CPT

## 2018-04-09 PROCEDURE — 96374 THER/PROPH/DIAG INJ IV PUSH: CPT

## 2018-04-09 PROCEDURE — 96375 TX/PRO/DX INJ NEW DRUG ADDON: CPT

## 2018-04-09 PROCEDURE — 80053 COMPREHEN METABOLIC PANEL: CPT | Performed by: NURSE PRACTITIONER

## 2018-04-09 RX ORDER — HYDROMORPHONE HYDROCHLORIDE 1 MG/ML
1 INJECTION, SOLUTION INTRAMUSCULAR; INTRAVENOUS; SUBCUTANEOUS ONCE
Status: COMPLETED | OUTPATIENT
Start: 2018-04-09 | End: 2018-04-09

## 2018-04-09 RX ORDER — SODIUM CHLORIDE 0.9 % (FLUSH) 0.9 %
10 SYRINGE (ML) INJECTION AS NEEDED
Status: DISCONTINUED | OUTPATIENT
Start: 2018-04-09 | End: 2018-04-09 | Stop reason: HOSPADM

## 2018-04-09 RX ORDER — ONDANSETRON 2 MG/ML
4 INJECTION INTRAMUSCULAR; INTRAVENOUS ONCE
Status: COMPLETED | OUTPATIENT
Start: 2018-04-09 | End: 2018-04-09

## 2018-04-09 RX ADMIN — HYDROMORPHONE HYDROCHLORIDE 1 MG: 1 INJECTION, SOLUTION INTRAMUSCULAR; INTRAVENOUS; SUBCUTANEOUS at 01:41

## 2018-04-09 RX ADMIN — IOPAMIDOL 100 ML: 612 INJECTION, SOLUTION INTRAVENOUS at 02:05

## 2018-04-09 RX ADMIN — SODIUM CHLORIDE 1000 ML: 9 INJECTION, SOLUTION INTRAVENOUS at 01:41

## 2018-04-09 RX ADMIN — ONDANSETRON 4 MG: 2 INJECTION INTRAMUSCULAR; INTRAVENOUS at 01:41

## 2018-04-09 NOTE — ED PROVIDER NOTES
Subjective   Presents to the emergency department with complaint of acute abdominal pain in the lower midline region onset spontaneously approximately 30 minutes prior to arrival.  His pain awoke the patient and caused her to experience cramping with diaphoresis and nausea area 8 she had a bowel movement without relief.  She's had no vomiting.  Patient went to bed this evening feeling completely normal.        History provided by:  Patient   used: No    Abdominal Pain   Pain location:  Suprapubic  Pain quality: aching and cramping    Pain radiates to:  Does not radiate  Pain severity:  Moderate  Onset quality:  Sudden  Duration:  1 hour  Timing:  Constant  Progression:  Unchanged  Chronicity:  New  Context: awakening from sleep    Context: not alcohol use, not recent illness, not suspicious food intake and not trauma    Relieved by:  Nothing  Worsened by:  Nothing  Ineffective treatments: Bowel movement.  Associated symptoms: no anorexia, no chest pain, no constipation, no cough, no diarrhea, no dysuria, no fever, no hematuria, no shortness of breath and no vomiting    Risk factors: obesity    Risk factors: has not had multiple surgeries and not pregnant        Review of Systems   Constitutional: Negative.  Negative for fever.   HENT: Negative.    Eyes: Negative.    Respiratory: Negative for cough and shortness of breath.    Cardiovascular: Negative.  Negative for chest pain.   Gastrointestinal: Positive for abdominal pain. Negative for anorexia, constipation, diarrhea and vomiting.   Endocrine: Negative.    Genitourinary: Positive for pelvic pain. Negative for dysuria, flank pain, frequency, hematuria and vaginal pain.   Musculoskeletal: Negative.    Skin: Negative.  Negative for color change.   Allergic/Immunologic: Negative.    Psychiatric/Behavioral: Negative.    All other systems reviewed and are negative.      Past Medical History:   Diagnosis Date   • Absolute anemia    • Arthritis      Right knee   • Carpal tunnel syndrome     RIGHT WRIST   • Cholelithiasis     Nausea related to stones has phenergan. Also has related RUQ pain.   • Dizziness    • Elevated C-reactive protein (CRP)    • Elevated erythrocyte sedimentation rate    • Influenza    • Iron deficiency     Will not take iron supplement but will take MVI   • Left hip pain    • Migraines    • Numbness and tingling in right hand     Has nerve conductions 3/23/2016, awaiting results. Along with Paresthesia.   • Pain and swelling of right forearm     Check u/s to r/o clot. Rash and redness are resolving, will continue to monitor.   • Pain of left arm    • Sleep disturbances    • Vitamin D deficiency     25 oh.       Allergies   Allergen Reactions   • Amoxicillin      TABS.   • Penicillins        Past Surgical History:   Procedure Laterality Date   •  SECTION     • CHOLECYSTECTOMY     • COLONOSCOPY     • CYST REMOVAL      behind belly button   • TONSILLECTOMY     • URACHAL CYST INCISION      History of Excision Of Urachal Cyst.       Family History   Problem Relation Age of Onset   • Stroke Mother    • Hypertension Mother    • Migraines Mother    • Osteoarthritis Mother    • Cervical cancer Mother    • Heart disease Mother    • Hyperlipidemia Maternal Grandmother    • Osteoarthritis Maternal Grandmother    • Birth defects Maternal Grandmother    • Ovarian cancer Maternal Grandmother      PREMENOPAUSAL    • Transient ischemic attack Maternal Grandmother    • Hypertension Maternal Grandmother    • Stroke Other      CVA x 2. and TIA   • Cancer Other      Cervical, malignant neoplasm x2 , ovarian       Social History     Social History   • Marital status: Single     Social History Main Topics   • Smoking status: Never Smoker   • Smokeless tobacco: Never Used   • Alcohol use 1.2 oz/week     2 Glasses of wine per week   • Drug use: No   • Sexual activity: Yes     Partners: Male     Birth control/ protection: Condom     Other  Topics Concern   • Not on file     Social History Narrative    single           Objective   Physical Exam   Constitutional: She is oriented to person, place, and time. She appears well-developed and well-nourished. No distress.   HENT:   Head: Normocephalic and atraumatic.   Mouth/Throat: Oropharynx is clear and moist.   Eyes: Conjunctivae and EOM are normal. Pupils are equal, round, and reactive to light.   Neck: Normal range of motion. Neck supple. No tracheal deviation present.   Cardiovascular: Normal rate, regular rhythm and normal heart sounds.    No murmur heard.  Pulmonary/Chest: Effort normal and breath sounds normal. No respiratory distress. She has no wheezes. She has no rales.   Abdominal: Soft. Bowel sounds are normal. She exhibits no distension. There is tenderness (suprapubic, RLQ and LLQ). There is no rebound and no guarding.   Musculoskeletal: Normal range of motion. She exhibits no edema.   Neurological: She is alert and oriented to person, place, and time. She exhibits normal muscle tone. Coordination normal.   Skin: Skin is warm and dry. Capillary refill takes less than 2 seconds. No rash noted. She is not diaphoretic. No erythema. No pallor.   Psychiatric: She has a normal mood and affect. Her behavior is normal. Thought content normal.   Nursing note and vitals reviewed.      Procedures         ED Course  ED Course   Comment By Time   Pt is in good spirits; her pain is relieved.  She understands and concurs with outpatient follow up as well as parameters for concern that would warrant return to the ER.  SYBIL Iniguez 04/09 0303      .[facility]    Final diagnoses:   Abdominal cramping            SYBIL Iniguez  04/09/18 0306

## 2018-04-09 NOTE — DISCHARGE INSTRUCTIONS
MEDICAITONS AS DIRECTED.  FOLLOW UP WITH YOUR PRIMARY CARE PROVIDER TO MONITOR YOUR RECOVERY.  THANK YOU

## 2018-05-16 ENCOUNTER — OFFICE VISIT (OUTPATIENT)
Dept: INTERNAL MEDICINE | Facility: CLINIC | Age: 38
End: 2018-05-16

## 2018-05-16 VITALS
SYSTOLIC BLOOD PRESSURE: 100 MMHG | OXYGEN SATURATION: 99 % | WEIGHT: 265.9 LBS | DIASTOLIC BLOOD PRESSURE: 70 MMHG | HEART RATE: 87 BPM | BODY MASS INDEX: 50.24 KG/M2

## 2018-05-16 DIAGNOSIS — S39.012A STRAIN OF LUMBAR REGION, INITIAL ENCOUNTER: Primary | ICD-10-CM

## 2018-05-16 PROCEDURE — 99213 OFFICE O/P EST LOW 20 MIN: CPT | Performed by: INTERNAL MEDICINE

## 2018-05-16 RX ORDER — MONTELUKAST SODIUM 10 MG/1
10 TABLET ORAL NIGHTLY
Qty: 90 TABLET | Refills: 1 | Status: SHIPPED | OUTPATIENT
Start: 2018-05-16 | End: 2018-11-26 | Stop reason: SDUPTHER

## 2018-05-16 RX ORDER — CYCLOBENZAPRINE HCL 10 MG
10 TABLET ORAL 3 TIMES DAILY PRN
Qty: 30 TABLET | Refills: 0 | Status: SHIPPED | OUTPATIENT
Start: 2018-05-16 | End: 2019-01-09

## 2018-05-16 NOTE — PROGRESS NOTES
Subjective   Nury Pineda is a 37 y.o. female.   Chief Complaint   Patient presents with   • Back Pain       History of Present Illness   Lower back pain that started yesterday after she was helping change her grandmother.  Pain worst with certain movements like turning.  No numbness in loss. No loss of bowel or bladder control.     The following portions of the patient's history were reviewed and updated as appropriate: allergies, current medications, past family history, past medical history, past social history, past surgical history and problem list.    Review of Systems   Constitutional: Negative for activity change, appetite change, chills, diaphoresis, fatigue, fever and unexpected weight change.   HENT: Negative for congestion, ear discharge, ear pain, mouth sores, nosebleeds, sinus pressure, sneezing and sore throat.    Eyes: Negative for pain, discharge and itching.   Respiratory: Negative for cough, chest tightness, shortness of breath and wheezing.    Cardiovascular: Negative for chest pain, palpitations and leg swelling.   Gastrointestinal: Negative for abdominal pain, constipation, diarrhea, nausea and vomiting.   Endocrine: Negative for cold intolerance, heat intolerance, polydipsia and polyphagia.   Genitourinary: Negative for dysuria, flank pain, frequency, hematuria and urgency.   Musculoskeletal: Positive for back pain. Negative for arthralgias, gait problem, myalgias, neck pain and neck stiffness.   Skin: Negative for color change, pallor and rash.   Neurological: Negative for seizures, speech difficulty, numbness and headaches.   Psychiatric/Behavioral: Negative for agitation, confusion, decreased concentration and sleep disturbance. The patient is not nervous/anxious.        Objective   Physical Exam   Constitutional: She appears well-developed.   HENT:   Head: Normocephalic.   Right Ear: External ear normal.   Left Ear: External ear normal.   Nose: Nose normal.   Mouth/Throat: Oropharynx is  clear and moist.   Eyes: Conjunctivae are normal. Pupils are equal, round, and reactive to light.   Neck: No JVD present. No thyromegaly present.   Cardiovascular: Normal rate, regular rhythm and normal heart sounds.  Exam reveals no friction rub.    No murmur heard.  Pulmonary/Chest: Effort normal and breath sounds normal. No respiratory distress. She has no wheezes. She has no rales.   Abdominal: Soft. Bowel sounds are normal. She exhibits no distension. There is no tenderness. There is no guarding.   Musculoskeletal: She exhibits tenderness (lower back). She exhibits no edema.   Lymphadenopathy:     She has no cervical adenopathy.   Neurological: She displays normal reflexes. No cranial nerve deficit.   Skin: No rash noted.   Psychiatric: Her behavior is normal.   Nursing note and vitals reviewed.      Assessment/Plan   Nury was seen today for back pain.    Diagnoses and all orders for this visit:    Strain of lumbar region, initial encounter  -     cyclobenzaprine (FLEXERIL) 10 MG tablet; Take 1 tablet by mouth 3 (Three) Times a Day As Needed for Muscle Spasms.  5 days aleve daily  Other orders  -     montelukast (SINGULAIR) 10 MG tablet; Take 1 tablet by mouth Every Night.

## 2018-07-12 ENCOUNTER — TELEPHONE (OUTPATIENT)
Dept: INTERNAL MEDICINE | Facility: CLINIC | Age: 38
End: 2018-07-12

## 2018-07-12 NOTE — TELEPHONE ENCOUNTER
PATIENT IS WANTING TO KNOW IF DR. BURGOS WILL CALL IN [facility] FOR PATIENT. SHE SAID SHE HAD BEEN TAKING THIS MEDICATION AND QUIT FOR A WHILE BUT WANTS TO START TAKING IT AGAIN. I DID NOT SEE MEDICATION IN PATIENTS MED LIST.  PLEASE ADVISE. RX CAN BE SENT TO [facility] ON [address]. THANKS.

## 2018-07-13 ENCOUNTER — APPOINTMENT (OUTPATIENT)
Dept: CT IMAGING | Facility: HOSPITAL | Age: 38
End: 2018-07-13

## 2018-07-13 ENCOUNTER — HOSPITAL ENCOUNTER (EMERGENCY)
Facility: HOSPITAL | Age: 38
Discharge: HOME | End: 2018-07-13
Attending: EMERGENCY MEDICINE | Admitting: EMERGENCY MEDICINE

## 2018-07-13 ENCOUNTER — TELEPHONE (OUTPATIENT)
Dept: OBSTETRICS AND GYNECOLOGY | Facility: CLINIC | Age: 38
End: 2018-07-13

## 2018-07-13 VITALS
HEIGHT: 61 IN | SYSTOLIC BLOOD PRESSURE: 126 MMHG | DIASTOLIC BLOOD PRESSURE: 95 MMHG | RESPIRATION RATE: 16 BRPM | WEIGHT: 250 LBS | TEMPERATURE: 98.6 F | HEART RATE: 98 BPM | OXYGEN SATURATION: 100 % | BODY MASS INDEX: 47.2 KG/M2
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
## 2018-07-13 DIAGNOSIS — S00.03XA CONTUSION OF SCALP, INITIAL ENCOUNTER: ICD-10-CM

## 2018-07-13 DIAGNOSIS — S16.1XXA ACUTE CERVICAL MYOFASCIAL STRAIN, INITIAL ENCOUNTER: Primary | ICD-10-CM

## 2018-07-13 PROCEDURE — 99283 EMERGENCY DEPT VISIT LOW MDM: CPT

## 2018-07-13 PROCEDURE — 72125 CT NECK SPINE W/O DYE: CPT

## 2018-07-13 RX ORDER — FLUCONAZOLE 150 MG/1
TABLET ORAL
Qty: 2 TABLET | Refills: 1 | Status: SHIPPED | OUTPATIENT
Start: 2018-07-13 | End: 2018-07-19

## 2018-07-13 RX ORDER — ACETAMINOPHEN 160 MG/5ML
650 SOLUTION ORAL ONCE
Status: DISCONTINUED | OUTPATIENT
Start: 2018-07-13 | End: 2018-07-13

## 2018-07-13 RX ORDER — ACETAMINOPHEN 160 MG/5ML
650 SOLUTION ORAL ONCE
Status: COMPLETED | OUTPATIENT
Start: 2018-07-13 | End: 2018-07-13

## 2018-07-13 RX ORDER — ACETAMINOPHEN 325 MG/1
650 TABLET ORAL ONCE
Status: DISCONTINUED | OUTPATIENT
Start: 2018-07-13 | End: 2018-07-13

## 2018-07-13 RX ADMIN — ACETAMINOPHEN 650 MG: 650 SOLUTION ORAL at 20:43

## 2018-07-13 NOTE — TELEPHONE ENCOUNTER
Dr. Hughes patient  [phone] patient called complains of a yeast infection. I will send in a prescription for Diflucan 150 mg # 2 with 1 refill.
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
## 2018-07-19 ENCOUNTER — OFFICE VISIT (OUTPATIENT)
Dept: INTERNAL MEDICINE | Facility: CLINIC | Age: 38
End: 2018-07-19

## 2018-07-19 VITALS
SYSTOLIC BLOOD PRESSURE: 100 MMHG | WEIGHT: 269.7 LBS | OXYGEN SATURATION: 99 % | HEART RATE: 73 BPM | BODY MASS INDEX: 50.96 KG/M2 | DIASTOLIC BLOOD PRESSURE: 80 MMHG

## 2018-07-19 DIAGNOSIS — M54.2 NECK PAIN: Primary | ICD-10-CM

## 2018-07-19 PROCEDURE — 99213 OFFICE O/P EST LOW 20 MIN: CPT | Performed by: INTERNAL MEDICINE
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
## 2018-07-25 ENCOUNTER — OFFICE VISIT (OUTPATIENT)
Dept: INTERNAL MEDICINE | Facility: CLINIC | Age: 38
End: 2018-07-25

## 2018-07-25 VITALS
OXYGEN SATURATION: 99 % | DIASTOLIC BLOOD PRESSURE: 70 MMHG | WEIGHT: 267.7 LBS | SYSTOLIC BLOOD PRESSURE: 110 MMHG | HEART RATE: 83 BPM | BODY MASS INDEX: 50.58 KG/M2

## 2018-07-25 DIAGNOSIS — T30.0 BURN, FIRST DEGREE: ICD-10-CM

## 2018-07-25 DIAGNOSIS — E66.01 MORBID OBESITY WITH BMI OF 50.0-59.9, ADULT (HCC): Primary | Chronic | ICD-10-CM

## 2018-07-25 PROCEDURE — 99214 OFFICE O/P EST MOD 30 MIN: CPT | Performed by: INTERNAL MEDICINE

## 2018-08-03 ENCOUNTER — HOSPITAL ENCOUNTER (OUTPATIENT)
Dept: PHYSICAL THERAPY | Facility: HOSPITAL | Age: 38
Setting detail: THERAPIES SERIES
Discharge: HOME | End: 2018-08-03

## 2018-08-03 DIAGNOSIS — M54.2 NECK PAIN: Primary | ICD-10-CM

## 2018-08-03 PROCEDURE — 97161 PT EVAL LOW COMPLEX 20 MIN: CPT | Performed by: PHYSICAL THERAPIST
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
## 2018-08-07 ENCOUNTER — HOSPITAL ENCOUNTER (OUTPATIENT)
Dept: PHYSICAL THERAPY | Facility: HOSPITAL | Age: 38
Setting detail: THERAPIES SERIES
Discharge: HOME | End: 2018-08-07

## 2018-08-07 DIAGNOSIS — M54.2 NECK PAIN: Primary | ICD-10-CM

## 2018-08-07 PROCEDURE — 97110 THERAPEUTIC EXERCISES: CPT | Performed by: PHYSICAL THERAPIST

## 2018-08-07 PROCEDURE — 97140 MANUAL THERAPY 1/> REGIONS: CPT | Performed by: PHYSICAL THERAPIST

## 2018-08-07 NOTE — THERAPY TREATMENT NOTE
"    Outpatient Physical Therapy Ortho Treatment Note   [place]     Patient Name: Nury Pineda  : 1980  MRN: [id number]  Today's Date: 2018      Visit Date: 2018    Visit Dx:    ICD-10-CM ICD-9-CM   1. Neck pain M54.2 723.1       Patient Active Problem List   Diagnosis   • Acute intractable headache   • H/O atypical migraine   • Transient vision disturbance of both eyes   • Migraine variant with headache   • Cervical myelopathy (CMS/HCC)   • Morbid obesity with BMI of 50.0-59.9, adult (CMS/HCC)   • Numbness and tingling in right hand   • Paresthesia   • Sleep disturbance   • Iron deficiency anemia   • Iron deficiency anemia due to chronic blood loss   • Knee pain   • Dizziness, nonspecific        Past Medical History:   Diagnosis Date   • Absolute anemia    • Arthritis     Right knee   • Carpal tunnel syndrome     RIGHT WRIST   • Cholelithiasis     Nausea related to stones has phenergan. Also has related RUQ pain.   • Dizziness    • Elevated C-reactive protein (CRP)    • Elevated erythrocyte sedimentation rate    • Influenza    • Iron deficiency     Will not take iron supplement but will take MVI   • Left hip pain    • Migraines    • Numbness and tingling in right hand     Has nerve conductions 3/23/2016, awaiting results. Along with Paresthesia.   • Pain and swelling of right forearm     Check u/s to r/o clot. Rash and redness are resolving, will continue to monitor.   • Pain of left arm    • Sleep disturbances    • Vitamin D deficiency     25 oh.        Past Surgical History:   Procedure Laterality Date   •  SECTION     • CHOLECYSTECTOMY     • COLONOSCOPY     • CYST REMOVAL      behind belly button   • TONSILLECTOMY     • URACHAL CYST INCISION      History of Excision Of Urachal Cyst.                                     Exercises     Row Name 18 1500             Subjective Comments    Subjective Comments The neck is still \"tight\" but she notes no extensive " movements.  She notes on Saturday she had increased trap and head pain while carrying a box.  She has not been to the gym.  -VICTOR MANUEL         Subjective Pain    Able to rate subjective pain? yes  -VICTOR MANUEL      Pre-Treatment Pain Level 2  -VICTOR MANUEL      Post-Treatment Pain Level 2  -VICTOR MANUEL         Total Minutes    76565 - PT Therapeutic Exercise Minutes 20  -VICTOR MANUEL      01181 - PT Manual Therapy Minutes 10  -VICTOR MANUEL         Exercise 2    Exercise Name 2 supine CT  -VICTOR MANUEL      Reps 2 15  -VICTOR MANUEL      Time 2 3 seconds  -VICTOR MANUEL         Exercise 3    Exercise Name 3 upper trap stretch  -VICTOR MANUEL      Reps 3 3  -VICTOR MANUEL      Time 3 30 seconds  -VICTOR MANUEL         Exercise 4    Exercise Name 4 levator stretch  -VICTOR MANUEL      Reps 4 3  -VICTOR MANUEL      Time 4 30seconds  -VICTOR MANUEL         Exercise 5    Exercise Name 5 middle rows green  -VICTOR MANUEL      Reps 5 15  -VICTOR MANUEL         Exercise 6    Exercise Name 6 low rows green  -VICTOR MANUEL      Reps 6 15  -VICTOR MANUEL         Exercise 7    Exercise Name 7 no money green  -VICTOR MANUEL      Reps 7 15  -VICTOR MANUEL        User Key  (r) = Recorded By, (t) = Taken By, (c) = Cosigned By    Initials Name Provider Type    Antwon Laurent, PT Physical Therapist                        Manual Rx (last 36 hours)      Manual Treatments     Row Name 08/07/18 1500             Total Minutes    03372 - PT Manual Therapy Minutes 10  -VICTOR MANUEL         Manual Rx 1    Manual Rx 1 Location cervical spine  -VICTOR MANUEL      Manual Rx 1 Type distraction, SGLs R to L, right sided gapping  -VICTOR MANUEL      Manual Rx 1 Duration 10  -VICTOR MANUEL        User Key  (r) = Recorded By, (t) = Taken By, (c) = Cosigned By    Initials Name Provider Type    Antwon Laurent, PT Physical Therapist              Therapy Education  Education Details: added green band mid/low rows and no money  Given: HEP  Program: New  How Provided: Verbal, Demonstration, Written  Provided to: Patient  Level of Understanding: Verbalized, Demonstrated              Time Calculation:   Start Time: 1515  Therapy Suggested Charges     Code   Minutes Charges    42911 (CPT®) Hc Pt Neuromusc Re  Education Ea 15 Min      74370 (CPT®) Hc Pt Ther Proc Ea 15 Min 20 1    47073 (CPT®) Hc Gait Training Ea 15 Min      52544 (CPT®) Hc Pt Therapeutic Act Ea 15 Min      83209 (CPT®) Hc Pt Manual Therapy Ea 15 Min 10 1    98457 (CPT®) Hc Pt Ther Massage- Per 15 Min      85114 (CPT®) Hc Pt Iontophoresis Ea 15 Min      20254 (CPT®) Hc Pt Elec Stim Ea-Per 15 Min      96999 (CPT®) Hc Pt Ultrasound Ea 15 Min      16826 (CPT®) Hc Pt Self Care/Mgmt/Train Ea 15 Min      Total  30 2        Therapy Charges for Today     Code Description Service Date Service Provider Modifiers Qty    85731143792 HC PT THER PROC EA 15 MIN 8/7/2018 Antwon Doyle, PT GP 1    04180975259 HC PT MANUAL THERAPY EA 15 MIN 8/7/2018 Antwon Doyle, PT GP 1                    Antwon Doyle, PT  8/7/2018

## 2018-08-23 ENCOUNTER — OFFICE VISIT (OUTPATIENT)
Dept: OBSTETRICS AND GYNECOLOGY | Facility: CLINIC | Age: 38
End: 2018-08-23

## 2018-08-23 VITALS
RESPIRATION RATE: 14 BRPM | HEIGHT: 61 IN | WEIGHT: 267.9 LBS | BODY MASS INDEX: 50.58 KG/M2 | DIASTOLIC BLOOD PRESSURE: 70 MMHG | SYSTOLIC BLOOD PRESSURE: 120 MMHG

## 2018-08-23 DIAGNOSIS — R53.82 CHRONIC FATIGUE: ICD-10-CM

## 2018-08-23 DIAGNOSIS — B37.31 CANDIDAL VAGINITIS: Primary | ICD-10-CM

## 2018-08-23 PROCEDURE — 99213 OFFICE O/P EST LOW 20 MIN: CPT | Performed by: OBSTETRICS & GYNECOLOGY

## 2018-08-23 RX ORDER — PHENTERMINE HYDROCHLORIDE 37.5 MG/1
37.5 CAPSULE ORAL EVERY MORNING
Qty: 60 CAPSULE | Refills: 1 | Status: SHIPPED | OUTPATIENT
Start: 2018-08-23 | End: 2019-01-09

## 2018-08-23 RX ORDER — PHENTERMINE HYDROCHLORIDE 37.5 MG/1
37.5 CAPSULE ORAL EVERY MORNING
Qty: 60 CAPSULE | Refills: 1 | Status: SHIPPED | OUTPATIENT
Start: 2018-08-23 | End: 2018-08-23 | Stop reason: SDUPTHER

## 2018-08-23 RX ORDER — FLUCONAZOLE 150 MG/1
150 TABLET ORAL ONCE
Qty: 1 TABLET | Refills: 1 | Status: SHIPPED | OUTPATIENT
Start: 2018-08-23 | End: 2018-08-23

## 2018-08-23 NOTE — PROGRESS NOTES
"Subjective   Chief Complaint   Patient presents with   • Follow-up     Follow-up from      Nury Pineda is a 37 y.o. year old .  Patient's last menstrual period was 2018.  She presents to be seen for several weeks of irritating whitish discharge.  She described it as thick and cheesy in consistency    OTHER COMPLAINTS:  Nothing else    The following portions of the patient's history were reviewed and updated as appropriate:current medications and allergies    Smoking status: Never Smoker                                                              Smokeless tobacco: Never Used                        Review of Systems  Constitutional POS: nothing reported    NEG: anorexia or night sweats   Gastointestinal POS: nothing reported    NEG: bloating, change in bowel habits, melena or reflux symptoms   Genitourinary POS: see HPI    NEG: dysuria or hematuria   Integument POS: nothing reported    NEG: moles that are changing in size, shape, color or rashes   Breast POS: nothing reported    NEG: persistent breast lump, skin dimpling or nipple discharge         Objective   /70   Resp 14   Ht 154 cm (60.63\")   Wt 122 kg (267 lb 14.4 oz)   LMP 2018   Breastfeeding? No   BMI 51.24 kg/m²     General:  well developed; well nourished  no acute distress   Skin:  No suspicious lesions seen   Thyroid: normal to inspection and palpation   Lungs:  breathing is unlabored   Heart:  regular rate and rhythm, S1, S2 normal, no murmur, click, rub or gallop   Breasts:  Not performed.   Abdomen: soft, non-tender; no masses  no umbilical or inginual hernias are present  no hepato-splenomegaly   Pelvis: Clinical staff was present for exam  External genitalia:  normal appearance of the external genitalia including Bartholin's and Roeville's glands.  :  urethral meatus normal;  Vaginal:  normal pink mucosa without prolapse or lesions. discharge present -  yellow, white and thick;     Lab Review   No data " reviewed    Imaging   No data reviewed        Assessment   1. Candidal vaginitis  2. Fatigue     Plan   1. Diflucan ordered.  Await lab results for plan of care Patient will return to clinic in December as previous is scheduled      No orders of the defined types were placed in this encounter.         This note was electronically signed.    Ta Hughes M.D. [facility]

## 2018-08-29 ENCOUNTER — OFFICE VISIT (OUTPATIENT)
Dept: INTERNAL MEDICINE | Facility: CLINIC | Age: 38
End: 2018-08-29

## 2018-08-29 VITALS
OXYGEN SATURATION: 99 % | HEART RATE: 91 BPM | SYSTOLIC BLOOD PRESSURE: 110 MMHG | DIASTOLIC BLOOD PRESSURE: 80 MMHG | BODY MASS INDEX: 49.26 KG/M2 | WEIGHT: 260.7 LBS

## 2018-08-29 DIAGNOSIS — R53.83 OTHER FATIGUE: ICD-10-CM

## 2018-08-29 DIAGNOSIS — R11.0 NAUSEA: Primary | ICD-10-CM

## 2018-08-29 LAB
ALBUMIN SERPL-MCNC: 4.62 G/DL (ref 3.2–4.8)
ALBUMIN/GLOB SERPL: 1.2 G/DL (ref 1.5–2.5)
ALP SERPL-CCNC: 90 U/L (ref 25–100)
ALT SERPL W P-5'-P-CCNC: 40 U/L (ref 7–40)
ANION GAP SERPL CALCULATED.3IONS-SCNC: 6 MMOL/L (ref 3–11)
AST SERPL-CCNC: 49 U/L (ref 0–33)
BASOPHILS # BLD AUTO: 0.02 10*3/MM3 (ref 0–0.2)
BASOPHILS NFR BLD AUTO: 0.3 % (ref 0–1)
BILIRUB SERPL-MCNC: 0.7 MG/DL (ref 0.3–1.2)
BUN BLD-MCNC: 17 MG/DL (ref 9–23)
BUN/CREAT SERPL: 20 (ref 7–25)
CALCIUM SPEC-SCNC: 9.6 MG/DL (ref 8.7–10.4)
CHLORIDE SERPL-SCNC: 99 MMOL/L (ref 99–109)
CO2 SERPL-SCNC: 31 MMOL/L (ref 20–31)
CREAT BLD-MCNC: 0.85 MG/DL (ref 0.6–1.3)
DEPRECATED RDW RBC AUTO: 45.7 FL (ref 37–54)
EOSINOPHIL # BLD AUTO: 0.11 10*3/MM3 (ref 0–0.3)
EOSINOPHIL NFR BLD AUTO: 1.7 % (ref 0–3)
ERYTHROCYTE [DISTWIDTH] IN BLOOD BY AUTOMATED COUNT: 15.9 % (ref 11.3–14.5)
GFR SERPL CREATININE-BSD FRML MDRD: 92 ML/MIN/1.73
GLOBULIN UR ELPH-MCNC: 3.8 GM/DL
GLUCOSE BLD-MCNC: 114 MG/DL (ref 70–100)
HCG INTACT+B SERPL-ACNC: <5 MIU/ML
HCT VFR BLD AUTO: 39.8 % (ref 34.5–44)
HGB BLD-MCNC: 12.4 G/DL (ref 11.5–15.5)
IMM GRANULOCYTES # BLD: 0.01 10*3/MM3 (ref 0–0.03)
IMM GRANULOCYTES NFR BLD: 0.2 % (ref 0–0.6)
LYMPHOCYTES # BLD AUTO: 3.09 10*3/MM3 (ref 0.6–4.8)
LYMPHOCYTES NFR BLD AUTO: 46.6 % (ref 24–44)
MCH RBC QN AUTO: 24.8 PG (ref 27–31)
MCHC RBC AUTO-ENTMCNC: 31.2 G/DL (ref 32–36)
MCV RBC AUTO: 79.8 FL (ref 80–99)
MONOCYTES # BLD AUTO: 0.6 10*3/MM3 (ref 0–1)
MONOCYTES NFR BLD AUTO: 9 % (ref 0–12)
NEUTROPHILS # BLD AUTO: 2.81 10*3/MM3 (ref 1.5–8.3)
NEUTROPHILS NFR BLD AUTO: 42.4 % (ref 41–71)
PLATELET # BLD AUTO: 354 10*3/MM3 (ref 150–450)
PMV BLD AUTO: 11.4 FL (ref 6–12)
POTASSIUM BLD-SCNC: 4.1 MMOL/L (ref 3.5–5.5)
PROT SERPL-MCNC: 8.4 G/DL (ref 5.7–8.2)
RBC # BLD AUTO: 4.99 10*6/MM3 (ref 3.89–5.14)
SODIUM BLD-SCNC: 136 MMOL/L (ref 132–146)
TSH SERPL DL<=0.05 MIU/L-ACNC: 0.96 MIU/ML (ref 0.35–5.35)
WBC NRBC COR # BLD: 6.63 10*3/MM3 (ref 3.5–10.8)

## 2018-08-29 PROCEDURE — 80053 COMPREHEN METABOLIC PANEL: CPT | Performed by: INTERNAL MEDICINE

## 2018-08-29 PROCEDURE — 85025 COMPLETE CBC W/AUTO DIFF WBC: CPT | Performed by: INTERNAL MEDICINE

## 2018-08-29 PROCEDURE — 84702 CHORIONIC GONADOTROPIN TEST: CPT | Performed by: INTERNAL MEDICINE

## 2018-08-29 PROCEDURE — 99214 OFFICE O/P EST MOD 30 MIN: CPT | Performed by: INTERNAL MEDICINE

## 2018-08-29 PROCEDURE — 84443 ASSAY THYROID STIM HORMONE: CPT | Performed by: INTERNAL MEDICINE

## 2018-08-29 NOTE — PROGRESS NOTES
Subjective   Nury Pineda is a 37 y.o. female.   Chief Complaint   Patient presents with   • Nausea     Acute   • Fatigue       History of Present Illness   Nausea and fatigue x 1 month. No vomiting. No fever. Feels hot. No diarrhea.  Nothing makes it better or worst.   The following portions of the patient's history were reviewed and updated as appropriate: allergies, current medications, past family history, past medical history, past social history, past surgical history and problem list.    Review of Systems   Constitutional: Positive for fatigue. Negative for activity change, appetite change, chills, diaphoresis, fever and unexpected weight change.   HENT: Negative for congestion, ear discharge, ear pain, mouth sores, nosebleeds, sinus pressure, sneezing and sore throat.    Eyes: Negative for pain, discharge and itching.   Respiratory: Negative for cough, chest tightness, shortness of breath and wheezing.    Cardiovascular: Negative for chest pain, palpitations and leg swelling.   Gastrointestinal: Positive for nausea. Negative for abdominal pain, constipation, diarrhea and vomiting.   Endocrine: Negative for cold intolerance, heat intolerance, polydipsia and polyphagia.   Genitourinary: Negative for dysuria, flank pain, frequency, hematuria and urgency.   Musculoskeletal: Negative for arthralgias, back pain, gait problem, myalgias, neck pain and neck stiffness.   Skin: Negative for color change, pallor and rash.   Neurological: Negative for seizures, speech difficulty, numbness and headaches.   Psychiatric/Behavioral: Negative for agitation, confusion, decreased concentration and sleep disturbance. The patient is not nervous/anxious.    /80   Pulse 91   Wt 118 kg (260 lb 11.2 oz)   LMP 08/07/2018   SpO2 99%   BMI 49.26 kg/m²       Objective   Physical Exam   Constitutional: She is oriented to person, place, and time. She appears well-developed.   HENT:   Head: Normocephalic.   Right Ear: External  ear normal.   Left Ear: External ear normal.   Nose: Nose normal.   Mouth/Throat: Oropharynx is clear and moist.   Eyes: Pupils are equal, round, and reactive to light. Conjunctivae are normal.   Neck: No JVD present. No thyromegaly present.   Cardiovascular: Normal rate, regular rhythm and normal heart sounds.  Exam reveals no friction rub.    No murmur heard.  Pulmonary/Chest: Effort normal and breath sounds normal. No respiratory distress. She has no wheezes. She has no rales.   Abdominal: Soft. Bowel sounds are normal. She exhibits no distension. There is no tenderness. There is no guarding.   Musculoskeletal: She exhibits no edema or tenderness.   Lymphadenopathy:     She has no cervical adenopathy.   Neurological: She is oriented to person, place, and time. She displays normal reflexes. No cranial nerve deficit.   Skin: No rash noted.   Psychiatric: Her behavior is normal.   Nursing note and vitals reviewed.      Assessment/Plan   Nury was seen today for nausea and fatigue.    Diagnoses and all orders for this visit:    Nausea  -     Comprehensive Metabolic Panel  -     CBC & Differential  -     TSH  -     hCG, Quantitative, Pregnancy    Other fatigue  tsh

## 2018-09-10 ENCOUNTER — TELEPHONE (OUTPATIENT)
Dept: OBSTETRICS AND GYNECOLOGY | Facility: CLINIC | Age: 38
End: 2018-09-10

## 2018-09-10 NOTE — TELEPHONE ENCOUNTER
Dr. Hughes patient called stating she still has a yeast infection and is requesting something be called in. Spoke with Dr. Hughes and he gave me the OK to send in a prescription.   [phone] I advised patient that I will send in Terazol 3 to her local pharmacy: [facility] on [address]. Patient verbalized understanding.

## 2018-09-12 ENCOUNTER — OFFICE VISIT (OUTPATIENT)
Dept: INTERNAL MEDICINE | Facility: CLINIC | Age: 38
End: 2018-09-12

## 2018-09-12 VITALS
SYSTOLIC BLOOD PRESSURE: 112 MMHG | HEART RATE: 75 BPM | TEMPERATURE: 98.1 F | OXYGEN SATURATION: 98 % | WEIGHT: 267.25 LBS | BODY MASS INDEX: 50.5 KG/M2 | DIASTOLIC BLOOD PRESSURE: 72 MMHG

## 2018-09-12 DIAGNOSIS — R74.8 ABNORMAL LIVER ENZYMES: ICD-10-CM

## 2018-09-12 DIAGNOSIS — R11.0 NAUSEA: Primary | ICD-10-CM

## 2018-09-12 LAB
ALBUMIN SERPL-MCNC: 4.09 G/DL (ref 3.2–4.8)
ALBUMIN/GLOB SERPL: 1.5 G/DL (ref 1.5–2.5)
ALP SERPL-CCNC: 69 U/L (ref 25–100)
ALT SERPL W P-5'-P-CCNC: 13 U/L (ref 7–40)
ANION GAP SERPL CALCULATED.3IONS-SCNC: 6 MMOL/L (ref 3–11)
AST SERPL-CCNC: 13 U/L (ref 0–33)
BILIRUB SERPL-MCNC: 0.4 MG/DL (ref 0.3–1.2)
BUN BLD-MCNC: 12 MG/DL (ref 9–23)
BUN/CREAT SERPL: 17.4 (ref 7–25)
CALCIUM SPEC-SCNC: 8.8 MG/DL (ref 8.7–10.4)
CHLORIDE SERPL-SCNC: 105 MMOL/L (ref 99–109)
CO2 SERPL-SCNC: 27 MMOL/L (ref 20–31)
CREAT BLD-MCNC: 0.69 MG/DL (ref 0.6–1.3)
GFR SERPL CREATININE-BSD FRML MDRD: 116 ML/MIN/1.73
GLOBULIN UR ELPH-MCNC: 2.7 GM/DL
GLUCOSE BLD-MCNC: 87 MG/DL (ref 70–100)
HCG INTACT+B SERPL-ACNC: <5 MIU/ML
POTASSIUM BLD-SCNC: 4.7 MMOL/L (ref 3.5–5.5)
PROT SERPL-MCNC: 6.8 G/DL (ref 5.7–8.2)
SODIUM BLD-SCNC: 138 MMOL/L (ref 132–146)

## 2018-09-12 PROCEDURE — 84702 CHORIONIC GONADOTROPIN TEST: CPT | Performed by: INTERNAL MEDICINE

## 2018-09-12 PROCEDURE — 90471 IMMUNIZATION ADMIN: CPT | Performed by: INTERNAL MEDICINE

## 2018-09-12 PROCEDURE — 99213 OFFICE O/P EST LOW 20 MIN: CPT | Performed by: INTERNAL MEDICINE

## 2018-09-12 PROCEDURE — 80053 COMPREHEN METABOLIC PANEL: CPT | Performed by: INTERNAL MEDICINE

## 2018-09-12 PROCEDURE — 90632 HEPA VACCINE ADULT IM: CPT | Performed by: INTERNAL MEDICINE

## 2018-09-12 NOTE — PROGRESS NOTES
Subjective   Nury Pineda is a 38 y.o. female.   Chief Complaint   Patient presents with   • Nausea     follow up       History of Present Illness   F/u on nausea. Has improved. No vomiting. Pregnancy test negative. Last menses about 3 weeks ago.  Her gyn is prescribing her phentermine. Discussed with her not recommended for more then 3 month use.  Elevated LFT's.  The following portions of the patient's history were reviewed and updated as appropriate: allergies, current medications, past family history, past medical history, past social history, past surgical history and problem list.    Review of Systems   Constitutional: Negative for activity change, appetite change, chills, diaphoresis, fatigue, fever and unexpected weight change.   HENT: Negative for congestion, ear discharge, ear pain, mouth sores, nosebleeds, sinus pressure, sneezing and sore throat.    Eyes: Negative for pain, discharge and itching.   Respiratory: Negative for cough, chest tightness, shortness of breath and wheezing.    Cardiovascular: Negative for chest pain, palpitations and leg swelling.   Gastrointestinal: Negative for abdominal pain, constipation, diarrhea, nausea and vomiting.   Endocrine: Negative for cold intolerance, heat intolerance, polydipsia and polyphagia.   Genitourinary: Negative for dysuria, flank pain, frequency, hematuria and urgency.   Musculoskeletal: Negative for arthralgias, back pain, gait problem, myalgias, neck pain and neck stiffness.   Skin: Negative for color change, pallor and rash.   Neurological: Negative for seizures, speech difficulty, numbness and headaches.   Psychiatric/Behavioral: Negative for agitation, confusion, decreased concentration and sleep disturbance. The patient is not nervous/anxious.      /72 (BP Location: Left arm, Patient Position: Sitting, Cuff Size: Large Adult)   Pulse 75   Temp 98.1 °F (36.7 °C) (Oral)   Wt 121 kg (267 lb 4 oz)   SpO2 98%   BMI 50.50 kg/m²     Objective    Physical Exam   Constitutional: She appears well-developed.   HENT:   Head: Normocephalic.   Right Ear: External ear normal.   Left Ear: External ear normal.   Nose: Nose normal.   Mouth/Throat: Oropharynx is clear and moist.   Eyes: Pupils are equal, round, and reactive to light. Conjunctivae are normal.   Neck: No JVD present. No thyromegaly present.   Cardiovascular: Normal rate, regular rhythm and normal heart sounds.  Exam reveals no friction rub.    No murmur heard.  Pulmonary/Chest: Effort normal and breath sounds normal. No respiratory distress. She has no wheezes. She has no rales.   Abdominal: Soft. Bowel sounds are normal. She exhibits no distension. There is no tenderness. There is no guarding.   Musculoskeletal: She exhibits no edema or tenderness.   Lymphadenopathy:     She has no cervical adenopathy.   Neurological: She displays normal reflexes. No cranial nerve deficit.   Skin: No rash noted.   Psychiatric: Her behavior is normal.   Nursing note and vitals reviewed.      Assessment/Plan   Nury was seen today for nausea.    Diagnoses and all orders for this visit:    Nausea  Has improved.   Discussed exercise. She is going to gym 4 times a week.  Working on diet. Gyn prescribes her phentermine. Would recommend max 3 month use only.BP needs to be monitored.  Elevated LFT's  needs repeat level  Hep A vac today

## 2018-09-26 ENCOUNTER — OFFICE VISIT (OUTPATIENT)
Dept: INTERNAL MEDICINE | Facility: CLINIC | Age: 38
End: 2018-09-26

## 2018-09-26 VITALS
HEIGHT: 61 IN | WEIGHT: 266 LBS | BODY MASS INDEX: 50.22 KG/M2 | SYSTOLIC BLOOD PRESSURE: 118 MMHG | HEART RATE: 85 BPM | DIASTOLIC BLOOD PRESSURE: 74 MMHG | OXYGEN SATURATION: 100 %

## 2018-09-26 DIAGNOSIS — Z00.00 HEALTHCARE MAINTENANCE: Primary | ICD-10-CM

## 2018-09-26 PROCEDURE — 99395 PREV VISIT EST AGE 18-39: CPT | Performed by: INTERNAL MEDICINE

## 2018-09-26 NOTE — PROGRESS NOTES
Chief Complaint   Patient presents with   • Annual Exam         Reported Health  Good Yes  FairNo  PoorNo      Dental,Vision,Hearing  Regular dental visitsYes  Vision ProblemsYes  Hearing LossNo      Immunization Status:  Up To DateNo        Lifestyle  Healthy DietYes  Weight ConcernsYes  Regular ExerciseYes  Tobacco UseNo  Alcohol UseNo  Drug AbuseNo      Screening  Cancer ScreeningYes  Metabolic ScreeningYes  Risk ScreeningYes    Past Medical History:   Diagnosis Date   • Absolute anemia    • Arthritis     Right knee   • Carpal tunnel syndrome     RIGHT WRIST   • Cholelithiasis     Nausea related to stones has phenergan. Also has related RUQ pain.   • Dizziness    • Elevated C-reactive protein (CRP)    • Elevated erythrocyte sedimentation rate    • Influenza    • Iron deficiency     Will not take iron supplement but will take MVI   • Left hip pain    • Migraines    • Numbness and tingling in right hand     Has nerve conductions 3/23/2016, awaiting results. Along with Paresthesia.   • Pain and swelling of right forearm     Check u/s to r/o clot. Rash and redness are resolving, will continue to monitor.   • Pain of left arm    • Sleep disturbances    • Vitamin D deficiency     25 oh.     Past Surgical History:   Procedure Laterality Date   •  SECTION     • CHOLECYSTECTOMY     • COLONOSCOPY     • CYST REMOVAL      behind belly button   • TONSILLECTOMY     • URACHAL CYST INCISION      History of Excision Of Urachal Cyst.     Family History   Problem Relation Age of Onset   • Stroke Mother    • Hypertension Mother    • Migraines Mother    • Osteoarthritis Mother    • Cervical cancer Mother    • Heart disease Mother    • Hyperlipidemia Maternal Grandmother    • Osteoarthritis Maternal Grandmother    • Birth defects Maternal Grandmother    • Ovarian cancer Maternal Grandmother         PREMENOPAUSAL    • Transient ischemic attack Maternal Grandmother    • Hypertension Maternal Grandmother   "  • Stroke Other         CVA x 2. and TIA   • Cancer Other         Cervical, malignant neoplasm x2 , ovarian     Social History     Social History   • Marital status: Single     Spouse name: N/A   • Number of children: N/A   • Years of education: N/A     Occupational History   • Not on file.     Social History Main Topics   • Smoking status: Never Smoker   • Smokeless tobacco: Never Used   • Alcohol use 1.2 oz/week     2 Glasses of wine per week   • Drug use: No   • Sexual activity: Yes     Partners: Male     Birth control/ protection: Condom     Other Topics Concern   • Not on file     Social History Narrative    single       Review of Systems   Constitutional: Negative for activity change, appetite change, chills, diaphoresis, fatigue, fever and unexpected weight change.   HENT: Negative for congestion, ear discharge, ear pain, mouth sores, nosebleeds, sinus pressure, sneezing and sore throat.    Eyes: Negative for pain, discharge and itching.   Respiratory: Negative for cough, chest tightness, shortness of breath and wheezing.    Cardiovascular: Negative for chest pain, palpitations and leg swelling.   Gastrointestinal: Negative for abdominal pain, constipation, diarrhea, nausea and vomiting.   Endocrine: Negative for cold intolerance, heat intolerance, polydipsia and polyphagia.   Genitourinary: Negative for dysuria, flank pain, frequency, hematuria and urgency.   Musculoskeletal: Negative for arthralgias, back pain, gait problem, myalgias, neck pain and neck stiffness.   Skin: Negative for color change, pallor and rash.   Neurological: Negative for seizures, speech difficulty, numbness and headaches.   Psychiatric/Behavioral: Negative for agitation, confusion, decreased concentration and sleep disturbance. The patient is not nervous/anxious.      /74   Pulse 85   Ht 154.9 cm (61\")   Wt 121 kg (266 lb)   SpO2 100%   BMI 50.26 kg/m²     Physical Exam   Constitutional: She is oriented to person, place, " and time. She appears well-developed.   HENT:   Head: Normocephalic.   Right Ear: External ear normal.   Left Ear: External ear normal.   Nose: Nose normal.   Mouth/Throat: Oropharynx is clear and moist.   Eyes: Pupils are equal, round, and reactive to light. Conjunctivae are normal.   Neck: No JVD present. No thyromegaly present.   Cardiovascular: Normal rate, regular rhythm and normal heart sounds.  Exam reveals no friction rub.    No murmur heard.  Pulmonary/Chest: Effort normal and breath sounds normal. No respiratory distress. She has no wheezes. She has no rales.   Abdominal: Soft. Bowel sounds are normal. She exhibits no distension. There is no tenderness. There is no guarding.   Musculoskeletal: She exhibits no edema or tenderness.   Lymphadenopathy:     She has no cervical adenopathy.   Neurological: She is oriented to person, place, and time. She displays normal reflexes. No cranial nerve deficit.   Skin: No rash noted.   Psychiatric: Her behavior is normal.   Nursing note and vitals reviewed.                Diet and Exercise    Healthy Diet Yes  Adequate DietYes  Poor DietNo  Adequate Exercise RegimenYes  Inadequate Exercise RegimenNo      Cervical Cancer Screening    Risks and benefits discussedYes  Screening currentYes  PAP Done Today OrderedNo  Screening Not IndicatedNo  Pap Every 3 yearsYes  Screening Done By GYNYes    Breast Cancer screening  Screen at 40    STD Testing  ChlamydiaNo  GonorrheaNo  HIVNo      Osteoporosis Screening  Not indicated    Colorectal Cancer Screening  Screen at 50 or sooner if needed    Metabolic Screening  lipids      Immunizations  Risks and benefits discussedYes  Immunizations Up To DateNo  Immunizations NeededYes  Immunizations Per OrdersNo  Patient DYeseclines      Preventative Counseling  NutritionYes  Aerobic ExerciseYes  Weight Bearing ExerciseYes  Weight LossYes  Calcium SupplementsYes  Vitamin D SupplementsYes  Reproductive HealthNo  Cardiovascular Risk  ReductionYes  Tobacco CessationNo  Alcohol UseYes  Sunscreen UseYes  Self Skin ExaminationNo  Helmet UseNo  Seat Belt UseYes  Fall Risk ReductionNo  Advanced Directive PlanningNo      Patient Discussion  PatientYes  FamilyNo  CounselingYes  Nury was seen today for annual exam.    Diagnoses and all orders for this visit:    Healthcare maintenance  -     Lipid Panel

## 2018-09-28 ENCOUNTER — DOCUMENTATION (OUTPATIENT)
Dept: PHYSICAL THERAPY | Facility: HOSPITAL | Age: 38
End: 2018-09-28

## 2018-09-28 DIAGNOSIS — M54.2 NECK PAIN: Primary | ICD-10-CM

## 2018-10-12 ENCOUNTER — OFFICE VISIT (OUTPATIENT)
Dept: ORTHOPEDIC SURGERY | Facility: CLINIC | Age: 38
End: 2018-10-12

## 2018-10-12 VITALS — BODY MASS INDEX: 49.95 KG/M2 | OXYGEN SATURATION: 92 % | WEIGHT: 264.55 LBS | HEIGHT: 61 IN | HEART RATE: 71 BPM

## 2018-10-12 DIAGNOSIS — S46.912A SHOULDER STRAIN, LEFT, INITIAL ENCOUNTER: Primary | ICD-10-CM

## 2018-10-12 PROCEDURE — 99203 OFFICE O/P NEW LOW 30 MIN: CPT | Performed by: ORTHOPAEDIC SURGERY

## 2018-10-12 NOTE — PROGRESS NOTES
[facility] [place]    Subjective     Chief Complaint   Patient presents with   • Left Shoulder - Pain        HPI      Nury Pineda is a 38 y.o. female.  He injured her left shoulder lifting her grandmother on .  She felt a pop and pain and went to the hospital.  X-rays negative.  Pain is 2 out of 10 but actually is much better today.  She's able to move her arm again.    Past Medical History:   Diagnosis Date   • Absolute anemia    • Arthritis     Right knee   • Carpal tunnel syndrome     RIGHT WRIST   • Cholelithiasis     Nausea related to stones has phenergan. Also has related RUQ pain.   • Dizziness    • Elevated C-reactive protein (CRP)    • Elevated erythrocyte sedimentation rate    • Influenza    • Iron deficiency     Will not take iron supplement but will take MVI   • Left hip pain    • Migraines    • Numbness and tingling in right hand     Has nerve conductions 3/23/2016, awaiting results. Along with Paresthesia.   • Pain and swelling of right forearm     Check u/s to r/o clot. Rash and redness are resolving, will continue to monitor.   • Pain of left arm    • Sleep disturbances    • Vitamin D deficiency     25 oh.      Past Surgical History:   Procedure Laterality Date   •  SECTION     • CHOLECYSTECTOMY     • COLONOSCOPY     • CYST REMOVAL      behind belly button   • TONSILLECTOMY     • URACHAL CYST INCISION      History of Excision Of Urachal Cyst.      Family History   Problem Relation Age of Onset   • Stroke Mother    • Hypertension Mother    • Migraines Mother    • Osteoarthritis Mother    • Cervical cancer Mother    • Heart disease Mother    • Hyperlipidemia Maternal Grandmother    • Osteoarthritis Maternal Grandmother    • Birth defects Maternal Grandmother    • Ovarian cancer Maternal Grandmother         PREMENOPAUSAL    • Transient ischemic attack Maternal Grandmother    • Hypertension Maternal Grandmother    • Stroke Other         CVA x 2.  and TIA   • Cancer Other         Cervical, malignant neoplasm x2 , ovarian     Social History     Social History   • Marital status: Single     Spouse name: N/A   • Number of children: N/A   • Years of education: N/A     Occupational History   • Not on file.     Social History Main Topics   • Smoking status: Never Smoker   • Smokeless tobacco: Never Used   • Alcohol use 1.2 oz/week     2 Glasses of wine per week   • Drug use: No   • Sexual activity: Yes     Partners: Male     Birth control/ protection: Condom     Other Topics Concern   • Not on file     Social History Narrative    single      Current Outpatient Prescriptions on File Prior to Visit   Medication Sig Dispense Refill   • cyclobenzaprine (FLEXERIL) 10 MG tablet Take 1 tablet by mouth 3 (Three) Times a Day As Needed for Muscle Spasms. 30 tablet 0   • hyoscyamine (LEVSIN) 0.125 MG SL tablet Take 1 tablet by mouth Every 4 (Four) Hours As Needed for Cramping. 20 tablet 0   • meloxicam (MOBIC) 15 MG tablet Take 1 tablet by mouth Daily. 30 tablet 0   • montelukast (SINGULAIR) 10 MG tablet Take 1 tablet by mouth Every Night. 90 tablet 1   • ondansetron ODT (ZOFRAN-ODT) 4 MG disintegrating tablet Take 1 tablet by mouth Every 6 (Six) Hours As Needed for Nausea or Vomiting. 21 tablet 0   • phentermine 37.5 MG capsule Take 1 capsule by mouth Every Morning. 60 capsule 1   • SUMAtriptan (IMITREX) 50 MG tablet Take 1 tablet by mouth 1 (One) Time As Needed for Migraine for up to 1 dose. May repeat dose one time in 2 hours if headache not relieved. 9 tablet 5   • terconazole (TERAZOL 3) 0.8 % vaginal cream Insert 1 applicator into the vagina Every Night. 20 g 1   • Topiramate ER 50 MG capsule sustained-release 24 hr Take 50 mg by mouth Every Night. 30 capsule 11     No current facility-administered medications on file prior to visit.       Allergies   Allergen Reactions   • Amoxicillin      TABS.   • Penicillins         The following portions of the patient's history  "were reviewed and updated as appropriate: allergies, current medications, past family history, past medical history, past social history, past surgical history and problem list.    Review of Systems   Constitutional: Negative.    HENT: Negative.    Eyes: Negative.    Respiratory: Negative.    Cardiovascular: Negative.    Gastrointestinal: Negative.    Endocrine: Negative.    Genitourinary: Negative.    Musculoskeletal: Positive for arthralgias.   Skin: Negative.    Allergic/Immunologic: Positive for environmental allergies.   Neurological: Negative.    Hematological: Negative.    Psychiatric/Behavioral: Negative.         Objective      Physical Exam  Pulse 71   Ht 154.9 cm (60.98\")   Wt 120 kg (264 lb 8.8 oz)   LMP 09/23/2018   SpO2 92%   BMI 50.01 kg/m²     Body mass index is 50.01 kg/m².        GENERAL APPEARANCE: awake, alert & oriented x 3, in no acute distress and well developed, well nourished  PSYCH: normal mood and affect  LUNGS:  breathing nonlabored, no wheezing  EYES: sclera anicteric, pupils equal  CARDIOVASCULAR: palpable pulses dorsalis pedis, palpable posterior tibial bilaterally. Capillary refill less than 2 seconds  INTEGUMENTARY: skin intact, no clubbing, cyanosis  NEUROLOGIC:  Normal Sensation and reflexes             Ortho Exam  Musculoskeletal   Upper Extremity   Left Shoulder     Inspection and Palpation:     Tenderness - none     Crepitus - none    Sensation is normal    Examination reveals no ecchymosis.      Strength and Tone:    Supraspinatus, Infraspinatus - 5/5    Subscapularis - 5/5    Deltoid - 5/5   Range of Motion   Right shoulder:    Internal Rotation: ROM - T7    External Rotation: AROM - 80 degrees    Elevation through flexion: AROM - 180 degrees     Abduction - 180   Left Shoulder:    Internal Rotation: ROM - T7    External Rotation: AROM - 80 degrees    Elevation through flexion: AROM - 180 degrees     Abduction - 180 degrees   Instability   Left shoulder    Sulcus sign " negative    Apprehension test negative    Dennise relocation test negative    Jerk test negative   Impingement   Left shoulder    Castro impingement test positive    Neer impingement test positive   Functional Testing   Left shoulder    AC crossover adduction test negative    Abdominal compression test negative    Lift-off sign negative    Speed's test negative    Mayo's test negative    Horriblower's sign negative     Imaging/Studies  Imaging Results (last 7 days)     ** No results found for the last 168 hours. **      I viewed her x-rays from October 9 which are negative    Assessment/Plan        ICD-10-CM ICD-9-CM   1. Shoulder strain, left, initial encounter S46.912A 840.9     She will continue the ice and heat with home exercise program.  She'll follow-up as needed.  Medical Decision Making  Management Options : over-the-counter medicine  Data/Risk: radiology tests and independent visualization of imaging, lab tests, or EMG/NCV    Manjit Martinez MD  10/12/18  10:48 AM         EMR Dragon/Transcription disclaimer:  Much of this encounter note is an electronic transcription of spoken language to printed text. Electronic transcription of spoken language may permit erroneous, or at times, nonsensical words or phrases to be inadvertently transcribed. Although I have reviewed the note for such errors, some may still exist.

## 2018-12-03 ENCOUNTER — OFFICE VISIT (OUTPATIENT)
Dept: OBSTETRICS AND GYNECOLOGY | Facility: CLINIC | Age: 38
End: 2018-12-03

## 2018-12-03 VITALS — HEIGHT: 61 IN | BODY MASS INDEX: 50.03 KG/M2 | WEIGHT: 265 LBS | RESPIRATION RATE: 14 BRPM

## 2018-12-03 DIAGNOSIS — Z01.419 ENCOUNTER FOR GYNECOLOGICAL EXAMINATION WITHOUT ABNORMAL FINDING: ICD-10-CM

## 2018-12-03 DIAGNOSIS — B37.31 CANDIDAL VAGINITIS: ICD-10-CM

## 2018-12-03 DIAGNOSIS — Z01.419 ENCOUNTER FOR GYNECOLOGICAL EXAMINATION WITHOUT ABNORMAL FINDING: Primary | ICD-10-CM

## 2018-12-03 PROCEDURE — 99395 PREV VISIT EST AGE 18-39: CPT | Performed by: OBSTETRICS & GYNECOLOGY

## 2019-01-08 ENCOUNTER — APPOINTMENT (OUTPATIENT)
Dept: CT IMAGING | Facility: HOSPITAL | Age: 39
End: 2019-01-08

## 2019-01-08 ENCOUNTER — HOSPITAL ENCOUNTER (EMERGENCY)
Facility: HOSPITAL | Age: 39
Discharge: HOME | End: 2019-01-08
Attending: EMERGENCY MEDICINE | Admitting: EMERGENCY MEDICINE

## 2019-01-08 VITALS
WEIGHT: 260 LBS | HEIGHT: 61 IN | OXYGEN SATURATION: 98 % | RESPIRATION RATE: 14 BRPM | SYSTOLIC BLOOD PRESSURE: 110 MMHG | TEMPERATURE: 98.6 F | BODY MASS INDEX: 49.09 KG/M2 | HEART RATE: 85 BPM | DIASTOLIC BLOOD PRESSURE: 51 MMHG

## 2019-01-08 DIAGNOSIS — R51.9 GENERALIZED HEADACHE: Primary | ICD-10-CM

## 2019-01-08 LAB
ALBUMIN SERPL-MCNC: 4.27 G/DL (ref 3.2–4.8)
ALBUMIN/GLOB SERPL: 1.4 G/DL (ref 1.5–2.5)
ALP SERPL-CCNC: 76 U/L (ref 25–100)
ALT SERPL W P-5'-P-CCNC: 22 U/L (ref 7–40)
ANION GAP SERPL CALCULATED.3IONS-SCNC: 6 MMOL/L (ref 3–11)
AST SERPL-CCNC: 20 U/L (ref 0–33)
BASOPHILS # BLD AUTO: 0.01 10*3/MM3 (ref 0–0.2)
BASOPHILS NFR BLD AUTO: 0.1 % (ref 0–1)
BILIRUB SERPL-MCNC: 0.5 MG/DL (ref 0.3–1.2)
BUN BLD-MCNC: 13 MG/DL (ref 9–23)
BUN/CREAT SERPL: 17.1 (ref 7–25)
CALCIUM SPEC-SCNC: 9.3 MG/DL (ref 8.7–10.4)
CHLORIDE SERPL-SCNC: 102 MMOL/L (ref 99–109)
CO2 SERPL-SCNC: 28 MMOL/L (ref 20–31)
CREAT BLD-MCNC: 0.76 MG/DL (ref 0.6–1.3)
DEPRECATED RDW RBC AUTO: 47 FL (ref 37–54)
EOSINOPHIL # BLD AUTO: 0.04 10*3/MM3 (ref 0–0.3)
EOSINOPHIL NFR BLD AUTO: 0.5 % (ref 0–3)
ERYTHROCYTE [DISTWIDTH] IN BLOOD BY AUTOMATED COUNT: 16.1 % (ref 11.3–14.5)
GFR SERPL CREATININE-BSD FRML MDRD: 104 ML/MIN/1.73
GLOBULIN UR ELPH-MCNC: 3 GM/DL
GLUCOSE BLD-MCNC: 81 MG/DL (ref 70–100)
HCT VFR BLD AUTO: 36 % (ref 34.5–44)
HGB BLD-MCNC: 11.2 G/DL (ref 11.5–15.5)
IMM GRANULOCYTES # BLD AUTO: 0.03 10*3/MM3 (ref 0–0.03)
IMM GRANULOCYTES NFR BLD AUTO: 0.4 % (ref 0–0.6)
LYMPHOCYTES # BLD AUTO: 3.03 10*3/MM3 (ref 0.6–4.8)
LYMPHOCYTES NFR BLD AUTO: 38 % (ref 24–44)
MCH RBC QN AUTO: 24.6 PG (ref 27–31)
MCHC RBC AUTO-ENTMCNC: 31.1 G/DL (ref 32–36)
MCV RBC AUTO: 79.1 FL (ref 80–99)
MONOCYTES # BLD AUTO: 0.69 10*3/MM3 (ref 0–1)
MONOCYTES NFR BLD AUTO: 8.6 % (ref 0–12)
NEUTROPHILS # BLD AUTO: 4.18 10*3/MM3 (ref 1.5–8.3)
NEUTROPHILS NFR BLD AUTO: 52.4 % (ref 41–71)
PLATELET # BLD AUTO: 288 10*3/MM3 (ref 150–450)
PMV BLD AUTO: 10.1 FL (ref 6–12)
POTASSIUM BLD-SCNC: 4.2 MMOL/L (ref 3.5–5.5)
PROT SERPL-MCNC: 7.3 G/DL (ref 5.7–8.2)
RBC # BLD AUTO: 4.55 10*6/MM3 (ref 3.89–5.14)
SODIUM BLD-SCNC: 136 MMOL/L (ref 132–146)
WBC NRBC COR # BLD: 7.98 10*3/MM3 (ref 3.5–10.8)

## 2019-01-08 PROCEDURE — 25010000002 KETOROLAC TROMETHAMINE PER 15 MG: Performed by: EMERGENCY MEDICINE

## 2019-01-08 PROCEDURE — 25010000002 HYDROMORPHONE PER 4 MG: Performed by: EMERGENCY MEDICINE

## 2019-01-08 PROCEDURE — 70450 CT HEAD/BRAIN W/O DYE: CPT

## 2019-01-08 PROCEDURE — 80053 COMPREHEN METABOLIC PANEL: CPT | Performed by: EMERGENCY MEDICINE

## 2019-01-08 PROCEDURE — 96375 TX/PRO/DX INJ NEW DRUG ADDON: CPT

## 2019-01-08 PROCEDURE — 96374 THER/PROPH/DIAG INJ IV PUSH: CPT

## 2019-01-08 PROCEDURE — 25010000002 DEXAMETHASONE PER 1 MG: Performed by: EMERGENCY MEDICINE

## 2019-01-08 PROCEDURE — 85025 COMPLETE CBC W/AUTO DIFF WBC: CPT | Performed by: EMERGENCY MEDICINE

## 2019-01-08 PROCEDURE — 99284 EMERGENCY DEPT VISIT MOD MDM: CPT

## 2019-01-08 PROCEDURE — 93005 ELECTROCARDIOGRAM TRACING: CPT | Performed by: EMERGENCY MEDICINE

## 2019-01-08 PROCEDURE — 96376 TX/PRO/DX INJ SAME DRUG ADON: CPT

## 2019-01-08 PROCEDURE — 25010000002 DIMENHYDRINATE 50 MG/ML SOLUTION: Performed by: EMERGENCY MEDICINE

## 2019-01-08 PROCEDURE — 93005 ELECTROCARDIOGRAM TRACING: CPT

## 2019-01-08 PROCEDURE — 25010000002 METOCLOPRAMIDE PER 10 MG: Performed by: EMERGENCY MEDICINE

## 2019-01-08 RX ORDER — KETOROLAC TROMETHAMINE 15 MG/ML
10 INJECTION, SOLUTION INTRAMUSCULAR; INTRAVENOUS ONCE
Status: COMPLETED | OUTPATIENT
Start: 2019-01-08 | End: 2019-01-08

## 2019-01-08 RX ORDER — METOCLOPRAMIDE HYDROCHLORIDE 5 MG/ML
5 INJECTION INTRAMUSCULAR; INTRAVENOUS ONCE
Status: COMPLETED | OUTPATIENT
Start: 2019-01-08 | End: 2019-01-08

## 2019-01-08 RX ORDER — DEXAMETHASONE SODIUM PHOSPHATE 4 MG/ML
4 INJECTION, SOLUTION INTRA-ARTICULAR; INTRALESIONAL; INTRAMUSCULAR; INTRAVENOUS; SOFT TISSUE ONCE
Status: COMPLETED | OUTPATIENT
Start: 2019-01-08 | End: 2019-01-08

## 2019-01-08 RX ORDER — DIMENHYDRINATE 50 MG/ML
50 INJECTION, SOLUTION INTRAMUSCULAR; INTRAVENOUS ONCE
Status: COMPLETED | OUTPATIENT
Start: 2019-01-08 | End: 2019-01-08

## 2019-01-08 RX ORDER — HYDROMORPHONE HYDROCHLORIDE 1 MG/ML
0.25 INJECTION, SOLUTION INTRAMUSCULAR; INTRAVENOUS; SUBCUTANEOUS ONCE
Status: COMPLETED | OUTPATIENT
Start: 2019-01-08 | End: 2019-01-08

## 2019-01-08 RX ADMIN — DIMENHYDRINATE 50 MG: 50 INJECTION, SOLUTION INTRAMUSCULAR; INTRAVENOUS at 17:11

## 2019-01-08 RX ADMIN — HYDROMORPHONE HYDROCHLORIDE 0.25 MG: 1 INJECTION, SOLUTION INTRAMUSCULAR; INTRAVENOUS; SUBCUTANEOUS at 22:08

## 2019-01-08 RX ADMIN — METOCLOPRAMIDE 5 MG: 5 INJECTION, SOLUTION INTRAMUSCULAR; INTRAVENOUS at 22:08

## 2019-01-08 RX ADMIN — SODIUM CHLORIDE 1000 ML: 9 INJECTION, SOLUTION INTRAVENOUS at 22:07

## 2019-01-08 RX ADMIN — HYDROMORPHONE HYDROCHLORIDE 0.25 MG: 1 INJECTION, SOLUTION INTRAMUSCULAR; INTRAVENOUS; SUBCUTANEOUS at 20:13

## 2019-01-08 RX ADMIN — KETOROLAC TROMETHAMINE 10 MG: 15 INJECTION, SOLUTION INTRAMUSCULAR; INTRAVENOUS at 17:03

## 2019-01-08 RX ADMIN — SODIUM CHLORIDE 1000 ML: 9 INJECTION, SOLUTION INTRAVENOUS at 17:01

## 2019-01-08 RX ADMIN — DEXAMETHASONE SODIUM PHOSPHATE 4 MG: 4 INJECTION, SOLUTION INTRAMUSCULAR; INTRAVENOUS at 22:10

## 2019-01-08 RX ADMIN — DEXAMETHASONE SODIUM PHOSPHATE 4 MG: 4 INJECTION, SOLUTION INTRAMUSCULAR; INTRAVENOUS at 17:07

## 2019-01-08 RX ADMIN — METOCLOPRAMIDE 5 MG: 5 INJECTION, SOLUTION INTRAMUSCULAR; INTRAVENOUS at 17:09

## 2019-01-08 RX ADMIN — KETOROLAC TROMETHAMINE 10 MG: 15 INJECTION, SOLUTION INTRAMUSCULAR; INTRAVENOUS at 20:14

## 2019-01-08 RX ADMIN — SODIUM CHLORIDE 1000 ML: 9 INJECTION, SOLUTION INTRAVENOUS at 20:13

## 2019-01-08 NOTE — ED PROVIDER NOTES
Subjective   Nury Pineda is a 38 y.o.female who presents to the ED with complaints of headache with associated dizziness. The patient reports her discomfort has been constant since it onset last night. She currently rates her headache as a 9/10 in severity. She states light exposure worsens her pain. Her pain started on the left side of her head, but has radiated throughout her head. She has not taken any medication for her discomfort. She also complains of nausea, tingling in her extremities, and chest pressure that radiates into her back, but denies any vomiting, constipation, diarrhea, neck stiffness, fever, rash, or abdominal pain. Additionally, she denies any recent travel. There are no other complaints at this time.         History provided by:  Patient  Headache   Pain location:  Generalized  Quality:  Dull  Radiates to:  Does not radiate  Severity currently:  9/10  Onset quality:  Sudden  Duration:  1 day  Timing:  Constant  Progression:  Unchanged  Chronicity:  New  Similar to prior headaches: no    Relieved by:  None tried  Worsened by:  Light  Ineffective treatments:  None tried  Associated symptoms: back pain, dizziness and nausea    Associated symptoms: no abdominal pain, no diarrhea, no fever, no neck stiffness and no vomiting        Review of Systems   Constitutional: Negative for fever.   Cardiovascular: Positive for chest pain (pressure).   Gastrointestinal: Positive for nausea. Negative for abdominal pain, constipation, diarrhea and vomiting.   Musculoskeletal: Positive for back pain. Negative for neck stiffness.   Skin: Negative for rash.   Neurological: Positive for dizziness and headaches.        Tingling in her extremities   All other systems reviewed and are negative.      Past Medical History:   Diagnosis Date   • Absolute anemia    • Arthritis     Right knee   • Carpal tunnel syndrome     RIGHT WRIST   • Cholelithiasis     Nausea related to stones has phenergan. Also has related RUQ pain.    • Dizziness    • Elevated C-reactive protein (CRP)    • Elevated erythrocyte sedimentation rate    • Influenza    • Iron deficiency     Will not take iron supplement but will take MVI   • Left hip pain    • Migraines    • Numbness and tingling in right hand     Has nerve conductions 3/23/2016, awaiting results. Along with Paresthesia.   • Pain and swelling of right forearm     Check u/s to r/o clot. Rash and redness are resolving, will continue to monitor.   • Pain of left arm    • Sleep disturbances    • Vitamin D deficiency     25 oh.       Allergies   Allergen Reactions   • Amoxicillin      TABS.   • Penicillins        Past Surgical History:   Procedure Laterality Date   •  SECTION     • CHOLECYSTECTOMY     • COLONOSCOPY     • CYST REMOVAL      behind belly button   • TONSILLECTOMY     • URACHAL CYST INCISION      History of Excision Of Urachal Cyst.       Family History   Problem Relation Age of Onset   • Stroke Mother    • Hypertension Mother    • Migraines Mother    • Osteoarthritis Mother    • Cervical cancer Mother    • Heart disease Mother    • Hyperlipidemia Maternal Grandmother    • Osteoarthritis Maternal Grandmother    • Birth defects Maternal Grandmother    • Ovarian cancer Maternal Grandmother         PREMENOPAUSAL    • Transient ischemic attack Maternal Grandmother    • Hypertension Maternal Grandmother    • Stroke Other         CVA x 2. and TIA   • Cancer Other         Cervical, malignant neoplasm x2 , ovarian       Social History     Socioeconomic History   • Marital status: Single     Spouse name: Not on file   • Number of children: Not on file   • Years of education: Not on file   • Highest education level: Not on file   Tobacco Use   • Smoking status: Never Smoker   • Smokeless tobacco: Never Used   Substance and Sexual Activity   • Alcohol use: Yes     Alcohol/week: 1.2 oz     Types: 2 Glasses of wine per week   • Drug use: No   • Sexual activity: Yes     Partners:  Male     Birth control/protection: Condom   Social History Narrative    single         Objective   Physical Exam   Constitutional: She is oriented to person, place, and time. She appears well-developed and well-nourished.  Non-toxic appearance. No distress.   Somewhat uncomfortable appearing female.   HENT:   Head: Normocephalic and atraumatic.   Nose: Nose normal.   Mouth/Throat: Mucous membranes are dry.   Mildly dry mucous membranes. No sinus tenderness or nuchal rigidity.    Eyes: Conjunctivae and EOM are normal. Pupils are equal, round, and reactive to light. No scleral icterus.   Pupils are 4 mm.   Neck: Normal range of motion. Neck supple.   Cardiovascular: Normal rate, regular rhythm, normal heart sounds and intact distal pulses.   No murmur heard.  Pulmonary/Chest: Effort normal and breath sounds normal. No respiratory distress.   Abdominal: Soft. Bowel sounds are normal. There is no tenderness.   Musculoskeletal: Normal range of motion. She exhibits no edema.   Neurological: She is alert and oriented to person, place, and time. No cranial nerve deficit.   Mild photophobia.    Skin: Skin is warm and dry.   Psychiatric: She has a normal mood and affect. Her behavior is normal.   Nursing note and vitals reviewed.      Procedures         ED Course  ED Course as of Jan 08 2315   Tue Jan 08, 2019 1942 Ms. Pineda is feeling somewhat better, but still complaining of significant discomfort.   [TB]      ED Course User Index  [TB] Baron Ruby     Recent Results (from the past 24 hour(s))   Comprehensive Metabolic Panel    Collection Time: 01/08/19  4:42 PM   Result Value Ref Range    Glucose 81 70 - 100 mg/dL    BUN 13 9 - 23 mg/dL    Creatinine 0.76 0.60 - 1.30 mg/dL    Sodium 136 132 - 146 mmol/L    Potassium 4.2 3.5 - 5.5 mmol/L    Chloride 102 99 - 109 mmol/L    CO2 28.0 20.0 - 31.0 mmol/L    Calcium 9.3 8.7 - 10.4 mg/dL    Total Protein 7.3 5.7 - 8.2 g/dL    Albumin 4.27 3.20 - 4.80 g/dL    ALT (SGPT) 22  7 - 40 U/L    AST (SGOT) 20 0 - 33 U/L    Alkaline Phosphatase 76 25 - 100 U/L    Total Bilirubin 0.5 0.3 - 1.2 mg/dL    eGFR  African Amer 104 >60 mL/min/1.73    Globulin 3.0 gm/dL    A/G Ratio 1.4 (L) 1.5 - 2.5 g/dL    BUN/Creatinine Ratio 17.1 7.0 - 25.0    Anion Gap 6.0 3.0 - 11.0 mmol/L   CBC Auto Differential    Collection Time: 01/08/19  4:42 PM   Result Value Ref Range    WBC 7.98 3.50 - 10.80 10*3/mm3    RBC 4.55 3.89 - 5.14 10*6/mm3    Hemoglobin 11.2 (L) 11.5 - 15.5 g/dL    Hematocrit 36.0 34.5 - 44.0 %    MCV 79.1 (L) 80.0 - 99.0 fL    MCH 24.6 (L) 27.0 - 31.0 pg    MCHC 31.1 (L) 32.0 - 36.0 g/dL    RDW 16.1 (H) 11.3 - 14.5 %    RDW-SD 47.0 37.0 - 54.0 fl    MPV 10.1 6.0 - 12.0 fL    Platelets 288 150 - 450 10*3/mm3    Neutrophil % 52.4 41.0 - 71.0 %    Lymphocyte % 38.0 24.0 - 44.0 %    Monocyte % 8.6 0.0 - 12.0 %    Eosinophil % 0.5 0.0 - 3.0 %    Basophil % 0.1 0.0 - 1.0 %    Immature Grans % 0.4 0.0 - 0.6 %    Neutrophils, Absolute 4.18 1.50 - 8.30 10*3/mm3    Lymphocytes, Absolute 3.03 0.60 - 4.80 10*3/mm3    Monocytes, Absolute 0.69 0.00 - 1.00 10*3/mm3    Eosinophils, Absolute 0.04 0.00 - 0.30 10*3/mm3    Basophils, Absolute 0.01 0.00 - 0.20 10*3/mm3    Immature Grans, Absolute 0.03 0.00 - 0.03 10*3/mm3     Note: In addition to lab results from this visit, the labs listed above may include labs taken at another facility or during a different encounter within the last 24 hours. Please correlate lab times with ED admission and discharge times for further clarification of the services performed during this visit.    CT Head Without Contrast   Final Result   No acute intracranial hemorrhage or mass effect.       THIS DOCUMENT HAS BEEN ELECTRONICALLY SIGNED BY TOMASZ YOUSIF MD        Vitals:    01/08/19 1845 01/08/19 1900 01/08/19 2130 01/08/19 2200   BP:  104/49 114/56 116/66   BP Location:       Patient Position:       Pulse: 86 85 88 80   Resp:       Temp:       SpO2: 97% 97% 97% 98%   Weight:        Height:         Medications   sodium chloride 0.9 % bolus 1,000 mL (0 mL Intravenous Stopped 1/8/19 1913)   dexamethasone (DECADRON) injection 4 mg (4 mg Intravenous Given 1/8/19 1707)   ketorolac (TORADOL) injection 10 mg (10 mg Intravenous Given 1/8/19 1703)   dimenhydrinate injection 50 mg (50 mg Intravenous Given 1/8/19 1711)   metoclopramide (REGLAN) injection 5 mg (5 mg Intravenous Given 1/8/19 1709)   sodium chloride 0.9 % bolus 1,000 mL (0 mL Intravenous Stopped 1/8/19 2207)   HYDROmorphone (DILAUDID) injection 0.25 mg (0.25 mg Intravenous Given 1/8/19 2013)   ketorolac (TORADOL) injection 10 mg (10 mg Intravenous Given 1/8/19 2014)   dexamethasone (DECADRON) injection 4 mg (4 mg Intravenous Given 1/8/19 2210)   HYDROmorphone (DILAUDID) injection 0.25 mg (0.25 mg Intravenous Given 1/8/19 2208)   metoclopramide (REGLAN) injection 5 mg (5 mg Intravenous Given 1/8/19 2208)   sodium chloride 0.9 % bolus 1,000 mL (1,000 mL Intravenous New Bag 1/8/19 2207)     ECG/EMG Results (last 24 hours)     Procedure Component Value Units Date/Time    ECG 12 Lead [[id number]] Collected:  01/08/19 1507     Updated:  01/08/19 1507                        MDM    Final diagnoses:   Generalized headache       Documentation assistance provided by grady Ruby.  Information recorded by the grady was done at my direction and has been verified and validated by me.     Baron Ruby  01/08/19 1620       Baron Ruby  01/08/19 2317       Adrian Rodriguez MD  01/13/19 1388

## 2019-01-09 ENCOUNTER — APPOINTMENT (OUTPATIENT)
Dept: LAB | Facility: HOSPITAL | Age: 39
End: 2019-01-09

## 2019-01-09 ENCOUNTER — OFFICE VISIT (OUTPATIENT)
Dept: NEUROLOGY | Facility: CLINIC | Age: 39
End: 2019-01-09

## 2019-01-09 VITALS
BODY MASS INDEX: 52.15 KG/M2 | DIASTOLIC BLOOD PRESSURE: 72 MMHG | OXYGEN SATURATION: 100 % | SYSTOLIC BLOOD PRESSURE: 136 MMHG | WEIGHT: 276 LBS | HEART RATE: 66 BPM

## 2019-01-09 DIAGNOSIS — R51.9 NONINTRACTABLE EPISODIC HEADACHE, UNSPECIFIED HEADACHE TYPE: ICD-10-CM

## 2019-01-09 DIAGNOSIS — R11.0 NAUSEA: ICD-10-CM

## 2019-01-09 DIAGNOSIS — E55.9 VITAMIN D DEFICIENCY: ICD-10-CM

## 2019-01-09 DIAGNOSIS — G43.809 MIGRAINE VARIANT WITH HEADACHE: Primary | Chronic | ICD-10-CM

## 2019-01-09 DIAGNOSIS — R20.2 PARESTHESIA: ICD-10-CM

## 2019-01-09 LAB
25(OH)D3 SERPL-MCNC: 8.8 NG/ML
VIT B12 BLD-MCNC: 695 PG/ML (ref 211–911)

## 2019-01-09 PROCEDURE — 82607 VITAMIN B-12: CPT | Performed by: NURSE PRACTITIONER

## 2019-01-09 PROCEDURE — 82306 VITAMIN D 25 HYDROXY: CPT | Performed by: NURSE PRACTITIONER

## 2019-01-09 PROCEDURE — 36415 COLL VENOUS BLD VENIPUNCTURE: CPT | Performed by: NURSE PRACTITIONER

## 2019-01-09 PROCEDURE — 83921 ORGANIC ACID SINGLE QUANT: CPT | Performed by: NURSE PRACTITIONER

## 2019-01-09 PROCEDURE — 99214 OFFICE O/P EST MOD 30 MIN: CPT | Performed by: NURSE PRACTITIONER

## 2019-01-09 RX ORDER — BUTALBITAL/ACETAMINOPHEN 50MG-325MG
1-2 TABLET ORAL EVERY 6 HOURS PRN
Qty: 20 EACH | Refills: 0 | Status: SHIPPED | OUTPATIENT
Start: 2019-01-09 | End: 2019-03-21

## 2019-01-09 RX ORDER — ONDANSETRON 4 MG/1
4 TABLET, ORALLY DISINTEGRATING ORAL EVERY 6 HOURS PRN
Qty: 15 TABLET | Refills: 5 | Status: SHIPPED | OUTPATIENT
Start: 2019-01-09 | End: 2019-03-21

## 2019-01-09 NOTE — DISCHARGE INSTRUCTIONS
Push fluids.    Follow up with your primary care provider next available.     Immediately return to the ER if you develop any new or worsening symptoms.      Please review the medications you are supposed to be taking according to prior physician recommendations. I have not changed your home medications during this visit. If your discharge instructions indicate that I have changed your home medications, this is not the case, and you should disregard. If you have any questions about the medication you should be taking at home, please call your physician.

## 2019-01-09 NOTE — PROGRESS NOTES
"Subjective:     Patient ID: Nury Pineda is a 38 y.o. female.    CC:   Chief Complaint   Patient presents with   • Dizziness   • Headache   • tingling       HPI:   History of Present Illness   This is a very pleasant 38-year-old female who presents for [facility] ER follow-up on atypical migraine.  She has been followed in our office for several years but was last seen on 9/22/17 for regular follow-up on headaches.  She has been diagnosed with atypical migraines.  She has had an extensive prior workup.  She tells me that once a month she will have a \"regular\" headache & pain of 1 out of 10 and she does not take any medication or have any additional symptoms.  She said 2 evenings ago on 1/7/19 she had chest pain, severe nausea, tingling in her hands and feet, weakness in her lower extremities and dizziness but no headache symptoms and then the next day she had a severe headache and so she went to [facility] ER and was evaluated with a CT of the head which showed no acute abnormality.  She was given IV migraine cocktail of Decadron, Toradol and Benadryl with IV fluids and she tells me the headache got even worse and then she was given 0.25 mg of Dilaudid without improvement.  She was discharged home with no acute findings.  CBC and CMP looked okay with a known iron deficiency anemia with hemoglobin of 11.2.  She tells me that she does have a headache continued today in the left frontal region, nausea, no vomiting, positive photophobia, positive phonophobia, pain is throbbing 6 out of 10 and all other symptoms while in ER resolved.  She tells me that it is tolerable.  She does feel like it worsens from stress.  She has really not wanted to be on any preventive daily medicines.  She did previously take the Topamax but cannot remember daily to take this as well as she had some cognitive changes and so she stopped this.  She does have sumatriptan PRN but has not used this in over a year.  She has been working to " lose weight.  She does have sleep apnea which was very mild and with her weight loss she feels like that fatigue has improved some.  She does have a history of vitamin D deficiency as low as 6 in the past.  She currently is not on vitamin D.  She's not had her B12 checked recently but previously this was normal.  She does have braces which she will be getting off finally on January 15, 2019 and she would like to repeat the MRI of the brain which previously showed some possible white matter changes especially with her new symptoms this week but it was difficult to evaluate due to artifact from her braces and it was recommended once these were removed that she would have follow-up imaging. Prior imaging studies have showed no acute process and MRIs brain/C spine and MRA brain have been limited due to artifact from braces 2016 & 2015. CTA head and neck Normal .    The following portions of the patient's history were reviewed and updated as appropriate: allergies, current medications, past family history, past medical history, past social history, past surgical history and problem list.    Past Medical History:   Diagnosis Date   • Absolute anemia    • Arthritis     Right knee   • Carpal tunnel syndrome     RIGHT WRIST   • Cholelithiasis     Nausea related to stones has phenergan. Also has related RUQ pain.   • Dizziness    • Elevated C-reactive protein (CRP)    • Elevated erythrocyte sedimentation rate    • Influenza    • Iron deficiency     Will not take iron supplement but will take MVI   • Left hip pain    • Migraines    • Numbness and tingling in right hand     Has nerve conductions 3/23/2016, awaiting results. Along with Paresthesia.   • Pain and swelling of right forearm     Check u/s to r/o clot. Rash and redness are resolving, will continue to monitor.   • Pain of left arm    • Sleep disturbances    • Vitamin D deficiency     25 oh.       Past Surgical History:   Procedure Laterality Date   •   SECTION  2006   • CHOLECYSTECTOMY  2014   • COLONOSCOPY  2016   • CYST REMOVAL  2011    behind belly button   • TONSILLECTOMY  2008   • URACHAL CYST INCISION      History of Excision Of Urachal Cyst.       Social History     Socioeconomic History   • Marital status: Single     Spouse name: Not on file   • Number of children: Not on file   • Years of education: Not on file   • Highest education level: Not on file   Social Needs   • Financial resource strain: Not on file   • Food insecurity - worry: Not on file   • Food insecurity - inability: Not on file   • Transportation needs - medical: Not on file   • Transportation needs - non-medical: Not on file   Occupational History   • Not on file   Tobacco Use   • Smoking status: Never Smoker   • Smokeless tobacco: Never Used   Substance and Sexual Activity   • Alcohol use: Yes     Alcohol/week: 1.2 oz     Types: 2 Glasses of wine per week   • Drug use: No   • Sexual activity: Defer     Partners: Male     Birth control/protection: Condom   Other Topics Concern   • Not on file   Social History Narrative    single       Family History   Problem Relation Age of Onset   • Stroke Mother    • Hypertension Mother    • Migraines Mother    • Osteoarthritis Mother    • Cervical cancer Mother    • Heart disease Mother    • Hyperlipidemia Maternal Grandmother    • Osteoarthritis Maternal Grandmother    • Birth defects Maternal Grandmother    • Ovarian cancer Maternal Grandmother         PREMENOPAUSAL    • Transient ischemic attack Maternal Grandmother    • Hypertension Maternal Grandmother    • Stroke Other         CVA x 2. and TIA   • Cancer Other         Cervical, malignant neoplasm x2 , ovarian        Review of Systems   Constitutional: Negative for chills, fatigue, fever and unexpected weight change.   HENT: Negative for ear pain, hearing loss, nosebleeds, rhinorrhea and sore throat.    Eyes: Negative for photophobia, pain, discharge, itching and visual disturbance.   Respiratory:  Negative for cough, chest tightness, shortness of breath and wheezing.    Cardiovascular: Negative for chest pain, palpitations and leg swelling.   Gastrointestinal: Negative for abdominal pain, blood in stool, constipation, diarrhea, nausea and vomiting.   Genitourinary: Negative for dysuria, frequency, hematuria and urgency.   Musculoskeletal: Positive for neck pain and neck stiffness. Negative for arthralgias, back pain, gait problem, joint swelling and myalgias.   Skin: Negative for rash and wound.   Allergic/Immunologic: Negative for environmental allergies and food allergies.   Neurological: Positive for dizziness and headaches. Negative for tremors, seizures, syncope, speech difficulty, weakness, light-headedness and numbness.   Hematological: Negative for adenopathy. Does not bruise/bleed easily.   Psychiatric/Behavioral: Negative for agitation, confusion, decreased concentration, hallucinations, sleep disturbance and suicidal ideas. The patient is not nervous/anxious.    All other systems reviewed and are negative.       Objective:    Neurologic Exam     Mental Status   Oriented to person, place, and time.   Level of consciousness: alert    Cranial Nerves     CN II   Visual fields full to confrontation.     CN III, IV, VI   Pupils are equal, round, and reactive to light.  Extraocular motions are normal.     CN V   Right facial sensation deficit: none  Left facial sensation deficit: forehead and cheeks (decreased compared to right)    CN VII   Facial expression full, symmetric.     CN VIII   CN VIII normal.     CN IX, X   CN IX normal.   CN X normal.     CN XI   CN XI normal.     CN XII   CN XII normal.     Motor Exam   Muscle bulk: normal  Overall muscle tone: normal    Strength   Strength 5/5 throughout.     Sensory Exam   Light touch normal.   Vibration normal.   Proprioception normal.   Pinprick normal.     Gait, Coordination, and Reflexes     Gait  Gait: normal    Coordination   Finger to nose  coordination: normal    Tremor   Resting tremor: absent  Intention tremor: absent  Action tremor: absent    Reflexes   Right brachioradialis: 2+  Left brachioradialis: 2+  Right biceps: 2+  Left biceps: 2+  Right triceps: 2+  Left triceps: 2+  Right patellar: 2+  Left patellar: 2+  Right achilles: 2+  Left achilles: 2+  Right : 2+  Left : 2+      Physical Exam   Constitutional: She is oriented to person, place, and time.   Eyes: EOM are normal. Pupils are equal, round, and reactive to light.   Neurological: She is oriented to person, place, and time. She has normal strength. She has a normal Finger-Nose-Finger Test. Gait normal.   Reflex Scores:       Tricep reflexes are 2+ on the right side and 2+ on the left side.       Bicep reflexes are 2+ on the right side and 2+ on the left side.       Brachioradialis reflexes are 2+ on the right side and 2+ on the left side.       Patellar reflexes are 2+ on the right side and 2+ on the left side.       Achilles reflexes are 2+ on the right side and 2+ on the left side.      Assessment/Plan:       Nury was seen today for dizziness, headache and tingling.    Diagnoses and all orders for this visit:    Migraine variant with headache  -     ondansetron ODT (ZOFRAN-ODT) 4 MG disintegrating tablet; Take 1 tablet by mouth Every 6 (Six) Hours As Needed for Nausea or Vomiting.  -     butalbital-acetaminophen  MG tablet tablet; Take 1-2 tablets by mouth Every 6 (Six) Hours As Needed (migraine symptoms).    Nausea  -     ondansetron ODT (ZOFRAN-ODT) 4 MG disintegrating tablet; Take 1 tablet by mouth Every 6 (Six) Hours As Needed for Nausea or Vomiting.    Nonintractable episodic headache, unspecified headache type  -     MRI Brain With & Without Contrast    Paresthesia  -     Vitamin B12  -     Methylmalonic Acid, Serum    Vitamin D deficiency  -     Vitamin D 25 Hydroxy         MRI of the brain with and without contrast is requested to rule out demyelinating lesions or  acute process with decreased sensation on the left side of the face, tingling, nausea and severe headache 2 days ago.  Also we'll DC her sumatriptan for now.  We will add butalbital with Tylenol as well as Zofran to take at onset of headache or headache symptoms.  We'll check vitamin D and B12 today.  We'll follow-up in 4 weeks or sooner if needed. Reviewed medications, potential side effects and signs and symptoms to report. Discussed risk versus benefits of treatment plan with patient and/or family-including medications, labs and radiology that may be ordered. Addressed questions and concerns during visit. Patient and/or family verbalized understanding and agree with plan.    During this visit the following were done:  Labs Reviewed [x]    Labs Ordered [x]    Radiology Reports Reviewed []    Radiology Ordered []    PCP Records Reviewed []    Referring Provider Records Reviewed []    ER Records Reviewed [x]    Hospital Records Reviewed []    History Obtained From Family []    Radiology Images Reviewed [x]    Other Reviewed []    Records Requested []      As part of this patient's treatment plan I am prescribing controlled substances. The patient has been made aware of appropriate use of such medications, including potential risk of somnolence, limited ability to drive and/or work safely, and potential for dependence or overdose. It has also been made clear that these medications are for use by the patient only, without concomitant use of alcohol or other substances unless prescribed. Keep out of reach of children.  [facility] report has been reviewed. If this is going to be prescribed long term, [facility] Controlled Substance Agreement Contract has also been read and signed by patient and myself.  [facility] #[id number] reviewed.    EMR Dragon/Transcription Disclaimer:  Much of this encounter note is an electronic transcription of spoken language to printed text. Electronic transcription of spoken language may permit erroneous  words or phrases to be inadvertently transcribed. Although I have reviewed the note for such errors, some may still exist in this documentation.      Ele Thorne, APRN  1/9/2019

## 2019-01-14 LAB
Lab: NORMAL
METHYLMALONATE SERPL-SCNC: 112 NMOL/L (ref 0–378)

## 2019-01-16 ENCOUNTER — TELEPHONE (OUTPATIENT)
Dept: NEUROLOGY | Facility: CLINIC | Age: 39
End: 2019-01-16

## 2019-01-16 DIAGNOSIS — E55.9 VITAMIN D DEFICIENCY: Primary | ICD-10-CM

## 2019-01-16 RX ORDER — ERGOCALCIFEROL 1.25 MG/1
50000 CAPSULE ORAL WEEKLY
Qty: 4 CAPSULE | Refills: 6 | Status: SHIPPED | OUTPATIENT
Start: 2019-01-16 | End: 2019-03-21

## 2019-01-16 NOTE — TELEPHONE ENCOUNTER
Pt is aware of the labs and that her vitamin d is low and Ele sent a script to pharmacy for her for the low vitamin D.

## 2019-01-16 NOTE — TELEPHONE ENCOUNTER
----- Message from SYBIL Macario sent at 1/16/2019 10:33 AM EST -----  Patient's vitamin D level is very low. Sending in high dose vitamin d for her to take weekly. Others labs look normal. Thanks, SYBIL Vale

## 2019-01-16 NOTE — PROGRESS NOTES
Patient's vitamin D level is very low. Sending in high dose vitamin d for her to take weekly. Others labs look normal. Thanks, SYBIL Vale

## 2019-01-18 ENCOUNTER — HOSPITAL ENCOUNTER (OUTPATIENT)
Dept: MRI IMAGING | Facility: HOSPITAL | Age: 39
Discharge: HOME | End: 2019-01-18
Admitting: NURSE PRACTITIONER

## 2019-01-18 PROCEDURE — 70553 MRI BRAIN STEM W/O & W/DYE: CPT

## 2019-01-18 PROCEDURE — A9577 INJ MULTIHANCE: HCPCS | Performed by: NURSE PRACTITIONER

## 2019-01-18 PROCEDURE — 0 GADOBENATE DIMEGLUMINE 529 MG/ML SOLUTION: Performed by: NURSE PRACTITIONER

## 2019-01-18 RX ADMIN — GADOBENATE DIMEGLUMINE 20 ML: 529 INJECTION, SOLUTION INTRAVENOUS at 10:05

## 2019-01-21 ENCOUNTER — TELEPHONE (OUTPATIENT)
Dept: NEUROLOGY | Facility: CLINIC | Age: 39
End: 2019-01-21

## 2019-01-23 ENCOUNTER — OFFICE VISIT (OUTPATIENT)
Dept: OBSTETRICS AND GYNECOLOGY | Facility: CLINIC | Age: 39
End: 2019-01-23

## 2019-01-23 VITALS — BODY MASS INDEX: 51.09 KG/M2 | WEIGHT: 270.4 LBS | RESPIRATION RATE: 14 BRPM

## 2019-01-23 DIAGNOSIS — Z71.1 FEARED COMPLAINT WITHOUT DIAGNOSIS: Primary | ICD-10-CM

## 2019-01-23 PROCEDURE — 99212 OFFICE O/P EST SF 10 MIN: CPT | Performed by: OBSTETRICS & GYNECOLOGY

## 2019-02-07 ENCOUNTER — OFFICE VISIT (OUTPATIENT)
Dept: NEUROLOGY | Facility: CLINIC | Age: 39
End: 2019-02-07

## 2019-02-07 VITALS
HEIGHT: 61 IN | DIASTOLIC BLOOD PRESSURE: 78 MMHG | WEIGHT: 272 LBS | HEART RATE: 91 BPM | OXYGEN SATURATION: 100 % | BODY MASS INDEX: 51.35 KG/M2 | SYSTOLIC BLOOD PRESSURE: 128 MMHG

## 2019-02-07 DIAGNOSIS — G43.809 MIGRAINE VARIANT WITH HEADACHE: Primary | Chronic | ICD-10-CM

## 2019-02-07 PROCEDURE — 99213 OFFICE O/P EST LOW 20 MIN: CPT | Performed by: NURSE PRACTITIONER

## 2019-02-07 NOTE — PROGRESS NOTES
"Subjective:     Patient ID: Nury Pineda is a 38 y.o. female.    CC:   Chief Complaint   Patient presents with   • Migraine     Ondasnsertron ODT.       HPI:   History of Present Illness   This is a very pleasant 38-year-old female who presents for 4 week follow up on atypical migraine. Since last visit unfortunately her brother was killed tragically 2 days after her visit so she has been under increased stress. She did have her MRI Brain with and without contrast and imaging and report were reviewed today in office and unremarkable 1/18/2019. She has had no migraines since last visit. She has had some minimal daily tension/\"stress\" headaches daily 1-2/10 pain. She has not required the Fioricet. She has taken the zofran several times. She has been followed in our office for several years with atypical migraines.  She has had an extensive prior workup. She did previously take the Topamax but cannot remember daily to take this as well as she had some cognitive changes and so she stopped this. She does have sleep apnea which was very mild and with her weight loss she feels like that fatigue has improved some. She has significantly low vitamin D at 8 and is on high dose replacement since last visit. CBC, CMP and B12/MMA were fine 1/2019. Prior imaging studies have showed no acute process and MRIs brain/C spine and MRA brain have been limited due to artifact from braces 1/2016 & 12/2015. CTA head and neck Normal 2016. No new concerns today. Has trouble taking large pills.    The following portions of the patient's history were reviewed and updated as appropriate: allergies, current medications, past family history, past medical history, past social history, past surgical history and problem list.    Past Medical History:   Diagnosis Date   • Absolute anemia    • Arthritis     Right knee   • Carpal tunnel syndrome     RIGHT WRIST   • Cholelithiasis     Nausea related to stones has phenergan. Also has related RUQ pain.   • " Dizziness    • Elevated C-reactive protein (CRP)    • Elevated erythrocyte sedimentation rate    • Influenza    • Iron deficiency     Will not take iron supplement but will take MVI   • Left hip pain    • Migraines    • Numbness and tingling in right hand     Has nerve conductions 3/23/2016, awaiting results. Along with Paresthesia.   • Pain and swelling of right forearm     Check u/s to r/o clot. Rash and redness are resolving, will continue to monitor.   • Pain of left arm    • Sleep disturbances    • Vitamin D deficiency     25 oh.       Past Surgical History:   Procedure Laterality Date   •  SECTION     • CHOLECYSTECTOMY     • COLONOSCOPY     • CYST REMOVAL      behind belly button   • TONSILLECTOMY     • URACHAL CYST INCISION      History of Excision Of Urachal Cyst.       Social History     Socioeconomic History   • Marital status: Single     Spouse name: Not on file   • Number of children: Not on file   • Years of education: Not on file   • Highest education level: Not on file   Social Needs   • Financial resource strain: Not on file   • Food insecurity - worry: Not on file   • Food insecurity - inability: Not on file   • Transportation needs - medical: Not on file   • Transportation needs - non-medical: Not on file   Occupational History   • Not on file   Tobacco Use   • Smoking status: Never Smoker   • Smokeless tobacco: Never Used   Substance and Sexual Activity   • Alcohol use: Yes     Alcohol/week: 1.2 oz     Types: 2 Glasses of wine per week   • Drug use: No   • Sexual activity: Defer     Partners: Male     Birth control/protection: Condom   Other Topics Concern   • Not on file   Social History Narrative    single       Family History   Problem Relation Age of Onset   • Stroke Mother    • Hypertension Mother    • Migraines Mother    • Osteoarthritis Mother    • Cervical cancer Mother    • Heart disease Mother    • Hyperlipidemia Maternal Grandmother    • Osteoarthritis  Maternal Grandmother    • Birth defects Maternal Grandmother    • Ovarian cancer Maternal Grandmother         PREMENOPAUSAL    • Transient ischemic attack Maternal Grandmother    • Hypertension Maternal Grandmother    • Stroke Other         CVA x 2. and TIA   • Cancer Other         Cervical, malignant neoplasm x2 , ovarian        Review of Systems   Constitutional: Negative.    HENT: Negative.    Eyes: Negative.    Respiratory: Negative.    Cardiovascular: Negative.    Gastrointestinal: Negative.    Endocrine: Negative.    Genitourinary: Negative.    Musculoskeletal: Negative.    Skin: Negative.    Allergic/Immunologic: Negative.    Neurological: Negative.    Hematological: Negative.    Psychiatric/Behavioral: Negative.         Objective:    Neurologic Exam     Mental Status   Oriented to person, place, and time.   Level of consciousness: alert    Cranial Nerves   Cranial nerves II through XII intact.     Motor Exam   Muscle bulk: normal  Overall muscle tone: normal    Strength   Strength 5/5 throughout.     Gait, Coordination, and Reflexes     Gait  Gait: normal    Coordination   Finger to nose coordination: normal    Tremor   Resting tremor: absent  Intention tremor: absent  Action tremor: absent    Reflexes   Right brachioradialis: 2+  Left brachioradialis: 2+  Right biceps: 2+  Left biceps: 2+  Right triceps: 2+  Left triceps: 2+  Right : 2+  Left : 2+      Physical Exam   Constitutional: She is oriented to person, place, and time.   Neurological: She is oriented to person, place, and time. She has normal strength. She has a normal Finger-Nose-Finger Test. Gait normal.   Reflex Scores:       Tricep reflexes are 2+ on the right side and 2+ on the left side.       Bicep reflexes are 2+ on the right side and 2+ on the left side.       Brachioradialis reflexes are 2+ on the right side and 2+ on the left side.      Assessment/Plan:       Nury was seen today for migraine.    Diagnoses and all orders for this  visit:    Migraine variant with headache  Comments:  Unremarkable MRI Brain reviewed today 1/18/19. Braces off. Fioricet with Zofran PRN.         F/U in 3 months or sooner if needed. Encouraged her to take Fioricet with Zofran at onset of migraine symptoms to avoid complex symptoms and Er visits. My sincere condolences on the death of her brother. Reviewed medications, potential side effects and signs and symptoms to report. Discussed risk versus benefits of treatment plan with patient and/or family-including medications, labs and radiology that may be ordered. Addressed questions and concerns during visit. Patient and/or family verbalized understanding and agree with plan.    During this visit the following were done:  Labs Reviewed [x]    Labs Ordered []    Radiology Reports Reviewed [x]    Radiology Ordered []    PCP Records Reviewed []    Referring Provider Records Reviewed []    ER Records Reviewed []    Hospital Records Reviewed []    History Obtained From Family []    Radiology Images Reviewed [x]    Other Reviewed []    Records Requested []      EMR Dragon/Transcription Disclaimer:  Much of this encounter note is an electronic transcription of spoken language to printed text. Electronic transcription of spoken language may permit erroneous words or phrases to be inadvertently transcribed. Although I have reviewed the note for such errors, some may still exist in this documentation.      Ele Thorne, APRN  2/7/2019

## 2019-02-19 NOTE — PROGRESS NOTES
Subjective   Chief Complaint   Patient presents with   • Follow-up     Possible retained tampon     Nury Pineda is a 38 y.o. year old .  Patient's last menstrual period was 2019.  She presents to be seen for possible retained tampon.  Patient states that she completed menses roughly 2 days ago but does not recall removing a tampon.  She states that she has felt inside the vagina but was unable to reach high enough to tell if she got the tampon out.  She states that she has had retained tampons in the past and is concerned that she has had another.    OTHER COMPLAINTS:  Nothing else    The following portions of the patient's history were reviewed and updated as appropriate:current medications and allergies    Social History    Tobacco Use      Smoking status: Never Smoker      Smokeless tobacco: Never Used    Review of Systems  Constitutional POS: nothing reported    NEG: anorexia or night sweats   Gastointestinal POS: nothing reported    NEG: bloating, change in bowel habits, melena or reflux symptoms   Genitourinary POS: see HPI    NEG: dysuria or hematuria   Integument POS: nothing reported    NEG: moles that are changing in size, shape, color or rashes   Breast POS: nothing reported    NEG: persistent breast lump, skin dimpling or nipple discharge         Objective   Resp 14   Wt 123 kg (270 lb 6.4 oz)   LMP 2019   Breastfeeding? No   BMI 51.09 kg/m²     General:  well developed; well nourished  no acute distress   Skin:  No suspicious lesions seen   Thyroid: normal to inspection and palpation   Lungs:  breathing is unlabored   Heart:  regular rate and rhythm, S1, S2 normal, no murmur, click, rub or gallop   Breasts:  Not performed.   Abdomen: soft, non-tender; no masses  no umbilical or inguinal hernias are present  no hepato-splenomegaly   Pelvis: Clinical staff was present for exam  External genitalia:  normal appearance of the external genitalia including Bartholin's and [place]'s  glands.  :  urethral meatus normal;  Vaginal:  normal pink mucosa without prolapse or lesions. No tampon visualized     Lab Review   No data reviewed    Imaging   No data reviewed        Assessment   1. Feared condition not demonstrated     Plan   1. No tampon was found intravaginally.  Patient given reassurance and will return to clinic as previously scheduled      No orders of the defined types were placed in this encounter.         This note was electronically signed.    Ta Hughes M.D. [facility]

## 2019-02-20 DIAGNOSIS — R53.82 CHRONIC FATIGUE: ICD-10-CM

## 2019-02-27 NOTE — PROGRESS NOTES
Subjective   Chief Complaint   Patient presents with   • Gynecologic Exam     Yeast infection     Nury Pineda is a 38 y.o. year old  presenting to be seen for her annual exam.     SEXUAL Hx:  She is currently sexually active.  In the past year there has not been new sexual partners.    Condoms are not typically used.  She would not like to be screened for STD's at today's exam.  Current birth control method: abstinence.  She is happy with her current method of contraception and does not want to discuss alternative methods of contraception.  MENSTRUAL Hx:  Patient's last menstrual period was 2018.  In the past 6 months her cycles have been regular, predictable and occur monthly.  Her menstrual flow is normal.   Each month on average there are roughly 0 day(s) of very heavy flow.    Intermenstrual bleeding is absent.    Post-coital bleeding is absent.  Dysmenorrhea: is not affecting her activities of daily living  PMS: is not affecting her activities of daily living  Her cycles are not a source of concern for her that she wishes to discuss today.  HEALTH Hx:  She exercises regularly: no (and has no plans to become more active).  She wears her seat belt: yes.  She has concerns about domestic violence: no.  OTHER COMPLAINTS:  Nothing else    The following portions of the patient's history were reviewed and updated as appropriate:problem list, current medications, allergies, past family history, past medical history, past social history and past surgical history.    Social History    Tobacco Use      Smoking status: Never Smoker      Smokeless tobacco: Never Used    Review of Systems  Constitutional POS: nothing reported    NEG: anorexia or night sweats   Gastointestinal POS: nothing reported    NEG: bloating, change in bowel habits, melena or reflux symptoms   Genitourinary POS: nothing reported    NEG: dysuria or hematuria   Integument POS: nothing reported    NEG: moles that are changing in size, shape,  "color or rashes   Breast POS: nothing reported    NEG: persistent breast lump, skin dimpling or nipple discharge        Objective   Resp 14   Ht 154.9 cm (60.98\")   Wt 120 kg (265 lb)   LMP 11/25/2018   Breastfeeding? No   BMI 50.10 kg/m²     General:  well developed; well nourished  no acute distress  obese - Body mass index is 50.1 kg/m².   Skin:  No suspicious lesions seen   Thyroid: normal to inspection and palpation   Breasts:  Examined in supine position  Symmetric without masses or skin dimpling  Nipples normal without inversion, lesions or discharge  There are no palpable axillary nodes   Abdomen: soft, non-tender; no masses  no umbilical or inguinal hernias are present  no hepato-splenomegaly   Pelvis: Clinical staff was present for exam  External genitalia:  normal appearance of the external genitalia including Bartholin's and [place]'s glands.  :  urethral meatus normal;  Vaginal:  normal pink mucosa without prolapse or lesions.  Cervix:   normal appearance. Pap smear collected  Uterus:  normal size, shape and consistency.  Adnexa:  normal bimanual exam of the adnexa.        Assessment   1. Well woman exam     Plan   1. Await Pap smear results for plan of care.  Patient will return to clinic in 1 year or on an as-needed basis.      No orders of the defined types were placed in this encounter.         This note was electronically signed.    Ta Hughes MD MBA  February 26, 2019  "

## 2019-03-14 ENCOUNTER — TELEPHONE (OUTPATIENT)
Dept: OBSTETRICS AND GYNECOLOGY | Facility: CLINIC | Age: 39
End: 2019-03-14

## 2019-03-14 NOTE — TELEPHONE ENCOUNTER
Attempted to call pt concerning lab testing ordered by Dr Hughes on 2/20/19.  No answer, message left for return call to office.

## 2019-03-19 ENCOUNTER — APPOINTMENT (OUTPATIENT)
Dept: GENERAL RADIOLOGY | Facility: HOSPITAL | Age: 39
End: 2019-03-19

## 2019-03-19 ENCOUNTER — HOSPITAL ENCOUNTER (EMERGENCY)
Facility: HOSPITAL | Age: 39
Discharge: HOME | End: 2019-03-20
Attending: EMERGENCY MEDICINE | Admitting: EMERGENCY MEDICINE

## 2019-03-19 DIAGNOSIS — R07.9 CHEST PAIN, UNSPECIFIED TYPE: Primary | ICD-10-CM

## 2019-03-19 LAB
ALBUMIN SERPL-MCNC: 4.34 G/DL (ref 3.2–4.8)
ALBUMIN/GLOB SERPL: 1.4 G/DL (ref 1.5–2.5)
ALP SERPL-CCNC: 76 U/L (ref 25–100)
ALT SERPL W P-5'-P-CCNC: 16 U/L (ref 7–40)
ANION GAP SERPL CALCULATED.3IONS-SCNC: 7 MMOL/L (ref 3–11)
AST SERPL-CCNC: 15 U/L (ref 0–33)
BASOPHILS # BLD AUTO: 0.02 10*3/MM3 (ref 0–0.2)
BASOPHILS NFR BLD AUTO: 0.2 % (ref 0–1)
BILIRUB SERPL-MCNC: 0.5 MG/DL (ref 0.3–1.2)
BNP SERPL-MCNC: 6 PG/ML (ref 0–100)
BUN BLD-MCNC: 15 MG/DL (ref 9–23)
BUN/CREAT SERPL: 17.6 (ref 7–25)
CALCIUM SPEC-SCNC: 9.4 MG/DL (ref 8.7–10.4)
CHLORIDE SERPL-SCNC: 105 MMOL/L (ref 99–109)
CO2 SERPL-SCNC: 26 MMOL/L (ref 20–31)
CREAT BLD-MCNC: 0.85 MG/DL (ref 0.6–1.3)
DEPRECATED RDW RBC AUTO: 46.2 FL (ref 37–54)
EOSINOPHIL # BLD AUTO: 0.05 10*3/MM3 (ref 0–0.3)
EOSINOPHIL NFR BLD AUTO: 0.6 % (ref 0–3)
ERYTHROCYTE [DISTWIDTH] IN BLOOD BY AUTOMATED COUNT: 16.1 % (ref 11.3–14.5)
GFR SERPL CREATININE-BSD FRML MDRD: 91 ML/MIN/1.73
GLOBULIN UR ELPH-MCNC: 3.1 GM/DL
GLUCOSE BLD-MCNC: 102 MG/DL (ref 70–100)
HCT VFR BLD AUTO: 33.8 % (ref 34.5–44)
HGB BLD-MCNC: 10.8 G/DL (ref 11.5–15.5)
HOLD SPECIMEN: NORMAL
HOLD SPECIMEN: NORMAL
IMM GRANULOCYTES # BLD AUTO: 0.01 10*3/MM3 (ref 0–0.05)
IMM GRANULOCYTES NFR BLD AUTO: 0.1 % (ref 0–0.6)
LIPASE SERPL-CCNC: 29 U/L (ref 6–51)
LYMPHOCYTES # BLD AUTO: 2.7 10*3/MM3 (ref 0.6–4.8)
LYMPHOCYTES NFR BLD AUTO: 32.3 % (ref 24–44)
MCH RBC QN AUTO: 24.9 PG (ref 27–31)
MCHC RBC AUTO-ENTMCNC: 32 G/DL (ref 32–36)
MCV RBC AUTO: 77.9 FL (ref 80–99)
MONOCYTES # BLD AUTO: 0.75 10*3/MM3 (ref 0–1)
MONOCYTES NFR BLD AUTO: 9 % (ref 0–12)
NEUTROPHILS # BLD AUTO: 4.82 10*3/MM3 (ref 1.5–8.3)
NEUTROPHILS NFR BLD AUTO: 57.8 % (ref 41–71)
PLATELET # BLD AUTO: 319 10*3/MM3 (ref 150–450)
PMV BLD AUTO: 10 FL (ref 6–12)
POTASSIUM BLD-SCNC: 3.8 MMOL/L (ref 3.5–5.5)
PROT SERPL-MCNC: 7.4 G/DL (ref 5.7–8.2)
RBC # BLD AUTO: 4.34 10*6/MM3 (ref 3.89–5.14)
SODIUM BLD-SCNC: 138 MMOL/L (ref 132–146)
TROPONIN I SERPL-MCNC: 0 NG/ML (ref 0–0.07)
TROPONIN I SERPL-MCNC: 0 NG/ML (ref 0–0.07)
WBC NRBC COR # BLD: 8.35 10*3/MM3 (ref 3.5–10.8)
WHOLE BLOOD HOLD SPECIMEN: NORMAL
WHOLE BLOOD HOLD SPECIMEN: NORMAL

## 2019-03-19 PROCEDURE — 80053 COMPREHEN METABOLIC PANEL: CPT

## 2019-03-19 PROCEDURE — 93005 ELECTROCARDIOGRAM TRACING: CPT | Performed by: EMERGENCY MEDICINE

## 2019-03-19 PROCEDURE — 99284 EMERGENCY DEPT VISIT MOD MDM: CPT

## 2019-03-19 PROCEDURE — 83690 ASSAY OF LIPASE: CPT

## 2019-03-19 PROCEDURE — 93005 ELECTROCARDIOGRAM TRACING: CPT

## 2019-03-19 PROCEDURE — 71045 X-RAY EXAM CHEST 1 VIEW: CPT

## 2019-03-19 PROCEDURE — 85025 COMPLETE CBC W/AUTO DIFF WBC: CPT

## 2019-03-19 PROCEDURE — 84484 ASSAY OF TROPONIN QUANT: CPT

## 2019-03-19 PROCEDURE — 83880 ASSAY OF NATRIURETIC PEPTIDE: CPT

## 2019-03-19 RX ORDER — SODIUM CHLORIDE 0.9 % (FLUSH) 0.9 %
10 SYRINGE (ML) INJECTION AS NEEDED
Status: DISCONTINUED | OUTPATIENT
Start: 2019-03-19 | End: 2019-03-20 | Stop reason: HOSPADM

## 2019-03-19 RX ORDER — ASPIRIN 81 MG/1
324 TABLET, CHEWABLE ORAL ONCE
Status: COMPLETED | OUTPATIENT
Start: 2019-03-19 | End: 2019-03-20

## 2019-03-20 VITALS
TEMPERATURE: 98 F | RESPIRATION RATE: 18 BRPM | SYSTOLIC BLOOD PRESSURE: 138 MMHG | BODY MASS INDEX: 50.98 KG/M2 | WEIGHT: 270 LBS | HEART RATE: 82 BPM | OXYGEN SATURATION: 100 % | DIASTOLIC BLOOD PRESSURE: 71 MMHG | HEIGHT: 61 IN

## 2019-03-20 RX ORDER — HYDROCODONE BITARTRATE AND ACETAMINOPHEN 5; 325 MG/1; MG/1
1 TABLET ORAL ONCE
Status: COMPLETED | OUTPATIENT
Start: 2019-03-20 | End: 2019-03-20

## 2019-03-20 RX ADMIN — HYDROCODONE BITARTRATE AND ACETAMINOPHEN 1 TABLET: 5; 325 TABLET ORAL at 00:18

## 2019-03-20 RX ADMIN — ASPIRIN 81 MG 324 MG: 81 TABLET ORAL at 00:16

## 2019-03-20 NOTE — DISCHARGE INSTRUCTIONS
Please follow-up with your PCP, Dr. Lobato, within one week for re-check. Call for appointment.     Immediately return to the ED for any acute or progressive symptom presentation.

## 2019-03-20 NOTE — ED PROVIDER NOTES
Subjective   38-year-old female presents complaining of chest pain.  Of note, the patient states that she has no cardiac risk factors.  She states that at approximately 2 PM today she was at work when she began experiencing central and right-sided chest pain that has bothered her intermittently since that time.  She is unsure as to what may have triggered her symptoms and states that the pain started at rest.  The pain seems to be worse with movement and with drinking fluids.  She denies any accompanying nausea or vomiting.  No shortness of breath.  No cough or fevers.  No diaphoresis.  She states that the pain is currently 3 out of 10 in severity.  She denies any injury or trauma.            Review of Systems   Cardiovascular: Positive for chest pain.   All other systems reviewed and are negative.      Past Medical History:   Diagnosis Date   • Absolute anemia    • Arthritis     Right knee   • Carpal tunnel syndrome     RIGHT WRIST   • Cholelithiasis     Nausea related to stones has phenergan. Also has related RUQ pain.   • Dizziness    • Elevated C-reactive protein (CRP)    • Elevated erythrocyte sedimentation rate    • Influenza    • Iron deficiency     Will not take iron supplement but will take MVI   • Left hip pain    • Migraines    • Numbness and tingling in right hand     Has nerve conductions 3/23/2016, awaiting results. Along with Paresthesia.   • Pain and swelling of right forearm     Check u/s to r/o clot. Rash and redness are resolving, will continue to monitor.   • Pain of left arm    • Sleep disturbances    • Vitamin D deficiency     25 oh.       Allergies   Allergen Reactions   • Amoxicillin      TABS.   • Penicillins        Past Surgical History:   Procedure Laterality Date   •  SECTION     • CHOLECYSTECTOMY     • COLONOSCOPY     • CYST REMOVAL      behind belly button   • TONSILLECTOMY     • URACHAL CYST INCISION      History of Excision Of Urachal Cyst.       Family  History   Problem Relation Age of Onset   • Stroke Mother    • Hypertension Mother    • Migraines Mother    • Osteoarthritis Mother    • Cervical cancer Mother    • Heart disease Mother    • Hyperlipidemia Maternal Grandmother    • Osteoarthritis Maternal Grandmother    • Birth defects Maternal Grandmother    • Ovarian cancer Maternal Grandmother         PREMENOPAUSAL    • Transient ischemic attack Maternal Grandmother    • Hypertension Maternal Grandmother    • Stroke Other         CVA x 2. and TIA   • Cancer Other         Cervical, malignant neoplasm x2 , ovarian       Social History     Socioeconomic History   • Marital status: Single     Spouse name: Not on file   • Number of children: Not on file   • Years of education: Not on file   • Highest education level: Not on file   Tobacco Use   • Smoking status: Never Smoker   • Smokeless tobacco: Never Used   Substance and Sexual Activity   • Alcohol use: Yes     Alcohol/week: 1.2 oz     Types: 2 Glasses of wine per week   • Drug use: No   • Sexual activity: Defer     Partners: Male     Birth control/protection: Condom   Social History Narrative    single           Objective   Physical Exam   Constitutional: She is oriented to person, place, and time. She appears well-developed and well-nourished. No distress.   Well-appearing female in no acute distress   HENT:   Head: Normocephalic and atraumatic.   Mouth/Throat: Oropharynx is clear and moist.   Eyes: EOM are normal. Pupils are equal, round, and reactive to light.   Neck: Normal range of motion. Neck supple. No JVD present.   Cardiovascular: Normal rate, regular rhythm, normal heart sounds, intact distal pulses and normal pulses. Exam reveals no gallop and no friction rub.   No murmur heard.  Point tenderness noted to central and right lateral anterior chest wall   Pulmonary/Chest: Effort normal and breath sounds normal. No respiratory distress. She has no wheezes. She has no rales.   Abdominal: Soft. Bowel sounds  are normal. She exhibits no distension and no mass. There is no tenderness. There is no rebound and no guarding.   Musculoskeletal: Normal range of motion.   Neurological: She is alert and oriented to person, place, and time.   Skin: Skin is warm and dry. No rash noted. She is not diaphoretic. No erythema.   Psychiatric: She has a normal mood and affect. Judgment and thought content normal.   Nursing note and vitals reviewed.      Procedures           ED Course  ED Course as of Mar 20 0023   Wed Mar 20, 2019   0021 38-year-old female presents for evaluation of chest pain.  Her symptoms started at approximately 2 PM today and have bothered her intermittently since that time.  On arrival to the ED, patient well-appearing.  Vital signs reassuring.  Benign exam.  She has no cardiac risk factors.  Low risk Well's and PERC negative.    [DD]   0022 Initial EKG revealed normal sinus rhythm with a heart rate of 82 and no ST segments suggestive of are concerning for ischemia.  Chest x-ray negative.  Pain control provided.  Upon reevaluation, patient improved.  Repeat troponin/EKG negative/unchanged.  Doubt ACS, PE, dissection, or emergent cardiothoracic process at this time based on exam, history, clinical presentation, gestalt, and risk stratification.  HEART score of 0.  Reassured and counseled regarding conservative management of most likely musculoskeletal chest pain.  Referred to our chest pain clinic and will follow up promptly within 72 hours.  Additionally, she was advised to follow-up with her primary care physician within the next week.  Agreeable with plan and given appropriate return precautions.  [DD]      ED Course User Index  [DD] Brandon Krishna MD        Recent Results (from the past 24 hour(s))   POC Troponin, Rapid    Collection Time: 03/19/19  6:08 PM   Result Value Ref Range    Troponin I 0.00 0.00 - 0.07 ng/mL   Comprehensive Metabolic Panel    Collection Time: 03/19/19  6:11 PM   Result Value Ref  Range    Glucose 102 (H) 70 - 100 mg/dL    BUN 15 9 - 23 mg/dL    Creatinine 0.85 0.60 - 1.30 mg/dL    Sodium 138 132 - 146 mmol/L    Potassium 3.8 3.5 - 5.5 mmol/L    Chloride 105 99 - 109 mmol/L    CO2 26.0 20.0 - 31.0 mmol/L    Calcium 9.4 8.7 - 10.4 mg/dL    Total Protein 7.4 5.7 - 8.2 g/dL    Albumin 4.34 3.20 - 4.80 g/dL    ALT (SGPT) 16 7 - 40 U/L    AST (SGOT) 15 0 - 33 U/L    Alkaline Phosphatase 76 25 - 100 U/L    Total Bilirubin 0.5 0.3 - 1.2 mg/dL    eGFR  African Amer 91 >60 mL/min/1.73    Globulin 3.1 gm/dL    A/G Ratio 1.4 (L) 1.5 - 2.5 g/dL    BUN/Creatinine Ratio 17.6 7.0 - 25.0    Anion Gap 7.0 3.0 - 11.0 mmol/L   Lipase    Collection Time: 03/19/19  6:11 PM   Result Value Ref Range    Lipase 29 6 - 51 U/L   BNP    Collection Time: 03/19/19  6:11 PM   Result Value Ref Range    BNP 6.0 0.0 - 100.0 pg/mL   Green Top (Gel)    Collection Time: 03/19/19  6:11 PM   Result Value Ref Range    Extra Tube Hold for add-ons.    Lavender Top    Collection Time: 03/19/19  6:11 PM   Result Value Ref Range    Extra Tube hold for add-on    Gold Top - SST    Collection Time: 03/19/19  6:11 PM   Result Value Ref Range    Extra Tube Hold for add-ons.    CBC Auto Differential    Collection Time: 03/19/19  6:11 PM   Result Value Ref Range    WBC 8.35 3.50 - 10.80 10*3/mm3    RBC 4.34 3.89 - 5.14 10*6/mm3    Hemoglobin 10.8 (L) 11.5 - 15.5 g/dL    Hematocrit 33.8 (L) 34.5 - 44.0 %    MCV 77.9 (L) 80.0 - 99.0 fL    MCH 24.9 (L) 27.0 - 31.0 pg    MCHC 32.0 32.0 - 36.0 g/dL    RDW 16.1 (H) 11.3 - 14.5 %    RDW-SD 46.2 37.0 - 54.0 fl    MPV 10.0 6.0 - 12.0 fL    Platelets 319 150 - 450 10*3/mm3    Neutrophil % 57.8 41.0 - 71.0 %    Lymphocyte % 32.3 24.0 - 44.0 %    Monocyte % 9.0 0.0 - 12.0 %    Eosinophil % 0.6 0.0 - 3.0 %    Basophil % 0.2 0.0 - 1.0 %    Immature Grans % 0.1 0.0 - 0.6 %    Neutrophils, Absolute 4.82 1.50 - 8.30 10*3/mm3    Lymphocytes, Absolute 2.70 0.60 - 4.80 10*3/mm3    Monocytes, Absolute 0.75 0.00 -  "1.00 10*3/mm3    Eosinophils, Absolute 0.05 0.00 - 0.30 10*3/mm3    Basophils, Absolute 0.02 0.00 - 0.20 10*3/mm3    Immature Grans, Absolute 0.01 0.00 - 0.05 10*3/mm3   Light Blue Top    Collection Time: 03/19/19  6:23 PM   Result Value Ref Range    Extra Tube hold for add-on    POC Troponin, Rapid    Collection Time: 03/19/19  9:27 PM   Result Value Ref Range    Troponin I 0.00 0.00 - 0.07 ng/mL     Note: In addition to lab results from this visit, the labs listed above may include labs taken at another facility or during a different encounter within the last 24 hours. Please correlate lab times with ED admission and discharge times for further clarification of the services performed during this visit.    XR Chest 1 View    (Results Pending)     Vitals:    03/19/19 1724 03/19/19 2005 03/19/19 2218   BP: 132/60 153/93 109/75   BP Location: Left arm     Patient Position: Sitting Sitting    Pulse: 87 84 85   Resp: 18     Temp: 98 °F (36.7 °C)     TempSrc: Oral     SpO2: 100% 100% 99%   Weight: 122 kg (270 lb)     Height: 154.9 cm (61\")       Medications   sodium chloride 0.9 % flush 10 mL (not administered)   aspirin chewable tablet 324 mg (324 mg Oral Given 3/20/19 0016)   HYDROcodone-acetaminophen (NORCO) 5-325 MG per tablet 1 tablet (1 tablet Oral Given 3/20/19 0018)     ECG/EMG Results (last 24 hours)     Procedure Component Value Units Date/Time    ECG 12 Lead [[id number]] Collected:  03/19/19 1734     Updated:  03/19/19 1733    ECG 12 Lead [[id number]] Collected:  03/19/19 2122     Updated:  03/19/19 2121        ECG 12 Lead         ECG 12 Lead                       MDM      Final diagnoses:   Chest pain, unspecified type            Brandon Krishna MD  03/20/19 0023    "

## 2019-03-21 ENCOUNTER — OFFICE VISIT (OUTPATIENT)
Dept: CARDIOLOGY | Facility: HOSPITAL | Age: 39
End: 2019-03-21

## 2019-03-21 VITALS
WEIGHT: 269 LBS | BODY MASS INDEX: 50.79 KG/M2 | HEART RATE: 80 BPM | DIASTOLIC BLOOD PRESSURE: 70 MMHG | SYSTOLIC BLOOD PRESSURE: 134 MMHG | TEMPERATURE: 97.3 F | OXYGEN SATURATION: 100 % | HEIGHT: 61 IN | RESPIRATION RATE: 16 BRPM

## 2019-03-21 DIAGNOSIS — E66.01 MORBID OBESITY WITH BMI OF 50.0-59.9, ADULT (HCC): Chronic | ICD-10-CM

## 2019-03-21 DIAGNOSIS — R06.09 DOE (DYSPNEA ON EXERTION): ICD-10-CM

## 2019-03-21 DIAGNOSIS — R53.83 FATIGUE, UNSPECIFIED TYPE: ICD-10-CM

## 2019-03-21 DIAGNOSIS — R07.2 PRECORDIAL PAIN: Primary | ICD-10-CM

## 2019-03-21 DIAGNOSIS — R94.31 NONSPECIFIC ST-T WAVE ELECTROCARDIOGRAPHIC CHANGES: ICD-10-CM

## 2019-03-21 PROCEDURE — 99214 OFFICE O/P EST MOD 30 MIN: CPT | Performed by: NURSE PRACTITIONER

## 2019-03-21 NOTE — PROGRESS NOTES
[facility]  [facility]  Chest Pain Clinic    Encounter Date:03/21/2019     Nury Pineda  [address] [facility] [id number]  [unfilled]    1980    Acacia Lobato DO    Nury Pineda is a 38 y.o. female.      Subjective:     Chief Complaint:  Establish Care and Chest Pain       HPI patient presents the office today for ongoing evaluation of her new onset of chest pain.  She notes chest pain started on Tuesday 3/19 which prompted an ED visit.  Chest pain started while she was at work. Chest pain is central and right-sided.  Worsens with exertion and inspiration.  Patient also reports pain when she swallows in her chest.  She also notes fatigue and dyspnea on exertion with sharp chest pain.  She notes she that had worked out every day without chest pain but reports she has not worked out since Tuesday.  She notes pain is 3 out of 10.  Pain does radiate from right chest around right shoulder blade.  Patient is a non-smoker with no history of hypertension or hyperlipidemia.  She is morbidly obese with a BMI of 51.    Cardiac risk factors:   sedentary lifestyle, obesity (BMI > 30)      Allergies   Allergen Reactions   • Amoxicillin Swelling     Throat swells   • Penicillins Swelling     Throat swells         Current Outpatient Medications:   •  Multiple Vitamins-Minerals (EQ ONE DAILY [employer] PO), Take 2 tablets by mouth Daily., Disp: , Rfl:   •  montelukast (SINGULAIR) 10 MG tablet, Take 1 tablet by mouth Every Night., Disp: 30 tablet, Rfl: 0    The following portions of the patient's history were reviewed and updated as appropriate in Epic:  Problem list, allergies, current medications, past medical and surgical history, past social and family history.     Review of Systems   Constitution: Positive for malaise/fatigue. Negative for chills, decreased appetite, diaphoresis, fever, weakness, night sweats, weight gain and weight loss.   HENT: Negative for congestion, hearing  "loss, hoarse voice and nosebleeds.    Eyes: Negative for blurred vision, visual disturbance and visual halos.   Cardiovascular: Positive for chest pain and dyspnea on exertion. Negative for claudication, cyanosis, irregular heartbeat, leg swelling, near-syncope, orthopnea, palpitations, paroxysmal nocturnal dyspnea and syncope.   Respiratory: Positive for cough. Negative for hemoptysis, shortness of breath, sleep disturbances due to breathing, snoring, sputum production and wheezing.    Hematologic/Lymphatic: Negative for bleeding problem. Does not bruise/bleed easily.   Skin: Negative for dry skin, itching and rash.   Musculoskeletal: Positive for joint pain. Negative for arthritis, falls, joint swelling and myalgias.   Gastrointestinal: Negative for bloating, abdominal pain, constipation, diarrhea, flatus, heartburn, hematemesis, hematochezia, melena, nausea and vomiting.   Genitourinary: Negative for dysuria, frequency, hematuria, nocturia and urgency.   Neurological: Negative for excessive daytime sleepiness, dizziness, headaches, light-headedness and loss of balance.   Psychiatric/Behavioral: Negative for depression. The patient does not have insomnia and is not nervous/anxious.        Objective:     Vitals:    03/21/19 1015 03/21/19 1017 03/21/19 1018   BP: 117/56 117/63 134/70   BP Location: Right arm Left arm Left arm   Patient Position: Sitting Sitting Standing   Pulse: 77 77 80   Resp: 16     Temp: 97.3 °F (36.3 °C)     TempSrc: Temporal     SpO2: 100%     Weight: 122 kg (269 lb)     Height: 154.9 cm (61\")           Physical Exam   Constitutional: She is oriented to person, place, and time. She appears well-developed and well-nourished. She is active and cooperative. No distress.   HENT:   Head: Normocephalic and atraumatic.   Mouth/Throat: Oropharynx is clear and moist.   Eyes: Conjunctivae and EOM are normal. Pupils are equal, round, and reactive to light.   Neck: Normal range of motion. Neck supple. No " JVD present. No tracheal deviation present. No thyromegaly present.   Cardiovascular: Normal rate, regular rhythm, normal heart sounds and intact distal pulses.   Pulmonary/Chest: Effort normal and breath sounds normal.   Abdominal: Soft. Bowel sounds are normal. She exhibits no distension. There is no tenderness.   Musculoskeletal: Normal range of motion.   Neurological: She is alert and oriented to person, place, and time.   Skin: Skin is warm, dry and intact.   Psychiatric: She has a normal mood and affect. Her behavior is normal.   Nursing note and vitals reviewed.      Lab and Diagnostic Review:  Results for orders placed or performed during the hospital encounter of 03/19/19   Comprehensive Metabolic Panel   Result Value Ref Range    Glucose 102 (H) 70 - 100 mg/dL    BUN 15 9 - 23 mg/dL    Creatinine 0.85 0.60 - 1.30 mg/dL    Sodium 138 132 - 146 mmol/L    Potassium 3.8 3.5 - 5.5 mmol/L    Chloride 105 99 - 109 mmol/L    CO2 26.0 20.0 - 31.0 mmol/L    Calcium 9.4 8.7 - 10.4 mg/dL    Total Protein 7.4 5.7 - 8.2 g/dL    Albumin 4.34 3.20 - 4.80 g/dL    ALT (SGPT) 16 7 - 40 U/L    AST (SGOT) 15 0 - 33 U/L    Alkaline Phosphatase 76 25 - 100 U/L    Total Bilirubin 0.5 0.3 - 1.2 mg/dL    eGFR  African Amer 91 >60 mL/min/1.73    Globulin 3.1 gm/dL    A/G Ratio 1.4 (L) 1.5 - 2.5 g/dL    BUN/Creatinine Ratio 17.6 7.0 - 25.0    Anion Gap 7.0 3.0 - 11.0 mmol/L   Lipase   Result Value Ref Range    Lipase 29 6 - 51 U/L   BNP   Result Value Ref Range    BNP 6.0 0.0 - 100.0 pg/mL   CBC Auto Differential   Result Value Ref Range    WBC 8.35 3.50 - 10.80 10*3/mm3    RBC 4.34 3.89 - 5.14 10*6/mm3    Hemoglobin 10.8 (L) 11.5 - 15.5 g/dL    Hematocrit 33.8 (L) 34.5 - 44.0 %    MCV 77.9 (L) 80.0 - 99.0 fL    MCH 24.9 (L) 27.0 - 31.0 pg    MCHC 32.0 32.0 - 36.0 g/dL    RDW 16.1 (H) 11.3 - 14.5 %    RDW-SD 46.2 37.0 - 54.0 fl    MPV 10.0 6.0 - 12.0 fL    Platelets 319 150 - 450 10*3/mm3    Neutrophil % 57.8 41.0 - 71.0 %     Lymphocyte % 32.3 24.0 - 44.0 %    Monocyte % 9.0 0.0 - 12.0 %    Eosinophil % 0.6 0.0 - 3.0 %    Basophil % 0.2 0.0 - 1.0 %    Immature Grans % 0.1 0.0 - 0.6 %    Neutrophils, Absolute 4.82 1.50 - 8.30 10*3/mm3    Lymphocytes, Absolute 2.70 0.60 - 4.80 10*3/mm3    Monocytes, Absolute 0.75 0.00 - 1.00 10*3/mm3    Eosinophils, Absolute 0.05 0.00 - 0.30 10*3/mm3    Basophils, Absolute 0.02 0.00 - 0.20 10*3/mm3    Immature Grans, Absolute 0.01 0.00 - 0.05 10*3/mm3   POC Troponin, Rapid   Result Value Ref Range    Troponin I 0.00 0.00 - 0.07 ng/mL   POC Troponin, Rapid   Result Value Ref Range    Troponin I 0.00 0.00 - 0.07 ng/mL   EKG 3/19/19: Normal sinus rhythm  Nonspecific T wave abnormality  Abnormal ECG  When compared with ECG of 08-JAN-2019 15:07,  No significant change was found  EKG:   Normal sinus rhythm  Nonspecific T wave abnormality  Abnormal ECG  When compared with ECG of 19-MAR-2019 17:34, (Unconfirmed)  No significant change was found  Confirmed by MD Krishna Michael ([id number]) on 3/21/2019 5:58:10  by MD Krishna Michael ([id number]) on 3/21/2019 5:57:48 AM    Assessment and Plan:     1. Precordial pain  VERONICA Score: 1 (ASA )  - Stress Test With Pet Myocardial Perfusion (Multi Study); Future    2. DUNCAN (dyspnea on exertion)    - Stress Test With Pet Myocardial Perfusion (Multi Study); Future    3. Fatigue, unspecified type    - Stress Test With Pet Myocardial Perfusion (Multi Study); Future    4. Nonspecific ST-T wave electrocardiographic changes    - Stress Test With Pet Myocardial Perfusion (Multi Study); Future    5. Morbid obesity with BMI of 50.0-59.9, adult (CMS/[facility])    - Stress Test With Pet Myocardial Perfusion (Multi Study); Future    It has been a pleasure to participate in the care of this patient.  Patient was instructed to call the [facility] with any questions, concerns, or worsening symptoms.    *Please note that portions of this note were completed with a voice recognition program. Efforts  were made to edit the dictations, but occasionally words are mistranscribed.

## 2019-03-22 ENCOUNTER — APPOINTMENT (OUTPATIENT)
Dept: CARDIOLOGY | Facility: HOSPITAL | Age: 39
End: 2019-03-22

## 2019-03-22 ENCOUNTER — OFFICE VISIT (OUTPATIENT)
Dept: INTERNAL MEDICINE | Facility: CLINIC | Age: 39
End: 2019-03-22

## 2019-03-22 VITALS
HEART RATE: 75 BPM | SYSTOLIC BLOOD PRESSURE: 110 MMHG | BODY MASS INDEX: 51.02 KG/M2 | DIASTOLIC BLOOD PRESSURE: 80 MMHG | WEIGHT: 270 LBS | OXYGEN SATURATION: 99 %

## 2019-03-22 DIAGNOSIS — R13.10 PAINFUL SWALLOWING: Primary | ICD-10-CM

## 2019-03-22 DIAGNOSIS — R07.9 CHEST PAIN IN ADULT: ICD-10-CM

## 2019-03-22 PROCEDURE — 99214 OFFICE O/P EST MOD 30 MIN: CPT | Performed by: INTERNAL MEDICINE

## 2019-03-22 RX ORDER — OMEPRAZOLE 20 MG/1
20 CAPSULE, DELAYED RELEASE ORAL DAILY
Qty: 30 CAPSULE | Refills: 1 | Status: SHIPPED | OUTPATIENT
Start: 2019-03-22 | End: 2019-05-08

## 2019-03-22 NOTE — PROGRESS NOTES
Subjective   Nury Pineda is a 38 y.o. female.   Chief Complaint   Patient presents with   • ER Follow Up   • Chest Pain       History of Present Illness   Went to ER for chest pain in center of chest went to back. No diaphoresis. No radiation of pain. No cough. No fever. Happens after she eats ans swallows.   The following portions of the patient's history were reviewed and updated as appropriate: allergies, current medications, past family history, past medical history, past social history, past surgical history and problem list.    Review of Systems   Constitutional: Negative for activity change, appetite change, chills, diaphoresis, fatigue, fever and unexpected weight change.   HENT: Negative for congestion, ear discharge, ear pain, mouth sores, nosebleeds, sinus pressure, sneezing and sore throat.    Eyes: Negative for pain, discharge and itching.   Respiratory: Negative for cough, chest tightness, shortness of breath and wheezing.    Cardiovascular: Positive for chest pain. Negative for palpitations and leg swelling.   Gastrointestinal: Negative for abdominal pain, constipation, diarrhea, nausea and vomiting.        Painful swallowing   Endocrine: Negative for cold intolerance, heat intolerance, polydipsia and polyphagia.   Genitourinary: Negative for dysuria, flank pain, frequency, hematuria and urgency.   Musculoskeletal: Negative for arthralgias, back pain, gait problem, myalgias, neck pain and neck stiffness.   Skin: Negative for color change, pallor and rash.   Neurological: Negative for seizures, speech difficulty, numbness and headaches.   Psychiatric/Behavioral: Negative for agitation, confusion, decreased concentration and sleep disturbance. The patient is not nervous/anxious.      /80   Pulse 75   Wt 122 kg (270 lb)   SpO2 99%   BMI 51.02 kg/m²     Objective   Physical Exam   Constitutional: She appears well-developed.   HENT:   Head: Normocephalic.   Right Ear: External ear normal.   Left  Ear: External ear normal.   Nose: Nose normal.   Mouth/Throat: Oropharynx is clear and moist.   Eyes: Conjunctivae are normal. Pupils are equal, round, and reactive to light.   Neck: No JVD present. No thyromegaly present.   Cardiovascular: Normal rate, regular rhythm and normal heart sounds. Exam reveals no friction rub.   No murmur heard.  Pulmonary/Chest: Effort normal and breath sounds normal. No respiratory distress. She has no wheezes. She has no rales.   Abdominal: Soft. Bowel sounds are normal. She exhibits no distension. There is no tenderness. There is no guarding.   Musculoskeletal: She exhibits no edema or tenderness.   Lymphadenopathy:     She has no cervical adenopathy.   Neurological: She displays normal reflexes. No cranial nerve deficit.   Skin: Skin is dry. No rash noted.   Psychiatric: Her behavior is normal.   Nursing note and vitals reviewed.      Assessment/Plan   Nury was seen today for er follow up.    Diagnoses and all orders for this visit:    Painful swallowing  -     Ambulatory referral for Screening EGD  -     omeprazole (priLOSEC) 20 MG capsule; Take 1 capsule by mouth Daily.prescribed    Chest pain in adult  Has stress Monday

## 2019-03-29 ENCOUNTER — OFFICE VISIT (OUTPATIENT)
Dept: INTERNAL MEDICINE | Facility: CLINIC | Age: 39
End: 2019-03-29

## 2019-03-29 VITALS
SYSTOLIC BLOOD PRESSURE: 100 MMHG | OXYGEN SATURATION: 99 % | HEART RATE: 83 BPM | DIASTOLIC BLOOD PRESSURE: 80 MMHG | WEIGHT: 270 LBS | BODY MASS INDEX: 51.02 KG/M2

## 2019-03-29 DIAGNOSIS — R13.10 DIFFICULTY SWALLOWING SOLIDS: Primary | ICD-10-CM

## 2019-03-29 PROCEDURE — 99213 OFFICE O/P EST LOW 20 MIN: CPT | Performed by: INTERNAL MEDICINE

## 2019-03-29 NOTE — PROGRESS NOTES
"Subjective   Nury Pineda is a 38 y.o. female.   Chief Complaint   Patient presents with   • Difficulty Swallowing     Follow Up       History of Present Illness   Few weeks of difficulty swallowing. \"feels like food pressing up against something when swallows\". Does not get stuck. Solids only. Is having an EGD MOnday.  The following portions of the patient's history were reviewed and updated as appropriate: allergies, current medications, past family history, past medical history, past social history, past surgical history and problem list.    Review of Systems   Constitutional: Negative for activity change, appetite change, chills, diaphoresis, fatigue, fever and unexpected weight change.   HENT: Negative for congestion, ear discharge, ear pain, mouth sores, nosebleeds, sinus pressure, sneezing and sore throat.    Eyes: Negative for pain, discharge and itching.   Respiratory: Negative for cough, chest tightness, shortness of breath and wheezing.    Cardiovascular: Negative for chest pain, palpitations and leg swelling.   Gastrointestinal: Negative for abdominal pain, constipation, diarrhea, nausea and vomiting.   Endocrine: Negative for cold intolerance, heat intolerance, polydipsia and polyphagia.   Genitourinary: Negative for dysuria, flank pain, frequency, hematuria and urgency.   Musculoskeletal: Negative for arthralgias, back pain, gait problem, myalgias, neck pain and neck stiffness.   Skin: Negative for color change, pallor and rash.   Neurological: Negative for seizures, speech difficulty, numbness and headaches.   Psychiatric/Behavioral: Negative for agitation, confusion, decreased concentration and sleep disturbance. The patient is not nervous/anxious.      /80   Pulse 83   Wt 122 kg (270 lb)   SpO2 99%   BMI 51.02 kg/m²     Objective   Physical Exam   Constitutional: She is oriented to person, place, and time. She appears well-developed.   HENT:   Head: Normocephalic.   Right Ear: External ear " normal.   Left Ear: External ear normal.   Nose: Nose normal.   Mouth/Throat: Oropharynx is clear and moist.   Eyes: Conjunctivae are normal. Pupils are equal, round, and reactive to light.   Neck: No JVD present. No thyromegaly present.   Cardiovascular: Normal rate, regular rhythm and normal heart sounds. Exam reveals no friction rub.   No murmur heard.  Pulmonary/Chest: Effort normal and breath sounds normal. No respiratory distress. She has no wheezes. She has no rales.   Abdominal: Soft. Bowel sounds are normal. She exhibits no distension. There is no tenderness. There is no guarding.   Musculoskeletal: She exhibits no edema or tenderness.   Lymphadenopathy:     She has no cervical adenopathy.   Neurological: She is oriented to person, place, and time. She displays normal reflexes. No cranial nerve deficit.   Skin: No rash noted.   Psychiatric: Her behavior is normal.   Nursing note and vitals reviewed.      Assessment/Plan   Nury was seen today for difficulty swallowing.    Diagnoses and all orders for this visit:    Difficulty swallowing solids  EGD [facility] for Monday.

## 2019-04-01 ENCOUNTER — LAB REQUISITION (OUTPATIENT)
Dept: LAB | Facility: HOSPITAL | Age: 39
End: 2019-04-01

## 2019-04-01 ENCOUNTER — OUTSIDE FACILITY SERVICE (OUTPATIENT)
Dept: GASTROENTEROLOGY | Facility: CLINIC | Age: 39
End: 2019-04-01

## 2019-04-01 DIAGNOSIS — R10.13 EPIGASTRIC PAIN: ICD-10-CM

## 2019-04-01 PROCEDURE — 43239 EGD BIOPSY SINGLE/MULTIPLE: CPT | Performed by: INTERNAL MEDICINE

## 2019-04-01 PROCEDURE — 88305 TISSUE EXAM BY PATHOLOGIST: CPT | Performed by: INTERNAL MEDICINE

## 2019-04-02 LAB
CYTO UR: NORMAL
LAB AP CASE REPORT: NORMAL
LAB AP CLINICAL INFORMATION: NORMAL
PATH REPORT.FINAL DX SPEC: NORMAL
PATH REPORT.GROSS SPEC: NORMAL

## 2019-04-09 ENCOUNTER — APPOINTMENT (OUTPATIENT)
Dept: CARDIOLOGY | Facility: HOSPITAL | Age: 39
End: 2019-04-09

## 2019-04-23 ENCOUNTER — HOSPITAL ENCOUNTER (OUTPATIENT)
Dept: CARDIOLOGY | Facility: HOSPITAL | Age: 39
Discharge: HOME | End: 2019-04-23
Admitting: NURSE PRACTITIONER

## 2019-04-23 DIAGNOSIS — E66.01 MORBID OBESITY WITH BMI OF 50.0-59.9, ADULT (HCC): ICD-10-CM

## 2019-04-23 DIAGNOSIS — R53.83 FATIGUE, UNSPECIFIED TYPE: ICD-10-CM

## 2019-04-23 DIAGNOSIS — R06.09 DOE (DYSPNEA ON EXERTION): ICD-10-CM

## 2019-04-23 DIAGNOSIS — R07.2 PRECORDIAL PAIN: ICD-10-CM

## 2019-04-23 DIAGNOSIS — R94.31 NONSPECIFIC ST-T WAVE ELECTROCARDIOGRAPHIC CHANGES: ICD-10-CM

## 2019-04-23 LAB
BH CV NUCLEAR PRIOR STUDY: 3
BH CV STRESS BP STAGE 1: ABNORMAL
BH CV STRESS BP STAGE 3: ABNORMAL
BH CV STRESS COMMENTS STAGE 1: ABNORMAL
BH CV STRESS DOSE REGADENOSON STAGE 1: 0.4
BH CV STRESS DURATION MIN STAGE 1: 1
BH CV STRESS DURATION MIN STAGE 2: 1
BH CV STRESS DURATION MIN STAGE 3: 1
BH CV STRESS DURATION MIN STAGE 4: 1
BH CV STRESS DURATION SEC STAGE 1: 0
BH CV STRESS DURATION SEC STAGE 2: 0
BH CV STRESS DURATION SEC STAGE 3: 0
BH CV STRESS DURATION SEC STAGE 4: 0
BH CV STRESS HR STAGE 1: 99
BH CV STRESS HR STAGE 2: 96
BH CV STRESS HR STAGE 3: 95
BH CV STRESS HR STAGE 4: 93
BH CV STRESS O2 STAGE 1: 100
BH CV STRESS O2 STAGE 2: 100
BH CV STRESS O2 STAGE 3: 100
BH CV STRESS O2 STAGE 4: 100
BH CV STRESS PROTOCOL 1: ABNORMAL
BH CV STRESS RECOVERY BP: ABNORMAL MMHG
BH CV STRESS RECOVERY HR: 91 BPM
BH CV STRESS STAGE 1: 1
BH CV STRESS STAGE 2: 2
BH CV STRESS STAGE 3: 3
BH CV STRESS STAGE 4: 4
LV EF NUC BP: 55 %
MAXIMAL PREDICTED HEART RATE: 183 BPM
PERCENT MAX PREDICTED HR: 54.1 %
STRESS BASELINE BP: ABNORMAL MMHG
STRESS BASELINE HR: 78 BPM
STRESS PERCENT HR: 64 %
STRESS POST ESTIMATED WORKLOAD: 1 METS
STRESS POST EXERCISE DUR MIN: 4 MIN
STRESS POST EXERCISE DUR SEC: 0 SEC
STRESS POST PEAK BP: ABNORMAL MMHG
STRESS POST PEAK HR: 99 BPM
STRESS TARGET HR: 156 BPM

## 2019-04-23 PROCEDURE — 25010000002 REGADENOSON 0.4 MG/5ML SOLUTION: Performed by: NURSE PRACTITIONER

## 2019-04-23 PROCEDURE — 93017 CV STRESS TEST TRACING ONLY: CPT

## 2019-04-23 PROCEDURE — 0 RUBIDIUM CHLORIDE: Performed by: NURSE PRACTITIONER

## 2019-04-23 PROCEDURE — 78492 MYOCRD IMG PET MLT RST&STRS: CPT | Performed by: INTERNAL MEDICINE

## 2019-04-23 PROCEDURE — A9555 RB82 RUBIDIUM: HCPCS | Performed by: NURSE PRACTITIONER

## 2019-04-23 PROCEDURE — 78492 MYOCRD IMG PET MLT RST&STRS: CPT

## 2019-04-23 PROCEDURE — 93018 CV STRESS TEST I&R ONLY: CPT | Performed by: INTERNAL MEDICINE

## 2019-04-23 RX ADMIN — RUBIDIUM CHLORIDE RB-82 1 DOSE: 150 INJECTION, SOLUTION INTRAVENOUS at 14:25

## 2019-04-23 RX ADMIN — REGADENOSON 0.4 MG: 0.08 INJECTION, SOLUTION INTRAVENOUS at 14:37

## 2019-04-23 RX ADMIN — RUBIDIUM CHLORIDE RB-82 1 DOSE: 150 INJECTION, SOLUTION INTRAVENOUS at 14:38

## 2019-04-24 ENCOUNTER — TELEPHONE (OUTPATIENT)
Dept: CARDIOLOGY | Facility: HOSPITAL | Age: 39
End: 2019-04-24

## 2019-04-24 NOTE — TELEPHONE ENCOUNTER
Reviewed stress test with patient. Patient to follow up with PCP for ongoing evaluation of noncardiac causes of chest pain.

## 2019-05-08 ENCOUNTER — OFFICE VISIT (OUTPATIENT)
Dept: NEUROLOGY | Facility: CLINIC | Age: 39
End: 2019-05-08

## 2019-05-08 VITALS
HEIGHT: 61 IN | DIASTOLIC BLOOD PRESSURE: 80 MMHG | BODY MASS INDEX: 52.3 KG/M2 | SYSTOLIC BLOOD PRESSURE: 130 MMHG | WEIGHT: 277 LBS

## 2019-05-08 DIAGNOSIS — G43.809 MIGRAINE VARIANT WITH HEADACHE: Primary | Chronic | ICD-10-CM

## 2019-05-08 DIAGNOSIS — R42 DIZZINESS, NONSPECIFIC: ICD-10-CM

## 2019-05-08 PROCEDURE — 99212 OFFICE O/P EST SF 10 MIN: CPT | Performed by: NURSE PRACTITIONER

## 2019-05-08 NOTE — PROGRESS NOTES
"Subjective:     Patient ID: Nury Pineda is a 38 y.o. female.    CC:   Chief Complaint   Patient presents with   • Migraine       HPI:   History of Present Illness   This is a very pleasant 38-year-old female who presents for 3 month follow up on atypical migraine. Brother tragically killed about 3 months ago. Dealing ok with this. Has had some issues with atypical CP and cardiac and GI workup including EEG and stress ECHO WNL. She tells me she knew it was a \"pulled muscle\" from working out all along. She did have her MRI Brain with and without contrast unremarkable 1/18/2019. Has migraines rarely now. No migraines in the past 3 months. Has cambia, aspirin, sumatriptan, Fioricet and Reglan at home PRN but has not required this. She has had an extensive prior workup. Has mild sleep apnea and prior sleep study recommended sleep position changes. Has intermittent dizziness whether sitting or standing, helps to go to sleep, feels like room is spinning. Concerns or vertiginous migraines in past. Cannot tolerate Topamax. Does not want to take daily meds. Prior imaging studies have showed no acute process and MRIs brain/C spine and MRA brain have been limited due to artifact from braces 1/2016 & 12/2015. CTA head and neck Normal 2016. Trouble with swallowing pills.    The following portions of the patient's history were reviewed and updated as appropriate: allergies, current medications, past family history, past medical history, past social history, past surgical history and problem list.    Past Medical History:   Diagnosis Date   • Absolute anemia    • Arthritis     Right knee   • Carpal tunnel syndrome     RIGHT WRIST   • Cholelithiasis     Nausea related to stones has phenergan. Also has related RUQ pain.   • Dizziness    • Elevated C-reactive protein (CRP)    • Elevated erythrocyte sedimentation rate    • Influenza    • Iron deficiency     Will not take iron supplement but will take MVI   • Left hip pain    • " Migraines    • Numbness and tingling in right hand     Has nerve conductions 3/23/2016, awaiting results. Along with Paresthesia.   • Pain and swelling of right forearm     Check u/s to r/o clot. Rash and redness are resolving, will continue to monitor.   • Pain of left arm    • Sleep disturbances    • Vitamin D deficiency     25 oh.       Past Surgical History:   Procedure Laterality Date   •  SECTION     • CHOLECYSTECTOMY     • COLONOSCOPY     • CYST REMOVAL      behind belly button   • TONSILLECTOMY     • URACHAL CYST INCISION      History of Excision Of Urachal Cyst.       Social History     Socioeconomic History   • Marital status: Single     Spouse name: Not on file   • Number of children: Not on file   • Years of education: Not on file   • Highest education level: Not on file   Tobacco Use   • Smoking status: Never Smoker   • Smokeless tobacco: Never Used   Substance and Sexual Activity   • Alcohol use: Yes     Alcohol/week: 1.2 oz     Types: 2 Glasses of wine per week   • Drug use: No   • Sexual activity: Defer     Partners: Male     Birth control/protection: Condom   Social History Narrative    Single    Caffeine Intake: 0-1 servings per day       Family History   Problem Relation Age of Onset   • Stroke Mother    • Hypertension Mother    • Migraines Mother    • Osteoarthritis Mother    • Cervical cancer Mother    • Heart disease Mother    • Hyperlipidemia Maternal Grandmother    • Osteoarthritis Maternal Grandmother    • Birth defects Maternal Grandmother    • Ovarian cancer Maternal Grandmother         PREMENOPAUSAL    • Transient ischemic attack Maternal Grandmother    • Hypertension Maternal Grandmother    • Stroke Other         CVA x 2. and TIA   • Cancer Other         Cervical, malignant neoplasm x2 , ovarian   • Hypertension Father    • Diabetes Father         Review of Systems   Constitutional: Negative for chills, fatigue, fever and unexpected weight change.   HENT:  Negative for ear pain, hearing loss, nosebleeds, rhinorrhea and sore throat.    Eyes: Negative for photophobia, pain, discharge, itching and visual disturbance.   Respiratory: Negative for cough, chest tightness, shortness of breath and wheezing.    Cardiovascular: Negative for chest pain, palpitations and leg swelling.   Gastrointestinal: Negative for abdominal pain, blood in stool, constipation, diarrhea, nausea and vomiting.   Genitourinary: Negative for dysuria, frequency, hematuria and urgency.   Musculoskeletal: Negative for arthralgias, back pain, gait problem, joint swelling, myalgias, neck pain and neck stiffness.   Skin: Negative for rash and wound.   Allergic/Immunologic: Negative for environmental allergies and food allergies.   Neurological: Negative for dizziness, tremors, seizures, syncope, speech difficulty, weakness, light-headedness, numbness and headaches.   Hematological: Negative for adenopathy. Does not bruise/bleed easily.   Psychiatric/Behavioral: Negative for agitation, confusion, decreased concentration, hallucinations, sleep disturbance and suicidal ideas. The patient is not nervous/anxious.         Objective:    Neurologic Exam     Mental Status   Oriented to person, place, and time.   Level of consciousness: alert    Cranial Nerves   Cranial nerves II through XII intact.     Motor Exam   Muscle bulk: normal  Overall muscle tone: normal    Strength   Strength 5/5 throughout.     Gait, Coordination, and Reflexes     Gait  Gait: normal    Coordination   Finger to nose coordination: normal    Tremor   Resting tremor: absent  Intention tremor: absent  Action tremor: absent    Reflexes   Right brachioradialis: 2+  Left brachioradialis: 2+  Right biceps: 2+  Left biceps: 2+  Right triceps: 2+  Left triceps: 2+  Right : 2+  Left : 2+      Physical Exam   Constitutional: She is oriented to person, place, and time.   Neurological: She is oriented to person, place, and time. She has normal  strength. She has a normal Finger-Nose-Finger Test. Gait normal.   Reflex Scores:       Tricep reflexes are 2+ on the right side and 2+ on the left side.       Bicep reflexes are 2+ on the right side and 2+ on the left side.       Brachioradialis reflexes are 2+ on the right side and 2+ on the left side.      Assessment/Plan:       Nury was seen today for migraine.    Diagnoses and all orders for this visit:    Migraine variant with headache  Comments:  Cambia, Sumatriptan, Aspirin PRN-does not take daily. significantly improved.    Dizziness, nonspecific  Comments:  non-positional, normal mri brain, intermittent, feels better when sleeping/laying down. push fluids.         Continue PRN meds. Reviewed s&s of stroke and when to go to ER. F/U in 6 months or sooner if needed. Reviewed medications, potential side effects and signs and symptoms to report. Discussed risk versus benefits of treatment plan with patient and/or family-including medications, labs and radiology that may be ordered. Addressed questions and concerns during visit. Patient and/or family verbalized understanding and agree with plan.    Ele Thorne, APRN  5/8/2019

## 2019-07-12 RX ORDER — PHENTERMINE HYDROCHLORIDE 37.5 MG/1
37.5 CAPSULE ORAL EVERY MORNING
Qty: 30 CAPSULE | Refills: 3 | Status: SHIPPED | OUTPATIENT
Start: 2019-07-12 | End: 2019-12-09

## 2019-07-17 ENCOUNTER — TELEPHONE (OUTPATIENT)
Dept: INTERNAL MEDICINE | Facility: CLINIC | Age: 39
End: 2019-07-17

## 2019-07-17 DIAGNOSIS — R53.83 OTHER FATIGUE: Primary | ICD-10-CM

## 2019-07-17 NOTE — TELEPHONE ENCOUNTER
PATIENT IS ASKING IF SHE CAN HAVE LABS PUT IN FOR IRON TEST. SHE IS FEELING REALLY TIRED. PLEASE CALL AND LET HER KNOW. [phone]

## 2019-07-18 ENCOUNTER — LAB (OUTPATIENT)
Dept: INTERNAL MEDICINE | Facility: CLINIC | Age: 39
End: 2019-07-18

## 2019-07-18 DIAGNOSIS — R53.83 OTHER FATIGUE: ICD-10-CM

## 2019-07-18 PROCEDURE — 83540 ASSAY OF IRON: CPT | Performed by: INTERNAL MEDICINE

## 2019-07-18 PROCEDURE — 84443 ASSAY THYROID STIM HORMONE: CPT | Performed by: INTERNAL MEDICINE

## 2019-07-18 PROCEDURE — 82728 ASSAY OF FERRITIN: CPT | Performed by: INTERNAL MEDICINE

## 2019-07-18 PROCEDURE — 85025 COMPLETE CBC W/AUTO DIFF WBC: CPT | Performed by: INTERNAL MEDICINE

## 2019-07-19 LAB
BASOPHILS # BLD AUTO: 0.02 10*3/MM3 (ref 0–0.2)
BASOPHILS NFR BLD AUTO: 0.2 % (ref 0–1.5)
DEPRECATED RDW RBC AUTO: 50.2 FL (ref 37–54)
EOSINOPHIL # BLD AUTO: 0.06 10*3/MM3 (ref 0–0.4)
EOSINOPHIL NFR BLD AUTO: 0.7 % (ref 0.3–6.2)
ERYTHROCYTE [DISTWIDTH] IN BLOOD BY AUTOMATED COUNT: 17.4 % (ref 12.3–15.4)
FERRITIN SERPL-MCNC: 80.7 NG/ML (ref 13–150)
HCT VFR BLD AUTO: 35 % (ref 34–46.6)
HGB BLD-MCNC: 10.4 G/DL (ref 12–15.9)
IMM GRANULOCYTES # BLD AUTO: 0.03 10*3/MM3 (ref 0–0.05)
IMM GRANULOCYTES NFR BLD AUTO: 0.4 % (ref 0–0.5)
IRON 24H UR-MRATE: 22 MCG/DL (ref 37–145)
LYMPHOCYTES # BLD AUTO: 2.89 10*3/MM3 (ref 0.7–3.1)
LYMPHOCYTES NFR BLD AUTO: 34.4 % (ref 19.6–45.3)
MCH RBC QN AUTO: 23.5 PG (ref 26.6–33)
MCHC RBC AUTO-ENTMCNC: 29.7 G/DL (ref 31.5–35.7)
MCV RBC AUTO: 79 FL (ref 79–97)
MONOCYTES # BLD AUTO: 0.83 10*3/MM3 (ref 0.1–0.9)
MONOCYTES NFR BLD AUTO: 9.9 % (ref 5–12)
NEUTROPHILS # BLD AUTO: 4.57 10*3/MM3 (ref 1.7–7)
NEUTROPHILS NFR BLD AUTO: 54.4 % (ref 42.7–76)
NRBC BLD AUTO-RTO: 0 /100 WBC (ref 0–0.2)
PLATELET # BLD AUTO: 317 10*3/MM3 (ref 140–450)
PMV BLD AUTO: 11.6 FL (ref 6–12)
RBC # BLD AUTO: 4.43 10*6/MM3 (ref 3.77–5.28)
TSH SERPL DL<=0.05 MIU/L-ACNC: 0.58 MIU/ML (ref 0.27–4.2)
WBC NRBC COR # BLD: 8.4 10*3/MM3 (ref 3.4–10.8)

## 2019-08-12 ENCOUNTER — OFFICE VISIT (OUTPATIENT)
Dept: NEUROLOGY | Facility: CLINIC | Age: 39
End: 2019-08-12

## 2019-08-12 VITALS
OXYGEN SATURATION: 99 % | WEIGHT: 273 LBS | HEIGHT: 61 IN | BODY MASS INDEX: 51.54 KG/M2 | SYSTOLIC BLOOD PRESSURE: 122 MMHG | HEART RATE: 92 BPM | DIASTOLIC BLOOD PRESSURE: 80 MMHG

## 2019-08-12 DIAGNOSIS — G43.809 MIGRAINE VARIANT WITH HEADACHE: Primary | Chronic | ICD-10-CM

## 2019-08-12 PROCEDURE — 96372 THER/PROPH/DIAG INJ SC/IM: CPT | Performed by: NURSE PRACTITIONER

## 2019-08-12 PROCEDURE — 99213 OFFICE O/P EST LOW 20 MIN: CPT | Performed by: NURSE PRACTITIONER

## 2019-08-12 RX ORDER — PROCHLORPERAZINE MALEATE 10 MG
10 TABLET ORAL EVERY 6 HOURS PRN
Qty: 15 TABLET | Refills: 5 | Status: SHIPPED | OUTPATIENT
Start: 2019-08-12 | End: 2020-06-10

## 2019-08-12 RX ORDER — KETOROLAC TROMETHAMINE 30 MG/ML
60 INJECTION, SOLUTION INTRAMUSCULAR; INTRAVENOUS ONCE
Status: COMPLETED | OUTPATIENT
Start: 2019-08-12 | End: 2019-08-12

## 2019-08-12 RX ORDER — METHYLPREDNISOLONE SODIUM SUCCINATE 125 MG/2ML
125 INJECTION, POWDER, LYOPHILIZED, FOR SOLUTION INTRAMUSCULAR; INTRAVENOUS ONCE
Status: COMPLETED | OUTPATIENT
Start: 2019-08-12 | End: 2019-08-12

## 2019-08-12 RX ORDER — BUTALBITAL, ACETAMINOPHEN AND CAFFEINE 50; 325; 40 MG/1; MG/1; MG/1
1 CAPSULE ORAL EVERY 6 HOURS PRN
Qty: 15 CAPSULE | Refills: 0 | Status: SHIPPED | OUTPATIENT
Start: 2019-08-12 | End: 2020-06-10

## 2019-08-12 RX ADMIN — KETOROLAC TROMETHAMINE 60 MG: 30 INJECTION, SOLUTION INTRAMUSCULAR; INTRAVENOUS at 15:35

## 2019-08-12 RX ADMIN — METHYLPREDNISOLONE SODIUM SUCCINATE 125 MG: 125 INJECTION, POWDER, LYOPHILIZED, FOR SOLUTION INTRAMUSCULAR; INTRAVENOUS at 15:35

## 2019-08-12 NOTE — PROGRESS NOTES
Subjective:     Patient ID: Nury Pineda is a 38 y.o. female.    CC:   Chief Complaint   Patient presents with   • Migraine   • Injections     Toradol 60mg IM x 1-Left deltoid ndc 7590-1398-12 [id number] exp 19 solu-medrol 125mg IM x 1 left deltoid ndc [id number] lot [id number] exp. 2020       HPI:   History of Present Illness   This is a very pleasant 38-year-old female who presents for urgent appointment for 3 weeks of mild migraine headache and then yesterday turned to a severe migraine.  She has frontal throbbing and neck pain as well.  Nausea, photophobia, phonophobia, hands and face tingling.  She has had these symptoms multiple times with all of her migraines which are not treated with medication at onset.  She has not taken any medications.  She has difficulty swallowing pills.  She tells me she has been getting iron infusions and this is significantly improved her headaches and then recently her PCP told her that her iron levels were low again and she says since this time her headaches have been increasing.  She has not been able to tolerate multiple preventive medications and tells me she is very poor with compliance of daily meds.  She would like to have some injections today for her migraine.  She tells me it is a 10 out of 10 pain.  She was able to drive herself here today.  She does have some dizziness with the headache.  She tells me that all of her medications that her abortive have . Just finished antibiotics for acute sinus infection from 19.    She has had atypical migraines since 2-15. Brother tragically killed about 6 months ago. Dealing ok with this. Has had some issues with atypical CP and cardiac and GI workup including EGD and stress ECHO WNL. She did have her MRI Brain with and without contrast unremarkable 2019. Has migraines rarely now. No migraines in the previous 5 months. Has cambia, aspirin, sumatriptan, Fioricet and Reglan at home PRN but has not  required this-all now . She has had an extensive prior workup. Has mild sleep apnea and prior sleep study recommended sleep position changes. Has intermittent dizziness whether sitting or standing, helps to go to sleep, feels like room is spinning. Concerns or vertiginous migraines in past. Cannot tolerate Topamax. Does not want to take daily meds. Prior imaging studies have showed no acute process and MRIs brain/C spine and MRA brain have been limited due to artifact from braces 2016 & 2015. CTA head and neck Normal 2016. Trouble with swallowing pills.    The following portions of the patient's history were reviewed and updated as appropriate: allergies, current medications, past family history, past medical history, past social history, past surgical history and problem list.    Past Medical History:   Diagnosis Date   • Absolute anemia    • Arthritis     Right knee   • Carpal tunnel syndrome     RIGHT WRIST   • Cholelithiasis     Nausea related to stones has phenergan. Also has related RUQ pain.   • Dizziness    • Elevated C-reactive protein (CRP)    • Elevated erythrocyte sedimentation rate    • Influenza    • Iron deficiency     Will not take iron supplement but will take MVI   • Left hip pain    • Migraines    • Numbness and tingling in right hand     Has nerve conductions 3/23/2016, awaiting results. Along with Paresthesia.   • Pain and swelling of right forearm     Check u/s to r/o clot. Rash and redness are resolving, will continue to monitor.   • Pain of left arm    • Sleep disturbances    • Vitamin D deficiency     25 oh.       Past Surgical History:   Procedure Laterality Date   •  SECTION     • CHOLECYSTECTOMY     • COLONOSCOPY     • CYST REMOVAL      behind belly button   • TONSILLECTOMY     • URACHAL CYST INCISION      History of Excision Of Urachal Cyst.       Social History     Socioeconomic History   • Marital status: Single     Spouse name: Not on file   •  Number of children: Not on file   • Years of education: Not on file   • Highest education level: Not on file   Tobacco Use   • Smoking status: Never Smoker   • Smokeless tobacco: Never Used   Substance and Sexual Activity   • Alcohol use: Yes     Alcohol/week: 1.2 oz     Types: 2 Glasses of wine per week   • Drug use: No   • Sexual activity: Defer     Partners: Male     Birth control/protection: Condom   Social History Narrative    Single    Caffeine Intake: 0-1 servings per day       Family History   Problem Relation Age of Onset   • Stroke Mother    • Hypertension Mother    • Migraines Mother    • Osteoarthritis Mother    • Cervical cancer Mother    • Heart disease Mother    • Hyperlipidemia Maternal Grandmother    • Osteoarthritis Maternal Grandmother    • Birth defects Maternal Grandmother    • Ovarian cancer Maternal Grandmother         PREMENOPAUSAL    • Transient ischemic attack Maternal Grandmother    • Hypertension Maternal Grandmother    • Stroke Other         CVA x 2. and TIA   • Cancer Other         Cervical, malignant neoplasm x2 , ovarian   • Hypertension Father    • Diabetes Father         Review of Systems   Constitutional: Negative for chills, fatigue, fever and unexpected weight change.   HENT: Negative for ear pain, hearing loss, nosebleeds, rhinorrhea and sore throat.    Eyes: Negative for photophobia, pain, discharge, itching and visual disturbance.   Respiratory: Negative for cough, chest tightness, shortness of breath and wheezing.    Cardiovascular: Negative for chest pain, palpitations and leg swelling.   Gastrointestinal: Negative for abdominal pain, blood in stool, constipation, diarrhea, nausea and vomiting.   Genitourinary: Negative for dysuria, frequency, hematuria and urgency.   Musculoskeletal: Positive for gait problem. Negative for arthralgias, back pain, joint swelling, myalgias, neck pain and neck stiffness.        Balance   Skin: Negative for rash and wound.    Allergic/Immunologic: Negative for environmental allergies and food allergies.   Neurological: Positive for numbness and headaches. Negative for dizziness, tremors, seizures, syncope, speech difficulty, weakness and light-headedness.        Left side is tingling , face   Hematological: Negative for adenopathy. Does not bruise/bleed easily.   Psychiatric/Behavioral: Negative for agitation, confusion, decreased concentration, hallucinations, sleep disturbance and suicidal ideas. The patient is not nervous/anxious.    All other systems reviewed and are negative.         Objective:    Neurologic Exam     Mental Status   Oriented to person, place, and time.   Level of consciousness: alert    Cranial Nerves   Cranial nerves II through XII intact.     Motor Exam   Muscle bulk: normal  Overall muscle tone: normal    Strength   Strength 5/5 throughout.     Sensory Exam   Light touch normal.   Vibration normal.   Proprioception normal.   Pinprick normal.     Gait, Coordination, and Reflexes     Gait  Gait: normal    Coordination   Romberg: negative  Finger to nose coordination: normal  Heel to shin coordination: normal    Tremor   Resting tremor: absent  Intention tremor: absent  Action tremor: absent    Reflexes   Right brachioradialis: 2+  Left brachioradialis: 2+  Right biceps: 2+  Left biceps: 2+  Right triceps: 2+  Left triceps: 2+  Right patellar: 2+  Left patellar: 2+  Right achilles: 2+  Left achilles: 2+  Right : 2+  Left : 2+  Right plantar: normal  Left plantar: normal      Physical Exam   Constitutional: She is oriented to person, place, and time.   Neurological: She is oriented to person, place, and time. She has normal strength. She has a normal Finger-Nose-Finger Test, a normal Heel to Harris Test and a normal Romberg Test. Gait normal.   Reflex Scores:       Tricep reflexes are 2+ on the right side and 2+ on the left side.       Bicep reflexes are 2+ on the right side and 2+ on the left side.        Brachioradialis reflexes are 2+ on the right side and 2+ on the left side.       Patellar reflexes are 2+ on the right side and 2+ on the left side.       Achilles reflexes are 2+ on the right side and 2+ on the left side.    Toradol & Solu-Medrol Injection    Chief Complaint   Patient presents with   • Migraine   • Injections     Toradol 60mg IM x 1-Left deltoid ndc 2044-5078-77 [id number] exp 12/1/19 solu-medrol 125mg IM x 1 left deltoid ndc 4899-2332-37 lot [id number] exp. 12/2020       After risks and benefits were explained including bleeding, infection, worsening of the pain, damage to the area being injected, weakness, allergic reaction to medications, vascular injection, and nerve damage, signed consent was obtained.  All questions were answered.      The gluteus muscle was identified and the skin prepped three times with alcohol and the alcohol allowed to dry.  Next, a 25 gauge 1 inch needle was placed at the gluteus muscle. Once negative aspiration confirmed, the medication was injected and the needle removed.      The patient Did tolerate procedure well without complications.      Medication used: Toradol (Ketoralac) 30mg/ml x1 Along with Solu-Medrol 125mg with Normal Saline (2ml)    Injection location:  left deltoid     Assessment/Plan:       Nury was seen today for migraine and injections.    Diagnoses and all orders for this visit:    Migraine variant with headache  -     methylPREDNISolone sodium succinate (SOLU-Medrol) injection 125 mg  -     ketorolac (TORADOL) injection 60 mg  -     prochlorperazine (COMPAZINE) 10 MG tablet; Take 1 tablet by mouth Every 6 (Six) Hours As Needed for Nausea or Vomiting.  -     Butalbital-APAP-Caffeine -40 MG per capsule; Take 1 capsule by mouth Every 6 (Six) Hours As Needed for Migraine.         Solu-Medrol and Toradol injections administered today for acute migraine with similar symptoms to all prior migraines. Normal neuro exam. Sent in compazine and Fioricet  to the pharmacy. She will also have these filled. If not improving by tomorrow with symptoms to call for OP migraine cocktail-will need a . She avoids ER if at all possible. She will also contact PCP about iron infusions again. She is scheduled to f/u November 2019 but if not better to call us for sooner appointment. She is aware of S&s of stroke and when to proceed to ER or call 911. Reviewed medications, potential side effects and signs and symptoms to report. Discussed risk versus benefits of treatment plan with patient and/or family-including medications, labs and radiology that may be ordered. Addressed questions and concerns during visit. Patient and/or family verbalized understanding and agree with plan.    As part of this patient's treatment plan I am prescribing controlled substances. The patient has been made aware of appropriate use of such medications, including potential risk of somnolence, limited ability to drive and/or work safely, and potential for dependence or overdose. It has also been made clear that these medications are for use by the patient only, without concomitant use of alcohol or other substances unless prescribed. Keep out of reach of children.  [facility] report has been reviewed. If this is going to be prescribed long term, [facility] Controlled Substance Agreement Contract has also been read and signed by patient and myself.    EMR Dragon/Transcription Disclaimer:  Much of this encounter note is an electronic transcription of spoken language to printed text. Electronic transcription of spoken language may permit erroneous words or phrases to be inadvertently transcribed. Although I have reviewed the note for such errors, some may still exist in this documentation.    Ele Thorne, APRN  8/12/2019

## 2019-08-13 ENCOUNTER — TELEPHONE (OUTPATIENT)
Dept: NEUROLOGY | Facility: CLINIC | Age: 39
End: 2019-08-13

## 2019-08-14 ENCOUNTER — INFUSION (OUTPATIENT)
Dept: ONCOLOGY | Facility: HOSPITAL | Age: 39
End: 2019-08-14

## 2019-08-14 VITALS
HEART RATE: 83 BPM | RESPIRATION RATE: 18 BRPM | DIASTOLIC BLOOD PRESSURE: 49 MMHG | TEMPERATURE: 97.9 F | SYSTOLIC BLOOD PRESSURE: 123 MMHG

## 2019-08-14 DIAGNOSIS — G43.809 MIGRAINE VARIANT WITH HEADACHE: Primary | ICD-10-CM

## 2019-08-14 PROCEDURE — 25010000002 METOCLOPRAMIDE PER 10 MG: Performed by: NURSE PRACTITIONER

## 2019-08-14 PROCEDURE — 25010000002 METHYLPREDNISOLONE PER 125 MG: Performed by: NURSE PRACTITIONER

## 2019-08-14 PROCEDURE — 96361 HYDRATE IV INFUSION ADD-ON: CPT

## 2019-08-14 PROCEDURE — 25010000002 HYDROMORPHONE 1 MG/ML SOLUTION: Performed by: NURSE PRACTITIONER

## 2019-08-14 PROCEDURE — 25010000002 DIPHENHYDRAMINE PER 50 MG: Performed by: NURSE PRACTITIONER

## 2019-08-14 PROCEDURE — 96375 TX/PRO/DX INJ NEW DRUG ADDON: CPT

## 2019-08-14 PROCEDURE — 96365 THER/PROPH/DIAG IV INF INIT: CPT

## 2019-08-14 RX ORDER — DIPHENHYDRAMINE HYDROCHLORIDE 50 MG/ML
25 INJECTION INTRAMUSCULAR; INTRAVENOUS ONCE
Status: CANCELLED | OUTPATIENT
Start: 2019-08-14

## 2019-08-14 RX ORDER — DIPHENHYDRAMINE HYDROCHLORIDE 50 MG/ML
25 INJECTION INTRAMUSCULAR; INTRAVENOUS ONCE
Status: COMPLETED | OUTPATIENT
Start: 2019-08-14 | End: 2019-08-14

## 2019-08-14 RX ORDER — SODIUM CHLORIDE 9 MG/ML
500 INJECTION, SOLUTION INTRAVENOUS ONCE
Status: CANCELLED | OUTPATIENT
Start: 2019-08-14 | End: 2019-08-14

## 2019-08-14 RX ORDER — METOCLOPRAMIDE HYDROCHLORIDE 5 MG/ML
10 INJECTION INTRAMUSCULAR; INTRAVENOUS ONCE
Status: COMPLETED | OUTPATIENT
Start: 2019-08-14 | End: 2019-08-14

## 2019-08-14 RX ORDER — SODIUM CHLORIDE 9 MG/ML
500 INJECTION, SOLUTION INTRAVENOUS ONCE
Status: COMPLETED | OUTPATIENT
Start: 2019-08-14 | End: 2019-08-14

## 2019-08-14 RX ORDER — METOCLOPRAMIDE HYDROCHLORIDE 5 MG/ML
10 INJECTION INTRAMUSCULAR; INTRAVENOUS ONCE
Status: CANCELLED | OUTPATIENT
Start: 2019-08-14 | End: 2019-08-14

## 2019-08-14 RX ORDER — HYDROMORPHONE HYDROCHLORIDE 1 MG/ML
1 INJECTION, SOLUTION INTRAMUSCULAR; INTRAVENOUS; SUBCUTANEOUS ONCE
Status: CANCELLED | OUTPATIENT
Start: 2019-08-14

## 2019-08-14 RX ADMIN — SODIUM CHLORIDE 500 ML: 9 INJECTION, SOLUTION INTRAVENOUS at 11:09

## 2019-08-14 RX ADMIN — SODIUM CHLORIDE 250 MG: 9 INJECTION, SOLUTION INTRAVENOUS at 11:20

## 2019-08-14 RX ADMIN — DIPHENHYDRAMINE HYDROCHLORIDE 25 MG: 50 INJECTION INTRAMUSCULAR; INTRAVENOUS at 11:12

## 2019-08-14 RX ADMIN — METOCLOPRAMIDE 10 MG: 5 INJECTION, SOLUTION INTRAMUSCULAR; INTRAVENOUS at 11:10

## 2019-08-14 RX ADMIN — HYDROMORPHONE HYDROCHLORIDE 1 MG: 1 INJECTION, SOLUTION INTRAMUSCULAR; INTRAVENOUS; SUBCUTANEOUS at 11:14

## 2019-10-01 ENCOUNTER — OFFICE VISIT (OUTPATIENT)
Dept: INTERNAL MEDICINE | Facility: CLINIC | Age: 39
End: 2019-10-01

## 2019-10-01 VITALS
HEIGHT: 61 IN | DIASTOLIC BLOOD PRESSURE: 70 MMHG | SYSTOLIC BLOOD PRESSURE: 130 MMHG | OXYGEN SATURATION: 99 % | WEIGHT: 272.4 LBS | BODY MASS INDEX: 51.43 KG/M2 | HEART RATE: 86 BPM

## 2019-10-01 DIAGNOSIS — D50.8 OTHER IRON DEFICIENCY ANEMIA: ICD-10-CM

## 2019-10-01 DIAGNOSIS — Z00.00 HEALTHCARE MAINTENANCE: Primary | ICD-10-CM

## 2019-10-01 LAB
BILIRUB BLD-MCNC: NEGATIVE MG/DL
CLARITY, POC: CLEAR
COLOR UR: YELLOW
GLUCOSE UR STRIP-MCNC: NEGATIVE MG/DL
KETONES UR QL: NEGATIVE
LEUKOCYTE EST, POC: NEGATIVE
NITRITE UR-MCNC: NEGATIVE MG/ML
PH UR: 6 [PH] (ref 5–8)
PROT UR STRIP-MCNC: NEGATIVE MG/DL
RBC # UR STRIP: NEGATIVE /UL
SP GR UR: 1.02 (ref 1–1.03)
UROBILINOGEN UR QL: NORMAL

## 2019-10-01 PROCEDURE — 85025 COMPLETE CBC W/AUTO DIFF WBC: CPT | Performed by: INTERNAL MEDICINE

## 2019-10-01 PROCEDURE — 84466 ASSAY OF TRANSFERRIN: CPT | Performed by: INTERNAL MEDICINE

## 2019-10-01 PROCEDURE — 80053 COMPREHEN METABOLIC PANEL: CPT | Performed by: INTERNAL MEDICINE

## 2019-10-01 PROCEDURE — 83540 ASSAY OF IRON: CPT | Performed by: INTERNAL MEDICINE

## 2019-10-01 PROCEDURE — 81003 URINALYSIS AUTO W/O SCOPE: CPT | Performed by: INTERNAL MEDICINE

## 2019-10-01 PROCEDURE — 84443 ASSAY THYROID STIM HORMONE: CPT | Performed by: INTERNAL MEDICINE

## 2019-10-01 PROCEDURE — 99395 PREV VISIT EST AGE 18-39: CPT | Performed by: INTERNAL MEDICINE

## 2019-10-01 PROCEDURE — 80061 LIPID PANEL: CPT | Performed by: INTERNAL MEDICINE

## 2019-10-01 PROCEDURE — 82728 ASSAY OF FERRITIN: CPT | Performed by: INTERNAL MEDICINE

## 2019-10-01 NOTE — PROGRESS NOTES
Chief Complaint   Patient presents with   • Annual Exam         Reported Health  Good Yes  FairNo  PoorNo      Dental,Vision,Hearing  Regular dental visitsYes  Vision ProblemsYes  Hearing LossNo      Immunization Status:  Up To DateYes        Lifestyle  Healthy DietYes  Weight ConcernsYes  Regular ExerciseYes  Tobacco UseNo  Alcohol UseNo  Drug AbuseNo      Screening  Cancer ScreeningYes  Metabolic ScreeningYes  Risk ScreeningYes    Past Medical History:   Diagnosis Date   • Absolute anemia    • Arthritis     Right knee   • Carpal tunnel syndrome     RIGHT WRIST   • Cholelithiasis     Nausea related to stones has phenergan. Also has related RUQ pain.   • Dizziness    • Elevated C-reactive protein (CRP)    • Elevated erythrocyte sedimentation rate    • Influenza    • Iron deficiency     Will not take iron supplement but will take MVI   • Left hip pain    • Migraines    • Numbness and tingling in right hand     Has nerve conductions 3/23/2016, awaiting results. Along with Paresthesia.   • Pain and swelling of right forearm     Check u/s to r/o clot. Rash and redness are resolving, will continue to monitor.   • Pain of left arm    • Sleep disturbances    • Vitamin D deficiency     25 oh.     Past Surgical History:   Procedure Laterality Date   •  SECTION     • CHOLECYSTECTOMY     • COLONOSCOPY     • CYST REMOVAL      behind belly button   • TONSILLECTOMY     • URACHAL CYST INCISION      History of Excision Of Urachal Cyst.     Family History   Problem Relation Age of Onset   • Stroke Mother    • Hypertension Mother    • Migraines Mother    • Osteoarthritis Mother    • Cervical cancer Mother    • Heart disease Mother    • Hyperlipidemia Maternal Grandmother    • Osteoarthritis Maternal Grandmother    • Birth defects Maternal Grandmother    • Ovarian cancer Maternal Grandmother         PREMENOPAUSAL    • Transient ischemic attack Maternal Grandmother    • Hypertension Maternal Grandmother     • Stroke Other         CVA x 2. and TIA   • Cancer Other         Cervical, malignant neoplasm x2 , ovarian   • Hypertension Father    • Diabetes Father      Social History     Socioeconomic History   • Marital status: Single     Spouse name: Not on file   • Number of children: Not on file   • Years of education: Not on file   • Highest education level: Not on file   Tobacco Use   • Smoking status: Never Smoker   • Smokeless tobacco: Never Used   Substance and Sexual Activity   • Alcohol use: Yes     Alcohol/week: 1.2 oz     Types: 2 Glasses of wine per week   • Drug use: No   • Sexual activity: Defer     Partners: Male     Birth control/protection: Condom   Social History Narrative    Single    Caffeine Intake: 0-1 servings per day       Review of Systems   Constitutional: Negative for activity change, appetite change, chills, diaphoresis, fatigue, fever and unexpected weight change.   HENT: Negative for congestion, ear discharge, ear pain, mouth sores, nosebleeds, sinus pressure, sneezing and sore throat.    Eyes: Negative for pain, discharge and itching.   Respiratory: Negative for cough, chest tightness, shortness of breath and wheezing.    Cardiovascular: Negative for chest pain, palpitations and leg swelling.   Gastrointestinal: Negative for abdominal pain, constipation, diarrhea, nausea and vomiting.   Endocrine: Negative for cold intolerance, heat intolerance, polydipsia and polyphagia.   Genitourinary: Negative for dysuria, flank pain, frequency, hematuria and urgency.   Musculoskeletal: Negative for arthralgias, back pain, gait problem, myalgias, neck pain and neck stiffness.   Skin: Negative for color change, pallor and rash.   Neurological: Negative for seizures, speech difficulty, numbness and headaches.   Psychiatric/Behavioral: Negative for agitation, confusion, decreased concentration and sleep disturbance. The patient is not nervous/anxious.        Physical Exam   Constitutional: She is oriented to  person, place, and time. She appears well-developed.   HENT:   Head: Normocephalic.   Right Ear: External ear normal.   Left Ear: External ear normal.   Nose: Nose normal.   Mouth/Throat: Oropharynx is clear and moist.   Eyes: Conjunctivae are normal. Pupils are equal, round, and reactive to light.   Neck: No JVD present. No thyromegaly present.   Cardiovascular: Normal rate, regular rhythm and normal heart sounds. Exam reveals no friction rub.   No murmur heard.  Pulmonary/Chest: Effort normal and breath sounds normal. No respiratory distress. She has no wheezes. She has no rales.   Abdominal: Soft. Bowel sounds are normal. She exhibits no distension. There is no tenderness. There is no guarding.   Musculoskeletal: She exhibits no edema or tenderness.   Lymphadenopathy:     She has no cervical adenopathy.   Neurological: She is alert and oriented to person, place, and time. She displays normal reflexes. No cranial nerve deficit.   Skin: No rash noted.   Psychiatric: Her behavior is normal.   Nursing note and vitals reviewed.                Diet and Exercise    Healthy Diet Yes  Adequate DietYes  Poor DietNo  Adequate Exercise RegimenYes  Inadequate Exercise RegimenNo      Cervical Cancer Screening    Risks and benefits discussedYes  Screening currentYes  PAP Done Today OrderedNo  Screening Not IndicatedNo  Pap Every 3 yearsYes  Screening Done By GYNYes    Breast Cancer screening  Not indicated      STD Testing  ChlamydiaNo  GonorrheaNo  HIVNo      Osteoporosis Screening  Not indicated    Colorectal Cancer Screening  Not indicated    Metabolic Screening  GlucoseYes  LipidsYes  CBCYes  TSHYes  UAYes  CMPYes  25OHNo      Immunizations  Risks and benefits discussedYes  Immunizations Up To DateNo  Immunizations NeededYes  Immunizations Per OrdersNo  Patient DYeseclines      Preventative Counseling  NutritionYes  Aerobic ExerciseYes  Weight Bearing ExerciseYes  Weight LossYes  Calcium SupplementsYes  Vitamin D  SupplementsYes  Reproductive HealthYes  Cardiovascular Risk ReductionYes  Tobacco CessationNo  Alcohol UseYes  Sunscreen UseYes  Self Skin ExaminationYes  Helmet UseNo  Seat Belt UseYes  Fall Risk ReductionNo  Advanced Directive PlanningNo      Patient Discussion  PatientYes  FamilyNo  CounselingYes  Nury was seen today for annual exam.    Diagnoses and all orders for this visit:    Healthcare maintenance  -     POCT urinalysis dipstick, automated  -     CBC & Differential  -     Comprehensive Metabolic Panel  -     Lipid Panel  -     TSH    Other iron deficiency anemia  -     Ferritin  -     Iron and TIBC

## 2019-10-02 LAB
ALBUMIN SERPL-MCNC: 4.2 G/DL (ref 3.5–5.2)
ALBUMIN/GLOB SERPL: 1.3 G/DL
ALP SERPL-CCNC: 65 U/L (ref 39–117)
ALT SERPL W P-5'-P-CCNC: 11 U/L (ref 1–33)
ANION GAP SERPL CALCULATED.3IONS-SCNC: 9.1 MMOL/L (ref 5–15)
AST SERPL-CCNC: 11 U/L (ref 1–32)
BASOPHILS # BLD AUTO: 0.03 10*3/MM3 (ref 0–0.2)
BASOPHILS NFR BLD AUTO: 0.4 % (ref 0–1.5)
BILIRUB SERPL-MCNC: 0.3 MG/DL (ref 0.2–1.2)
BUN BLD-MCNC: 11 MG/DL (ref 6–20)
BUN/CREAT SERPL: 13.3 (ref 7–25)
CALCIUM SPEC-SCNC: 8.9 MG/DL (ref 8.6–10.5)
CHLORIDE SERPL-SCNC: 103 MMOL/L (ref 98–107)
CHOLEST SERPL-MCNC: 167 MG/DL (ref 0–200)
CO2 SERPL-SCNC: 26.9 MMOL/L (ref 22–29)
CREAT BLD-MCNC: 0.83 MG/DL (ref 0.57–1)
DEPRECATED RDW RBC AUTO: 48.2 FL (ref 37–54)
EOSINOPHIL # BLD AUTO: 0.04 10*3/MM3 (ref 0–0.4)
EOSINOPHIL NFR BLD AUTO: 0.5 % (ref 0.3–6.2)
ERYTHROCYTE [DISTWIDTH] IN BLOOD BY AUTOMATED COUNT: 17.3 % (ref 12.3–15.4)
FERRITIN SERPL-MCNC: 61.6 NG/ML (ref 13–150)
GFR SERPL CREATININE-BSD FRML MDRD: 93 ML/MIN/1.73
GLOBULIN UR ELPH-MCNC: 3.3 GM/DL
GLUCOSE BLD-MCNC: 86 MG/DL (ref 65–99)
HCT VFR BLD AUTO: 33.7 % (ref 34–46.6)
HDLC SERPL-MCNC: 82 MG/DL (ref 40–60)
HGB BLD-MCNC: 10 G/DL (ref 12–15.9)
IMM GRANULOCYTES # BLD AUTO: 0.04 10*3/MM3 (ref 0–0.05)
IMM GRANULOCYTES NFR BLD AUTO: 0.5 % (ref 0–0.5)
IRON 24H UR-MRATE: 34 MCG/DL (ref 37–145)
IRON SATN MFR SERPL: 8 % (ref 20–50)
LDLC SERPL CALC-MCNC: 69 MG/DL (ref 0–100)
LDLC/HDLC SERPL: 0.84 {RATIO}
LYMPHOCYTES # BLD AUTO: 2.66 10*3/MM3 (ref 0.7–3.1)
LYMPHOCYTES NFR BLD AUTO: 31 % (ref 19.6–45.3)
MCH RBC QN AUTO: 22.6 PG (ref 26.6–33)
MCHC RBC AUTO-ENTMCNC: 29.7 G/DL (ref 31.5–35.7)
MCV RBC AUTO: 76.1 FL (ref 79–97)
MONOCYTES # BLD AUTO: 0.66 10*3/MM3 (ref 0.1–0.9)
MONOCYTES NFR BLD AUTO: 7.7 % (ref 5–12)
NEUTROPHILS # BLD AUTO: 5.14 10*3/MM3 (ref 1.7–7)
NEUTROPHILS NFR BLD AUTO: 59.9 % (ref 42.7–76)
NRBC BLD AUTO-RTO: 0 /100 WBC (ref 0–0.2)
PLATELET # BLD AUTO: 315 10*3/MM3 (ref 140–450)
PMV BLD AUTO: 10.8 FL (ref 6–12)
POTASSIUM BLD-SCNC: 3.7 MMOL/L (ref 3.5–5.2)
PROT SERPL-MCNC: 7.5 G/DL (ref 6–8.5)
RBC # BLD AUTO: 4.43 10*6/MM3 (ref 3.77–5.28)
SODIUM BLD-SCNC: 139 MMOL/L (ref 136–145)
TIBC SERPL-MCNC: 429 MCG/DL (ref 298–536)
TRANSFERRIN SERPL-MCNC: 288 MG/DL (ref 200–360)
TRIGL SERPL-MCNC: 82 MG/DL (ref 0–150)
TSH SERPL DL<=0.05 MIU/L-ACNC: 1.04 UIU/ML (ref 0.27–4.2)
VLDLC SERPL-MCNC: 16.4 MG/DL (ref 5–40)
WBC NRBC COR # BLD: 8.57 10*3/MM3 (ref 3.4–10.8)

## 2019-10-03 DIAGNOSIS — D64.9 ANEMIA, UNSPECIFIED TYPE: Primary | ICD-10-CM

## 2019-10-03 RX ORDER — FERROUS SULFATE 325(65) MG
325 TABLET ORAL
Qty: 30 TABLET | Refills: 0 | Status: SHIPPED | OUTPATIENT
Start: 2019-10-03 | End: 2020-06-10

## 2019-11-24 NOTE — TELEPHONE ENCOUNTER
Per Dr. Vazquez pt is have aura and should take her imitrex. Informed pt. Pt stated that she will take it and let us know how she is doing.   
Pt called and stated that she is having tingling and numbness in hands feets, and legs. It comes when she is having a migraine. She is having sharp pains in her temple.   She doesn't feel bad. But she wants to see you or see if there is something you could do. She just doesn't want to go to the ER since she doesn't feel bad.   
Home

## 2019-12-09 ENCOUNTER — OFFICE VISIT (OUTPATIENT)
Dept: NEUROLOGY | Facility: CLINIC | Age: 39
End: 2019-12-09

## 2019-12-09 VITALS
HEART RATE: 76 BPM | WEIGHT: 274 LBS | HEIGHT: 61 IN | DIASTOLIC BLOOD PRESSURE: 78 MMHG | BODY MASS INDEX: 51.73 KG/M2 | SYSTOLIC BLOOD PRESSURE: 138 MMHG | OXYGEN SATURATION: 98 %

## 2019-12-09 DIAGNOSIS — G43.809 MIGRAINE VARIANT WITH HEADACHE: Primary | Chronic | ICD-10-CM

## 2019-12-09 PROCEDURE — 99212 OFFICE O/P EST SF 10 MIN: CPT | Performed by: NURSE PRACTITIONER

## 2019-12-09 NOTE — PROGRESS NOTES
Subjective:     Patient ID: Nury Pineda is a 39 y.o. female.    CC:   Chief Complaint   Patient presents with   • Migraine       HPI:   History of Present Illness   This is a very pleasant 39-year-old female who presents for 4 month follow up on atypical migraines. She is having a migraine but does not want to take anything. Her migraines have frontal throbbing and neck pain as well.  Nausea, photophobia, phonophobia, hands and face tingling.  She has had these symptoms multiple times with all of her migraines which are not treated with medication at onset.  She has not taken any medications.  She has difficulty swallowing pills. This time she believes it is from sleep deprivation from being in a Show over the weekend and not sleeping much. She has not been able to tolerate multiple preventive medications and tells me she is very poor with compliance of daily meds. Prefers no meds unless severe migraine. Has compazine PRN. Has not filled Fioricet since Spring 2019.      She has had atypical migraines since 2/2015. Brother tragically killed about 8 months ago. Dealing ok with this. Has had some issues with atypical CP and cardiac and GI workup including EGD and stress ECHO WNL. She did have her MRI Brain with and without contrast unremarkable 1/18/2019. Has migraines rarely now.  Has cambia, aspirin, sumatriptan, Fioricet and Reglan at home PRN but has not required this. She has had an extensive prior workup. Has mild sleep apnea and prior sleep study recommended sleep position changes. Has intermittent dizziness whether sitting or standing, helps to go to sleep, feels like room is spinning. Concerns or vertiginous migraines in past. Cannot tolerate Topamax. Does not want to take daily meds. Prior imaging studies have showed no acute process and MRIs brain/C spine and MRA brain have been limited due to artifact from braces 1/2016 & 12/2015. CTA head and neck Normal 2016. Trouble with swallowing pills.    The  following portions of the patient's history were reviewed and updated as appropriate: allergies, current medications, past family history, past medical history, past social history, past surgical history and problem list.    Past Medical History:   Diagnosis Date   • Absolute anemia    • Arthritis     Right knee   • Carpal tunnel syndrome     RIGHT WRIST   • Cholelithiasis     Nausea related to stones has phenergan. Also has related RUQ pain.   • Dizziness    • Elevated C-reactive protein (CRP)    • Elevated erythrocyte sedimentation rate    • Influenza    • Iron deficiency     Will not take iron supplement but will take MVI   • Left hip pain    • Migraines    • Numbness and tingling in right hand     Has nerve conductions 3/23/2016, awaiting results. Along with Paresthesia.   • Pain and swelling of right forearm     Check u/s to r/o clot. Rash and redness are resolving, will continue to monitor.   • Pain of left arm    • Sleep disturbances    • Vitamin D deficiency     25 oh.       Past Surgical History:   Procedure Laterality Date   •  SECTION     • CHOLECYSTECTOMY     • COLONOSCOPY     • CYST REMOVAL      behind belly button   • TONSILLECTOMY     • URACHAL CYST INCISION      History of Excision Of Urachal Cyst.       Social History     Socioeconomic History   • Marital status: Single     Spouse name: Not on file   • Number of children: Not on file   • Years of education: Not on file   • Highest education level: Not on file   Tobacco Use   • Smoking status: Never Smoker   • Smokeless tobacco: Never Used   Substance and Sexual Activity   • Alcohol use: Yes     Alcohol/week: 2.0 standard drinks     Types: 2 Glasses of wine per week   • Drug use: No   • Sexual activity: Defer     Partners: Male     Birth control/protection: Condom   Social History Narrative    Single    Caffeine Intake: 0-1 servings per day       Family History   Problem Relation Age of Onset   • Stroke Mother    •  "Hypertension Mother    • Migraines Mother    • Osteoarthritis Mother    • Cervical cancer Mother    • Heart disease Mother    • Hyperlipidemia Maternal Grandmother    • Osteoarthritis Maternal Grandmother    • Birth defects Maternal Grandmother    • Ovarian cancer Maternal Grandmother         PREMENOPAUSAL    • Transient ischemic attack Maternal Grandmother    • Hypertension Maternal Grandmother    • Stroke Other         CVA x 2. and TIA   • Cancer Other         Cervical, malignant neoplasm x2 , ovarian   • Hypertension Father    • Diabetes Father         Review of Systems   Constitutional: Negative for chills, fatigue, fever and unexpected weight change.   HENT: Negative for ear pain, hearing loss, nosebleeds, rhinorrhea and sore throat.    Eyes: Negative for photophobia, pain, discharge, itching and visual disturbance.   Respiratory: Negative for cough, chest tightness, shortness of breath and wheezing.    Cardiovascular: Negative for chest pain, palpitations and leg swelling.   Gastrointestinal: Negative for abdominal pain, blood in stool, constipation, diarrhea, nausea and vomiting.   Genitourinary: Negative for dysuria, frequency, hematuria and urgency.   Musculoskeletal: Negative for arthralgias, back pain, gait problem, joint swelling, myalgias, neck pain and neck stiffness.   Skin: Negative for rash and wound.   Allergic/Immunologic: Negative for environmental allergies and food allergies.   Neurological: Positive for headaches. Negative for dizziness, tremors, seizures, syncope, speech difficulty, weakness, light-headedness and numbness.   Hematological: Negative for adenopathy. Does not bruise/bleed easily.   Psychiatric/Behavioral: Negative for agitation, confusion, decreased concentration, hallucinations, sleep disturbance and suicidal ideas. The patient is not nervous/anxious.    All other systems reviewed and are negative.       Objective:  /78   Pulse 76   Ht 154.9 cm (61\")   Wt 124 kg (274 " lb)   SpO2 98%   BMI 51.77 kg/m²     Neurologic Exam     Mental Status   Oriented to person, place, and time.   Level of consciousness: alert    Cranial Nerves   Cranial nerves II through XII intact.     Motor Exam   Muscle bulk: normal  Overall muscle tone: normal    Strength   Strength 5/5 throughout.     Gait, Coordination, and Reflexes     Gait  Gait: normal    Coordination   Finger to nose coordination: normal    Tremor   Resting tremor: absent  Intention tremor: absent  Action tremor: absent    Reflexes   Right brachioradialis: 2+  Left brachioradialis: 2+  Right biceps: 2+  Left biceps: 2+  Right triceps: 2+  Left triceps: 2+  Right patellar: 2+  Left patellar: 2+  Right achilles: 2+  Left achilles: 2+  Right : 2+  Left : 2+      Physical Exam   Constitutional: She is oriented to person, place, and time.   Neurological: She is oriented to person, place, and time. She has normal strength. She has a normal Finger-Nose-Finger Test. Gait normal.   Reflex Scores:       Tricep reflexes are 2+ on the right side and 2+ on the left side.       Bicep reflexes are 2+ on the right side and 2+ on the left side.       Brachioradialis reflexes are 2+ on the right side and 2+ on the left side.       Patellar reflexes are 2+ on the right side and 2+ on the left side.       Achilles reflexes are 2+ on the right side and 2+ on the left side.      Assessment/Plan:       Nury was seen today for migraine.    Diagnoses and all orders for this visit:    Migraine variant with headache  Comments:  compazine with fioricet PRN-sparingly. prefers conservative therapy. Try OTC lavender & peppermint oil PRN migraines.         Continue conservative therapy. Consider OTC alternative therapy. F/U in 6 months or sooner if needed.Reviewed medications, potential side effects and signs and symptoms to report. Discussed risk versus benefits of treatment plan with patient and/or family-including medications, labs and radiology that may  be ordered. Addressed questions and concerns during visit. Patient and/or family verbalized understanding and agree with plan.    Migrastil migraine  $10 on amazon-mix of peppermint oil, lavender oil and coconut oil-roll on temples, back of neck and behind ears as needed for migraines.    EMR Dragon/Transcription Disclaimer:  Much of this encounter note is an electronic transcription of spoken language to printed text. Electronic transcription of spoken language may permit erroneous words or phrases to be inadvertently transcribed. Although I have reviewed the note for such errors, some may still exist in this documentation.    Ele Thorne, APRN  12/9/2019

## 2019-12-09 NOTE — PATIENT INSTRUCTIONS
Migrastil migraine  $10 on amaz[employer]-mix of peppermint oil, lavender oil and coconut oil-roll on temples, back of neck and behind ears as needed for migraines.

## 2019-12-18 ENCOUNTER — OFFICE VISIT (OUTPATIENT)
Dept: OBSTETRICS AND GYNECOLOGY | Facility: CLINIC | Age: 39
End: 2019-12-18

## 2019-12-18 VITALS
SYSTOLIC BLOOD PRESSURE: 130 MMHG | WEIGHT: 274.8 LBS | RESPIRATION RATE: 14 BRPM | BODY MASS INDEX: 51.88 KG/M2 | HEIGHT: 61 IN | DIASTOLIC BLOOD PRESSURE: 80 MMHG

## 2019-12-18 DIAGNOSIS — Z30.41 ENCOUNTER FOR BIRTH CONTROL PILLS MAINTENANCE: ICD-10-CM

## 2019-12-18 DIAGNOSIS — N64.4 BREAST TENDERNESS IN FEMALE: ICD-10-CM

## 2019-12-18 DIAGNOSIS — Z01.419 WELL WOMAN EXAM WITH ROUTINE GYNECOLOGICAL EXAM: Primary | ICD-10-CM

## 2019-12-18 PROBLEM — B37.31 CANDIDAL VAGINITIS: Status: RESOLVED | Noted: 2018-08-23 | Resolved: 2019-12-18

## 2019-12-18 PROCEDURE — 99214 OFFICE O/P EST MOD 30 MIN: CPT | Performed by: OBSTETRICS & GYNECOLOGY

## 2019-12-18 NOTE — PROGRESS NOTES
Subjective   Chief Complaint   Patient presents with   • Gynecologic Exam     Bilateral breast tenderness     Nury Pineda is a 39 y.o. year old  presenting to be seen for her annual exam.  Patient has been complaining of breast tenderness for only 2 days.  She will decrease caffeine to see if this causes an improvement in her condition.  She is on lo Loestrin birth control pills and is doing well.  She does not need a prescription for the pills at this time.  Her blood pressure has remained normal and she has no increase in headaches.  Patient presents today for an annual exam with no other problems.    SEXUAL Hx:  She is currently sexually active.  In the past year there has not been new sexual partners.    Condoms are not typically used.  She would not like to be screened for STD's at today's exam.  Current birth control method: OCP (estrogen/progesterone).  She is happy with her current method of contraception and does not want to discuss alternative methods of contraception.  MENSTRUAL Hx:  No LMP recorded. (Menstrual status: Oral contraceptives).  In the past 6 months her cycles have been irregular.  Her menstrual flow is typically light.   Each month on average there are roughly 0 day(s) of very heavy flow.    Intermenstrual bleeding is absent.    Post-coital bleeding is absent.  Dysmenorrhea: none  PMS: none  Her cycles are not a source of concern for her that she wishes to discuss today.  HEALTH Hx:  She exercises regularly:yes.  She wears her seat belt:no.  She has concerns about domestic violence: no.  OTHER COMPLAINTS:  Nothing else    The following portions of the patient's history were reviewed and updated as appropriate:problem list, current medications, allergies, past family history, past medical history, past social history and past surgical history.    Social History    Tobacco Use      Smoking status: Never Smoker      Smokeless tobacco: Never Used    Review of Systems  Review of  "Systems - History obtained from the patient  General ROS: negative  Psychological ROS: negative  ENT ROS: negative  Allergy and Immunology ROS: positive for - seasonal allergies  Hematological and Lymphatic ROS: negative  Endocrine ROS: negative  Breast ROS: positive for -tenderness  Respiratory ROS: no cough, shortness of breath, or wheezing  Cardiovascular ROS: no chest pain or dyspnea on exertion  Gastrointestinal ROS: no abdominal pain, change in bowel habits, or black or bloody stools  Genito-Urinary ROS: no dysuria, trouble voiding, or hematuria  Musculoskeletal ROS: negative  Neurological ROS: positive for - headaches  Dermatological ROS: negative        Objective   /80   Resp 14   Ht 154.9 cm (61\")   Wt 125 kg (274 lb 12.8 oz)   Breastfeeding No   BMI 51.92 kg/m²     General:  well developed; well nourished  no acute distress   Skin:  No suspicious lesions seen   Thyroid: normal to inspection and palpation   Breasts:  Examined in supine position  Symmetric without masses or skin dimpling  Nipples normal without inversion, lesions or discharge  There are no palpable axillary nodes   Abdomen: soft, non-tender; no masses  no umbilical or inguinal hernias are present  no hepato-splenomegaly   Heart: regular rate and rhythm, S1, S2 normal, no murmur, click, rub or gallop   Lungs: clear to auscultation   Pelvis: Clinical staff was present for exam  External genitalia:  normal appearance of the external genitalia including Bartholin's and [place]'s glands.  :  urethral meatus normal;  Vaginal:  normal pink mucosa without prolapse or lesions.  Cervix:  normal appearance. Pap smear was done  Uterus:  normal size, shape and consistency.  Adnexa:  normal bimanual exam of the adnexa.  Rectal:  digital rectal exam not performed; anus visually normal appearing.          Assessment/Plan   Nury was seen today for gynecologic exam.    Diagnoses and all orders for this visit:    Well woman exam with routine " gynecological exam  -     Liquid-based Pap Smear, Screening    Breast tenderness in female    Encounter for birth control pills maintenance         Call when refill needed for birth control pills  Decrease caffeine for breast tenderness  Return in 1 year  This note was electronically signed.    Danilo Yung MD   December 18, 2019

## 2020-03-25 ENCOUNTER — TELEPHONE (OUTPATIENT)
Dept: INTERNAL MEDICINE | Facility: CLINIC | Age: 40
End: 2020-03-25

## 2020-03-26 DIAGNOSIS — J20.9 ACUTE BRONCHITIS, UNSPECIFIED ORGANISM: ICD-10-CM

## 2020-03-26 RX ORDER — MONTELUKAST SODIUM 10 MG/1
10 TABLET ORAL NIGHTLY
Qty: 30 TABLET | Refills: 0 | Status: SHIPPED | OUTPATIENT
Start: 2020-03-26 | End: 2020-07-15 | Stop reason: SDUPTHER

## 2020-03-30 ENCOUNTER — TELEPHONE (OUTPATIENT)
Dept: OBSTETRICS AND GYNECOLOGY | Facility: CLINIC | Age: 40
End: 2020-03-30

## 2020-03-30 NOTE — TELEPHONE ENCOUNTER
[phone] patient called this morning stating she has a yeast infection and wanted to know if Dr. Yung would send in a prescription for vaginal cream instead of Diflucan. Patient is up to date on her annual so I will send in a prescription for Terazol 7 with 1 refill to [facility]. Patient notified and verbalized understanding.

## 2020-06-03 ENCOUNTER — TELEPHONE (OUTPATIENT)
Dept: OBSTETRICS AND GYNECOLOGY | Facility: CLINIC | Age: 40
End: 2020-06-03

## 2020-06-03 NOTE — TELEPHONE ENCOUNTER
Dr. Yung's patient  Patient called asking for something to get rid of a yeast infection. She complains of white vaginal discharge with itching for about 2 days now. I advised patient I will send in Terazol 7 and if her symptoms does not get better she will need an appointment. Patient verbalized understanding. E-Rx sent

## 2020-06-10 ENCOUNTER — OFFICE VISIT (OUTPATIENT)
Dept: NEUROLOGY | Facility: CLINIC | Age: 40
End: 2020-06-10

## 2020-06-10 DIAGNOSIS — K21.9 GASTROESOPHAGEAL REFLUX DISEASE WITHOUT ESOPHAGITIS: ICD-10-CM

## 2020-06-10 DIAGNOSIS — G43.809 MIGRAINE VARIANT WITH HEADACHE: Primary | Chronic | ICD-10-CM

## 2020-06-10 DIAGNOSIS — R49.0 HOARSENESS: ICD-10-CM

## 2020-06-10 PROBLEM — E66.01 MORBID OBESITY (HCC): Status: ACTIVE | Noted: 2020-06-10

## 2020-06-10 PROBLEM — E61.1 IRON DEFICIENCY: Status: ACTIVE | Noted: 2020-06-10

## 2020-06-10 PROCEDURE — 99441 PR PHYS/QHP TELEPHONE EVALUATION 5-10 MIN: CPT | Performed by: NURSE PRACTITIONER

## 2020-06-10 RX ORDER — PROCHLORPERAZINE MALEATE 10 MG
10 TABLET ORAL EVERY 6 HOURS PRN
Qty: 15 TABLET | Refills: 5 | Status: SHIPPED | OUTPATIENT
Start: 2020-06-10 | End: 2020-07-29 | Stop reason: SDUPTHER

## 2020-06-10 RX ORDER — BUTALBITAL, ACETAMINOPHEN AND CAFFEINE 50; 325; 40 MG/1; MG/1; MG/1
1 CAPSULE ORAL EVERY 6 HOURS PRN
Qty: 15 CAPSULE | Refills: 0 | Status: SHIPPED | OUTPATIENT
Start: 2020-06-10 | End: 2022-04-29

## 2020-06-10 NOTE — PROGRESS NOTES
Subjective:     Patient ID: Nury Pineda is a 39 y.o. female.    CC:   Chief Complaint   Patient presents with   • Migraine       HPI:   History of Present Illness     Due to COVID-19 Pandemic, this visit was completed via telephone with verbal consent to treat obtained from patient.      You have chosen to receive care through a telephone visit. Do you consent to use a telephone visit for your medical care today? Yes    This is a very pleasant 39-year-old female who presents for 6 month follow up on atypical migraines. She has had no migraines in past 6 months. Very minor headaches present. Uses butalbital and compazine very sparingly PRN and needs new script-no fills in past 12 months. Her migraines have frontal throbbing and neck pain as well.  Nausea, photophobia, phonophobia, hands and face tingling. Very poor compliance with medications for prevention and abortive.    She has difficulty swallowing pills. She reports esophageal spasms at times. She also has acid reflux symptoms but is not good about taking medications. She would like to see GI for further evaluation. She also has been hoarse for many months. She sings. She is using a mint to help with the hoarseness, but would also like to see ENT for further eval since she is off work until August 2020 and has time to complete these evals. Did have EGD 10/2019 and normal per procedure report in Epic. She does not want medications at this time. Would like GI appointment. Denies cough, weight loss, nausea, vomiting, BRBPR or significant constipation.     She has had atypical migraines since 2/2015. Brother tragically killed in 2019. Dealing ok with this. MRI Brain with and without contrast unremarkable 1/18/2019. Has migraines rarely now.  Has cambia, aspirin, sumatriptan, Fioricet and Reglan at home PRN but has not required this. She has had an extensive prior workup. Has mild sleep apnea and prior sleep study recommended sleep position changes. Has  intermittent dizziness whether sitting or standing, helps to go to sleep, feels like room is spinning. Concerns or vertiginous migraines in past. Cannot tolerate Topamax. Does not want to take daily meds. Prior imaging studies have showed no acute process and MRIs brain/C spine and MRA brain have been limited due to artifact from braces 2016 & 2015. CTA head and neck Normal 2016. Trouble with swallowing pills.    The following portions of the patient's history were reviewed and updated as appropriate: allergies, current medications, past family history, past medical history, past social history, past surgical history and problem list.    Past Medical History:   Diagnosis Date   • Absolute anemia    • Arthritis     Right knee   • Carpal tunnel syndrome     RIGHT WRIST   • Cholelithiasis     Nausea related to stones has phenergan. Also has related RUQ pain.   • Dizziness    • Elevated C-reactive protein (CRP)    • Elevated erythrocyte sedimentation rate    • Influenza    • Iron deficiency     Will not take iron supplement but will take MVI   • Left hip pain    • Migraines    • Numbness and tingling in right hand     Has nerve conductions 3/23/2016, awaiting results. Along with Paresthesia.   • Pain and swelling of right forearm     Check u/s to r/o clot. Rash and redness are resolving, will continue to monitor.   • Pain of left arm    • Sleep disturbances    • Vitamin D deficiency     25 oh.       Past Surgical History:   Procedure Laterality Date   •  SECTION     • CHOLECYSTECTOMY     • COLONOSCOPY     • CYST REMOVAL      behind belly button   • TONSILLECTOMY     • URACHAL CYST INCISION      History of Excision Of Urachal Cyst.       Social History     Socioeconomic History   • Marital status: Single     Spouse name: Not on file   • Number of children: Not on file   • Years of education: Not on file   • Highest education level: Not on file   Tobacco Use   • Smoking status: Never  Smoker   • Smokeless tobacco: Never Used   Substance and Sexual Activity   • Alcohol use: Yes     Alcohol/week: 2.0 standard drinks     Types: 2 Glasses of wine per week   • Drug use: No   • Sexual activity: Defer     Partners: Male     Birth control/protection: Condom, OCP   Social History Narrative    Single    Caffeine Intake: 0-1 servings per day       Family History   Problem Relation Age of Onset   • Stroke Mother    • Hypertension Mother    • Migraines Mother    • Osteoarthritis Mother    • Cervical cancer Mother    • Heart disease Mother    • Hyperlipidemia Maternal Grandmother    • Osteoarthritis Maternal Grandmother    • Birth defects Maternal Grandmother    • Ovarian cancer Maternal Grandmother         PREMENOPAUSAL    • Transient ischemic attack Maternal Grandmother    • Hypertension Maternal Grandmother    • Stroke Other         CVA x 2. and TIA   • Cancer Other         Cervical, malignant neoplasm x2 , ovarian   • Hypertension Father    • Diabetes Father         Review of Systems   Constitutional: Negative.    HENT: Positive for trouble swallowing and voice change.    Eyes: Negative.    Respiratory: Negative.    Cardiovascular: Negative.    Gastrointestinal: Negative for abdominal distention, abdominal pain, blood in stool, constipation, diarrhea, nausea, rectal pain and vomiting.        Reflux   Endocrine: Negative.    Genitourinary: Negative.    Musculoskeletal: Negative.    Allergic/Immunologic: Negative.    Neurological: Positive for headaches.   Hematological: Negative.    Psychiatric/Behavioral: Negative.         Objective:  There were no vitals taken for this visit.  LITA D/T TELEPHONE VISIT    Neurologic Exam     LITA D/T TELEPHONE VISIT  Physical Exam     LITA D/T TELEPHONE VISIT    Assessment/Plan:       Nury was seen today for migraine.    Diagnoses and all orders for this visit:    Migraine variant with headache  -     prochlorperazine (COMPAZINE) 10 MG tablet; Take 1 tablet by mouth Every 6  (Six) Hours As Needed for Nausea or Vomiting.  -     Butalbital-APAP-Caffeine -40 MG per capsule; Take 1 capsule by mouth Every 6 (Six) Hours As Needed for Migraine.    Gastroesophageal reflux disease without esophagitis  -     Ambulatory Referral to Gastroenterology    Hoarseness  -     Ambulatory Referral to ENT (Otolaryngology)           Total time of telephone visit 8 minutes. Continue PRN meds. Monitor migraines. Prefers no daily meds. Referrals placed per her request for further eval of reflux/esophageal spasms and persistent hoarseness. F/U in 6 months or sooner if needed. Reviewed medications, potential side effects and signs and symptoms to report. Discussed risk versus benefits of treatment plan with patient and/or family-including medications, labs and radiology that may be ordered. Addressed questions and concerns during visit. Patient and/or family verbalized understanding and agree with plan.    As part of this patient's treatment plan I am prescribing controlled substances. The patient has been made aware of appropriate use of such medications, including potential risk of somnolence, limited ability to drive and/or work safely, and potential for dependence or overdose. It has also been made clear that these medications are for use by the patient only, without concomitant use of alcohol or other substances unless prescribed. Keep out of reach of children.  [facility] report has been reviewed. If this is going to be prescribed long term, [facility] Controlled Substance Agreement Contract has also been read and signed by patient and myself.  [facility] #[id number] reviewed.      During this visit the following were done:  Labs Reviewed []    Labs Ordered []    Radiology Reports Reviewed [x]    Radiology Ordered []    PCP Records Reviewed [x]    Referring Provider Records Reviewed []    ER Records Reviewed []    Hospital Records Reviewed []    History Obtained From Family []    Radiology Images Reviewed []    Other  Reviewed [x]    Records Requested []      Ele Thorne, APRN  6/10/2020

## 2020-06-11 ENCOUNTER — TELEPHONE (OUTPATIENT)
Dept: NEUROLOGY | Facility: CLINIC | Age: 40
End: 2020-06-11

## 2020-06-11 NOTE — TELEPHONE ENCOUNTER
----- Message from SYBIL Macario sent at 6/10/2020  4:40 PM EDT -----  Please call and schedule 6 month in office visit. Complete ent and gi referrals. thanks

## 2020-06-28 ENCOUNTER — APPOINTMENT (OUTPATIENT)
Dept: PREADMISSION TESTING | Facility: HOSPITAL | Age: 40
End: 2020-06-28

## 2020-06-28 PROCEDURE — U0002 COVID-19 LAB TEST NON-CDC: HCPCS

## 2020-06-28 PROCEDURE — C9803 HOPD COVID-19 SPEC COLLECT: HCPCS

## 2020-06-28 PROCEDURE — U0004 COV-19 TEST NON-CDC HGH THRU: HCPCS

## 2020-06-29 LAB
REF LAB TEST METHOD: NORMAL
SARS-COV-2 RNA RESP QL NAA+PROBE: NOT DETECTED

## 2020-06-30 ENCOUNTER — OUTSIDE FACILITY SERVICE (OUTPATIENT)
Dept: GASTROENTEROLOGY | Facility: CLINIC | Age: 40
End: 2020-06-30

## 2020-06-30 ENCOUNTER — LAB REQUISITION (OUTPATIENT)
Dept: LAB | Facility: HOSPITAL | Age: 40
End: 2020-06-30

## 2020-06-30 DIAGNOSIS — K21.9 GASTRO-ESOPHAGEAL REFLUX DISEASE WITHOUT ESOPHAGITIS: ICD-10-CM

## 2020-06-30 PROCEDURE — 88305 TISSUE EXAM BY PATHOLOGIST: CPT | Performed by: INTERNAL MEDICINE

## 2020-06-30 PROCEDURE — 43239 EGD BIOPSY SINGLE/MULTIPLE: CPT | Performed by: INTERNAL MEDICINE

## 2020-07-09 PROCEDURE — U0003 INFECTIOUS AGENT DETECTION BY NUCLEIC ACID (DNA OR RNA); SEVERE ACUTE RESPIRATORY SYNDROME CORONAVIRUS 2 (SARS-COV-2) (CORONAVIRUS DISEASE [COVID-19]), AMPLIFIED PROBE TECHNIQUE, MAKING USE OF HIGH THROUGHPUT TECHNOLOGIES AS DESCRIBED BY CMS-2020-01-R: HCPCS | Performed by: FAMILY MEDICINE

## 2020-07-12 ENCOUNTER — TELEPHONE (OUTPATIENT)
Dept: URGENT CARE | Facility: CLINIC | Age: 40
End: 2020-07-12

## 2020-07-12 NOTE — TELEPHONE ENCOUNTER
Spoke to patient about positive covid result; patient stated she was feeling better but just had loss of taste and smell. Pt stated she didn't have any contact with a positive patient that she is aware of but did have outpatient surgery before this and was tested and possibly exposed while in the hospital. She hasn't travelled outside the country; pt told to quarantine for the 14 days from onset of symptoms.

## 2020-07-15 DIAGNOSIS — J20.9 ACUTE BRONCHITIS, UNSPECIFIED ORGANISM: ICD-10-CM

## 2020-07-15 RX ORDER — MONTELUKAST SODIUM 10 MG/1
10 TABLET ORAL NIGHTLY
Qty: 30 TABLET | Refills: 3 | Status: SHIPPED | OUTPATIENT
Start: 2020-07-15 | End: 2021-09-13 | Stop reason: SDUPTHER

## 2020-07-17 ENCOUNTER — TELEPHONE (OUTPATIENT)
Dept: NEUROLOGY | Facility: CLINIC | Age: 40
End: 2020-07-17

## 2020-07-17 NOTE — TELEPHONE ENCOUNTER
PT CALLED IN SAYING SHE WOULD LIKE TO SEE SYBIL HERNANDEZ NEXT WEEK. SHE JUST GOT OVER COVID 19. SHE STATES SHE HAS ALREADY SELF QUARANTINED FOR 14 DAYS AND HAVE BEEN RELEASED BUT STATES SHE IS STILL EXPERIENCING PRESSURE IN HER HEAD AND SOMETIMES, IT FEELS LIKE COLD WATER IS RUNNING ON THE BACK OF HER NECK ALTHOUGH NOTHING IS REALLY THERE. SHE SAID SHE IS HAVING TINGLING ON THE LEFT SIDE OF HER FACE AND THAT'S BEEN GOING ON FOR ABOUT TWO DAYS. SHE CONTACTED HER PCP WHO TOLD HER TO CONTACT HER NEUROLOGIST BECAUSE COVID CAN AFFECT PT'S NEUROLOGICAL ISSUES. SHE SAID SHE'S NOT EXPERIENCING ANY OTHER SYMPTOMS. SHE SAID SHE IS TAKING BROM/PSE/DM BECAUSE SHE WAS TOLD SHE HAD AN UPPER RESPIRATORY INFECTION BUT THEN WAS TOLD SHE CAME BACK POSITIVE FOR COVID 19. SHE WAS ALSO GIVEN PREDNISONE WHICH SHE HASN'T TAKEN YET BECAUSE SHE IS NOT EXPERIENCING ANY MIGRAINES. PLEASE ADVISE        CALL BACK- [phone]

## 2020-07-20 NOTE — TELEPHONE ENCOUNTER
She tested positive on 7/9/2020 for COVID-19-the soonest she could be seen would be 7/23/2020 to allow for a full 14 days quarantine. I am happy to see her next week or in the next few weeks to evaluate her new symptoms. We would likely get a new MRI Brain because yes COVID-19 can cause neurological symptoms. Ok to schedule a sooner appointment after 7/23/2020. Thanks, SYBIL Headley    Keep her regular December appointment too.

## 2020-07-22 ENCOUNTER — TELEPHONE (OUTPATIENT)
Dept: OBSTETRICS AND GYNECOLOGY | Facility: CLINIC | Age: 40
End: 2020-07-22

## 2020-07-22 NOTE — TELEPHONE ENCOUNTER
Patient called wanting a referral to general surgeon for a panniculectomy.    Patient wants a referral to have an abdominal plasty due to weight loss.  She was advised to call her primary care to use general health reasons as a possible indication.  Patient has a name of a surgeon.  She will call and see if she can get an appointment.    Danilo Yung MD

## 2020-07-29 ENCOUNTER — OFFICE VISIT (OUTPATIENT)
Dept: NEUROLOGY | Facility: CLINIC | Age: 40
End: 2020-07-29

## 2020-07-29 DIAGNOSIS — G43.809 MIGRAINE VARIANT WITH HEADACHE: Primary | Chronic | ICD-10-CM

## 2020-07-29 DIAGNOSIS — R20.2 FACIAL TINGLING SENSATION: ICD-10-CM

## 2020-07-29 DIAGNOSIS — R20.0 LT FACIAL NUMBNESS: ICD-10-CM

## 2020-07-29 DIAGNOSIS — U07.1 COVID-19 VIRUS DETECTED: ICD-10-CM

## 2020-07-29 PROCEDURE — 99442 PR PHYS/QHP TELEPHONE EVALUATION 11-20 MIN: CPT | Performed by: NURSE PRACTITIONER

## 2020-07-29 RX ORDER — OMEPRAZOLE 40 MG/1
40 CAPSULE, DELAYED RELEASE ORAL DAILY
COMMUNITY
Start: 2020-06-30 | End: 2021-09-13 | Stop reason: SDUPTHER

## 2020-07-29 RX ORDER — PROCHLORPERAZINE MALEATE 10 MG
10 TABLET ORAL EVERY 6 HOURS PRN
Qty: 15 TABLET | Refills: 5 | Status: SHIPPED | OUTPATIENT
Start: 2020-07-29 | End: 2021-01-07 | Stop reason: SDUPTHER

## 2020-07-29 NOTE — PROGRESS NOTES
"Subjective:     Patient ID: Nury Pineda is a 39 y.o. female.    CC:   Chief Complaint   Patient presents with   • Migraine       HPI:   History of Present Illness     Due to COVID-19 Pandemic, this visit was completed via telephone with verbal consent to treat obtained from patient.      You have chosen to receive care through a telephone visit. Do you consent to use a telephone visit for your medical care today? Yes    This is a very pleasant 39-year-old female who presents for sooner follow up for new onset left facial numbness, tingling and blurred vision intermittently since COVID-19 positive test on 7/9/2020. She also had a URI along with the infection. She tells me \"it feels like cold water is going down the back of my head\". Her PCP recommended she contact us for an MRI Brain with new neurological symptoms following COVID-19 infection. She has not had repeat testing yet. She has no symptoms of COVID-19 active infection at this time.  Last visit we referred her to ear nose and throat specialist for hoarseness.  They did a scope and diagnosed her with esophageal reflux.  She tells me she has no symptoms or reflux.  She also went into the hospital on 6/30/2020 to have an EGD completed by Dr. Zurita and I read the report and this shows chronic erosive esophagitis without evidence of Rivera's esophagitis.  She was prescribed omeprazole.  She has tried to swallow the pill but vomits due to the esophageal spasms.  I recommended that she open the capsule and sprinkle this over applesauce or yogurt.  She will try this.    She has been followed in our office for years for atypical migraines. Her migraines have been better overall and she uses fioricet with compazine PRN. Very minor headaches present. Uses butalbital and compazine very sparingly PRN. Her migraines have frontal throbbing and neck pain as well.  Nausea, photophobia, phonophobia, hands and face tingling. Very poor compliance with medications for " prevention and abortive in past d/t trouble swallowing pills and esophageal spasms.     She has had atypical migraines since 2/2015. Brother tragically killed in 2019. Dealing ok with this. MRI Brain with and without contrast unremarkable 1/18/2019. Has migraines rarely now.  Has cambia, aspirin, sumatriptan, Fioricet and Reglan at home PRN but has not required this. She has had an extensive prior workup. Has mild sleep apnea and prior sleep study recommended sleep position changes. Has intermittent dizziness whether sitting or standing, helps to go to sleep, feels like room is spinning. Concerns or vertiginous migraines in past. Cannot tolerate Topamax. Does not want to take daily meds. Prior imaging studies have showed no acute process and MRIs brain/C spine and MRA brain have been limited due to artifact from braces 1/2016 & 12/2015. CTA head and neck Normal 2016. Trouble with swallowing pills.    The following portions of the patient's history were reviewed and updated as appropriate: allergies, current medications, past family history, past medical history, past social history, past surgical history and problem list.    Past Medical History:   Diagnosis Date   • Absolute anemia    • Arthritis     Right knee   • Carpal tunnel syndrome     RIGHT WRIST   • Cholelithiasis     Nausea related to stones has phenergan. Also has related RUQ pain.   • Dizziness    • Elevated C-reactive protein (CRP)    • Elevated erythrocyte sedimentation rate    • Influenza    • Iron deficiency     Will not take iron supplement but will take MVI   • Left hip pain    • Migraines    • Numbness and tingling in right hand     Has nerve conductions 3/23/2016, awaiting results. Along with Paresthesia.   • Pain and swelling of right forearm     Check u/s to r/o clot. Rash and redness are resolving, will continue to monitor.   • Pain of left arm    • Sleep disturbances    • Vitamin D deficiency     25 oh.       Past Surgical History:    Procedure Laterality Date   •  SECTION     • CHOLECYSTECTOMY     • COLONOSCOPY  2016   • CYST REMOVAL      behind belly button   • TONSILLECTOMY     • URACHAL CYST INCISION      History of Excision Of Urachal Cyst.       Social History     Socioeconomic History   • Marital status: Single     Spouse name: Not on file   • Number of children: Not on file   • Years of education: Not on file   • Highest education level: Not on file   Tobacco Use   • Smoking status: Never Smoker   • Smokeless tobacco: Never Used   Substance and Sexual Activity   • Alcohol use: Yes     Alcohol/week: 2.0 standard drinks     Types: 2 Glasses of wine per week   • Drug use: No   • Sexual activity: Defer     Partners: Male     Birth control/protection: Condom, OCP   Social History Narrative    Single    Caffeine Intake: 0-1 servings per day       Family History   Problem Relation Age of Onset   • Stroke Mother    • Hypertension Mother    • Migraines Mother    • Osteoarthritis Mother    • Cervical cancer Mother    • Heart disease Mother    • Hyperlipidemia Maternal Grandmother    • Osteoarthritis Maternal Grandmother    • Birth defects Maternal Grandmother    • Ovarian cancer Maternal Grandmother         PREMENOPAUSAL    • Transient ischemic attack Maternal Grandmother    • Hypertension Maternal Grandmother    • Stroke Other         CVA x 2. and TIA   • Cancer Other         Cervical, malignant neoplasm x2 , ovarian   • Hypertension Father    • Diabetes Father         Review of Systems   Constitutional: Negative.    HENT: Positive for voice change (diagnosed with EGD for acid reflux, on omeprazole, esophageal spasms, open capsules or crush meds).    Eyes: Negative.    Respiratory: Negative.    Cardiovascular: Negative.    Gastrointestinal: Negative.    Endocrine: Negative.    Genitourinary: Negative.    Musculoskeletal: Negative.    Skin: Negative.    Allergic/Immunologic: Positive for environmental allergies.    Neurological: Positive for headaches.   Hematological: Negative.    Psychiatric/Behavioral: Negative.         Objective:  There were no vitals taken for this visit.  LITA D/T TELEPHONE VISIT    Neurologic Exam     Mental Status   Oriented to person, place, and time.   Speech: speech is normal   Level of consciousness: alert      Physical Exam   Constitutional: She is oriented to person, place, and time.   Neurological: She is oriented to person, place, and time.   Psychiatric: Her speech is normal.   LITA OTHER EXAM D/T TELEPHONE VISIT    Assessment/Plan:       Nury was seen today for migraine.    Diagnoses and all orders for this visit:    Migraine variant with headache  -     prochlorperazine (COMPAZINE) 10 MG tablet; Take 1 tablet by mouth Every 6 (Six) Hours As Needed for Nausea or Vomiting.    Facial tingling sensation  -     MRI Brain With & Without Contrast; Future    Lt facial numbness  -     MRI Brain With & Without Contrast; Future    COVID-19 virus detected  -     MRI Brain With & Without Contrast; Future         Total time of telephone visit 13 minutes.  Recommend taking the omeprazole and sprinkling it as long as this is okay with the pharmacy and her GI specialist.  Continue Compazine and Fioricet as needed migraines.  With new onset of facial tingling, left facial numbness and recent COVID-19 infection recommend MRI of the brain with and without contrast to rule out any type of plaque formation.  She tells me that the symptoms are present without her headaches and in the past the symptoms have been present only with her headaches.  I have also encouraged her to get her eyes examined before she follows up since she says there has been some blurred vision on the left side as well.  She is also aware that she will need to be retested for COVID-19 to have her testing and to be seen in our clinic.  We will follow-up in 4 to 6 weeks or sooner if needed. Consider additional labs at in office follow up if  symptoms persist and MRI Brain normal.  Reviewed medications, potential side effects and signs and symptoms to report. Discussed risk versus benefits of treatment plan with patient and/or family-including medications, labs and radiology that may be ordered. Addressed questions and concerns during visit. Patient and/or family verbalized understanding and agree with plan.    During this visit the following were done:  Labs Reviewed [x]     Radiology Ordered [x]    PCP Records Reviewed [x]    Referring Provider Records Reviewed []    ER Records Reviewed [x]  Urgent care labs +covid19    Ele Thorne, APRN  7/29/2020

## 2020-08-22 ENCOUNTER — APPOINTMENT (OUTPATIENT)
Dept: MRI IMAGING | Facility: HOSPITAL | Age: 40
End: 2020-08-22

## 2021-01-05 ENCOUNTER — TELEPHONE (OUTPATIENT)
Dept: NEUROLOGY | Facility: CLINIC | Age: 41
End: 2021-01-05

## 2021-01-05 NOTE — TELEPHONE ENCOUNTER
Provider: AVINASH PARADA  Caller: SHUBHAM  Relationship to Patient: SELF    Phone Number: [phone]    Reason for Call: PT CALLED STATING HER HEADACHES ARE GETTING WORSE AND SHE WANTED TO KNOW ABOUT APPT. SHE STATED SHE MISSED AN MRI ORDERED BY AVINASH PARADA BACK IN AUGUST. SHE WANTS TO KNOW IF APPT CAN BE MADE OR IF SHE STILL NEEDS TO HAVE THE MRI DONE FIRST. PLEASE ADVISE.

## 2021-01-06 ENCOUNTER — OFFICE VISIT (OUTPATIENT)
Dept: OBSTETRICS AND GYNECOLOGY | Facility: CLINIC | Age: 41
End: 2021-01-06

## 2021-01-06 VITALS
WEIGHT: 275.8 LBS | SYSTOLIC BLOOD PRESSURE: 118 MMHG | HEIGHT: 61 IN | BODY MASS INDEX: 52.07 KG/M2 | DIASTOLIC BLOOD PRESSURE: 70 MMHG | RESPIRATION RATE: 14 BRPM

## 2021-01-06 DIAGNOSIS — Z30.41 ENCOUNTER FOR BIRTH CONTROL PILLS MAINTENANCE: ICD-10-CM

## 2021-01-06 DIAGNOSIS — Z01.419 WELL WOMAN EXAM WITH ROUTINE GYNECOLOGICAL EXAM: Primary | ICD-10-CM

## 2021-01-06 PROCEDURE — 99395 PREV VISIT EST AGE 18-39: CPT | Performed by: OBSTETRICS & GYNECOLOGY

## 2021-01-06 RX ORDER — NORETHINDRONE ACETATE AND ETHINYL ESTRADIOL, ETHINYL ESTRADIOL AND FERROUS FUMARATE 1MG-10(24)
1 KIT ORAL DAILY
Qty: 28 TABLET | Refills: 12 | Status: SHIPPED | OUTPATIENT
Start: 2021-01-06 | End: 2022-03-10 | Stop reason: SDUPTHER

## 2021-01-06 RX ORDER — NORETHINDRONE ACETATE AND ETHINYL ESTRADIOL, ETHINYL ESTRADIOL AND FERROUS FUMARATE 1MG-10(24)
KIT ORAL DAILY
COMMUNITY
End: 2021-01-06 | Stop reason: SDUPTHER

## 2021-01-06 NOTE — PROGRESS NOTES
Subjective   Chief Complaint   Patient presents with   • Gynecologic Exam     Patient is here today for her annual and pap smear, no complaints     Nury Pineda is a 40 y.o. year old  presenting to be seen for her annual exam.  Patient is doing well on lo Loestrin birth control pills.  She wants to continue.  She is having no other GYN problems.  Scheduled for mammogram was discussed.  Patient has had some headaches and has a appointment with neurology tomorrow.  She does not feel the birth control pills have increased her headache severity or frequency.  Adult Visits - 19 to 39 Years - Counseling/Anticipatory Guidance: Nutrition, family planning/contraception, physical activity, healthy weight, injury prevention, misuse of tobacco, alcohol and drugs, sexual behavior and STDs, dental health, mental health, immunizations, screenings For Women: Breast cancer and self breast exams    SEXUAL Hx:  She is currently sexually active.  In the past year there has not been new sexual partners.    Condoms ARE typically used.  She would not like to be screened for STD's at today's exam.  Current birth control method: OCP (estrogen/progesterone).  She is happy with her current method of contraception and does not want to discuss alternative methods of contraception.  MENSTRUAL Hx:  Patient's last menstrual period was 2020 (exact date).  In the past 6 months her cycles have been regular, predictable and occur monthly.  Her menstrual flow is typically light.   Each month on average there are roughly 1 day(s) of very heavy flow.    Intermenstrual bleeding is absent.    Post-coital bleeding is absent.  Dysmenorrhea: is not affecting her activities of daily living  PMS: is not affecting her activities of daily living  Her cycles are not a source of concern for her that she wishes to discuss today.  HEALTH Hx:  She exercises regularly:no (and has no plans to become more active).  She wears her seat belt:not  "always.  She has concerns about domestic violence: no.  OTHER COMPLAINTS:  Nothing else    The following portions of the patient's history were reviewed and updated as appropriate:problem list, current medications, allergies, past family history, past medical history, past social history and past surgical history.    Social History    Tobacco Use      Smoking status: Never Smoker      Smokeless tobacco: Never Used    Review of Systems  Review of Systems - History obtained from the patient  General ROS: positive for  - fatigue  Psychological ROS: negative  ENT ROS: negative  Allergy and Immunology ROS: positive for - seasonal allergies  Hematological and Lymphatic ROS: negative  Endocrine ROS: negative  Breast ROS: negative for breast lumps  Respiratory ROS: no cough, shortness of breath, or wheezing  Cardiovascular ROS: no chest pain or dyspnea on exertion  Gastrointestinal ROS: positive for - heartburn  Genito-Urinary ROS: no dysuria, trouble voiding, or hematuria  Musculoskeletal ROS: negative  Neurological ROS: no TIA or stroke symptoms  Dermatological ROS: negative        Objective   /70 (BP Location: Right arm, Patient Position: Sitting, Cuff Size: Large Adult)   Resp 14   Ht 154.9 cm (60.98\")   Wt 125 kg (275 lb 12.8 oz)   LMP 12/30/2020 (Exact Date)   Breastfeeding No   BMI 52.15 kg/m²     General:  well developed; well nourished  no acute distress   Skin:  No suspicious lesions seen   Thyroid: not examined   Breasts:  Examined in supine position  Symmetric without masses or skin dimpling  Nipples normal without inversion, lesions or discharge  There are no palpable axillary nodes   Abdomen: soft, non-tender; no masses  no umbilical or inguinal hernias are present  no hepato-splenomegaly   Heart: regular rate and rhythm, S1, S2 normal, no murmur, click, rub or gallop   Lungs: clear to auscultation   Pelvis: Clinical staff was present for exam  External genitalia:  normal appearance of the external " genitalia including Bartholin's and [place]'s glands.  :  urethral meatus normal;  Vaginal:  normal pink mucosa without prolapse or lesions.  Cervix:  normal appearance. Pap smear was done  Uterus:  normal size, shape and consistency. anteverted;  Adnexa:  normal bimanual exam of the adnexa.  Rectal:  digital rectal exam not performed; anus visually normal appearing.          Assessment/Plan   Diagnoses and all orders for this visit:    1. Well woman exam with routine gynecological exam (Primary)  -     Pap IG, HPV-hr; Future  -     Pap IG, HPV-hr    2. Encounter for birth control pills maintenance  -     Norethin-Eth Estrad-Fe Biphas (Lo Loestrin Fe) 1 MG-10 MCG / 10 MCG tablet; Take 1 tablet by mouth Daily.  Dispense: 28 tablet; Refill: 12         Return in 1 year  This note was electronically signed.    Danilo Yung MD   January 6, 2021

## 2021-01-07 ENCOUNTER — OFFICE VISIT (OUTPATIENT)
Dept: NEUROLOGY | Facility: CLINIC | Age: 41
End: 2021-01-07

## 2021-01-07 VITALS
HEIGHT: 61 IN | SYSTOLIC BLOOD PRESSURE: 128 MMHG | HEART RATE: 89 BPM | DIASTOLIC BLOOD PRESSURE: 78 MMHG | TEMPERATURE: 97 F | WEIGHT: 278 LBS | OXYGEN SATURATION: 98 % | BODY MASS INDEX: 52.49 KG/M2

## 2021-01-07 DIAGNOSIS — G43.119 INTRACTABLE MIGRAINE WITH AURA WITHOUT STATUS MIGRAINOSUS: ICD-10-CM

## 2021-01-07 DIAGNOSIS — R20.2 FACIAL TINGLING SENSATION: ICD-10-CM

## 2021-01-07 DIAGNOSIS — R20.0 LT FACIAL NUMBNESS: Primary | ICD-10-CM

## 2021-01-07 DIAGNOSIS — H53.9 VISION CHANGES: ICD-10-CM

## 2021-01-07 DIAGNOSIS — G43.809 MIGRAINE VARIANT WITH HEADACHE: Chronic | ICD-10-CM

## 2021-01-07 LAB
BASOPHILS # BLD AUTO: 0.03 10*3/MM3 (ref 0–0.2)
BASOPHILS NFR BLD AUTO: 0.3 % (ref 0–1.5)
DEPRECATED RDW RBC AUTO: 45.4 FL (ref 37–54)
EOSINOPHIL # BLD AUTO: 0.09 10*3/MM3 (ref 0–0.4)
EOSINOPHIL NFR BLD AUTO: 1 % (ref 0.3–6.2)
ERYTHROCYTE [DISTWIDTH] IN BLOOD BY AUTOMATED COUNT: 16.5 % (ref 12.3–15.4)
HCT VFR BLD AUTO: 33.6 % (ref 34–46.6)
HGB BLD-MCNC: 10.6 G/DL (ref 12–15.9)
IMM GRANULOCYTES # BLD AUTO: 0.02 10*3/MM3 (ref 0–0.05)
IMM GRANULOCYTES NFR BLD AUTO: 0.2 % (ref 0–0.5)
LYMPHOCYTES # BLD AUTO: 2.71 10*3/MM3 (ref 0.7–3.1)
LYMPHOCYTES NFR BLD AUTO: 31.1 % (ref 19.6–45.3)
MCH RBC QN AUTO: 23.9 PG (ref 26.6–33)
MCHC RBC AUTO-ENTMCNC: 31.5 G/DL (ref 31.5–35.7)
MCV RBC AUTO: 75.8 FL (ref 79–97)
MONOCYTES # BLD AUTO: 0.67 10*3/MM3 (ref 0.1–0.9)
MONOCYTES NFR BLD AUTO: 7.7 % (ref 5–12)
NEUTROPHILS NFR BLD AUTO: 5.18 10*3/MM3 (ref 1.7–7)
NEUTROPHILS NFR BLD AUTO: 59.7 % (ref 42.7–76)
NRBC BLD AUTO-RTO: 0 /100 WBC (ref 0–0.2)
PLATELET # BLD AUTO: 331 10*3/MM3 (ref 140–450)
PMV BLD AUTO: 11.1 FL (ref 6–12)
RBC # BLD AUTO: 4.43 10*6/MM3 (ref 3.77–5.28)
WBC # BLD AUTO: 8.7 10*3/MM3 (ref 3.4–10.8)

## 2021-01-07 PROCEDURE — 84466 ASSAY OF TRANSFERRIN: CPT | Performed by: NURSE PRACTITIONER

## 2021-01-07 PROCEDURE — 84439 ASSAY OF FREE THYROXINE: CPT | Performed by: NURSE PRACTITIONER

## 2021-01-07 PROCEDURE — 85652 RBC SED RATE AUTOMATED: CPT | Performed by: NURSE PRACTITIONER

## 2021-01-07 PROCEDURE — 85025 COMPLETE CBC W/AUTO DIFF WBC: CPT | Performed by: NURSE PRACTITIONER

## 2021-01-07 PROCEDURE — 36415 COLL VENOUS BLD VENIPUNCTURE: CPT | Performed by: NURSE PRACTITIONER

## 2021-01-07 PROCEDURE — 83921 ORGANIC ACID SINGLE QUANT: CPT | Performed by: NURSE PRACTITIONER

## 2021-01-07 PROCEDURE — 86235 NUCLEAR ANTIGEN ANTIBODY: CPT | Performed by: NURSE PRACTITIONER

## 2021-01-07 PROCEDURE — 86140 C-REACTIVE PROTEIN: CPT | Performed by: NURSE PRACTITIONER

## 2021-01-07 PROCEDURE — 80053 COMPREHEN METABOLIC PANEL: CPT | Performed by: NURSE PRACTITIONER

## 2021-01-07 PROCEDURE — 82746 ASSAY OF FOLIC ACID SERUM: CPT | Performed by: NURSE PRACTITIONER

## 2021-01-07 PROCEDURE — 99214 OFFICE O/P EST MOD 30 MIN: CPT | Performed by: NURSE PRACTITIONER

## 2021-01-07 PROCEDURE — 83540 ASSAY OF IRON: CPT | Performed by: NURSE PRACTITIONER

## 2021-01-07 PROCEDURE — 82607 VITAMIN B-12: CPT | Performed by: NURSE PRACTITIONER

## 2021-01-07 PROCEDURE — 82728 ASSAY OF FERRITIN: CPT | Performed by: NURSE PRACTITIONER

## 2021-01-07 PROCEDURE — 86038 ANTINUCLEAR ANTIBODIES: CPT | Performed by: NURSE PRACTITIONER

## 2021-01-07 PROCEDURE — 86225 DNA ANTIBODY NATIVE: CPT | Performed by: NURSE PRACTITIONER

## 2021-01-07 PROCEDURE — 84443 ASSAY THYROID STIM HORMONE: CPT | Performed by: NURSE PRACTITIONER

## 2021-01-07 RX ORDER — RIMEGEPANT SULFATE 75 MG/75MG
75 TABLET, ORALLY DISINTEGRATING ORAL DAILY PRN
Qty: 4 TABLET | Refills: 0 | COMMUNITY
Start: 2021-01-07 | End: 2021-04-29

## 2021-01-07 RX ORDER — RIMEGEPANT SULFATE 75 MG/75MG
75 TABLET, ORALLY DISINTEGRATING ORAL DAILY PRN
Qty: 8 TABLET | Refills: 5 | Status: SHIPPED | OUTPATIENT
Start: 2021-01-07 | End: 2021-10-29

## 2021-01-07 RX ORDER — PROCHLORPERAZINE MALEATE 10 MG
10 TABLET ORAL EVERY 6 HOURS PRN
Qty: 15 TABLET | Refills: 5 | Status: SHIPPED | OUTPATIENT
Start: 2021-01-07 | End: 2021-09-13 | Stop reason: SDUPTHER

## 2021-01-07 NOTE — PROGRESS NOTES
Subjective:     Patient ID: Nury Pineda is a 40 y.o. female.    CC:   Chief Complaint   Patient presents with   • Migraine       HPI:   History of Present Illness   This is a pleasant 40-year-old female who presents for sooner follow-up on migraine headaches with new onset of intermittent left facial numbness, tingling and blurred vision since her COVID-19 positive test on 7/9/2020.  On 7/29/2020 we completed a telephone visit and had ordered an MRI of the brain.  She ended up canceling the MRI because she has started a new job and was not able to get off work.  She tells me that her headaches had really improved overall but over the past month or so she has really seen an increase in her headaches to daily to every other day which are lasting about 2 hours and they are really mild to moderate, will be present twice a day with tension and will typically go away without medication treatment.  She tells me when she does get her migraine she does have significant throbbing, frontal pain, positive photophobia, positive photophobia, nausea but no vomiting.  Migraines are about 4-6 days a month. Her biggest concern is that she is getting left facial numbness and tingling with or without migraines.  Previously when she had the paresthesias these were only during migraines.  She has had previous atypical and complex migraines since 2015 which have presented with stroke like symptoms.  It has been challenging to treat her migraines since she has severe difficulty with trouble swallowing and esophageal spasms so she is really not able to swallow most pills.  She cannot crush or open some pills to take.  She cannot swallow pills whole at this time.  Since last visit she has used a few Fioricet but does not need a refill on these today.  The Fioricet makes her very tired.  She does use Compazine as needed for the nausea and migraines and this does help slightly.  She has previously tried topiramate with intolerance,  amitriptyline with sedation and intolerance, sumatriptan with tightening of the throat, aspirin and Cambia.  She does have Cambia oral powder samples at home which have sometimes helped.  She has also used rizatriptan in the past but this caused her to feel like she was having spasms and tightening of her throat and chest as well.  Triptans have been poorly tolerated.  She is really hesitant to start daily preventive medications.  She tries to avoid medications if possible.  She would like to have MRI of the brain reordered today.  She has not had any recent labs.  She has struggled with severe anemia in the past.  Prior imaging studies showed no acute process with MRI of the brain and C-spine as well as MRA of the brain in 2016 and 2015 but there was significant artifact due to her wearing braces.  CTA of the head and neck was normal in 2016.  She does have her braces off and would like to have repeat imaging.  She also tells me she is very sensitive to movement and motion and when she looks to the left or right quickly she feels a disorientation, her vision gets blurred and she has not had an eye exam and would be willing to be referred.  She did have a sleep study remotely and told that she had very mild sleep apnea but has not been treated with a CPAP.  She does followed by GI for significant esophageal spasms and esophagitis.  When her migraines and headache started to be more frequent again she tells me she felt it was since she got off her regular eating regimen and was eating a lot of fast food so she has cut this out and this has not made a change in her headaches and she is also cut out her diet sodas to a very small portion a day and still no improvement in her headaches.    The following portions of the patient's history were reviewed and updated as appropriate: allergies, current medications, past family history, past medical history, past social history, past surgical history and problem list.    Past  Medical History:   Diagnosis Date   • Absolute anemia    • Arthritis     Right knee   • Carpal tunnel syndrome     RIGHT WRIST   • Cholelithiasis     Nausea related to stones has phenergan. Also has related RUQ pain.   • Dizziness    • Elevated C-reactive protein (CRP)    • Elevated erythrocyte sedimentation rate    • Influenza    • Iron deficiency     Will not take iron supplement but will take MVI   • Left hip pain    • Migraines    • Numbness and tingling in right hand     Has nerve conductions 3/23/2016, awaiting results. Along with Paresthesia.   • Pain and swelling of right forearm     Check u/s to r/o clot. Rash and redness are resolving, will continue to monitor.   • Pain of left arm    • Sleep disturbances    • Vitamin D deficiency     25 oh.       Past Surgical History:   Procedure Laterality Date   •  SECTION     • CHOLECYSTECTOMY     • COLONOSCOPY     • CYST REMOVAL      behind belly button   • TONSILLECTOMY     • URACHAL CYST INCISION      History of Excision Of Urachal Cyst.       Social History     Socioeconomic History   • Marital status: Single     Spouse name: Not on file   • Number of children: Not on file   • Years of education: Not on file   • Highest education level: Not on file   Tobacco Use   • Smoking status: Never Smoker   • Smokeless tobacco: Never Used   Substance and Sexual Activity   • Alcohol use: Yes     Alcohol/week: 2.0 standard drinks     Types: 2 Glasses of wine per week     Frequency: Monthly or less     Drinks per session: 1 or 2   • Drug use: No   • Sexual activity: Defer     Partners: Male     Birth control/protection: Condom, OCP   Social History Narrative    Single    Caffeine Intake: 0-1 servings per day       Family History   Problem Relation Age of Onset   • Stroke Mother    • Hypertension Mother    • Migraines Mother    • Osteoarthritis Mother    • Cervical cancer Mother    • Heart disease Mother    • Hyperlipidemia Maternal Grandmother    •  "Osteoarthritis Maternal Grandmother    • Birth defects Maternal Grandmother    • Ovarian cancer Maternal Grandmother         PREMENOPAUSAL    • Transient ischemic attack Maternal Grandmother    • Hypertension Maternal Grandmother    • Stroke Other         CVA x 2. and TIA   • Cancer Other         Cervical, malignant neoplasm x2 , ovarian   • Hypertension Father    • Diabetes Father         Review of Systems   Constitutional: Negative for chills, fatigue, fever and unexpected weight change.   HENT: Negative for ear pain, hearing loss, nosebleeds, rhinorrhea and sore throat.    Eyes: Negative for photophobia, pain, discharge, itching and visual disturbance.   Respiratory: Negative for cough, chest tightness, shortness of breath and wheezing.    Cardiovascular: Negative for chest pain, palpitations and leg swelling.   Gastrointestinal: Negative for abdominal pain, blood in stool, constipation, diarrhea, nausea and vomiting.   Genitourinary: Negative for dysuria, frequency, hematuria and urgency.   Musculoskeletal: Negative for arthralgias, back pain, gait problem, joint swelling, myalgias, neck pain and neck stiffness.   Skin: Negative for rash and wound.   Allergic/Immunologic: Negative for environmental allergies and food allergies.   Neurological: Positive for headaches. Negative for dizziness, tremors, seizures, syncope, speech difficulty, weakness, light-headedness and numbness.   Hematological: Negative for adenopathy. Does not bruise/bleed easily.   Psychiatric/Behavioral: Negative for agitation, confusion, decreased concentration, hallucinations, sleep disturbance and suicidal ideas. The patient is not nervous/anxious.    All other systems reviewed and are negative.       Objective:  /78   Pulse 89   Temp 97 °F (36.1 °C)   Ht 154.9 cm (61\")   Wt 126 kg (278 lb)   LMP 12/30/2020 (Exact Date)   SpO2 98%   BMI 52.53 kg/m²     Neurologic Exam     Mental Status   Oriented to person, place, and time. "   Speech: speech is normal   Level of consciousness: alert    Cranial Nerves   Cranial nerves II through XII intact.     Motor Exam   Muscle bulk: normal  Overall muscle tone: normal    Strength   Strength 5/5 throughout.     Gait, Coordination, and Reflexes     Gait  Gait: normal    Coordination   Finger to nose coordination: normal    Tremor   Resting tremor: absent  Intention tremor: absent  Action tremor: absent    Reflexes   Right brachioradialis: 2+  Left brachioradialis: 2+  Right biceps: 2+  Left biceps: 2+  Right triceps: 2+  Left triceps: 2+  Right : 2+  Left : 2+      Physical Exam  Eyes:      Visual Fields: Right eye visual fields normal and left eye visual fields normal.      Comments:   Reports some blurred vision with EOMs   Neurological:      Mental Status: She is oriented to person, place, and time.      Coordination: Finger-Nose-Finger Test normal.      Gait: Gait is intact.      Deep Tendon Reflexes: Strength normal.      Reflex Scores:       Tricep reflexes are 2+ on the right side and 2+ on the left side.       Bicep reflexes are 2+ on the right side and 2+ on the left side.       Brachioradialis reflexes are 2+ on the right side and 2+ on the left side.  Psychiatric:         Attention and Perception: Attention and perception normal.         Mood and Affect: Mood and affect normal.         Speech: Speech normal.         Behavior: Behavior normal.         Thought Content: Thought content normal.         Cognition and Memory: Cognition and memory normal.         Judgment: Judgment normal.         Assessment/Plan:       Diagnoses and all orders for this visit:    1. Lt facial numbness (Primary)  -     MRI Brain With & Without Contrast; Future  -     CBC & Differential; Future  -     Comprehensive Metabolic Panel; Future  -     Vitamin B12 & Folate; Future  -     Methylmalonic Acid, Serum; Future  -     Ferritin; Future  -     Iron Profile; Future  -     TSH; Future  -     T4, free;  Future  -     Sedimentation Rate; Future  -     C-reactive Protein; Future  -     JOSÉ MIGUEL by IFA, Reflex 9-biomarkers profile; Future  -     CBC & Differential  -     Comprehensive Metabolic Panel  -     Vitamin B12 & Folate  -     Methylmalonic Acid, Serum  -     Ferritin  -     Iron Profile  -     TSH  -     T4, free  -     Sedimentation Rate  -     C-reactive Protein  -     JOSÉ MIGUEL by IFA, Reflex 9-biomarkers profile  -     CBC Auto Differential    2. Facial tingling sensation  -     MRI Brain With & Without Contrast; Future  -     CBC & Differential; Future  -     Comprehensive Metabolic Panel; Future  -     Vitamin B12 & Folate; Future  -     Methylmalonic Acid, Serum; Future  -     Ferritin; Future  -     Iron Profile; Future  -     TSH; Future  -     T4, free; Future  -     Sedimentation Rate; Future  -     C-reactive Protein; Future  -     JOSÉ MIGUEL by IFA, Reflex 9-biomarkers profile; Future  -     CBC & Differential  -     Comprehensive Metabolic Panel  -     Vitamin B12 & Folate  -     Methylmalonic Acid, Serum  -     Ferritin  -     Iron Profile  -     TSH  -     T4, free  -     Sedimentation Rate  -     C-reactive Protein  -     JOSÉ MIGUEL by IFA, Reflex 9-biomarkers profile  -     CBC Auto Differential    3. Migraine variant with headache  -     prochlorperazine (COMPAZINE) 10 MG tablet; Take 1 tablet by mouth Every 6 (Six) Hours As Needed for Nausea or Vomiting.  Dispense: 15 tablet; Refill: 5  -     rimegepant 75 MG dispersible tablet; Take 75 mg by mouth Daily As Needed (migraine).  Dispense: 8 tablet; Refill: 5  -     Ambulatory Referral to Ophthalmology    4. Intractable migraine with aura without status migrainosus  -     rimegepant 75 MG dispersible tablet; Take 75 mg by mouth Daily As Needed (migraine).  Dispense: 8 tablet; Refill: 5  -     Rimegepant Sulfate (rimegepant) 75 MG tablet dispersible; Take 75 mg by mouth Daily As Needed (migraine).  Dispense: 4 tablet; Refill: 0    5. Vision changes  -     Ambulatory  Referral to Ophthalmology           Gave 2 boxes w/ 2 pills each lot#[id number] exp. 10/22, will complete PA, has been intolerant of triptans with vasoconstrictive symptoms. MRI brain w/wo contrast to r/o demyelinating lesions. Ophthalmology referral. Does not want preventive medication at this time although we have discussed extensively she would benefit from this. She does decline. She will f/u in 8 weeks or sooner if needed. Labs today to r/o other etiology of worsening headaches. Recommend she keep headache log to review at follow up.  Reviewed medications, potential side effects and signs and symptoms to report. Discussed risk versus benefits of treatment plan with patient and/or family-including medications, labs and radiology that may be ordered. Addressed questions and concerns during visit. Patient and/or family verbalized understanding and agree with plan.    AS THE PROVIDER, I PERSONALLY WORE PPE DURING ENTIRE FACE TO FACE ENCOUNTER IN CLINIC WITH THE PATIENT. PATIENT ALSO WORE PPE DURING ENTIRE FACE TO FACE ENCOUNTER EXCEPT FOR A MAX OF 30 SECONDS DURING NEUROLOGICAL EVALUATION OF CRANIAL NERVES AND THEN MASK WAS PLACED BACK OVER PATIENT FACE FOR REMAINDER OF VISIT. I WASHED MY HANDS BEFORE AND AFTER VISIT.    During this visit the following were done:  Labs Reviewed []    Labs Ordered [x]    Radiology Reports Reviewed []    Radiology Ordered [x]    PCP Records Reviewed []    Referring Provider Records Reviewed []    ER Records Reviewed []    Hospital Records Reviewed []    History Obtained From Family []    Radiology Images Reviewed []    Other Reviewed [x]    Records Requested []      Ele Thorne, SYBIL  1/7/2021

## 2021-01-08 ENCOUNTER — TELEPHONE (OUTPATIENT)
Dept: NEUROLOGY | Facility: CLINIC | Age: 41
End: 2021-01-08

## 2021-01-08 DIAGNOSIS — D50.0 IRON DEFICIENCY ANEMIA DUE TO CHRONIC BLOOD LOSS: Primary | ICD-10-CM

## 2021-01-08 LAB
ALBUMIN SERPL-MCNC: 3.8 G/DL (ref 3.5–5.2)
ALBUMIN/GLOB SERPL: 1.1 G/DL
ALP SERPL-CCNC: 79 U/L (ref 39–117)
ALT SERPL W P-5'-P-CCNC: 12 U/L (ref 1–33)
ANION GAP SERPL CALCULATED.3IONS-SCNC: 7.7 MMOL/L (ref 5–15)
AST SERPL-CCNC: 11 U/L (ref 1–32)
BILIRUB SERPL-MCNC: 0.3 MG/DL (ref 0–1.2)
BUN SERPL-MCNC: 12 MG/DL (ref 6–20)
BUN/CREAT SERPL: 16.9 (ref 7–25)
CALCIUM SPEC-SCNC: 9 MG/DL (ref 8.6–10.5)
CHLORIDE SERPL-SCNC: 103 MMOL/L (ref 98–107)
CO2 SERPL-SCNC: 26.3 MMOL/L (ref 22–29)
CREAT SERPL-MCNC: 0.71 MG/DL (ref 0.57–1)
CRP SERPL-MCNC: 2.33 MG/DL (ref 0–0.5)
ERYTHROCYTE [SEDIMENTATION RATE] IN BLOOD: 37 MM/HR (ref 0–20)
FERRITIN SERPL-MCNC: 77.7 NG/ML (ref 13–150)
FOLATE SERPL-MCNC: 5.14 NG/ML (ref 4.78–24.2)
GFR SERPL CREATININE-BSD FRML MDRD: 111 ML/MIN/1.73
GLOBULIN UR ELPH-MCNC: 3.4 GM/DL
GLUCOSE SERPL-MCNC: 93 MG/DL (ref 65–99)
IRON 24H UR-MRATE: 30 MCG/DL (ref 37–145)
IRON SATN MFR SERPL: 9 % (ref 20–50)
POTASSIUM SERPL-SCNC: 4.3 MMOL/L (ref 3.5–5.2)
PROT SERPL-MCNC: 7.2 G/DL (ref 6–8.5)
SODIUM SERPL-SCNC: 137 MMOL/L (ref 136–145)
T4 FREE SERPL-MCNC: 1.04 NG/DL (ref 0.93–1.7)
TIBC SERPL-MCNC: 331 MCG/DL (ref 298–536)
TRANSFERRIN SERPL-MCNC: 222 MG/DL (ref 200–360)
TSH SERPL DL<=0.05 MIU/L-ACNC: 0.98 UIU/ML (ref 0.27–4.2)
VIT B12 BLD-MCNC: 528 PG/ML (ref 211–946)

## 2021-01-08 NOTE — TELEPHONE ENCOUNTER
----- Message from SYBIL Macario sent at 1/8/2021 10:18 AM EST -----  Please notify the patient that her vitamin B12 level, folic acid, thyroid levels all appear normal.  She does continue to have some elevation in her inflammatory markers but these are decreased from prior labs about 5 years ago.  We have run an autoimmune panel and we will see what this shows.  The general inflammatory markers scanned be elevated with any type of inflammation within the body and are not always specific to the headaches.  She does continue to remain significantly anemic with low iron levels.  I know she has not been able to take oral iron in the past,would she be willing to see a blood specialist to possibly get iron infusions?  Please let me know and I am happy to place that referral-it would definitely help her feel better.  We will keep her posted on additional lab results.  We will follow-up as scheduled in office.  Thanks, SYBIL Headley.

## 2021-01-08 NOTE — PROGRESS NOTES
Please notify the patient that her vitamin B12 level, folic acid, thyroid levels all appear normal.  She does continue to have some elevation in her inflammatory markers but these are decreased from prior labs about 5 years ago.  We have run an autoimmune panel and we will see what this shows.  The general inflammatory markers scanned be elevated with any type of inflammation within the body and are not always specific to the headaches.  She does continue to remain significantly anemic with low iron levels.  I know she has not been able to take oral iron in the past,would she be willing to see a blood specialist to possibly get iron infusions?  Please let me know and I am happy to place that referral-it would definitely help her feel better.  We will keep her posted on additional lab results.  We will follow-up as scheduled in office.  Thanks, SYBIL Headley.

## 2021-01-12 DIAGNOSIS — R76.8 ANA POSITIVE: ICD-10-CM

## 2021-01-12 DIAGNOSIS — M25.50 PAIN IN JOINT INVOLVING MULTIPLE SITES: Primary | ICD-10-CM

## 2021-01-12 LAB
ANA SPECKLED TITR SER: ABNORMAL {TITER}
ANA TITR SER IF: POSITIVE {TITER}
CENTROMERE B AB SER-ACNC: <0.2 AI (ref 0–0.9)
CHROMATIN AB SERPL-ACNC: <0.2 AI (ref 0–0.9)
DSDNA AB SER-ACNC: <1 IU/ML (ref 0–9)
ENA JO1 AB SER-ACNC: <0.2 AI (ref 0–0.9)
ENA RNP AB SER-ACNC: <0.2 AI (ref 0–0.9)
ENA SCL70 AB SER-ACNC: <0.2 AI (ref 0–0.9)
ENA SM AB SER-ACNC: <0.2 AI (ref 0–0.9)
ENA SS-A AB SER-ACNC: <0.2 AI (ref 0–0.9)
ENA SS-B AB SER-ACNC: <0.2 AI (ref 0–0.9)
LABORATORY COMMENT REPORT: ABNORMAL
Lab: ABNORMAL
Lab: ABNORMAL

## 2021-01-12 NOTE — PROGRESS NOTES
Please notify patient her autoimmune panel came back positive and very elevated. I recommend we refer her to rheumatology for further evaluation to determine a cause for this. I will place this referral as ASAP to [facility]. Needs to fax labs and most recent note. Thanks, SYBIL Headley

## 2021-01-13 ENCOUNTER — TELEPHONE (OUTPATIENT)
Dept: NEUROLOGY | Facility: CLINIC | Age: 41
End: 2021-01-13

## 2021-01-13 NOTE — TELEPHONE ENCOUNTER
----- Message from SYBIL Macario sent at 1/12/2021  4:28 PM EST -----  Please notify patient her autoimmune panel came back positive and very elevated. I recommend we refer her to rheumatology for further evaluation to determine a cause for this. I will place this referral as ASAP to [facility]. Needs to fax labs and most recent note. Thanks, SYBIL Headley

## 2021-01-13 NOTE — TELEPHONE ENCOUNTER
Unfortunately Dr. Suyapa Maloney does not accept her insurance. You can call them and confirm, but I do not believe they accept [employer]. If they do let me know and I can change referral. Thanks.

## 2021-01-13 NOTE — TELEPHONE ENCOUNTER
Pt aware of the results and would like the referral to rheumatology and would like the referral to Dr. Suyapa Maloney if possible? Her granny sees her.

## 2021-01-14 LAB
Lab: NORMAL
METHYLMALONATE SERPL-SCNC: 106 NMOL/L (ref 0–378)

## 2021-01-25 NOTE — PROGRESS NOTES
DATE OF CONSULTATION: 1/26/2021    REFERRING PHYSICIAN: Acacia Lobato DO    Dear Acacia Meredith DO  Thank you for asking for my medical advice on this patient. I saw her in the  [place] office on 1/26/2021    REASON FOR CONSULTATION: Iron deficiency anemia    HISTORY OF PRESENT ILLNESS: The patient is a very pleasant 40 y.o.  female   who was in her usual state of health until June 2020.  The patient presented with generalized fatigue and weakness.  She had a blood work done that revealed iron deficiency anemia.  She was tried on oral iron replacement which she could not tolerate secondary to multiple GI side effects.  She had repeat blood work done on January 7, 2021 that showed persistent abnormality.  The patient was referred to me for further recommendations.    SUBJECTIVE: When I saw the patient today she is here by herself.  She is complaining of fatigue.  She denies any fever chills night sweats.  She had colonoscopy 2018 that was essentially negative EGD done last year revealed erosive esophagitis.    Review of Systems   Constitutional: Negative for activity change, appetite change, chills, fatigue, fever and unexpected weight change.   HENT: Negative for hearing loss, mouth sores, nosebleeds, sore throat and trouble swallowing.    Eyes: Negative for visual disturbance.   Respiratory: Negative for cough, chest tightness, shortness of breath and wheezing.    Cardiovascular: Negative for chest pain, palpitations and leg swelling.   Gastrointestinal: Negative for abdominal distention, abdominal pain, blood in stool, constipation, diarrhea, nausea, rectal pain and vomiting.   Endocrine: Negative for cold intolerance and heat intolerance.   Genitourinary: Negative for difficulty urinating, dysuria, frequency and urgency.   Musculoskeletal: Negative for arthralgias, back pain, gait problem, joint swelling and myalgias.   Skin: Negative for rash.   Neurological: Negative for dizziness, tremors,  syncope, weakness, light-headedness, numbness and headaches.   Hematological: Negative for adenopathy. Does not bruise/bleed easily.   Psychiatric/Behavioral: Negative for confusion, sleep disturbance and suicidal ideas. The patient is not nervous/anxious.        Past Medical History:   Diagnosis Date   • Absolute anemia    • Arthritis     Right knee   • Carpal tunnel syndrome     RIGHT WRIST   • Cholelithiasis     Nausea related to stones has phenergan. Also has related RUQ pain.   • Dizziness    • Elevated C-reactive protein (CRP)    • Elevated erythrocyte sedimentation rate    • Influenza    • Iron deficiency     Will not take iron supplement but will take MVI   • Left hip pain    • Migraines    • Numbness and tingling in right hand     Has nerve conductions 3/23/2016, awaiting results. Along with Paresthesia.   • Pain and swelling of right forearm     Check u/s to r/o clot. Rash and redness are resolving, will continue to monitor.   • Pain of left arm    • Sleep disturbances    • Vitamin D deficiency     25 oh.       Social History     Socioeconomic History   • Marital status: Single     Spouse name: Not on file   • Number of children: Not on file   • Years of education: Not on file   • Highest education level: Not on file   Tobacco Use   • Smoking status: Never Smoker   • Smokeless tobacco: Never Used   Substance and Sexual Activity   • Alcohol use: Yes     Alcohol/week: 2.0 standard drinks     Types: 2 Glasses of wine per week     Frequency: Monthly or less     Drinks per session: 1 or 2   • Drug use: No   • Sexual activity: Defer     Partners: Male     Birth control/protection: Condom, OCP   Social History Narrative    Single    Caffeine Intake: 0-1 servings per day       Family History   Problem Relation Age of Onset   • Stroke Mother    • Hypertension Mother    • Migraines Mother    • Osteoarthritis Mother    • Cervical cancer Mother    • Heart disease Mother    • Hyperlipidemia Maternal Grandmother    •  Osteoarthritis Maternal Grandmother    • Birth defects Maternal Grandmother    • Ovarian cancer Maternal Grandmother         PREMENOPAUSAL    • Transient ischemic attack Maternal Grandmother    • Hypertension Maternal Grandmother    • Stroke Other         CVA x 2. and TIA   • Cancer Other         Cervical, malignant neoplasm x2 , ovarian   • Hypertension Father    • Diabetes Father        Past Surgical History:   Procedure Laterality Date   •  SECTION     • CHOLECYSTECTOMY     • COLONOSCOPY     • CYST REMOVAL      behind belly button   • TONSILLECTOMY     • URACHAL CYST INCISION      History of Excision Of Urachal Cyst.       Allergies   Allergen Reactions   • Triptans Other (See Comments)     Chest pain, tightness in throat, vasocontriction   • Amoxicillin Swelling     Throat swells   • Penicillins Swelling     Throat swells          Current Outpatient Medications:   •  Butalbital-APAP-Caffeine -40 MG per capsule, Take 1 capsule by mouth Every 6 (Six) Hours As Needed for Migraine., Disp: 15 capsule, Rfl: 0  •  montelukast (SINGULAIR) 10 MG tablet, Take 1 tablet by mouth Every Night., Disp: 30 tablet, Rfl: 3  •  Multiple Vitamins-Minerals (EQ ONE DAILY WOMENS HEALTH PO), Take 2 tablets by mouth Daily., Disp: , Rfl:   •  Norethin-Eth Estrad-Fe Biphas (Lo Loestrin Fe) 1 MG-10 MCG / 10 MCG tablet, Take 1 tablet by mouth Daily., Disp: 28 tablet, Rfl: 12  •  omeprazole (priLOSEC) 40 MG capsule, Take 40 mg by mouth Daily., Disp: , Rfl:   •  prochlorperazine (COMPAZINE) 10 MG tablet, Take 1 tablet by mouth Every 6 (Six) Hours As Needed for Nausea or Vomiting., Disp: 15 tablet, Rfl: 5  •  rimegepant 75 MG dispersible tablet, Take 75 mg by mouth Daily As Needed (migraine)., Disp: 8 tablet, Rfl: 5  •  Rimegepant Sulfate (rimegepant) 75 MG tablet dispersible, Take 75 mg by mouth Daily As Needed (migraine)., Disp: 4 tablet, Rfl: 0    PHYSICAL EXAMINATION:   /79   Pulse 82   Temp 97.3 °F  "(36.3 °C) (Temporal)   Resp 18   Ht 154.9 cm (60.98\")   Wt 125 kg (275 lb 6.4 oz)   LMP 12/30/2020 (Exact Date)   SpO2 100%   BMI 52.06 kg/m²   Pain Score    01/26/21 1403   PainSc: 3  Comment: headache       ECOG Performance Status: 1 - Symptomatic but completely ambulatory  General Appearance:  alert, cooperative, no apparent distress and appears stated age   Neurologic/Psychiatric: A&O x 3, gait steady, appropriate affect, strength 5/5 in all muscle groups   HEENT:  Normocephalic, without obvious abnormality, mucous membranes moist   Neck: Supple, symmetrical, trachea midline, no adenopathy;  No thyromegaly, masses, or tenderness   Lungs:   Clear to auscultation bilaterally; respirations regular, even, and unlabored bilaterally   Heart:  Regular rate and rhythm, no murmurs appreciated   Abdomen:   Soft, non-tender, non-distended and no organomegaly   Lymph nodes: No cervical, supraclavicular, inguinal or axillary adenopathy noted   Extremities: Normal, atraumatic; no clubbing, cyanosis, or edema    Skin: No rashes, ulcers, or suspicious lesions noted       No visits with results within 2 Week(s) from this visit.   Latest known visit with results is:   Office Visit on 01/07/2021   Component Date Value Ref Range Status   • Glucose 01/07/2021 93  65 - 99 mg/dL Final   • BUN 01/07/2021 12  6 - 20 mg/dL Final   • Creatinine 01/07/2021 0.71  0.57 - 1.00 mg/dL Final   • Sodium 01/07/2021 137  136 - 145 mmol/L Final   • Potassium 01/07/2021 4.3  3.5 - 5.2 mmol/L Final   • Chloride 01/07/2021 103  98 - 107 mmol/L Final   • CO2 01/07/2021 26.3  22.0 - 29.0 mmol/L Final   • Calcium 01/07/2021 9.0  8.6 - 10.5 mg/dL Final   • Total Protein 01/07/2021 7.2  6.0 - 8.5 g/dL Final   • Albumin 01/07/2021 3.80  3.50 - 5.20 g/dL Final   • ALT (SGPT) 01/07/2021 12  1 - 33 U/L Final   • AST (SGOT) 01/07/2021 11  1 - 32 U/L Final   • Alkaline Phosphatase 01/07/2021 79  39 - 117 U/L Final   • Total Bilirubin 01/07/2021 0.3  0.0 - " 1.2 mg/dL Final   • eGFR   Amer 01/07/2021 111  >60 mL/min/1.73 Final   • Globulin 01/07/2021 3.4  gm/dL Final   • A/G Ratio 01/07/2021 1.1  g/dL Final   • BUN/Creatinine Ratio 01/07/2021 16.9  7.0 - 25.0 Final   • Anion Gap 01/07/2021 7.7  5.0 - 15.0 mmol/L Final   • Folate 01/07/2021 5.14  4.78 - 24.20 ng/mL Final   • Vitamin B-12 01/07/2021 528  211 - 946 pg/mL Final   • Methylmalonic Acid 01/07/2021 106  0 - 378 nmol/L Final   • Disclaimer: 01/07/2021 Comment   Final    This test was developed and its performance characteristics  determined by [facility]. It has not been cleared or approved  by the Food and Drug Administration.   • Ferritin 01/07/2021 77.70  13.00 - 150.00 ng/mL Final   • Iron 01/07/2021 30* 37 - 145 mcg/dL Final   • Iron Saturation 01/07/2021 9* 20 - 50 % Final   • Transferrin 01/07/2021 222  200 - 360 mg/dL Final   • TIBC 01/07/2021 331  298 - 536 mcg/dL Final   • TSH 01/07/2021 0.981  0.270 - 4.200 uIU/mL Final   • Free T4 01/07/2021 1.04  0.93 - 1.70 ng/dL Final   • Sed Rate 01/07/2021 37* 0 - 20 mm/hr Final   • C-Reactive Protein 01/07/2021 2.33* 0.00 - 0.50 mg/dL Final   • JOSÉ MIGUEL 01/07/2021 Positive*  Final                                         Negative   <1:80                                       Borderline  1:80                                       Positive   >1:80   • Please note 01/07/2021 Comment   Final    JOSÉ MIGUEL Multiplex methodology was designed to detect up to 11 antibodies  of the 100+ antibodies that may be detected by JOSÉ MIGUEL IFA methodology.   • WBC 01/07/2021 8.70  3.40 - 10.80 10*3/mm3 Final   • RBC 01/07/2021 4.43  3.77 - 5.28 10*6/mm3 Final   • Hemoglobin 01/07/2021 10.6* 12.0 - 15.9 g/dL Final   • Hematocrit 01/07/2021 33.6* 34.0 - 46.6 % Final   • MCV 01/07/2021 75.8* 79.0 - 97.0 fL Final   • MCH 01/07/2021 23.9* 26.6 - 33.0 pg Final   • MCHC 01/07/2021 31.5  31.5 - 35.7 g/dL Final   • RDW 01/07/2021 16.5* 12.3 - 15.4 % Final   • RDW-SD 01/07/2021 45.4  37.0 - 54.0 fl  Final   • MPV 01/07/2021 11.1  6.0 - 12.0 fL Final   • Platelets 01/07/2021 331  140 - 450 10*3/mm3 Final   • Neutrophil % 01/07/2021 59.7  42.7 - 76.0 % Final   • Lymphocyte % 01/07/2021 31.1  19.6 - 45.3 % Final   • Monocyte % 01/07/2021 7.7  5.0 - 12.0 % Final   • Eosinophil % 01/07/2021 1.0  0.3 - 6.2 % Final   • Basophil % 01/07/2021 0.3  0.0 - 1.5 % Final   • Immature Grans % 01/07/2021 0.2  0.0 - 0.5 % Final   • Neutrophils, Absolute 01/07/2021 5.18  1.70 - 7.00 10*3/mm3 Final   • Lymphocytes, Absolute 01/07/2021 2.71  0.70 - 3.10 10*3/mm3 Final   • Monocytes, Absolute 01/07/2021 0.67  0.10 - 0.90 10*3/mm3 Final   • Eosinophils, Absolute 01/07/2021 0.09  0.00 - 0.40 10*3/mm3 Final   • Basophils, Absolute 01/07/2021 0.03  0.00 - 0.20 10*3/mm3 Final   • Immature Grans, Absolute 01/07/2021 0.02  0.00 - 0.05 10*3/mm3 Final   • nRBC 01/07/2021 0.0  0.0 - 0.2 /100 WBC Final   • Speckled Pattern 01/07/2021 >1:1280*  Final    Dense Fine Speckled pattern is noted. This pattern suggests the  presence of DFS70 antibody which has a low prevalence in systemic  autoimmune rheumatic diseases.   • Note: (Reference) 01/07/2021 Comment   Final    Comment: A positive JOSÉ MIGUEL result may occur in healthy individuals (low  titer) or be associated with a variety of diseases.  See  interpretation chart which is not all inclusive:  Pattern      Antigen Detected  Suggested Disease Association  -----------  ----------------  -----------------------------  Homogeneous  DNA(ds,ss),       SLE - High titers               Nucleosomes,               Histones          Drug-induced SLE  -----------  ----------------  -----------------------------  Speckled     Sm, RNP, SCL-70,  SLE,MCTD,PSS (diffuse form),               SS-A/SS-B         Sjogrens  -----------  ----------------  -----------------------------  Nucleolar    SCL-70, PM-1/SCL  High titers Scleroderma,                                 PM/DM  -----------  ----------------   -----------------------------  Centromere   Centromere        PSS (limited form) w/Crest                                 syndrome variable  -----------  ----------------  -----------------------------  Nuclear Dot                             [id number]  Primary Biliary Cirrhosis  -----------  ----------------  -----------------------------  Nuclear      ,            Primary Biliary Cirrhosis  Membrane     maura A,B,C  -----------  ----------------  -----------------------------   • Anti-DNA (DS) Ab Qn 01/07/2021 <1  0 - 9 IU/mL Final                                       Negative      <5                                     Equivocal  5 - 9                                     Positive      >9   • RNP Antibodies 01/07/2021 <0.2  0.0 - 0.9 AI Final   • Miranda Antibodies 01/07/2021 <0.2  0.0 - 0.9 AI Final   • Antiscleroderma-70 Antibodies 01/07/2021 <0.2  0.0 - 0.9 AI Final   • ANDREA SSA (RO) Ab 01/07/2021 <0.2  0.0 - 0.9 AI Final   • ANDREA SSB (LA) Ab 01/07/2021 <0.2  0.0 - 0.9 AI Final   • Antichromatin Antibodies 01/07/2021 <0.2  0.0 - 0.9 AI Final   • DUGLAS-1 IgG 01/07/2021 <0.2  0.0 - 0.9 AI Final   • Anti-Centromere B Antibodies 01/07/2021 <0.2  0.0 - 0.9 AI Final   • See below: 01/07/2021 Comment   Final    Comment: Autoantibody                       Disease Association  ------------------------------------------------------------                          Condition                  Frequency  ---------------------   ------------------------   ---------  Antinuclear Antibody,    SLE, mixed connective  Direct (JOSÉ MIGUEL-D)           tissue diseases  ---------------------   ------------------------   ---------  dsDNA                    SLE                        40 - 60%  ---------------------   ------------------------   ---------  Chromatin                Drug induced SLE                90%                           SLE                        48 - 97%  ---------------------   ------------------------    ---------  SSA (Ro)                 SLE                        25 - 35%                           Sjogren's Syndrome         40 - 70%                            Lupus                 100%  ---------------------   ------------------------   ---------  SSB (La)                 SLE                                                        10%                           Sjogren's Syndrome              30%  ---------------------   -----------------------    ---------  Sm (anti-Smith)          SLE                        15 - 30%  ---------------------   -----------------------    ---------  RNP                      Mixed Connective Tissue                           Disease                         95%  (U1 nRNP,                SLE                        30 - 50%  anti-ribonucleoprotein)  Polymyositis and/or                           Dermatomyositis                 20%  ---------------------   ------------------------   ---------  Scl-70 (antiDNA          Scleroderma (diffuse)      20 - 35%  topoisomerase)           Crest                           13%  ---------------------   ------------------------   ---------  Haily-1                     Polymyositis and/or                           Dermatomyositis            20 - 40%  ---------------------   ------------------------   ---------  Centromere B             Scleroderma -                            Crest                           variant                         80%        No results found.      DIAGNOSTIC DATA:   1. Radiology:    EXAMINATION: XR CHEST 1 VW-      INDICATION: Chest pain triage protocol.      COMPARISON: 11/15/2015.     FINDINGS: Cardiac size within normal limits. Pulmonary vascularity  within normal limits. No focal opacification or consolidation. No  pneumothorax or significant effusion. No acute osseous abnormality.         IMPRESSION:  No acute cardiopulmonary process.     D:  2019  E:  2019     This report was finalized on 3/20/2019 9:12 AM by  Dr. Leon Fernandez.  2. Mrs. reaves's note from January 12, 2021 reviewed by me and documented in the  chart.   3. Pathology report:       ESOPHAGUS BIOPSY:  Chronic active erosive esophagitis with reactive and regenerative changes.  Negative for Rivera's metaplasia and eosinophilic esophagitis (0/1/HPF).  Negative for dysplasia and neoplasia.     JFJ/dlb       Electronically signed by Francois Herman MD on 7/1/2020 at 1108       4. Laboratory data: As above    ASSESSMENT: The patient is a very pleasant 40 y.o.  female  with anemia    PROBLEM LIST:   1.  Anemia  2.  Microcytosis  3.  Iron deficiency:  A.  EGD done July 2020 revealed erosive esophagitis  4.  Positive JOSÉ MIGUEL with inflammation markers    PLAN:   1. I had a long discussion today with the patient about her  new diagnosis of anemia. I did go over the final pathology report in  detail with both of them. I reviewed the patient's documents including refereing provider's notes, lab results, imaging, and path report.   2.  I explained to the patient most likely cause of her anemia with iron deficiency is chronic GI blood loss.  She had EGD done July of last year that confirmed erosive gastritis.  The patient has been taking ibuprofen for arthritis.  3.  The patient will be treated with 2 dose of IV iron using Injectafer 750 mg 1 week apart.  4.  We did go over potential side effects of IV iron including infusion reactions.  The patient will be premedicated with Benadryl and Pepcid.  5.  She will follow-up with me in 6-month repeat CBC and iron profile.  6.  She will continue to follow-up with rheumatology for arthritis    Michelle Eugene MD  1/26/2021

## 2021-01-26 ENCOUNTER — CONSULT (OUTPATIENT)
Dept: ONCOLOGY | Facility: CLINIC | Age: 41
End: 2021-01-26

## 2021-01-26 VITALS
SYSTOLIC BLOOD PRESSURE: 114 MMHG | DIASTOLIC BLOOD PRESSURE: 79 MMHG | OXYGEN SATURATION: 100 % | HEART RATE: 82 BPM | WEIGHT: 275.4 LBS | HEIGHT: 61 IN | RESPIRATION RATE: 18 BRPM | TEMPERATURE: 97.3 F | BODY MASS INDEX: 52 KG/M2

## 2021-01-26 DIAGNOSIS — D50.0 IRON DEFICIENCY ANEMIA DUE TO CHRONIC BLOOD LOSS: Primary | ICD-10-CM

## 2021-01-26 DIAGNOSIS — E61.1 IRON DEFICIENCY: ICD-10-CM

## 2021-01-26 DIAGNOSIS — K90.9 IRON MALABSORPTION: ICD-10-CM

## 2021-01-26 PROCEDURE — 99204 OFFICE O/P NEW MOD 45 MIN: CPT | Performed by: INTERNAL MEDICINE

## 2021-01-26 RX ORDER — FAMOTIDINE 10 MG/ML
20 INJECTION, SOLUTION INTRAVENOUS ONCE
Status: CANCELLED | OUTPATIENT
Start: 2021-02-02

## 2021-01-26 RX ORDER — FAMOTIDINE 10 MG/ML
20 INJECTION, SOLUTION INTRAVENOUS ONCE
Status: CANCELLED | OUTPATIENT
Start: 2021-02-10

## 2021-01-26 RX ORDER — DIPHENHYDRAMINE HCL 25 MG
25 CAPSULE ORAL ONCE
Status: CANCELLED | OUTPATIENT
Start: 2021-02-10

## 2021-01-26 RX ORDER — DIPHENHYDRAMINE HCL 25 MG
25 CAPSULE ORAL ONCE
Status: CANCELLED | OUTPATIENT
Start: 2021-02-02

## 2021-01-26 RX ORDER — SODIUM CHLORIDE 9 MG/ML
250 INJECTION, SOLUTION INTRAVENOUS ONCE
Status: CANCELLED | OUTPATIENT
Start: 2021-02-10

## 2021-01-26 RX ORDER — SODIUM CHLORIDE 9 MG/ML
250 INJECTION, SOLUTION INTRAVENOUS ONCE
Status: CANCELLED | OUTPATIENT
Start: 2021-02-02

## 2021-02-02 ENCOUNTER — APPOINTMENT (OUTPATIENT)
Dept: ONCOLOGY | Facility: HOSPITAL | Age: 41
End: 2021-02-02

## 2021-02-03 ENCOUNTER — HOSPITAL ENCOUNTER (OUTPATIENT)
Dept: MRI IMAGING | Facility: HOSPITAL | Age: 41
Discharge: HOME | End: 2021-02-03
Admitting: NURSE PRACTITIONER

## 2021-02-03 DIAGNOSIS — R20.0 LT FACIAL NUMBNESS: ICD-10-CM

## 2021-02-03 DIAGNOSIS — R20.2 FACIAL TINGLING SENSATION: ICD-10-CM

## 2021-02-03 PROCEDURE — A9577 INJ MULTIHANCE: HCPCS | Performed by: NURSE PRACTITIONER

## 2021-02-03 PROCEDURE — 0 GADOBENATE DIMEGLUMINE 529 MG/ML SOLUTION: Performed by: NURSE PRACTITIONER

## 2021-02-03 PROCEDURE — 70553 MRI BRAIN STEM W/O & W/DYE: CPT

## 2021-02-03 RX ADMIN — GADOBENATE DIMEGLUMINE 20 ML: 529 INJECTION, SOLUTION INTRAVENOUS at 20:32

## 2021-02-05 ENCOUNTER — TELEPHONE (OUTPATIENT)
Dept: NEUROLOGY | Facility: CLINIC | Age: 41
End: 2021-02-05

## 2021-02-05 NOTE — PROGRESS NOTES
Please notify the patient that the MRI of her brain looks stable compared to prior imaging.  No abnormality seen.  We will follow-up as scheduled in office.  Thanks, SYBIL Headley.

## 2021-02-05 NOTE — TELEPHONE ENCOUNTER
----- Message from SYBIL Macario sent at 2/5/2021  3:29 PM EST -----  Please notify the patient that the MRI of her brain looks stable compared to prior imaging.  No abnormality seen.  We will follow-up as scheduled in office.  Thanks, SYBIL Headley.

## 2021-02-09 ENCOUNTER — HOSPITAL ENCOUNTER (OUTPATIENT)
Dept: ONCOLOGY | Facility: HOSPITAL | Age: 41
Setting detail: INFUSION SERIES
Discharge: HOME | End: 2021-02-09

## 2021-02-09 VITALS
HEIGHT: 60 IN | DIASTOLIC BLOOD PRESSURE: 79 MMHG | BODY MASS INDEX: 53.99 KG/M2 | SYSTOLIC BLOOD PRESSURE: 136 MMHG | WEIGHT: 275 LBS | TEMPERATURE: 96.2 F | RESPIRATION RATE: 20 BRPM | HEART RATE: 89 BPM

## 2021-02-09 DIAGNOSIS — K90.9 IRON MALABSORPTION: Primary | ICD-10-CM

## 2021-02-09 DIAGNOSIS — E61.1 IRON DEFICIENCY: ICD-10-CM

## 2021-02-09 DIAGNOSIS — D50.0 IRON DEFICIENCY ANEMIA DUE TO CHRONIC BLOOD LOSS: ICD-10-CM

## 2021-02-09 PROCEDURE — 96375 TX/PRO/DX INJ NEW DRUG ADDON: CPT

## 2021-02-09 PROCEDURE — 25010000002 FERRIC CARBOXYMALTOSE 750 MG/15ML SOLUTION 15 ML VIAL: Performed by: INTERNAL MEDICINE

## 2021-02-09 PROCEDURE — 96365 THER/PROPH/DIAG IV INF INIT: CPT

## 2021-02-09 PROCEDURE — 96374 THER/PROPH/DIAG INJ IV PUSH: CPT

## 2021-02-09 PROCEDURE — 63710000001 DIPHENHYDRAMINE PER 50 MG: Performed by: INTERNAL MEDICINE

## 2021-02-09 RX ORDER — FAMOTIDINE 10 MG/ML
20 INJECTION, SOLUTION INTRAVENOUS ONCE
Status: COMPLETED | OUTPATIENT
Start: 2021-02-09 | End: 2021-02-09

## 2021-02-09 RX ORDER — DIPHENHYDRAMINE HCL 25 MG
25 CAPSULE ORAL ONCE
Status: COMPLETED | OUTPATIENT
Start: 2021-02-09 | End: 2021-02-09

## 2021-02-09 RX ORDER — SODIUM CHLORIDE 9 MG/ML
250 INJECTION, SOLUTION INTRAVENOUS ONCE
Status: COMPLETED | OUTPATIENT
Start: 2021-02-09 | End: 2021-02-09

## 2021-02-09 RX ADMIN — DIPHENHYDRAMINE HYDROCHLORIDE 25 MG: 25 CAPSULE ORAL at 08:22

## 2021-02-09 RX ADMIN — SODIUM CHLORIDE 250 ML: 9 INJECTION, SOLUTION INTRAVENOUS at 08:22

## 2021-02-09 RX ADMIN — FERRIC CARBOXYMALTOSE INJECTION 750 MG: 50 INJECTION, SOLUTION INTRAVENOUS at 08:26

## 2021-02-09 RX ADMIN — FAMOTIDINE 20 MG: 10 INJECTION, SOLUTION INTRAVENOUS at 08:22

## 2021-02-11 ENCOUNTER — TELEPHONE (OUTPATIENT)
Dept: OBSTETRICS AND GYNECOLOGY | Facility: CLINIC | Age: 41
End: 2021-02-11

## 2021-02-11 NOTE — TELEPHONE ENCOUNTER
Patient was called and informed that her Pap smear was negative but the HPV was positive for nonsixteen noneighteen.  This is her second positive HPV screening so a colposcopy was scheduled.    Danilo Yung MD

## 2021-02-16 ENCOUNTER — APPOINTMENT (OUTPATIENT)
Dept: ONCOLOGY | Facility: HOSPITAL | Age: 41
End: 2021-02-16

## 2021-02-19 ENCOUNTER — HOSPITAL ENCOUNTER (OUTPATIENT)
Dept: ONCOLOGY | Facility: HOSPITAL | Age: 41
Setting detail: INFUSION SERIES
Discharge: HOME | End: 2021-02-19

## 2021-02-19 VITALS
BODY MASS INDEX: 51.85 KG/M2 | DIASTOLIC BLOOD PRESSURE: 62 MMHG | WEIGHT: 274.6 LBS | HEART RATE: 76 BPM | RESPIRATION RATE: 18 BRPM | HEIGHT: 61 IN | SYSTOLIC BLOOD PRESSURE: 142 MMHG | TEMPERATURE: 96 F

## 2021-02-19 DIAGNOSIS — K90.9 IRON MALABSORPTION: Primary | ICD-10-CM

## 2021-02-19 DIAGNOSIS — E61.1 IRON DEFICIENCY: ICD-10-CM

## 2021-02-19 DIAGNOSIS — D50.0 IRON DEFICIENCY ANEMIA DUE TO CHRONIC BLOOD LOSS: ICD-10-CM

## 2021-02-19 PROCEDURE — 96365 THER/PROPH/DIAG IV INF INIT: CPT

## 2021-02-19 PROCEDURE — 96375 TX/PRO/DX INJ NEW DRUG ADDON: CPT

## 2021-02-19 PROCEDURE — 25010000002 FERRIC CARBOXYMALTOSE 750 MG/15ML SOLUTION 15 ML VIAL: Performed by: INTERNAL MEDICINE

## 2021-02-19 PROCEDURE — 63710000001 DIPHENHYDRAMINE PER 50 MG: Performed by: INTERNAL MEDICINE

## 2021-02-19 RX ORDER — FAMOTIDINE 10 MG/ML
20 INJECTION, SOLUTION INTRAVENOUS ONCE
Status: COMPLETED | OUTPATIENT
Start: 2021-02-19 | End: 2021-02-19

## 2021-02-19 RX ORDER — DIPHENHYDRAMINE HCL 25 MG
25 CAPSULE ORAL ONCE
Status: COMPLETED | OUTPATIENT
Start: 2021-02-19 | End: 2021-02-19

## 2021-02-19 RX ORDER — SODIUM CHLORIDE 9 MG/ML
250 INJECTION, SOLUTION INTRAVENOUS ONCE
Status: COMPLETED | OUTPATIENT
Start: 2021-02-19 | End: 2021-02-19

## 2021-02-19 RX ADMIN — FERRIC CARBOXYMALTOSE INJECTION 750 MG: 50 INJECTION, SOLUTION INTRAVENOUS at 08:28

## 2021-02-19 RX ADMIN — DIPHENHYDRAMINE HYDROCHLORIDE 25 MG: 25 CAPSULE ORAL at 08:25

## 2021-02-19 RX ADMIN — FAMOTIDINE 20 MG: 10 INJECTION, SOLUTION INTRAVENOUS at 08:25

## 2021-02-19 RX ADMIN — SODIUM CHLORIDE 250 ML: 9 INJECTION, SOLUTION INTRAVENOUS at 08:28

## 2021-02-22 ENCOUNTER — OFFICE VISIT (OUTPATIENT)
Dept: OBSTETRICS AND GYNECOLOGY | Facility: CLINIC | Age: 41
End: 2021-02-22

## 2021-02-22 VITALS
SYSTOLIC BLOOD PRESSURE: 114 MMHG | DIASTOLIC BLOOD PRESSURE: 84 MMHG | WEIGHT: 271 LBS | RESPIRATION RATE: 14 BRPM | BODY MASS INDEX: 51.16 KG/M2 | HEIGHT: 61 IN

## 2021-02-22 DIAGNOSIS — R87.810 CERVICAL HIGH RISK HPV (HUMAN PAPILLOMAVIRUS) TEST POSITIVE: Primary | ICD-10-CM

## 2021-02-22 PROCEDURE — 57452 EXAM OF CERVIX W/SCOPE: CPT | Performed by: OBSTETRICS & GYNECOLOGY

## 2021-02-22 NOTE — PROGRESS NOTES
Colposcopy    Date of procedure:  2/22/2021   Risks and benefits discussed? yes   All questions answered? yes   Consents given by: patient   Written consent obtained? yes   Pre-op indication: Normal PAP  Positive nonsixteen noneighteen HPV          Procedure documentation:  The cervix was initially viewed colposcopically through a green filter.  The cervix was next bathed in acetic acid.   The findings were as follows:    Transformation zone seen? No   Findings: 1. No visible lesions   Ectocervical biopsies: not obtained.   Endocervical curettage: not performed               Colposcopic Impression: 1. Normal appearing cervix w/o visible lesion  2. Inadequate colposcopy  3. Colposcopic findings are consistent with cytology       Plan:  Repeat Pap smear in about 6 months which is July 2021         This note was electronically signed.    Danilo Yung MD    February 22, 2021

## 2021-04-29 ENCOUNTER — OFFICE VISIT (OUTPATIENT)
Dept: NEUROLOGY | Facility: CLINIC | Age: 41
End: 2021-04-29

## 2021-04-29 ENCOUNTER — LAB (OUTPATIENT)
Dept: LAB | Facility: HOSPITAL | Age: 41
End: 2021-04-29

## 2021-04-29 VITALS
OXYGEN SATURATION: 98 % | BODY MASS INDEX: 50.98 KG/M2 | HEIGHT: 61 IN | SYSTOLIC BLOOD PRESSURE: 128 MMHG | TEMPERATURE: 98 F | HEART RATE: 86 BPM | DIASTOLIC BLOOD PRESSURE: 72 MMHG | WEIGHT: 270 LBS

## 2021-04-29 DIAGNOSIS — R76.8 ELEVATED ANTINUCLEAR ANTIBODY (ANA) LEVEL: ICD-10-CM

## 2021-04-29 DIAGNOSIS — G43.809 MIGRAINE VARIANT WITH HEADACHE: Primary | Chronic | ICD-10-CM

## 2021-04-29 PROCEDURE — 86235 NUCLEAR ANTIGEN ANTIBODY: CPT

## 2021-04-29 PROCEDURE — 99214 OFFICE O/P EST MOD 30 MIN: CPT | Performed by: NURSE PRACTITIONER

## 2021-04-29 PROCEDURE — 36415 COLL VENOUS BLD VENIPUNCTURE: CPT

## 2021-04-29 PROCEDURE — 86038 ANTINUCLEAR ANTIBODIES: CPT

## 2021-04-29 PROCEDURE — 86225 DNA ANTIBODY NATIVE: CPT

## 2021-04-29 NOTE — PROGRESS NOTES
Subjective:     Patient ID: Nury Pineda is a 40 y.o. female.    CC:   Chief Complaint   Patient presents with   • Migraine       HPI:   History of Present Illness   This is a 40-year-old female who presents for 3-month follow-up on complex migraine with intermittent left facial numbness, tingling and blurred vision.  She was Covid positive on 7/9/2020.  She has had complex migraines dating back to 2015.  Since last visit she is only had one episode of migraine and she took the Nurtec ODT and went to bed and when she woke that this was completely resolved.  Triptans are poorly tolerated.  She is hesitant to be on preventive medications.  She did have MRI of the brain with and without contrast completed on 2/4/2021 and this was unremarkable with imaging and radiology report reviewed today.  She has recently received iron infusion for chronic iron deficiency anemia and is feeling more energized overall.  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  She does occasionally get some joint pains.  At last visit she did have labs including ferritin 77, iron profile with iron saturation 9 and iron level 30, TSH 0.981, free T4 1.04, sed rate elevated at 37, CRP elevated at 2.33, JOSÉ MIGUEL with reflex was positive with a ratio of greater than 1:1280 speckled pattern, we have referred her to [facility] but she tells me she missed her phone call and has not returned their call to schedule.  She is willing to have another test today.  She denies any rashes, swelling or lesions.  We also referred her to ophthalmology but she also missed that appointment.  She also needs to reschedule with GI for acid reflux and refills on her omeprazole.  She missed that appointment in August 2020.    Previous extensive work-up & history included below:  She has had previous atypical and complex migraines since 2015 which have presented with stroke like symptoms.  It has been challenging to treat her migraines since  she has severe difficulty with trouble swallowing and esophageal spasms so she is really not able to swallow most pills.  She cannot crush or open some pills to take.  She cannot swallow pills whole at this time.  Since last visit she has used a few Fioricet but does not need a refill on these today.  The Fioricet makes her very tired.  She does use Compazine as needed for the nausea and migraines and this does help slightly.  She has previously tried topiramate with intolerance, amitriptyline with sedation and intolerance, sumatriptan with tightening of the throat, aspirin and Cambia.  She does have Cambia oral powder samples at home which have sometimes helped.  She has also used rizatriptan in the past but this caused her to feel like she was having spasms and tightening of her throat and chest as well.  Triptans have been poorly tolerated.  She is really hesitant to start daily preventive medications.  She tries to avoid medications if possible. Prior imaging studies showed no acute process with MRI of the brain and C-spine as well as MRA of the brain in 2016 and 2015 but there was significant artifact due to her wearing braces.  CTA of the head and neck was normal in 2016. She did have a sleep study remotely and told that she had very mild sleep apnea but has not been treated with a CPAP.      The following portions of the patient's history were reviewed and updated as appropriate: allergies, current medications, past family history, past medical history, past social history, past surgical history and problem list.    Past Medical History:   Diagnosis Date   • Absolute anemia    • Arthritis     Right knee   • Carpal tunnel syndrome     RIGHT WRIST   • Cholelithiasis     Nausea related to stones has phenergan. Also has related RUQ pain.   • Dizziness    • Elevated C-reactive protein (CRP)    • Elevated erythrocyte sedimentation rate    • Influenza    • Iron deficiency     Will not take iron supplement but will  take MVI   • Left hip pain    • Migraines    • Numbness and tingling in right hand     Has nerve conductions 3/23/2016, awaiting results. Along with Paresthesia.   • Pain and swelling of right forearm     Check u/s to r/o clot. Rash and redness are resolving, will continue to monitor.   • Pain of left arm    • Sleep disturbances    • Vitamin D deficiency     25 oh.       Past Surgical History:   Procedure Laterality Date   •  SECTION     • CHOLECYSTECTOMY     • COLONOSCOPY     • CYST REMOVAL      behind belly button   • TONSILLECTOMY     • URACHAL CYST INCISION      History of Excision Of Urachal Cyst.       Social History     Socioeconomic History   • Marital status: Single     Spouse name: Not on file   • Number of children: Not on file   • Years of education: Not on file   • Highest education level: Not on file   Tobacco Use   • Smoking status: Never Smoker   • Smokeless tobacco: Never Used   Vaping Use   • Vaping Use: Never used   Substance and Sexual Activity   • Alcohol use: Yes     Alcohol/week: 2.0 standard drinks     Types: 2 Glasses of wine per week   • Drug use: No   • Sexual activity: Defer     Partners: Male     Birth control/protection: Condom, OCP       Family History   Problem Relation Age of Onset   • Stroke Mother    • Hypertension Mother    • Migraines Mother    • Osteoarthritis Mother    • Cervical cancer Mother    • Heart disease Mother    • Hyperlipidemia Maternal Grandmother    • Osteoarthritis Maternal Grandmother    • Birth defects Maternal Grandmother    • Ovarian cancer Maternal Grandmother         PREMENOPAUSAL    • Transient ischemic attack Maternal Grandmother    • Hypertension Maternal Grandmother    • Stroke Other         CVA x 2. and TIA   • Cancer Other         Cervical, malignant neoplasm x2 , ovarian   • Hypertension Father    • Diabetes Father         Review of Systems   Constitutional: Negative for chills, fatigue, fever and unexpected weight change.  "  HENT: Negative for ear pain, hearing loss, nosebleeds, rhinorrhea and sore throat.    Eyes: Negative for photophobia, pain, discharge, itching and visual disturbance.   Respiratory: Negative for cough, chest tightness, shortness of breath and wheezing.    Cardiovascular: Negative for chest pain, palpitations and leg swelling.   Gastrointestinal: Negative for abdominal pain, blood in stool, constipation, diarrhea, nausea and vomiting.   Genitourinary: Negative for dysuria, frequency, hematuria and urgency.   Musculoskeletal: Negative for arthralgias, back pain, gait problem, joint swelling, myalgias, neck pain and neck stiffness.   Skin: Negative for rash and wound.   Allergic/Immunologic: Negative for environmental allergies and food allergies.   Neurological: Positive for headaches. Negative for dizziness, tremors, seizures, syncope, speech difficulty, weakness, light-headedness and numbness.   Hematological: Negative for adenopathy. Does not bruise/bleed easily.   Psychiatric/Behavioral: Negative for agitation, confusion, decreased concentration, hallucinations, sleep disturbance and suicidal ideas. The patient is not nervous/anxious.    All other systems reviewed and are negative.       Objective:  /72   Pulse 86   Temp 98 °F (36.7 °C)   Ht 154.9 cm (61\")   Wt 122 kg (270 lb)   SpO2 98%   BMI 51.02 kg/m²     Neurologic Exam     Mental Status   Oriented to person, place, and time.   Speech: speech is normal   Level of consciousness: alert    Cranial Nerves   Cranial nerves II through XII intact.     Motor Exam   Muscle bulk: normal  Overall muscle tone: normal    Strength   Strength 5/5 throughout.     Gait, Coordination, and Reflexes     Gait  Gait: normal    Coordination   Finger to nose coordination: normal    Tremor   Resting tremor: absent  Intention tremor: absent  Action tremor: absent    Reflexes   Right brachioradialis: 2+  Left brachioradialis: 2+  Right biceps: 2+  Left biceps: 2+  Right " : 2+  Left : 2+      Physical Exam  Neurological:      Mental Status: She is oriented to person, place, and time.      Coordination: Finger-Nose-Finger Test normal.      Gait: Gait is intact.      Deep Tendon Reflexes: Strength normal.      Reflex Scores:       Bicep reflexes are 2+ on the right side and 2+ on the left side.       Brachioradialis reflexes are 2+ on the right side and 2+ on the left side.  Psychiatric:         Speech: Speech normal.         Assessment/Plan:       Diagnoses and all orders for this visit:    1. Migraine variant with headache (Primary)  Comments:  continue nurtec odt prn, has not required compazine, mri brain w/wo 2/4/21 normal    2. Elevated antinuclear antibody (JOSÉ MIGUEL) level  Comments:  recheck today, JOSÉ MIGUEL+ >1:1280 last visit. needs to call to schedule with rheumatology  Orders:  -     JOSÉ MIGUEL by IFA, Reflex 9-biomarkers profile; Future           I have strongly encouraged her to reschedule and keep all appointments with specialist.  She has not seen her primary care provider in over 2 years, she has missed GI appointments and rheumatology and ophthalmology.  I have explained it is her responsibility to reschedule these visits.  She does verbalize understanding.  We will recheck her JOSÉ MIGUEL today but I am concerned about her elevation in JOSÉ MIGUEL, sed rate and CRP that there is an autoimmune etiology that needs to be addressed.  We will follow-up in our clinic in 6 months or sooner if needed.    Reviewed medications, potential side effects and signs and symptoms to report. Discussed risk versus benefits of treatment plan with patient and/or family-including medications, labs and radiology that may be ordered. Addressed questions and concerns during visit. Patient and/or family verbalized understanding and agree with plan.    AS THE PROVIDER, I PERSONALLY WORE PPE DURING ENTIRE FACE TO FACE ENCOUNTER IN CLINIC WITH THE PATIENT. PATIENT ALSO WORE PPE DURING ENTIRE FACE TO FACE ENCOUNTER EXCEPT  FOR A MAX OF 30 SECONDS DURING NEUROLOGICAL EVALUATION OF CRANIAL NERVES AND THEN MASK WAS PLACED BACK OVER PATIENT FACE FOR REMAINDER OF VISIT. I WASHED MY HANDS BEFORE AND AFTER VISIT.    During this visit the following were done:  Labs Reviewed [x]    Labs Ordered [x]    Radiology Reports Reviewed [x]    Radiology Ordered []    PCP Records Reviewed []    Referring Provider Records Reviewed []    ER Records Reviewed []    Hospital Records Reviewed []    History Obtained From Family []    Radiology Images Reviewed [x]    Other Reviewed [x].  Hematology notes.  Records Requested []      Ele Thorne, APRN  4/29/2021

## 2021-05-04 LAB
ANA SPECKLED TITR SER: ABNORMAL {TITER}
ANA TITR SER IF: POSITIVE {TITER}
CENTROMERE B AB SER-ACNC: <0.2 AI (ref 0–0.9)
CHROMATIN AB SERPL-ACNC: <0.2 AI (ref 0–0.9)
DSDNA AB SER-ACNC: <1 IU/ML (ref 0–9)
ENA JO1 AB SER-ACNC: <0.2 AI (ref 0–0.9)
ENA RNP AB SER-ACNC: 0.2 AI (ref 0–0.9)
ENA SCL70 AB SER-ACNC: <0.2 AI (ref 0–0.9)
ENA SM AB SER-ACNC: <0.2 AI (ref 0–0.9)
ENA SS-A AB SER-ACNC: <0.2 AI (ref 0–0.9)
ENA SS-B AB SER-ACNC: <0.2 AI (ref 0–0.9)
LABORATORY COMMENT REPORT: ABNORMAL
Lab: ABNORMAL
Lab: ABNORMAL

## 2021-05-05 ENCOUNTER — TELEPHONE (OUTPATIENT)
Dept: NEUROLOGY | Facility: CLINIC | Age: 41
End: 2021-05-05

## 2021-05-05 NOTE — TELEPHONE ENCOUNTER
----- Message from SYBIL Macario sent at 5/5/2021  7:52 AM EDT -----  Please notify the patient that her autoimmune panel is still relatively elevated but not as high as it was 3 months ago.  I would still like her to see [facility].  If you can give her that information again so she can call and reschedule she missed her first appointment or she never remembered to call them to schedule the first appointment.  Please fax updated labs to [facility] with last 3 visit notes and all labs we have collected on patient.  Thanks, SYBIL Headley.

## 2021-05-05 NOTE — TELEPHONE ENCOUNTER
I CALLED AND GAVE PT HER LAB RESULTS AND SHE IS SCHEDULED WITH [facility] FOR 6-4-21 AT 7:50 AM

## 2021-05-06 NOTE — TELEPHONE ENCOUNTER
Great, thank you. Make sure all labs are faxed to [facility] along with past 3 visit notes. Thanks, Ele

## 2021-07-12 ENCOUNTER — OFFICE VISIT (OUTPATIENT)
Dept: OBSTETRICS AND GYNECOLOGY | Facility: CLINIC | Age: 41
End: 2021-07-12

## 2021-07-12 VITALS
DIASTOLIC BLOOD PRESSURE: 70 MMHG | BODY MASS INDEX: 52.49 KG/M2 | SYSTOLIC BLOOD PRESSURE: 108 MMHG | HEIGHT: 61 IN | WEIGHT: 278 LBS | RESPIRATION RATE: 16 BRPM

## 2021-07-12 DIAGNOSIS — R87.810 CERVICAL HIGH RISK HPV (HUMAN PAPILLOMAVIRUS) TEST POSITIVE: Primary | ICD-10-CM

## 2021-07-12 DIAGNOSIS — R87.810 CERVICAL HIGH RISK HPV (HUMAN PAPILLOMAVIRUS) TEST POSITIVE: ICD-10-CM

## 2021-07-12 PROCEDURE — 99212 OFFICE O/P EST SF 10 MIN: CPT | Performed by: OBSTETRICS & GYNECOLOGY

## 2021-07-12 NOTE — PROGRESS NOTES
Subjective   Nury Pineda is a 40 y.o. female is here today for follow-up.    Chief Complaint   Patient presents with   • Abnormal Pap Smear     Patient is here for 6mos repeat pap. c/o sinus infection        History of Present Illness  In January of this year patient had a Pap smear read as normal but positive high risk nonsixteen noneighteen.  Patient returns for a 6-month repeat.  Patient is complaining of sinus symptoms and is on medication which have improved the symptoms.  The following portions of the patient's history were reviewed and updated as appropriate: allergies, current medications, past family history, past medical history, past social history, past surgical history and problem list.    Review of Systems - History obtained from the patient  General ROS: negative  Psychological ROS: negative  ENT ROS: negative  Allergy and Immunology ROS: positive for - seasonal allergies  Hematological and Lymphatic ROS: negative  Endocrine ROS: negative  Breast ROS: negative for breast lumps  Respiratory ROS: positive for - cough  Cardiovascular ROS: no chest pain or dyspnea on exertion  Gastrointestinal ROS: no abdominal pain, change in bowel habits, or black or bloody stools  Genito-Urinary ROS: no dysuria, trouble voiding, or hematuria  Musculoskeletal ROS: negative  Neurological ROS: no TIA or stroke symptoms  Dermatological ROS: negative     Objective   General:  well developed; well nourished  no acute distress   Skin:  Not performed.   Thyroid: not examined   Breasts:  Not performed.   Abdomen: soft, non-tender; no masses  no umbilical or inguinal hernias are present  no hepato-splenomegaly   Heart: regular rate and rhythm, S1, S2 normal, no murmur, click, rub or gallop   Lungs: clear to auscultation   Pelvis: Clinical staff was present for exam  External genitalia:  normal appearance of the external genitalia including Bartholin's and Northridge's glands.  :  urethral meatus normal;  Vaginal:  normal  pink mucosa without prolapse or lesions.  Cervix:  normal appearance. Pap smear was done  Uterus:  not palpable.  Adnexa:  non palpable bilaterally.  Rectal:  digital rectal exam not performed; anus visually normal appearing.         Assessment/Plan   Diagnoses and all orders for this visit:    1. Cervical high risk HPV (human papillomavirus) test positive (Primary)  -     Cancel: Liquid-based Pap Smear, Screening; Future        Return in January 2022 for refill birth control pills    Danilo Yung MD

## 2021-07-14 ENCOUNTER — OFFICE VISIT (OUTPATIENT)
Dept: ONCOLOGY | Facility: CLINIC | Age: 41
End: 2021-07-14

## 2021-07-14 ENCOUNTER — LAB (OUTPATIENT)
Dept: LAB | Facility: HOSPITAL | Age: 41
End: 2021-07-14

## 2021-07-14 VITALS
WEIGHT: 277.6 LBS | HEIGHT: 61 IN | RESPIRATION RATE: 18 BRPM | BODY MASS INDEX: 52.41 KG/M2 | HEART RATE: 80 BPM | OXYGEN SATURATION: 99 % | TEMPERATURE: 97.3 F | DIASTOLIC BLOOD PRESSURE: 83 MMHG | SYSTOLIC BLOOD PRESSURE: 125 MMHG

## 2021-07-14 DIAGNOSIS — K90.9 IRON MALABSORPTION: ICD-10-CM

## 2021-07-14 DIAGNOSIS — E61.1 IRON DEFICIENCY: ICD-10-CM

## 2021-07-14 DIAGNOSIS — D50.0 IRON DEFICIENCY ANEMIA DUE TO CHRONIC BLOOD LOSS: Primary | ICD-10-CM

## 2021-07-14 DIAGNOSIS — D50.0 IRON DEFICIENCY ANEMIA DUE TO CHRONIC BLOOD LOSS: ICD-10-CM

## 2021-07-14 LAB
ERYTHROCYTE [DISTWIDTH] IN BLOOD BY AUTOMATED COUNT: 15.6 % (ref 12.3–15.4)
FERRITIN SERPL-MCNC: 399.6 NG/ML (ref 13–150)
HCT VFR BLD AUTO: 35.7 % (ref 34–46.6)
HGB BLD-MCNC: 11.6 G/DL (ref 12–15.9)
IRON 24H UR-MRATE: 57 MCG/DL (ref 37–145)
IRON SATN MFR SERPL: 19 % (ref 20–50)
LYMPHOCYTES # BLD AUTO: 3.4 10*3/MM3 (ref 0.7–3.1)
LYMPHOCYTES NFR BLD AUTO: 43 % (ref 19.6–45.3)
MCH RBC QN AUTO: 25.8 PG (ref 26.6–33)
MCHC RBC AUTO-ENTMCNC: 32.5 G/DL (ref 31.5–35.7)
MCV RBC AUTO: 79.4 FL (ref 79–97)
MONOCYTES # BLD AUTO: 0.6 10*3/MM3 (ref 0.1–0.9)
MONOCYTES NFR BLD AUTO: 8 % (ref 5–12)
NEUTROPHILS NFR BLD AUTO: 3.9 10*3/MM3 (ref 1.7–7)
NEUTROPHILS NFR BLD AUTO: 49 % (ref 42.7–76)
PLATELET # BLD AUTO: 298 10*3/MM3 (ref 140–450)
PMV BLD AUTO: 8.2 FL (ref 6–12)
RBC # BLD AUTO: 4.49 10*6/MM3 (ref 3.77–5.28)
TIBC SERPL-MCNC: 294 MCG/DL (ref 298–536)
TRANSFERRIN SERPL-MCNC: 197 MG/DL (ref 200–360)
WBC # BLD AUTO: 8 10*3/MM3 (ref 3.4–10.8)

## 2021-07-14 PROCEDURE — 36415 COLL VENOUS BLD VENIPUNCTURE: CPT

## 2021-07-14 PROCEDURE — 84466 ASSAY OF TRANSFERRIN: CPT

## 2021-07-14 PROCEDURE — 82728 ASSAY OF FERRITIN: CPT

## 2021-07-14 PROCEDURE — 85025 COMPLETE CBC W/AUTO DIFF WBC: CPT

## 2021-07-14 PROCEDURE — 99214 OFFICE O/P EST MOD 30 MIN: CPT | Performed by: NURSE PRACTITIONER

## 2021-07-14 PROCEDURE — 83540 ASSAY OF IRON: CPT

## 2021-07-14 RX ORDER — SODIUM CHLORIDE 9 MG/ML
250 INJECTION, SOLUTION INTRAVENOUS ONCE
Status: CANCELLED | OUTPATIENT
Start: 2021-07-27

## 2021-07-14 RX ORDER — DIPHENHYDRAMINE HCL 25 MG
25 CAPSULE ORAL ONCE
Status: CANCELLED | OUTPATIENT
Start: 2021-07-20

## 2021-07-14 RX ORDER — FAMOTIDINE 10 MG/ML
20 INJECTION, SOLUTION INTRAVENOUS ONCE
Status: CANCELLED | OUTPATIENT
Start: 2021-07-27

## 2021-07-14 RX ORDER — SODIUM CHLORIDE 9 MG/ML
250 INJECTION, SOLUTION INTRAVENOUS ONCE
Status: CANCELLED | OUTPATIENT
Start: 2021-07-20

## 2021-07-14 RX ORDER — DIPHENHYDRAMINE HCL 25 MG
25 CAPSULE ORAL ONCE
Status: CANCELLED | OUTPATIENT
Start: 2021-07-27

## 2021-07-14 RX ORDER — FAMOTIDINE 10 MG/ML
20 INJECTION, SOLUTION INTRAVENOUS ONCE
Status: CANCELLED | OUTPATIENT
Start: 2021-07-20

## 2021-07-14 NOTE — PROGRESS NOTES
DATE OF VISIT: 7/14/2021    REASON FOR VISIT: Followup for iron deficiency anemia.      HISTORY OF PRESENT ILLNESS: The patient is a very pleasant 40 y.o. female  with past medical history significant for iron deficiency anemia diagnosed June 2020. The patient presented with fatigue and weakness and had blood work done that revealed iron deficiency with anemia. She was placed on oral iron replacement however she could not tolerate it secondary to multiple GI side effects. She has a history of erosive esophagitis found on EGD done 6/30/2020. She received IV iron replacement 2/9/2021 and 2/19/2021. The patient is here today for scheduled follow up visit.     SUBJECTIVE: The patient is here today by herself. She has been doing fairly well since her last visit. She currently has a sinus infection and bronchitis for which she is on steroid pack as well as antibiotics. She is beginning to feel better. She felt better after receiving he IV iron infusions, however she is beginning to feel more fatigued. She is having regular periods since being on birth control. She denies change in bowel function or stool color. She still has acid reflux for which she take omeprazole.     PAST MEDICAL HISTORY/SOCIAL HISTORY/FAMILY HISTORY: Reviewed by me and unchanged from Dr. Eugene's documentation done on 01/26/2021.    Review of Systems   Constitutional: Negative for activity change, appetite change, chills, fatigue, fever and unexpected weight change.   HENT: Positive for congestion, sinus pressure and sinus pain. Negative for hearing loss, mouth sores, nosebleeds, sore throat and trouble swallowing.    Eyes: Negative for visual disturbance.   Respiratory: Positive for cough and wheezing. Negative for chest tightness and shortness of breath.    Cardiovascular: Negative for chest pain, palpitations and leg swelling.   Gastrointestinal: Negative for abdominal distention, abdominal pain, blood in stool, constipation, diarrhea, nausea,  rectal pain and vomiting.        Acid reflux   Endocrine: Negative for cold intolerance and heat intolerance.   Genitourinary: Negative for difficulty urinating, dysuria, frequency and urgency.   Musculoskeletal: Negative for arthralgias, back pain, gait problem, joint swelling and myalgias.   Skin: Negative for rash.   Neurological: Positive for headaches. Negative for dizziness, tremors, syncope, weakness, light-headedness and numbness.   Hematological: Negative for adenopathy. Does not bruise/bleed easily.   Psychiatric/Behavioral: Negative for confusion, sleep disturbance and suicidal ideas. The patient is not nervous/anxious.          Current Outpatient Medications:   •  benzonatate (TESSALON) 100 MG capsule, Take 1 capsule by mouth 3 (Three) Times a Day As Needed for Cough for up to 5 days., Disp: 15 capsule, Rfl: 1  •  Butalbital-APAP-Caffeine -40 MG per capsule, Take 1 capsule by mouth Every 6 (Six) Hours As Needed for Migraine., Disp: 15 capsule, Rfl: 0  •  doxycycline (MONODOX) 100 MG capsule, Take 1 capsule by mouth 2 (Two) Times a Day for 7 days., Disp: 14 capsule, Rfl: 0  •  methylPREDNISolone (MEDROL) 4 MG dose pack, Take as directed on package instructions., Disp: 21 each, Rfl: 0  •  methylPREDNISolone (MEDROL) 4 MG dose pack, Take as directed on package instructions., Disp: 21 each, Rfl: 0  •  montelukast (SINGULAIR) 10 MG tablet, Take 1 tablet by mouth Every Night., Disp: 30 tablet, Rfl: 3  •  Multiple Vitamins-Minerals (EQ ONE DAILY WOMENS HEALTH PO), Take 2 tablets by mouth Daily., Disp: , Rfl:   •  Norethin-Eth Estrad-Fe Biphas (Lo Loestrin Fe) 1 MG-10 MCG / 10 MCG tablet, Take 1 tablet by mouth Daily., Disp: 28 tablet, Rfl: 12  •  omeprazole (priLOSEC) 40 MG capsule, Take 40 mg by mouth Daily., Disp: , Rfl:   •  prochlorperazine (COMPAZINE) 10 MG tablet, Take 1 tablet by mouth Every 6 (Six) Hours As Needed for Nausea or Vomiting., Disp: 15 tablet, Rfl: 5  •  rimegepant 75 MG dispersible  "tablet, Take 75 mg by mouth Daily As Needed (migraine)., Disp: 8 tablet, Rfl: 5    PHYSICAL EXAMINATION:   /83   Pulse 80   Temp 97.3 °F (36.3 °C)   Resp 18   Ht 154.9 cm (61\")   Wt 126 kg (277 lb 9.6 oz)   LMP 06/23/2021   SpO2 99%   BMI 52.45 kg/m²    There were no vitals filed for this visit.     ECOG score: 0          ECOG Performance Status: 0 - Asymptomatic  General Appearance:  alert, cooperative, no apparent distress and appears stated age   Neurologic/Psychiatric: A&O x 3, gait steady, appropriate affect, strength 5/5 in all muscle groups   HEENT:  Normocephalic, without obvious abnormality, mucous membranes moist   Neck: Supple, symmetrical, trachea midline, no adenopathy;  No thyromegaly, masses, or tenderness   Lungs:   Scattered expiratory wheezes throughout, no rales or rhonchi; respirations regular, even, and unlabored bilaterally   Heart:  Regular rate and rhythm, no murmurs appreciated   Abdomen:   Soft, non-tender, non-distended and no organomegaly   Lymph nodes: No cervical, supraclavicular, inguinal or axillary adenopathy noted   Extremities: Normal, atraumatic; no clubbing, cyanosis, or edema    Skin: No rashes, ulcers, or suspicious lesions noted     Lab on 07/14/2021   Component Date Value Ref Range Status   • WBC 07/14/2021 8.00  3.40 - 10.80 10*3/mm3 Final   • RBC 07/14/2021 4.49  3.77 - 5.28 10*6/mm3 Final   • Hemoglobin 07/14/2021 11.6* 12.0 - 15.9 g/dL Final   • Hematocrit 07/14/2021 35.7  34.0 - 46.6 % Final   • RDW 07/14/2021 15.6* 12.3 - 15.4 % Final   • MCV 07/14/2021 79.4  79.0 - 97.0 fL Final   • MCH 07/14/2021 25.8* 26.6 - 33.0 pg Final   • MCHC 07/14/2021 32.5  31.5 - 35.7 g/dL Final   • MPV 07/14/2021 8.2  6.0 - 12.0 fL Final   • Platelets 07/14/2021 298  140 - 450 10*3/mm3 Final   • Neutrophil % 07/14/2021 49.0  42.7 - 76.0 % Final   • Lymphocyte % 07/14/2021 43.0  19.6 - 45.3 % Final   • Monocyte % 07/14/2021 8.0  5.0 - 12.0 % Final   • Neutrophils, Absolute " 07/14/2021 3.90  1.70 - 7.00 10*3/mm3 Final   • Lymphocytes, Absolute 07/14/2021 3.40* 0.70 - 3.10 10*3/mm3 Final   • Monocytes, Absolute 07/14/2021 0.60  0.10 - 0.90 10*3/mm3 Final        No results found.    ASSESSMENT: The patient is a very pleasant 40 y.o. female  with iron deficiency anemia.     PROBLEM LIST:  1.  Anemia  2.  Microcytosis  3.  Iron deficiency:  A.  EGD done July 2020 revealed erosive esophagitis  4.  Positive JOSÉ MIGUEL with inflammation markers  A. Followed by rheumatology at   5. Acute sinusitis/bronchitis    PLAN:  1. I reviewed the lab results from today with the patient. I told the patient that her hemoglobin is improved, however she still has mild anemia with hemoglobin 11.6. Her MCV is low normal at 79, with normal WBC and platelet count.   2. We will follow up on the ferritin and iron profile results and notify her of significant findings.   3. We will go ahead and arrange for repeat IV iron infusions using Injectafer 750 mg given on days 1 and 8. We will give her premedication with Benadryl and Pepcid prior to her infusions.   4. She will continue follow up with rheumatology at  regarding positive JOSÉ MIGUEL.   5. We will her back in 6 months with repeat labs including CBC, ferritin, and iron profile.   6. She will continue OCP for control of menstrual cycles and polymenorrhagia.   7. We will refer her back to GI in the future if she has new symptoms of bowel changes, bleeding with bowel movements, or increased reflux. She had EGD done 6/2020 that revealed erosive esophagitis. She will continue omeprazole 40 mg daily.   8. She will continue doxycycline 100 mg twice a day and Medrol dose pack for acute sinusitis and bronchitis.       Dian Peck, APRN  7/14/2021

## 2021-07-20 ENCOUNTER — HOSPITAL ENCOUNTER (OUTPATIENT)
Dept: ONCOLOGY | Facility: HOSPITAL | Age: 41
Setting detail: INFUSION SERIES
Discharge: HOME | End: 2021-07-20

## 2021-07-20 VITALS
HEART RATE: 80 BPM | RESPIRATION RATE: 18 BRPM | WEIGHT: 281.3 LBS | DIASTOLIC BLOOD PRESSURE: 71 MMHG | BODY MASS INDEX: 53.15 KG/M2 | TEMPERATURE: 97.2 F | SYSTOLIC BLOOD PRESSURE: 128 MMHG

## 2021-07-20 DIAGNOSIS — E61.1 IRON DEFICIENCY: ICD-10-CM

## 2021-07-20 DIAGNOSIS — D50.0 IRON DEFICIENCY ANEMIA DUE TO CHRONIC BLOOD LOSS: ICD-10-CM

## 2021-07-20 DIAGNOSIS — K90.9 IRON MALABSORPTION: Primary | ICD-10-CM

## 2021-07-20 PROCEDURE — 63710000001 DIPHENHYDRAMINE PER 50 MG: Performed by: NURSE PRACTITIONER

## 2021-07-20 PROCEDURE — 96374 THER/PROPH/DIAG INJ IV PUSH: CPT

## 2021-07-20 PROCEDURE — 25010000002 FERRIC CARBOXYMALTOSE 750 MG/15ML SOLUTION 15 ML VIAL: Performed by: NURSE PRACTITIONER

## 2021-07-20 PROCEDURE — 96365 THER/PROPH/DIAG IV INF INIT: CPT

## 2021-07-20 PROCEDURE — 96375 TX/PRO/DX INJ NEW DRUG ADDON: CPT

## 2021-07-20 RX ORDER — FAMOTIDINE 10 MG/ML
20 INJECTION, SOLUTION INTRAVENOUS ONCE
Status: COMPLETED | OUTPATIENT
Start: 2021-07-20 | End: 2021-07-20

## 2021-07-20 RX ORDER — DIPHENHYDRAMINE HCL 25 MG
25 CAPSULE ORAL ONCE
Status: COMPLETED | OUTPATIENT
Start: 2021-07-20 | End: 2021-07-20

## 2021-07-20 RX ORDER — SODIUM CHLORIDE 9 MG/ML
250 INJECTION, SOLUTION INTRAVENOUS ONCE
Status: COMPLETED | OUTPATIENT
Start: 2021-07-20 | End: 2021-07-20

## 2021-07-20 RX ADMIN — DIPHENHYDRAMINE HYDROCHLORIDE 25 MG: 25 CAPSULE ORAL at 08:57

## 2021-07-20 RX ADMIN — SODIUM CHLORIDE 250 ML: 9 INJECTION, SOLUTION INTRAVENOUS at 08:57

## 2021-07-20 RX ADMIN — FERRIC CARBOXYMALTOSE INJECTION 750 MG: 50 INJECTION, SOLUTION INTRAVENOUS at 09:32

## 2021-07-20 RX ADMIN — FAMOTIDINE 20 MG: 10 INJECTION, SOLUTION INTRAVENOUS at 09:00

## 2021-07-20 NOTE — ADDENDUM NOTE
Encounter addended by: Petty Fishman RN on: 7/20/2021 11:19 AM   Actions taken: Patient Education assessment filed, Patient Education documented on

## 2021-07-27 ENCOUNTER — HOSPITAL ENCOUNTER (OUTPATIENT)
Dept: ONCOLOGY | Facility: HOSPITAL | Age: 41
Setting detail: INFUSION SERIES
Discharge: HOME | End: 2021-07-27

## 2021-07-27 VITALS
HEART RATE: 67 BPM | BODY MASS INDEX: 53.34 KG/M2 | SYSTOLIC BLOOD PRESSURE: 134 MMHG | TEMPERATURE: 97.9 F | WEIGHT: 282.3 LBS | RESPIRATION RATE: 18 BRPM | DIASTOLIC BLOOD PRESSURE: 59 MMHG

## 2021-07-27 DIAGNOSIS — D50.0 IRON DEFICIENCY ANEMIA DUE TO CHRONIC BLOOD LOSS: ICD-10-CM

## 2021-07-27 DIAGNOSIS — E61.1 IRON DEFICIENCY: ICD-10-CM

## 2021-07-27 DIAGNOSIS — K90.9 IRON MALABSORPTION: Primary | ICD-10-CM

## 2021-07-27 PROCEDURE — 25010000002 FERRIC CARBOXYMALTOSE 750 MG/15ML SOLUTION 15 ML VIAL: Performed by: NURSE PRACTITIONER

## 2021-07-27 PROCEDURE — 63710000001 DIPHENHYDRAMINE PER 50 MG: Performed by: NURSE PRACTITIONER

## 2021-07-27 PROCEDURE — 96374 THER/PROPH/DIAG INJ IV PUSH: CPT

## 2021-07-27 PROCEDURE — 96375 TX/PRO/DX INJ NEW DRUG ADDON: CPT

## 2021-07-27 RX ORDER — FAMOTIDINE 10 MG/ML
20 INJECTION, SOLUTION INTRAVENOUS ONCE
Status: COMPLETED | OUTPATIENT
Start: 2021-07-27 | End: 2021-07-27

## 2021-07-27 RX ORDER — DIPHENHYDRAMINE HCL 25 MG
25 CAPSULE ORAL ONCE
Status: COMPLETED | OUTPATIENT
Start: 2021-07-27 | End: 2021-07-27

## 2021-07-27 RX ORDER — SODIUM CHLORIDE 9 MG/ML
250 INJECTION, SOLUTION INTRAVENOUS ONCE
Status: COMPLETED | OUTPATIENT
Start: 2021-07-27 | End: 2021-07-27

## 2021-07-27 RX ADMIN — SODIUM CHLORIDE 250 ML: 9 INJECTION, SOLUTION INTRAVENOUS at 08:33

## 2021-07-27 RX ADMIN — FERRIC CARBOXYMALTOSE INJECTION 750 MG: 50 INJECTION, SOLUTION INTRAVENOUS at 08:34

## 2021-07-27 RX ADMIN — DIPHENHYDRAMINE HYDROCHLORIDE 25 MG: 25 CAPSULE ORAL at 08:17

## 2021-07-27 RX ADMIN — FAMOTIDINE 20 MG: 10 INJECTION, SOLUTION INTRAVENOUS at 08:33

## 2021-08-20 ENCOUNTER — TELEPHONE (OUTPATIENT)
Dept: OBSTETRICS AND GYNECOLOGY | Facility: CLINIC | Age: 41
End: 2021-08-20

## 2021-08-20 NOTE — TELEPHONE ENCOUNTER
Patient was called to discuss Pap smear results which was read as positive nonsixteen noneighteen.  Patient had a history of having a Pap smear read as this before.  She underwent a colposcopy but the squamous columnar junction could not be visualized.  Patient will repeat her Pap smear again in January 2022 when she returns for birth control pill refill.    Danilo Yung MD

## 2021-09-13 ENCOUNTER — OFFICE VISIT (OUTPATIENT)
Dept: INTERNAL MEDICINE | Facility: CLINIC | Age: 41
End: 2021-09-13

## 2021-09-13 VITALS
BODY MASS INDEX: 52.49 KG/M2 | HEIGHT: 61 IN | HEART RATE: 78 BPM | DIASTOLIC BLOOD PRESSURE: 82 MMHG | SYSTOLIC BLOOD PRESSURE: 128 MMHG | WEIGHT: 278 LBS | OXYGEN SATURATION: 100 %

## 2021-09-13 DIAGNOSIS — J30.2 SEASONAL ALLERGIES: ICD-10-CM

## 2021-09-13 DIAGNOSIS — G43.809 MIGRAINE VARIANT WITH HEADACHE: Chronic | ICD-10-CM

## 2021-09-13 DIAGNOSIS — K21.9 GASTROESOPHAGEAL REFLUX DISEASE WITHOUT ESOPHAGITIS: Primary | ICD-10-CM

## 2021-09-13 PROCEDURE — 99214 OFFICE O/P EST MOD 30 MIN: CPT | Performed by: INTERNAL MEDICINE

## 2021-09-13 RX ORDER — OMEPRAZOLE 40 MG/1
40 CAPSULE, DELAYED RELEASE ORAL DAILY
Qty: 90 CAPSULE | Refills: 3 | Status: SHIPPED | OUTPATIENT
Start: 2021-09-13

## 2021-09-13 RX ORDER — PROCHLORPERAZINE MALEATE 10 MG
10 TABLET ORAL EVERY 6 HOURS PRN
Qty: 15 TABLET | Refills: 5 | Status: SHIPPED | OUTPATIENT
Start: 2021-09-13 | End: 2021-10-29

## 2021-09-13 RX ORDER — MONTELUKAST SODIUM 10 MG/1
10 TABLET ORAL NIGHTLY
Qty: 90 TABLET | Refills: 3 | Status: SHIPPED | OUTPATIENT
Start: 2021-09-13 | End: 2022-09-18 | Stop reason: SDUPTHER

## 2021-09-13 NOTE — PROGRESS NOTES
Subjective   Nury Pineda is a 41 y.o. female.   Chief Complaint   Patient presents with   • Heartburn   • allegies       History of Present Illness   Here for f/u on chronic conditions: GERD, allergies, and migraines. GERD controlled with Prilosec. No CP or SOA.  Allergies controlled with Singulair.   Migraines controlled with rimegepant and compazine.   The following portions of the patient's history were reviewed and updated as appropriate: allergies, current medications, past family history, past medical history, past social history, past surgical history and problem list.  Past Medical History:   Diagnosis Date   • Absolute anemia    • Arthritis     Right knee   • Carpal tunnel syndrome     RIGHT WRIST   • Cholelithiasis     Nausea related to stones has phenergan. Also has related RUQ pain.   • Dizziness    • Elevated C-reactive protein (CRP)    • Elevated erythrocyte sedimentation rate    • Influenza    • Iron deficiency     Will not take iron supplement but will take MVI   • Left hip pain    • Migraines    • Numbness and tingling in right hand     Has nerve conductions 3/23/2016, awaiting results. Along with Paresthesia.   • Pain and swelling of right forearm     Check u/s to r/o clot. Rash and redness are resolving, will continue to monitor.   • Pain of left arm    • Sleep disturbances    • Vitamin D deficiency     25 oh.     Past Surgical History:   Procedure Laterality Date   •  SECTION     • CHOLECYSTECTOMY     • COLONOSCOPY     • CYST REMOVAL      behind belly button   • TONSILLECTOMY     • URACHAL CYST INCISION      History of Excision Of Urachal Cyst.     Family History   Problem Relation Age of Onset   • Stroke Mother    • Hypertension Mother    • Migraines Mother    • Osteoarthritis Mother    • Cervical cancer Mother    • Heart disease Mother    • Hyperlipidemia Maternal Grandmother    • Osteoarthritis Maternal Grandmother    • Birth defects Maternal Grandmother    •  Ovarian cancer Maternal Grandmother         PREMENOPAUSAL    • Transient ischemic attack Maternal Grandmother    • Hypertension Maternal Grandmother    • Stroke Other         CVA x 2. and TIA   • Cancer Other         Cervical, malignant neoplasm x2 , ovarian   • Hypertension Father    • Diabetes Father      Social History     Socioeconomic History   • Marital status: Single     Spouse name: Not on file   • Number of children: Not on file   • Years of education: Not on file   • Highest education level: Not on file   Tobacco Use   • Smoking status: Never Smoker   • Smokeless tobacco: Never Used   Vaping Use   • Vaping Use: Never used   Substance and Sexual Activity   • Alcohol use: Yes     Alcohol/week: 2.0 standard drinks     Types: 2 Glasses of wine per week   • Drug use: No   • Sexual activity: Defer     Partners: Male     Birth control/protection: Condom, OCP     Current Outpatient Medications on File Prior to Visit   Medication Sig Dispense Refill   • Butalbital-APAP-Caffeine -40 MG per capsule Take 1 capsule by mouth Every 6 (Six) Hours As Needed for Migraine. 15 capsule 0   • Multiple Vitamins-Minerals (EQ ONE DAILY WOMENS HEALTH PO) Take 2 tablets by mouth Daily.     • Norethin-Eth Estrad-Fe Biphas (Lo Loestrin Fe) 1 MG-10 MCG / 10 MCG tablet Take 1 tablet by mouth Daily. 28 tablet 12   • rimegepant 75 MG dispersible tablet Take 75 mg by mouth Daily As Needed (migraine). 8 tablet 5   • [DISCONTINUED] montelukast (SINGULAIR) 10 MG tablet Take 1 tablet by mouth Every Night. 30 tablet 3   • [DISCONTINUED] omeprazole (priLOSEC) 40 MG capsule Take 40 mg by mouth Daily.     • [DISCONTINUED] prochlorperazine (COMPAZINE) 10 MG tablet Take 1 tablet by mouth Every 6 (Six) Hours As Needed for Nausea or Vomiting. 15 tablet 5   • [DISCONTINUED] methylPREDNISolone (MEDROL) 4 MG dose pack Take as directed on package instructions. 21 each 0   • [DISCONTINUED] methylPREDNISolone (MEDROL) 4 MG dose pack Take as directed  "on package instructions. 21 each 0     No current facility-administered medications on file prior to visit.       Review of Systems   Constitutional: Negative for activity change, appetite change, chills, diaphoresis, fatigue, fever and unexpected weight change.   HENT: Negative for congestion, ear discharge, ear pain, mouth sores, nosebleeds, sinus pressure, sneezing and sore throat.    Respiratory: Negative for cough, chest tightness, shortness of breath and wheezing.    Cardiovascular: Negative for chest pain, palpitations and leg swelling.   Gastrointestinal: Negative for abdominal pain, constipation, diarrhea, nausea and vomiting.   Genitourinary: Negative for dysuria, flank pain, frequency, hematuria and urgency.   Skin: Negative for color change, pallor and rash.   Neurological: Negative for seizures, speech difficulty, numbness and headaches.   Psychiatric/Behavioral: Negative for agitation, confusion, decreased concentration and sleep disturbance. The patient is not nervous/anxious.      /82   Pulse 78   Ht 154.9 cm (61\")   Wt 126 kg (278 lb)   SpO2 100%   BMI 52.53 kg/m²     Objective   Physical Exam  Vitals and nursing note reviewed.   Constitutional:       Appearance: She is well-developed.   HENT:      Head: Normocephalic.      Right Ear: External ear normal.      Left Ear: External ear normal.      Nose: Nose normal.   Neck:      Thyroid: No thyromegaly.      Vascular: No JVD.   Cardiovascular:      Rate and Rhythm: Normal rate and regular rhythm.      Heart sounds: Normal heart sounds. No murmur heard.   No friction rub.   Pulmonary:      Effort: No respiratory distress.      Breath sounds: Normal breath sounds. No wheezing or rales.   Abdominal:      General: There is no distension.      Palpations: Abdomen is soft.      Tenderness: There is no abdominal tenderness.   Musculoskeletal:         General: No tenderness.   Lymphadenopathy:      Cervical: No cervical adenopathy.   Skin:     " Findings: No rash.   Neurological:      Mental Status: She is alert and oriented to person, place, and time.      Cranial Nerves: No cranial nerve deficit.      Deep Tendon Reflexes: Reflexes normal.   Psychiatric:         Mood and Affect: Mood normal.         Behavior: Behavior normal.         Assessment/Plan   Diagnoses and all orders for this visit:    1. Gastroesophageal reflux disease without esophagitis (Primary)  -     omeprazole (priLOSEC) 40 MG capsule; Take 1 capsule by mouth Daily.  Dispense: 90 capsule; Refill: 3  Continue Prilosec 40 mg po qd  2. Migraine variant with headache  -     prochlorperazine (COMPAZINE) 10 MG tablet; Take 1 tablet by mouth Every 6 (Six) Hours As Needed for Nausea or Vomiting.  Dispense: 15 tablet; Refill: 5  Continue rimegepant 75 mg po prn and compazine 10 mg po prn  3. Seasonal allergies  -     montelukast (SINGULAIR) 10 MG tablet; Take 1 tablet by mouth Every Night.  Dispense: 90 tablet; Refill: 3  Continue singulair 10 mg po qd

## 2021-10-29 ENCOUNTER — OFFICE VISIT (OUTPATIENT)
Dept: NEUROLOGY | Facility: CLINIC | Age: 41
End: 2021-10-29

## 2021-10-29 VITALS
HEART RATE: 78 BPM | WEIGHT: 284 LBS | DIASTOLIC BLOOD PRESSURE: 72 MMHG | TEMPERATURE: 96.7 F | HEIGHT: 61 IN | OXYGEN SATURATION: 98 % | BODY MASS INDEX: 53.62 KG/M2 | SYSTOLIC BLOOD PRESSURE: 122 MMHG

## 2021-10-29 DIAGNOSIS — G47.19 EXCESSIVE DAYTIME SLEEPINESS: ICD-10-CM

## 2021-10-29 DIAGNOSIS — G43.809 MIGRAINE VARIANT WITH HEADACHE: Primary | Chronic | ICD-10-CM

## 2021-10-29 PROCEDURE — 99213 OFFICE O/P EST LOW 20 MIN: CPT | Performed by: NURSE PRACTITIONER

## 2021-10-29 RX ORDER — RIMEGEPANT SULFATE 75 MG/75MG
75 TABLET, ORALLY DISINTEGRATING ORAL DAILY PRN
Qty: 8 TABLET | Refills: 5 | Status: SHIPPED | OUTPATIENT
Start: 2021-10-29 | End: 2022-04-29

## 2021-10-29 RX ORDER — PROCHLORPERAZINE MALEATE 10 MG
10 TABLET ORAL EVERY 6 HOURS PRN
Qty: 15 TABLET | Refills: 5 | Status: SHIPPED | OUTPATIENT
Start: 2021-10-29 | End: 2022-04-29

## 2021-10-29 NOTE — PROGRESS NOTES
Subjective:     Patient ID: Nury Pineda is a 41 y.o. female.    CC:   Chief Complaint   Patient presents with   • Migraine       HPI:   History of Present Illness   This is a very pleasant 41-year-old female patient presents for 6-month neurology follow-up on complex migraine with intermittent left facial numbness, tingling and blurred vision.  She has had a complex migraine since 2015.  These have been episodic and she has had minimal to no headaches since last visit.  She is now prescribed Nurtec ODT along with Compazine but has not required these just yet.  Triptans have been poorly tolerated.  She has been very hesitant to try preventive medications consistently.  MRI of the brain with and without contrast unremarkable 2/4/2021.  She recently had 2 iron infusions in July 2021 for chronic iron deficiency anemia.  She still continues to feel very fatigued and tired throughout the day.  She was suffering from insomnia several years ago and tried to complete a sleep study but cannot go to sleep.  Now she is sleeping all night and still feels that she could fall asleep during the day.  She would like a referral to have sleep study completed again.  Please see prior notes for additional work-up completed in the past.  She is doing really well overall.  She is caring for her grandmother who is now bed bound.  She has had 2 episodes where it feels like cool water is running down the back of her head to her neck.  These have lasted a max of 10 minutes.  She has had no headache or other associated symptoms.    The following portions of the patient's history were reviewed and updated as appropriate: allergies, current medications, past family history, past medical history, past social history, past surgical history and problem list.    Past Medical History:   Diagnosis Date   • Absolute anemia    • Arthritis     Right knee   • Carpal tunnel syndrome     RIGHT WRIST   • Cholelithiasis     Nausea related to stones has  phenergan. Also has related RUQ pain.   • Dizziness    • Elevated C-reactive protein (CRP)    • Elevated erythrocyte sedimentation rate    • Influenza    • Iron deficiency     Will not take iron supplement but will take MVI   • Left hip pain    • Migraines    • Numbness and tingling in right hand     Has nerve conductions 3/23/2016, awaiting results. Along with Paresthesia.   • Pain and swelling of right forearm     Check u/s to r/o clot. Rash and redness are resolving, will continue to monitor.   • Pain of left arm    • Sleep disturbances    • Vitamin D deficiency     25 oh.       Past Surgical History:   Procedure Laterality Date   •  SECTION     • CHOLECYSTECTOMY     • COLONOSCOPY     • CYST REMOVAL      behind belly button   • TONSILLECTOMY     • URACHAL CYST INCISION      History of Excision Of Urachal Cyst.       Social History     Socioeconomic History   • Marital status: Single   Tobacco Use   • Smoking status: Never Smoker   • Smokeless tobacco: Never Used   Vaping Use   • Vaping Use: Never used   Substance and Sexual Activity   • Alcohol use: Yes     Alcohol/week: 2.0 standard drinks     Types: 2 Glasses of wine per week   • Drug use: No   • Sexual activity: Defer     Partners: Male     Birth control/protection: Condom, OCP       Family History   Problem Relation Age of Onset   • Stroke Mother    • Hypertension Mother    • Migraines Mother    • Osteoarthritis Mother    • Cervical cancer Mother    • Heart disease Mother    • Hyperlipidemia Maternal Grandmother    • Osteoarthritis Maternal Grandmother    • Birth defects Maternal Grandmother    • Ovarian cancer Maternal Grandmother         PREMENOPAUSAL    • Transient ischemic attack Maternal Grandmother    • Hypertension Maternal Grandmother    • Stroke Other         CVA x 2. and TIA   • Cancer Other         Cervical, malignant neoplasm x2 , ovarian   • Hypertension Father    • Diabetes Father         Review of Systems  "  Constitutional: Positive for fatigue.   Neurological: Positive for headaches.   All other systems reviewed and are negative.       Objective:  /72   Pulse 78   Temp 96.7 °F (35.9 °C)   Ht 154.9 cm (61\")   Wt 129 kg (284 lb)   SpO2 98%   BMI 53.66 kg/m²     Neurologic Exam     Mental Status   Oriented to person, place, and time.   Speech: speech is normal   Level of consciousness: alert    Cranial Nerves   Cranial nerves II through XII intact.     Motor Exam   Muscle bulk: normal  Overall muscle tone: normal    Strength   Strength 5/5 throughout.     Gait, Coordination, and Reflexes     Gait  Gait: normal    Coordination   Finger to nose coordination: normal    Tremor   Resting tremor: absent  Intention tremor: absent  Action tremor: absent    Reflexes   Right brachioradialis: 2+  Left brachioradialis: 2+  Right biceps: 2+  Left biceps: 2+  Right : 2+  Left : 2+      Physical Exam  Constitutional:       Appearance: Normal appearance. She is obese.      Comments: BMI 53.7   Neurological:      Mental Status: She is alert and oriented to person, place, and time.      Coordination: Finger-Nose-Finger Test normal.      Gait: Gait is intact.      Deep Tendon Reflexes: Strength normal.      Reflex Scores:       Bicep reflexes are 2+ on the right side and 2+ on the left side.       Brachioradialis reflexes are 2+ on the right side and 2+ on the left side.  Psychiatric:         Mood and Affect: Mood and affect normal.         Speech: Speech normal.         Behavior: Behavior normal.         Thought Content: Thought content normal.         Cognition and Memory: Cognition and memory normal.         Judgment: Judgment normal.         Assessment/Plan:       Diagnoses and all orders for this visit:    1. Migraine variant with headache (Primary)  -     Nurtec 75 MG dispersible tablet; Take 1 tablet by mouth Daily As Needed (migraine).  Dispense: 8 tablet; Refill: 5  -     prochlorperazine (COMPAZINE) 10 MG " tablet; Take 1 tablet by mouth Every 6 (Six) Hours As Needed for Nausea or Vomiting.  Dispense: 15 tablet; Refill: 5    2. Excessive daytime sleepiness  -     Ambulatory Referral to Sleep Medicine         Continue conservative migraine management.  Next migraine take Nurtec and Compazine at onset of migraine.  Referral to sleep medicine for excessive daytime sleepiness with high suspicion of sleep apnea.  Also continue with hematology/oncology for chronic iron deficiency anemia with recheck of labs in January as scheduled.  Follow-up with neurology in 6 months or sooner if needed.    Reviewed medications, potential side effects and signs and symptoms to report. Discussed risk versus benefits of treatment plan with patient and/or family-including medications, labs and radiology that may be ordered. Addressed questions and concerns during visit. Patient and/or family verbalized understanding and agree with plan.    AS THE PROVIDER, I PERSONALLY WORE PPE DURING ENTIRE FACE TO FACE ENCOUNTER IN CLINIC WITH THE PATIENT. PATIENT ALSO WORE PPE DURING ENTIRE FACE TO FACE ENCOUNTER EXCEPT FOR A MAX OF 30 SECONDS DURING NEUROLOGICAL EVALUATION OF CRANIAL NERVES AND THEN MASK WAS PLACED BACK OVER PATIENT FACE FOR REMAINDER OF VISIT. I WASHED MY HANDS BEFORE AND AFTER VISIT.    During this visit the following were done:  Labs Reviewed [x]    Labs Ordered []    Radiology Reports Reviewed []    Radiology Ordered []    PCP Records Reviewed []    Referring Provider Records Reviewed []    ER Records Reviewed []    Hospital Records Reviewed []    History Obtained From Family []    Radiology Images Reviewed []    Other Reviewed [x]    Records Requested []      SYBIL Macario  10/29/2021

## 2022-01-03 ENCOUNTER — LAB (OUTPATIENT)
Dept: LAB | Facility: HOSPITAL | Age: 42
End: 2022-01-03

## 2022-01-03 ENCOUNTER — PRIOR AUTHORIZATION (OUTPATIENT)
Dept: NEUROLOGY | Facility: CLINIC | Age: 42
End: 2022-01-03

## 2022-01-03 ENCOUNTER — OFFICE VISIT (OUTPATIENT)
Dept: INTERNAL MEDICINE | Facility: CLINIC | Age: 42
End: 2022-01-03

## 2022-01-03 VITALS
DIASTOLIC BLOOD PRESSURE: 74 MMHG | SYSTOLIC BLOOD PRESSURE: 124 MMHG | HEIGHT: 61 IN | WEIGHT: 290 LBS | BODY MASS INDEX: 54.75 KG/M2 | HEART RATE: 68 BPM

## 2022-01-03 DIAGNOSIS — Z00.00 HEALTHCARE MAINTENANCE: ICD-10-CM

## 2022-01-03 DIAGNOSIS — N39.0 ACUTE UTI: ICD-10-CM

## 2022-01-03 DIAGNOSIS — Z00.00 HEALTHCARE MAINTENANCE: Primary | ICD-10-CM

## 2022-01-03 DIAGNOSIS — R07.9 CHEST PAIN IN ADULT: ICD-10-CM

## 2022-01-03 PROCEDURE — 99213 OFFICE O/P EST LOW 20 MIN: CPT | Performed by: INTERNAL MEDICINE

## 2022-01-03 PROCEDURE — 87086 URINE CULTURE/COLONY COUNT: CPT | Performed by: INTERNAL MEDICINE

## 2022-01-03 PROCEDURE — 87077 CULTURE AEROBIC IDENTIFY: CPT | Performed by: INTERNAL MEDICINE

## 2022-01-03 PROCEDURE — 93000 ELECTROCARDIOGRAM COMPLETE: CPT | Performed by: INTERNAL MEDICINE

## 2022-01-03 PROCEDURE — 87186 SC STD MICRODIL/AGAR DIL: CPT | Performed by: INTERNAL MEDICINE

## 2022-01-03 PROCEDURE — 80061 LIPID PANEL: CPT

## 2022-01-03 PROCEDURE — 84443 ASSAY THYROID STIM HORMONE: CPT

## 2022-01-03 RX ORDER — SULFAMETHOXAZOLE AND TRIMETHOPRIM 800; 160 MG/1; MG/1
1 TABLET ORAL 2 TIMES DAILY
Qty: 10 TABLET | Refills: 0 | OUTPATIENT
Start: 2022-01-03 | End: 2022-01-05

## 2022-01-03 NOTE — PROGRESS NOTES
Chief Complaint   Patient presents with   • Annual Exam       Reported Health  Good Yes  FairNo  PoorNo      Dental,Vision,Hearing  Regular dental visitsYes  Vision ProblemsNo  Hearing LossNo      Immunization Status:  Up To DateNo        Lifestyle  Healthy DietYes  Weight ConcernsYes  Regular ExerciseNo  Tobacco UseNo  Alcohol Useyes  Drug AbuseYes      Screening  Cancer ScreeningYes  Metabolic ScreeningYes  Risk ScreeningYes  Past Medical History:   Diagnosis Date   • Absolute anemia    • Arthritis     Right knee   • Carpal tunnel syndrome     RIGHT WRIST   • Cholelithiasis     Nausea related to stones has phenergan. Also has related RUQ pain.   • Dizziness    • Elevated C-reactive protein (CRP)    • Elevated erythrocyte sedimentation rate    • Influenza    • Iron deficiency     Will not take iron supplement but will take MVI   • Left hip pain    • Migraines    • Numbness and tingling in right hand     Has nerve conductions 3/23/2016, awaiting results. Along with Paresthesia.   • Pain and swelling of right forearm     Check u/s to r/o clot. Rash and redness are resolving, will continue to monitor.   • Pain of left arm    • Sleep disturbances    • Vitamin D deficiency     25 oh.     Past Surgical History:   Procedure Laterality Date   •  SECTION     • CHOLECYSTECTOMY     • COLONOSCOPY     • CYST REMOVAL      behind belly button   • TONSILLECTOMY     • URACHAL CYST INCISION      History of Excision Of Urachal Cyst.     Family History   Problem Relation Age of Onset   • Stroke Mother    • Hypertension Mother    • Migraines Mother    • Osteoarthritis Mother    • Cervical cancer Mother    • Heart disease Mother    • Hyperlipidemia Maternal Grandmother    • Osteoarthritis Maternal Grandmother    • Birth defects Maternal Grandmother    • Ovarian cancer Maternal Grandmother         PREMENOPAUSAL    • Transient ischemic attack Maternal Grandmother    • Hypertension Maternal Grandmother    •  Stroke Other         CVA x 2. and TIA   • Cancer Other         Cervical, malignant neoplasm x2 , ovarian   • Hypertension Father    • Diabetes Father      Social History     Socioeconomic History   • Marital status: Single   Tobacco Use   • Smoking status: Never Smoker   • Smokeless tobacco: Never Used   Vaping Use   • Vaping Use: Never used   Substance and Sexual Activity   • Alcohol use: Yes     Alcohol/week: 2.0 standard drinks     Types: 2 Glasses of wine per week   • Drug use: No   • Sexual activity: Defer     Partners: Male     Birth control/protection: Condom, OCP     Current Outpatient Medications on File Prior to Visit   Medication Sig Dispense Refill   • Butalbital-APAP-Caffeine -40 MG per capsule Take 1 capsule by mouth Every 6 (Six) Hours As Needed for Migraine. 15 capsule 0   • montelukast (SINGULAIR) 10 MG tablet Take 1 tablet by mouth Every Night. 90 tablet 3   • Multiple Vitamins-Minerals (EQ ONE DAILY [employer] HEALTH PO) Take 2 tablets by mouth Daily.     • Norethin-Eth Estrad-Fe Biphas (Lo Loestrin Fe) 1 MG-10 MCG / 10 MCG tablet Take 1 tablet by mouth Daily. 28 tablet 12   • Nurtec 75 MG dispersible tablet Take 1 tablet by mouth Daily As Needed (migraine). 8 tablet 5   • omeprazole (priLOSEC) 40 MG capsule Take 1 capsule by mouth Daily. 90 capsule 3   • prochlorperazine (COMPAZINE) 10 MG tablet Take 1 tablet by mouth Every 6 (Six) Hours As Needed for Nausea or Vomiting. 15 tablet 5     No current facility-administered medications on file prior to visit.         Review of Systems   Constitutional: Negative for activity change, appetite change, chills, diaphoresis, fatigue, fever and unexpected weight change.   HENT: Negative for congestion, ear discharge, ear pain, mouth sores, nosebleeds, sinus pressure, sneezing and sore throat.    Eyes: Negative for pain, discharge and itching.   Respiratory: Negative for cough, chest tightness, shortness of breath and wheezing.    Cardiovascular: Positive  "for chest pain (intermittent x 3 weeks. Does not radiate to arm or neck. No SOA. No diaphoresis..). Negative for palpitations and leg swelling.   Gastrointestinal: Negative for abdominal pain, constipation, diarrhea, nausea and vomiting.   Endocrine: Negative for cold intolerance, heat intolerance, polydipsia and polyphagia.   Genitourinary: Negative for dysuria, flank pain, frequency, hematuria and urgency.   Musculoskeletal: Negative for arthralgias, back pain, gait problem, myalgias, neck pain and neck stiffness.   Skin: Negative for color change, pallor and rash.   Neurological: Negative for seizures, speech difficulty, numbness and headaches.   Psychiatric/Behavioral: Negative for agitation, confusion, decreased concentration and sleep disturbance. The patient is not nervous/anxious.      /74   Pulse 68   Ht 154.9 cm (61\")   Wt 132 kg (290 lb)   LMP 12/27/2021   BMI 54.80 kg/m²       Physical Exam  Vitals and nursing note reviewed.   Constitutional:       Appearance: She is well-developed. She is obese.   HENT:      Head: Normocephalic.      Right Ear: External ear normal.      Left Ear: External ear normal.      Nose: Nose normal.   Eyes:      Conjunctiva/sclera: Conjunctivae normal.      Pupils: Pupils are equal, round, and reactive to light.   Neck:      Thyroid: No thyromegaly.      Vascular: No JVD.   Cardiovascular:      Rate and Rhythm: Normal rate and regular rhythm.      Heart sounds: Normal heart sounds. No murmur heard.  No friction rub.   Pulmonary:      Effort: No respiratory distress.      Breath sounds: No wheezing or rales.   Abdominal:      General: There is no distension.      Tenderness: There is no abdominal tenderness.   Musculoskeletal:         General: No tenderness.   Lymphadenopathy:      Cervical: No cervical adenopathy.   Skin:     Findings: No rash.   Neurological:      General: No focal deficit present.      Mental Status: She is alert and oriented to person, place, and " time.      Cranial Nerves: No cranial nerve deficit.      Deep Tendon Reflexes: Reflexes normal.   Psychiatric:         Mood and Affect: Mood normal.         Behavior: Behavior normal.                   Diet and Exercise    Healthy Diet Yes  Adequate DietYes  Poor DietNo  Adequate Exercise RegimenNo  Inadequate Exercise RegimenYes      Cervical Cancer Screening    Risks and benefits discussedYes  Screening currentYes  PAP Done Today OrderedNo  Screening Not IndicatedNo  Pap Every 3 yearsYes  Screening Done By GYNYes    Breast Cancer screening  Follows with gyn       STD Testing  ChlamydiaNo  GonorrheaNo  HIVNo      Osteoporosis Screening  Not indicated  Colorectal Cancer Screening  Screen at 45  Metabolic Screening  GlucoseYes  LipidsYes  CBCYes  TSHYes  UAYes  CMPYes  25OHNo      Immunizations  Risks and benefits discussedYes  Immunizations Up To Dateno  Immunizations NeededYes  Immunizations Per OrdersNo  Patient DNoeclines      Preventative Counseling  NutritionYes  Aerobic ExerciseYes  Weight Bearing ExerciseYes  Weight LossYes  Calcium SupplementsYes  Vitamin D SupplementsYes  Reproductive HealthNo  Cardiovascular Risk ReductionYes  Tobacco CessationNo  Alcohol UseYes  Sunscreen UseYes  Self Skin ExaminationYes  Helmet UseNo  Seat Belt UseYes  Fall Risk ReductionNo  Advanced Directive PlanningNo      Patient Discussion  PatientYes  FamilyNo  CounselingYes  Diagnoses and all orders for this visit:    1. Healthcare maintenance (Primary)  -     Lipid Panel; Future  -     TSH; Future    2. Acute UTI  -     sulfamethoxazole-trimethoprim (Bactrim DS) 800-160 MG per tablet; Take 1 tablet by mouth 2 (Two) Times a Day.  Dispense: 10 tablet; Refill: 0  -     Urine Culture - , Urine, Clean Catch; Future  -     Urine Culture - Urine, Urine, Clean Catch    3. Chest pain in adult  -     ECG 12 Lead      ECG 12 Lead    Date/Time: 1/3/2022 11:34 AM  Performed by: Acacia Lobato DO  Authorized by: Acacia Lobato DO    Comparison: not compared with previous ECG   Rhythm: sinus rhythm  Rate: normal  Conduction: conduction normal  ST Segments: ST segments normal  QRS axis: normal    Clinical impression: normal ECG

## 2022-01-04 LAB
CHOLEST SERPL-MCNC: 182 MG/DL (ref 0–200)
HDLC SERPL-MCNC: 88 MG/DL (ref 40–60)
LDLC SERPL CALC-MCNC: 82 MG/DL (ref 0–100)
LDLC/HDLC SERPL: 0.93 {RATIO}
TRIGL SERPL-MCNC: 60 MG/DL (ref 0–150)
TSH SERPL DL<=0.05 MIU/L-ACNC: 1.05 UIU/ML (ref 0.27–4.2)
VLDLC SERPL-MCNC: 12 MG/DL (ref 5–40)

## 2022-01-05 ENCOUNTER — TELEPHONE (OUTPATIENT)
Dept: INTERNAL MEDICINE | Facility: CLINIC | Age: 42
End: 2022-01-05

## 2022-01-05 NOTE — TELEPHONE ENCOUNTER
Caller: Edwin Nuryrashi Chungee    Relationship: Self    Best call back number: [phone]    What medications are you currently taking:   Current Outpatient Medications on File Prior to Visit   Medication Sig Dispense Refill   • Butalbital-APAP-Caffeine -40 MG per capsule Take 1 capsule by mouth Every 6 (Six) Hours As Needed for Migraine. 15 capsule 0   • montelukast (SINGULAIR) 10 MG tablet Take 1 tablet by mouth Every Night. 90 tablet 3   • Multiple Vitamins-Minerals (EQ ONE DAILY WOMENS HEALTH PO) Take 2 tablets by mouth Daily.     • Norethin-Eth Estrad-Fe Biphas (Lo Loestrin Fe) 1 MG-10 MCG / 10 MCG tablet Take 1 tablet by mouth Daily. 28 tablet 12   • Nurtec 75 MG dispersible tablet Take 1 tablet by mouth Daily As Needed (migraine). 8 tablet 5   • omeprazole (priLOSEC) 40 MG capsule Take 1 capsule by mouth Daily. 90 capsule 3   • prochlorperazine (COMPAZINE) 10 MG tablet Take 1 tablet by mouth Every 6 (Six) Hours As Needed for Nausea or Vomiting. 15 tablet 5   • sulfamethoxazole-trimethoprim (Bactrim DS) 800-160 MG per tablet Take 1 tablet by mouth 2 (Two) Times a Day. 10 tablet 0     No current facility-administered medications on file prior to visit.        Which medication are you concerned about: sulfamethoxazole-trimethoprim (Bactrim DS) 800-160 MG per tablet    Who prescribed you this medication: [facility]     What are your concerns: PATIENT STATED THAT THE PILLS ARE TOO LARGE AND ASKED FOR SOMETHING DIFFERENT OR A LIQUID

## 2022-01-06 LAB — BACTERIA SPEC AEROBE CULT: ABNORMAL

## 2022-01-06 RX ORDER — NITROFURANTOIN 25; 75 MG/1; MG/1
100 CAPSULE ORAL 2 TIMES DAILY
Qty: 14 CAPSULE | Refills: 0 | Status: SHIPPED | OUTPATIENT
Start: 2022-01-06 | End: 2022-03-10

## 2022-01-06 NOTE — TELEPHONE ENCOUNTER
Stop bactirm, resistant. Start macrobid ( all antibiotics are large, if macrobid is to large can break in half)

## 2022-01-07 DIAGNOSIS — D50.0 IRON DEFICIENCY ANEMIA DUE TO CHRONIC BLOOD LOSS: Primary | ICD-10-CM

## 2022-01-14 ENCOUNTER — PRIOR AUTHORIZATION (OUTPATIENT)
Dept: NEUROLOGY | Facility: CLINIC | Age: 42
End: 2022-01-14

## 2022-03-10 ENCOUNTER — OFFICE VISIT (OUTPATIENT)
Dept: OBSTETRICS AND GYNECOLOGY | Facility: CLINIC | Age: 42
End: 2022-03-10

## 2022-03-10 VITALS
WEIGHT: 288.6 LBS | HEIGHT: 61 IN | BODY MASS INDEX: 54.49 KG/M2 | RESPIRATION RATE: 14 BRPM | DIASTOLIC BLOOD PRESSURE: 78 MMHG | SYSTOLIC BLOOD PRESSURE: 130 MMHG

## 2022-03-10 DIAGNOSIS — Z30.41 ENCOUNTER FOR BIRTH CONTROL PILLS MAINTENANCE: ICD-10-CM

## 2022-03-10 DIAGNOSIS — Z12.31 ENCOUNTER FOR SCREENING MAMMOGRAM FOR BREAST CANCER: ICD-10-CM

## 2022-03-10 DIAGNOSIS — R87.616 TRANSFORMATION ZONE ABSENT ON CERVICAL PAP SMEAR: Primary | ICD-10-CM

## 2022-03-10 DIAGNOSIS — Z11.3 SCREEN FOR STD (SEXUALLY TRANSMITTED DISEASE): ICD-10-CM

## 2022-03-10 DIAGNOSIS — Z01.419 PAP TEST, AS PART OF ROUTINE GYNECOLOGICAL EXAMINATION: ICD-10-CM

## 2022-03-10 PROCEDURE — 2014F MENTAL STATUS ASSESS: CPT | Performed by: STUDENT IN AN ORGANIZED HEALTH CARE EDUCATION/TRAINING PROGRAM

## 2022-03-10 PROCEDURE — 3008F BODY MASS INDEX DOCD: CPT | Performed by: STUDENT IN AN ORGANIZED HEALTH CARE EDUCATION/TRAINING PROGRAM

## 2022-03-10 PROCEDURE — 99396 PREV VISIT EST AGE 40-64: CPT | Performed by: STUDENT IN AN ORGANIZED HEALTH CARE EDUCATION/TRAINING PROGRAM

## 2022-03-10 RX ORDER — NORETHINDRONE ACETATE AND ETHINYL ESTRADIOL, ETHINYL ESTRADIOL AND FERROUS FUMARATE 1MG-10(24)
1 KIT ORAL DAILY
Qty: 90 TABLET | Refills: 4 | Status: SHIPPED | OUTPATIENT
Start: 2022-03-10

## 2022-03-10 NOTE — PROGRESS NOTES
"Subjective   Chief Complaint   Patient presents with   • Annual Exam     No complaints     Nury Pineda is a 41 y.o. year old  presenting to be seen for her annual exam. She is doing well today and has no complaints. Patient is a  at [employer].     Mammogram: not yet competed     SEXUAL Hx:  She is currently sexually active.  In the past year there there has been NO new sexual partners.    Condoms are never used.  She would like to be screened for STD's at today's exam.  Current birth control method: OCP (estrogen/progesterone).  She is happy with her current method of contraception and does not want to discuss alternative methods of contraception.  MENSTRUAL Hx:  Patient's last menstrual period was 2022 (exact date).  In the past 6 months her cycles have been regular, predictable and occur monthly.  Her menstrual flow is typically light.   Each month on average there are roughly 0 day(s) of very heavy flow.  Intermenstrual bleeding is absent.    Post-coital bleeding is absent.  Dysmenorrhea: none  PMS: none  Her cycles are not a source of concern for her that she wishes to discuss today.  HEALTH Hx:  She exercises regularly: yes.  She wears her seat belt: no.  She has concerns about domestic violence: no.  OTHER THINGS SHE WANTS TO DISCUSS TODAY:  Nothing else    The following portions of the patient's history were reviewed and updated as appropriate:problem list, current medications, allergies, past family history, past medical history, past social history and past surgical history.    Social History    Tobacco Use      Smoking status: Never Smoker      Smokeless tobacco: Never Used         Objective   /78 (BP Location: Right arm, Patient Position: Sitting, Cuff Size: Large Adult)   Resp 14   Ht 154.9 cm (61\")   Wt 131 kg (288 lb 9.6 oz)   LMP 2022 (Exact Date)   Breastfeeding No   BMI 54.53 kg/m²     General:  well developed; well nourished  no acute distress  obese - " Body mass index is 54.53 kg/m².   Breasts:  Examined in supine position  Symmetric without masses or skin dimpling  Nipples normal without inversion, lesions or discharge  There are no palpable axillary nodes   Abdomen: soft, non-tender; no masses   Pelvis: Exam limited by  body habitus  Clinical staff was present for exam  External genitalia:  normal appearance of the external genitalia including Bartholin's and Ossian's glands.  :  urethral meatus normal;  Vaginal:  normal pink mucosa without prolapse or lesions.  Cervix:  normal appearance.  Uterus:  not palpable.  Adnexa:  non palpable bilaterally.  Rectal:  digital rectal exam not performed; anus visually normal appearing.        Assessment   1. Normal GYN exam   2. OCP refill   3. STD screening   4. She is up to date on all relevant gynecologic and colorectal screenings except mammography     Plan   1. Pap with HPV, and STD were done today.  If she does not receive the results of the Pap within 2 weeks  time, she was instructed to call to find out the results.  I explained to Nury that the recommendations for Pap smear interval in a low risk patient's has lengthened to 5 years time if both cytology and HPV testing were normal.  I encouraged her to be seen yearly for a full physical exam including breast and pelvic exam even during the off years when PAP's will not be performed.  2. She was encouraged to get yearly mammograms, order placed today.  She should report any palpable breast lump(s) or skin changes regardless of mammographic findings.  I explained to Nury that notification regarding her mammogram results will come from the center performing the study.  Our office will not be routinely calling with mammogram results.  It is her responsibility to make sure that the results from the mammogram are communicated to her by the breast center.  If she has any questions about the results, she is welcome to call our office anytime.  3. Refill sent to patient  pharmacy for OCP Lo Loestrin 90 day supply.   4. Patient encouraged to wear her seatbelt.   5. The importance of keeping all planned follow-up and taking all medications as prescribed was emphasized.  6. Follow up for annual exam in 1 year    New Medications Ordered This Visit   Medications   • Norethin-Eth Estrad-Fe Biphas (Lo Loestrin Fe) 1 MG-10 MCG / 10 MCG tablet     Sig: Take 1 tablet by mouth Daily.     Dispense:  90 tablet     Refill:  4          This note was electronically signed.    Lizzy Perez MD   March 10, 2022

## 2022-03-18 RX ORDER — METRONIDAZOLE 500 MG/1
500 TABLET ORAL 2 TIMES DAILY
Qty: 14 TABLET | Refills: 0 | Status: SHIPPED | OUTPATIENT
Start: 2022-03-18 | End: 2022-03-25

## 2022-04-22 ENCOUNTER — TELEPHONE (OUTPATIENT)
Dept: INTERNAL MEDICINE | Facility: CLINIC | Age: 42
End: 2022-04-22

## 2022-04-22 NOTE — TELEPHONE ENCOUNTER
Caller: Ji Pineda    Relationship: Self    Best call back number: [phone]    What is the best time to reach you: ANYTIME    Who are you requesting to speak with (clinical staff, provider,  specific staff member): CLINICAL STAFF    Do you know the name of the person who called: JI    What was the call regarding: PATIENT WOULD LIKE TO DISCUSES PANCOLECTOMIES ora food  to help her SHE HAD LOST 80 POUNDS AND WHEN HER BROTHER PASS AWAY SHE GAIN 30 BACK AND IS STILL CONTINUING TO GAIN AND NOT ABLE TO LOSS AGAIN    Do you require a callback: CALL BACK

## 2022-04-26 ENCOUNTER — OFFICE VISIT (OUTPATIENT)
Dept: INTERNAL MEDICINE | Facility: CLINIC | Age: 42
End: 2022-04-26

## 2022-04-26 ENCOUNTER — PRIOR AUTHORIZATION (OUTPATIENT)
Dept: INTERNAL MEDICINE | Facility: CLINIC | Age: 42
End: 2022-04-26

## 2022-04-26 VITALS
WEIGHT: 292.2 LBS | OXYGEN SATURATION: 99 % | BODY MASS INDEX: 55.21 KG/M2 | SYSTOLIC BLOOD PRESSURE: 132 MMHG | TEMPERATURE: 97.5 F | DIASTOLIC BLOOD PRESSURE: 80 MMHG | HEART RATE: 82 BPM

## 2022-04-26 DIAGNOSIS — E66.01 MORBID OBESITY WITH BMI OF 50.0-59.9, ADULT: Primary | Chronic | ICD-10-CM

## 2022-04-26 PROCEDURE — 99213 OFFICE O/P EST LOW 20 MIN: CPT | Performed by: INTERNAL MEDICINE

## 2022-04-26 NOTE — PROGRESS NOTES
Subjective   Nury Pineda is a 41 y.o. female.   Chief Complaint   Patient presents with   • Obesity       History of Present Illness   Here to discuss weight loss options. She is trying to watch her diet and walk. Wants to try medication to help lose weight.   The following portions of the patient's history were reviewed and updated as appropriate: allergies, current medications, past family history, past medical history, past social history, past surgical history and problem list.  Past Medical History:   Diagnosis Date   • Absolute anemia    • Arthritis     Right knee   • Carpal tunnel syndrome     RIGHT WRIST   • Cholelithiasis     Nausea related to stones has phenergan. Also has related RUQ pain.   • Dizziness    • Elevated C-reactive protein (CRP)    • Elevated erythrocyte sedimentation rate    • Influenza    • Iron deficiency     Will not take iron supplement but will take MVI   • Left hip pain    • Migraines    • Numbness and tingling in right hand     Has nerve conductions 3/23/2016, awaiting results. Along with Paresthesia.   • Pain and swelling of right forearm     Check u/s to r/o clot. Rash and redness are resolving, will continue to monitor.   • Pain of left arm    • Sleep disturbances    • Vitamin D deficiency     25 oh.     Past Surgical History:   Procedure Laterality Date   •  SECTION     • CHOLECYSTECTOMY     • COLONOSCOPY     • CYST REMOVAL      behind belly button   • TONSILLECTOMY     • URACHAL CYST INCISION      History of Excision Of Urachal Cyst.     Family History   Problem Relation Age of Onset   • Stroke Mother    • Hypertension Mother    • Migraines Mother    • Osteoarthritis Mother    • Cervical cancer Mother    • Heart disease Mother    • Hyperlipidemia Maternal Grandmother    • Osteoarthritis Maternal Grandmother    • Birth defects Maternal Grandmother    • Ovarian cancer Maternal Grandmother         PREMENOPAUSAL    • Transient ischemic attack  Maternal Grandmother    • Hypertension Maternal Grandmother    • Stroke Other         CVA x 2. and TIA   • Cancer Other         Cervical, malignant neoplasm x2 , ovarian   • Hypertension Father    • Diabetes Father      Social History     Socioeconomic History   • Marital status: Single   Tobacco Use   • Smoking status: Never Smoker   • Smokeless tobacco: Never Used   Vaping Use   • Vaping Use: Never used   Substance and Sexual Activity   • Alcohol use: Yes     Alcohol/week: 2.0 standard drinks     Types: 2 Glasses of wine per week   • Drug use: No   • Sexual activity: Defer     Partners: Male     Birth control/protection: Condom, OCP     Current Outpatient Medications on File Prior to Visit   Medication Sig Dispense Refill   • Butalbital-APAP-Caffeine -40 MG per capsule Take 1 capsule by mouth Every 6 (Six) Hours As Needed for Migraine. 15 capsule 0   • montelukast (SINGULAIR) 10 MG tablet Take 1 tablet by mouth Every Night. 90 tablet 3   • Multiple Vitamins-Minerals (EQ ONE DAILY WOMENS HEALTH PO) Take 2 tablets by mouth Daily.     • Norethin-Eth Estrad-Fe Biphas (Lo Loestrin Fe) 1 MG-10 MCG / 10 MCG tablet Take 1 tablet by mouth Daily. 90 tablet 4   • Nurtec 75 MG dispersible tablet Take 1 tablet by mouth Daily As Needed (migraine). 8 tablet 5   • omeprazole (priLOSEC) 40 MG capsule Take 1 capsule by mouth Daily. 90 capsule 3   • prochlorperazine (COMPAZINE) 10 MG tablet Take 1 tablet by mouth Every 6 (Six) Hours As Needed for Nausea or Vomiting. 15 tablet 5     No current facility-administered medications on file prior to visit.       Review of Systems   Constitutional: Negative for activity change, chills, diaphoresis and unexpected weight change.   HENT: Negative for congestion, ear discharge, ear pain, mouth sores, nosebleeds, sinus pressure, sneezing and sore throat.    Respiratory: Negative for cough, chest tightness, shortness of breath and wheezing.    Cardiovascular: Negative for chest pain,  palpitations and leg swelling.   Gastrointestinal: Negative for abdominal pain, constipation, diarrhea, nausea and vomiting.   Endocrine: Negative for cold intolerance, heat intolerance, polydipsia and polyphagia.   Genitourinary: Negative for dysuria, flank pain, frequency, hematuria and urgency.   Neurological: Negative for seizures, speech difficulty, numbness and headaches.   Psychiatric/Behavioral: Negative for agitation, confusion, decreased concentration and sleep disturbance. The patient is not nervous/anxious.      /80   Pulse 82   Temp 97.5 °F (36.4 °C)   Wt 133 kg (292 lb 3.2 oz)   SpO2 99%   BMI 55.21 kg/m²     Objective   Physical Exam  Vitals reviewed.   Constitutional:       Comments: morbidly obese   Cardiovascular:      Rate and Rhythm: Normal rate and regular rhythm.   Pulmonary:      Effort: No respiratory distress.      Breath sounds: No wheezing or rales.   Abdominal:      General: There is no distension.      Tenderness: There is no abdominal tenderness. There is no guarding.   Neurological:      General: No focal deficit present.      Mental Status: She is alert and oriented to person, place, and time.   Psychiatric:         Mood and Affect: Mood normal.         Behavior: Behavior normal.         Assessment/Plan   Diagnoses and all orders for this visit:    1. Morbid obesity with BMI of 50.0-59.9, adult (HCC) (Primary)  -     Liraglutide (SAXENDA) 18 MG/3ML injection pen; Inject 0.6mg under skin daily for week one, THEN 1.2mg daily for week two, THEN 1.8mg daily for week three, then 2.4mg daily for week four.  Dispense: 3 pen; Refill: 0    4 week f/u

## 2022-04-27 DIAGNOSIS — E66.01 MORBID OBESITY WITH BMI OF 50.0-59.9, ADULT: Primary | ICD-10-CM

## 2022-04-27 NOTE — TELEPHONE ENCOUNTER
PA denied for Saxenda.  Patient would like referral to weight loss clinic for other alternative treatments.

## 2022-04-29 ENCOUNTER — OFFICE VISIT (OUTPATIENT)
Dept: NEUROLOGY | Facility: CLINIC | Age: 42
End: 2022-04-29

## 2022-04-29 VITALS
HEART RATE: 77 BPM | DIASTOLIC BLOOD PRESSURE: 86 MMHG | BODY MASS INDEX: 55.32 KG/M2 | WEIGHT: 293 LBS | HEIGHT: 61 IN | TEMPERATURE: 97.2 F | SYSTOLIC BLOOD PRESSURE: 136 MMHG | RESPIRATION RATE: 16 BRPM | OXYGEN SATURATION: 99 %

## 2022-04-29 DIAGNOSIS — G47.19 EXCESSIVE DAYTIME SLEEPINESS: ICD-10-CM

## 2022-04-29 DIAGNOSIS — G43.809 MIGRAINE VARIANT WITH HEADACHE: Primary | Chronic | ICD-10-CM

## 2022-04-29 PROCEDURE — 99213 OFFICE O/P EST LOW 20 MIN: CPT | Performed by: NURSE PRACTITIONER

## 2022-04-29 RX ORDER — PROCHLORPERAZINE MALEATE 10 MG
10 TABLET ORAL EVERY 6 HOURS PRN
Qty: 15 TABLET | Refills: 5 | Status: SHIPPED | OUTPATIENT
Start: 2022-04-29

## 2022-04-29 RX ORDER — RIMEGEPANT SULFATE 75 MG/75MG
75 TABLET, ORALLY DISINTEGRATING ORAL DAILY PRN
Qty: 8 TABLET | Refills: 5 | Status: SHIPPED | OUTPATIENT
Start: 2022-04-29

## 2022-04-29 NOTE — PROGRESS NOTES
"Subjective:     Patient ID: Nury Pineda is a 41 y.o. female.    CC:   Chief Complaint   Patient presents with   • Migraine     Migraines have been good, no concerns        HPI:   History of Present Illness   Today 4/29/2022-  This is a pleasant 41 year old female who presents for 6 month neurology follow up on complex migraine headaches with intermittent left facial numbness, tingling, and blurred vision. She has had complex migraines since 2015. Currently, she is prescribed Nurtec ODT along with Compazine to use at onset of migraine.     At her last visit we referred her to sleep medicine for evaluation of excessive daytime sleepiness, it does not appear she has been evaluated by sleep medicine at this time.    The patient states her headaches have been good. She reports she has had one migraine since her visit in 10/2021. The patient notes she has intermittent headaches that do not last long, they present across her forehead and occasionally down the back of her neck. She has some nausea associated with the headaches, but no vomiting. The patient denies light or sound sensitivity. She reports 2 episodes of numbness and tingling in her face and hands, not associated with a headache. This is how her complex migraines usually start. The patient has used Nurtec once, she states it worked well for her. She denies using butalbital. The patient denies noticing a pattern in the headaches, she states she is well hydrated and \"tries\" to stay well rested. She denies drinking caffeine. The patient notes her food consumption depends on how busy she is. She does not use a migraine tracker.     Of note in 12/29/2021, she followed up with [facility] for an JOSÉ MIGUEL with a 1-1280 speckled pattern ratio. They did repeat labs during that visit for autoimmune disease with lupus. It does state in her work up in 06/2021, her work up was negative. They are scheduled to follow up with her again in 1 year.  The patient reports all " the lab work from rheumatology shows elevated levels, however, she was told there is nothing of concern. She denies symptoms.       MRI of the brain with and without contrast on 2/3/2021 was a normal MRI with no abnormal contrast-enhancement and no acute intracranial abnormalities.  This was stable compared to January 18, 2019 imaging.    Previous extensive work-up & history included below:  She has had previous atypical and complex migraines since 2015 which have presented with stroke like symptoms.  It has been challenging to treat her migraines since she has severe difficulty with trouble swallowing and esophageal spasms so she is really not able to swallow most pills.  She cannot crush or open some pills to take.  She cannot swallow pills whole at this time.  Since last visit she has used a few Fioricet but does not need a refill on these today.  The Fioricet makes her very tired.  She does use Compazine as needed for the nausea and migraines and this does help slightly.  She has previously tried topiramate with intolerance, amitriptyline with sedation and intolerance, sumatriptan with tightening of the throat, aspirin and Cambia.  She does have Cambia oral powder samples at home which have sometimes helped.  She has also used rizatriptan in the past but this caused her to feel like she was having spasms and tightening of her throat and chest as well.  Triptans have been poorly tolerated.  She is really hesitant to start daily preventive medications.  She tries to avoid medications if possible. Prior imaging studies showed no acute process with MRI of the brain and C-spine as well as MRA of the brain in 2016 and 2015 but there was significant artifact due to her wearing braces.  CTA of the head and neck was normal in 2016. She did have a sleep study remotely and told that she had very mild sleep apnea but has not been treated with a CPAP.      The following portions of the patient's history were reviewed and  updated as appropriate: allergies, current medications, past family history, past medical history, past social history, past surgical history and problem list.    Past Medical History:   Diagnosis Date   • Absolute anemia    • Arthritis     Right knee   • Carpal tunnel syndrome     RIGHT WRIST   • Cholelithiasis     Nausea related to stones has phenergan. Also has related RUQ pain.   • Dizziness    • Elevated C-reactive protein (CRP)    • Elevated erythrocyte sedimentation rate    • Influenza    • Iron deficiency     Will not take iron supplement but will take MVI   • Left hip pain    • Migraines    • Numbness and tingling in right hand     Has nerve conductions 3/23/2016, awaiting results. Along with Paresthesia.   • Pain and swelling of right forearm     Check u/s to r/o clot. Rash and redness are resolving, will continue to monitor.   • Pain of left arm    • Sleep disturbances    • Vitamin D deficiency     25 oh.       Past Surgical History:   Procedure Laterality Date   •  SECTION     • CHOLECYSTECTOMY     • COLONOSCOPY     • CYST REMOVAL      behind belly button   • TONSILLECTOMY     • URACHAL CYST INCISION      History of Excision Of Urachal Cyst.       Social History     Socioeconomic History   • Marital status: Single   Tobacco Use   • Smoking status: Never Smoker   • Smokeless tobacco: Never Used   Vaping Use   • Vaping Use: Never used   Substance and Sexual Activity   • Alcohol use: Yes     Alcohol/week: 2.0 standard drinks     Types: 2 Glasses of wine per week   • Drug use: No   • Sexual activity: Defer     Partners: Male     Birth control/protection: Condom, OCP       Family History   Problem Relation Age of Onset   • Stroke Mother    • Hypertension Mother    • Migraines Mother    • Osteoarthritis Mother    • Cervical cancer Mother    • Heart disease Mother    • Hyperlipidemia Maternal Grandmother    • Osteoarthritis Maternal Grandmother    • Birth defects Maternal Grandmother     • Ovarian cancer Maternal Grandmother         PREMENOPAUSAL    • Transient ischemic attack Maternal Grandmother    • Hypertension Maternal Grandmother    • Stroke Other         CVA x 2. and TIA   • Cancer Other         Cervical, malignant neoplasm x2 , ovarian   • Hypertension Father    • Diabetes Father           Current Outpatient Medications:   •  Liraglutide (SAXENDA) 18 MG/3ML injection pen, Inject 0.6mg under skin daily for week one, THEN 1.2mg daily for week two, THEN 1.8mg daily for week three, then 2.4mg daily for week four., Disp: 3 pen, Rfl: 0  •  montelukast (SINGULAIR) 10 MG tablet, Take 1 tablet by mouth Every Night., Disp: 90 tablet, Rfl: 3  •  Multiple Vitamins-Minerals (EQ ONE DAILY Athersys PO), Take 2 tablets by mouth Daily., Disp: , Rfl:   •  Norethin-Eth Estrad-Fe Biphas (Lo Loestrin Fe) 1 MG-10 MCG / 10 MCG tablet, Take 1 tablet by mouth Daily., Disp: 90 tablet, Rfl: 4  •  Nurtec 75 MG dispersible tablet, Take 1 tablet by mouth Daily As Needed (migraine)., Disp: 8 tablet, Rfl: 5  •  omeprazole (priLOSEC) 40 MG capsule, Take 1 capsule by mouth Daily., Disp: 90 capsule, Rfl: 3  •  prochlorperazine (COMPAZINE) 10 MG tablet, Take 1 tablet by mouth Every 6 (Six) Hours As Needed for Nausea or Vomiting., Disp: 15 tablet, Rfl: 5     Review of Systems   Constitutional: Negative for chills, fatigue, fever and unexpected weight change.   HENT: Negative for ear pain, hearing loss, nosebleeds, rhinorrhea and sore throat.    Eyes: Negative for photophobia, pain, discharge, itching and visual disturbance.   Respiratory: Negative for cough, chest tightness, shortness of breath and wheezing.    Cardiovascular: Negative for chest pain, palpitations and leg swelling.   Gastrointestinal: Negative for abdominal pain, blood in stool, constipation, diarrhea, nausea and vomiting.   Genitourinary: Negative for dysuria, frequency, hematuria and urgency.   Musculoskeletal: Negative for arthralgias, back pain,  "gait problem, joint swelling, myalgias, neck pain and neck stiffness.   Skin: Negative for rash and wound.   Allergic/Immunologic: Negative for environmental allergies and food allergies.   Neurological: Positive for numbness and headaches. Negative for dizziness, tremors, seizures, syncope, speech difficulty, weakness and light-headedness.   Hematological: Negative for adenopathy. Does not bruise/bleed easily.   Psychiatric/Behavioral: Positive for sleep disturbance. Negative for agitation, confusion, decreased concentration, hallucinations and suicidal ideas. The patient is not nervous/anxious.    All other systems reviewed and are negative.       Objective:  /86 (BP Location: Right arm, Patient Position: Sitting, Cuff Size: Large Adult)   Pulse 77   Temp 97.2 °F (36.2 °C) (Tympanic)   Resp 16   Ht 154.9 cm (60.98\")   Wt 133 kg (294 lb)   SpO2 99%   BMI 55.58 kg/m²     Neurologic Exam     Mental Status   Oriented to person, place, and time.   Attention: normal. Concentration: normal.   Speech: speech is normal   Level of consciousness: alert  Knowledge: good.     Cranial Nerves   Cranial nerves II through XII intact.     Motor Exam   Muscle bulk: normal  Overall muscle tone: normal    Strength   Strength 5/5 throughout.     Gait, Coordination, and Reflexes     Gait  Gait: normal    Coordination   Finger to nose coordination: normal  Heel to shin coordination: normal    Tremor   Resting tremor: absent  Intention tremor: absent  Action tremor: absent    Reflexes   Reflexes 2+ except as noted.   Right : 2+  Left : 2+      Physical Exam  Constitutional:       Appearance: Normal appearance. She is obese.      Comments: BMI 55.6   Neurological:      Mental Status: She is alert and oriented to person, place, and time.      Coordination: Finger-Nose-Finger Test and Heel to Shin Test normal.      Gait: Gait is intact.      Deep Tendon Reflexes: Strength normal.   Psychiatric:         Mood and Affect: " Mood and affect normal.         Speech: Speech normal.         Behavior: Behavior normal.         Thought Content: Thought content normal.         Cognition and Memory: Cognition and memory normal.         Judgment: Judgment normal.         Assessment/Plan:       Diagnoses and all orders for this visit:    1. Migraine variant with headache (Primary)  -     prochlorperazine (COMPAZINE) 10 MG tablet; Take 1 tablet by mouth Every 6 (Six) Hours As Needed for Nausea or Vomiting.  Dispense: 15 tablet; Refill: 5  -     Nurtec 75 MG dispersible tablet; Take 1 tablet by mouth Daily As Needed (migraine).  Dispense: 8 tablet; Refill: 5    2. Excessive daytime sleepiness  Comments:  will call sleep clinic to schedule        She has been referred by her PCP to a weight management program recently.     Continue current medications. Refills sent.    Gave the patient the information to set up an evaluation with sleep medicine for excessive daytime sleepiness with suspected sleep apnea.     Follow up in 6 months or sooner if needed.       Reviewed medications, potential side effects and signs and symptoms to report. Discussed risk versus benefits of treatment plan with patient and/or family-including medications, labs and radiology that may be ordered. Addressed questions and concerns during visit. Patient and/or family verbalized understanding and agree with plan.    AS THE PROVIDER, I PERSONALLY WORE PPE DURING ENTIRE FACE TO FACE ENCOUNTER IN CLINIC WITH THE PATIENT. PATIENT ALSO WORE PPE DURING ENTIRE FACE TO FACE ENCOUNTER EXCEPT FOR A MAX OF 30 SECONDS DURING NEUROLOGICAL EVALUATION OF CRANIAL NERVES AND THEN MASK WAS PLACED BACK OVER PATIENT FACE FOR REMAINDER OF VISIT. I WASHED MY HANDS BEFORE AND AFTER VISIT.    During this visit the following were done:  Labs Reviewed [x]    Labs Ordered []    Radiology Reports Reviewed []    Radiology Ordered []    PCP Records Reviewed [x]    Referring Provider Records Reviewed []    ER  Records Reviewed []    Hospital Records Reviewed []    History Obtained From Family []    Radiology Images Reviewed []    Other Reviewed [x]    Records Requested []      Transcribed from ambient dictation for SYBIL Macario by Carolina Garcias.  04/29/22   15:36 EDT    Patient verbalized consent to the visit recording.  I have personally performed the services described in this document as transcribed by the above individual, and it is both accurate and complete.  SYBIL Macario  4/29/2022  16:26 EDT

## 2022-04-29 NOTE — PATIENT INSTRUCTIONS
Dr. Francois Neil [facility], [place], operated by [facility]-call for an appointment #[phone]-sleep consultation

## 2022-05-24 ENCOUNTER — OFFICE VISIT (OUTPATIENT)
Dept: INTERNAL MEDICINE | Facility: CLINIC | Age: 42
End: 2022-05-24

## 2022-05-24 VITALS
OXYGEN SATURATION: 100 % | HEART RATE: 73 BPM | WEIGHT: 293 LBS | BODY MASS INDEX: 55.77 KG/M2 | DIASTOLIC BLOOD PRESSURE: 72 MMHG | SYSTOLIC BLOOD PRESSURE: 112 MMHG | TEMPERATURE: 96.3 F

## 2022-05-24 DIAGNOSIS — M54.41 ACUTE BILATERAL LOW BACK PAIN WITH RIGHT-SIDED SCIATICA: Primary | ICD-10-CM

## 2022-05-24 PROCEDURE — 99213 OFFICE O/P EST LOW 20 MIN: CPT | Performed by: INTERNAL MEDICINE

## 2022-05-24 RX ORDER — CYCLOBENZAPRINE HCL 5 MG
5 TABLET ORAL NIGHTLY PRN
Qty: 30 TABLET | Refills: 0 | Status: SHIPPED | OUTPATIENT
Start: 2022-05-24

## 2022-05-24 RX ORDER — METHYLPREDNISOLONE 4 MG/1
TABLET ORAL
Qty: 21 EACH | Refills: 0 | Status: SHIPPED | OUTPATIENT
Start: 2022-05-24 | End: 2022-07-27

## 2022-05-24 NOTE — PROGRESS NOTES
Subjective   Nury Pineda is a 41 y.o. female.     History of Present Illness   Low back pain with radiating pain down back of right leg on Sunday. Leg pain better but back still hurts. Got new beds and had to put together and take down old one. Has tried ibuprofen  The following portions of the patient's history were reviewed and updated as appropriate: allergies, current medications, past family history, past medical history, past social history, past surgical history and problem list.    Review of Systems    Objective   Physical Exam    Assessment & Plan   Diagnoses and all orders for this visit:    1. Gastroesophageal reflux disease without esophagitis (Primary)

## 2022-05-24 NOTE — PROGRESS NOTES
Subjective   Nury Pineda is a 41 y.o. female.   Chief Complaint   Patient presents with   • Back Pain       History of Present Illness   Low back pain that started  after taking apart old beds and putting together new beds.  Pain radiated down back of right leg. Leg pain better but back pain still present. Ibuprofen helped some.   The following portions of the patient's history were reviewed and updated as appropriate: allergies, current medications, past family history, past medical history, past social history, past surgical history and problem list.  Past Medical History:   Diagnosis Date   • Absolute anemia    • Arthritis     Right knee   • Carpal tunnel syndrome     RIGHT WRIST   • Cholelithiasis     Nausea related to stones has phenergan. Also has related RUQ pain.   • Dizziness    • Elevated C-reactive protein (CRP)    • Elevated erythrocyte sedimentation rate    • Influenza    • Iron deficiency     Will not take iron supplement but will take MVI   • Left hip pain    • Migraines    • Numbness and tingling in right hand     Has nerve conductions 3/23/2016, awaiting results. Along with Paresthesia.   • Pain and swelling of right forearm     Check u/s to r/o clot. Rash and redness are resolving, will continue to monitor.   • Pain of left arm    • Sleep disturbances    • Vitamin D deficiency     25 oh.     Past Surgical History:   Procedure Laterality Date   •  SECTION     • CHOLECYSTECTOMY     • COLONOSCOPY     • CYST REMOVAL      behind belly button   • TONSILLECTOMY     • URACHAL CYST INCISION      History of Excision Of Urachal Cyst.     Family History   Problem Relation Age of Onset   • Stroke Mother    • Hypertension Mother    • Migraines Mother    • Osteoarthritis Mother    • Cervical cancer Mother    • Heart disease Mother    • Hyperlipidemia Maternal Grandmother    • Osteoarthritis Maternal Grandmother    • Birth defects Maternal Grandmother    • Ovarian cancer  Maternal Grandmother         PREMENOPAUSAL    • Transient ischemic attack Maternal Grandmother    • Hypertension Maternal Grandmother    • Stroke Other         CVA x 2. and TIA   • Cancer Other         Cervical, malignant neoplasm x2 , ovarian   • Hypertension Father    • Diabetes Father      Social History     Socioeconomic History   • Marital status: Single   Tobacco Use   • Smoking status: Never Smoker   • Smokeless tobacco: Never Used   Vaping Use   • Vaping Use: Never used   Substance and Sexual Activity   • Alcohol use: Yes     Alcohol/week: 2.0 standard drinks     Types: 2 Glasses of wine per week   • Drug use: No   • Sexual activity: Defer     Partners: Male     Birth control/protection: Condom, OCP     Current Outpatient Medications on File Prior to Visit   Medication Sig Dispense Refill   • montelukast (SINGULAIR) 10 MG tablet Take 1 tablet by mouth Every Night. 90 tablet 3   • Multiple Vitamins-Minerals (EQ ONE DAILY WOMENDeviceAuthority PO) Take 2 tablets by mouth Daily.     • Norethin-Eth Estrad-Fe Biphas (Lo Loestrin Fe) 1 MG-10 MCG / 10 MCG tablet Take 1 tablet by mouth Daily. 90 tablet 4   • Nurtec 75 MG dispersible tablet Take 1 tablet by mouth Daily As Needed (migraine). 8 tablet 5   • omeprazole (priLOSEC) 40 MG capsule Take 1 capsule by mouth Daily. 90 capsule 3   • prochlorperazine (COMPAZINE) 10 MG tablet Take 1 tablet by mouth Every 6 (Six) Hours As Needed for Nausea or Vomiting. 15 tablet 5   • Liraglutide (SAXENDA) 18 MG/3ML injection pen Inject 0.6mg under skin daily for week one, THEN 1.2mg daily for week two, THEN 1.8mg daily for week three, then 2.4mg daily for week four. 3 pen 0     No current facility-administered medications on file prior to visit.       Review of Systems   Constitutional: Negative for diaphoresis and fatigue.   Respiratory: Negative for cough and shortness of breath.    Cardiovascular: Negative for chest pain and leg swelling.   Gastrointestinal: Negative for diarrhea,  nausea and vomiting.   Musculoskeletal: Positive for back pain.   Neurological: Negative for syncope and numbness.     /72   Pulse 73   Temp 96.3 °F (35.7 °C)   Wt 134 kg (295 lb)   SpO2 100%   BMI 55.77 kg/m²     Objective   Physical Exam  Vitals reviewed.   Cardiovascular:      Rate and Rhythm: Normal rate and regular rhythm.   Pulmonary:      Effort: No respiratory distress.      Breath sounds: No wheezing.   Musculoskeletal:         General: Tenderness (at SI joint) present. No deformity.   Neurological:      Mental Status: She is alert and oriented to person, place, and time.         Assessment & Plan   Diagnoses and all orders for this visit:    1. Acute bilateral low back pain with right-sided sciatica (Primary)  -     methylPREDNISolone (MEDROL) 4 MG dose pack; Take as directed on package instructions.  Dispense: 21 each; Refill: 0  -     cyclobenzaprine (FLEXERIL) 5 MG tablet; Take 1 tablet by mouth At Night As Needed for Muscle Spasms.  Dispense: 30 tablet; Refill: 0

## 2022-06-13 NOTE — TELEPHONE ENCOUNTER
Dr. Hughes called patient on Friday 12/1/2017   Donor Site Anesthesia Type: same as repair anesthesia

## 2022-07-27 ENCOUNTER — OFFICE VISIT (OUTPATIENT)
Dept: BARIATRICS/WEIGHT MGMT | Facility: CLINIC | Age: 42
End: 2022-07-27

## 2022-07-27 VITALS
SYSTOLIC BLOOD PRESSURE: 134 MMHG | OXYGEN SATURATION: 100 % | DIASTOLIC BLOOD PRESSURE: 82 MMHG | HEART RATE: 81 BPM | TEMPERATURE: 97.9 F | HEIGHT: 61 IN | WEIGHT: 293 LBS | BODY MASS INDEX: 55.32 KG/M2

## 2022-07-27 DIAGNOSIS — Z51.81 ENCOUNTER FOR THERAPEUTIC DRUG LEVEL MONITORING: ICD-10-CM

## 2022-07-27 DIAGNOSIS — E66.01 CLASS 3 SEVERE OBESITY WITH SERIOUS COMORBIDITY AND BODY MASS INDEX (BMI) OF 50.0 TO 59.9 IN ADULT, UNSPECIFIED OBESITY TYPE: Primary | ICD-10-CM

## 2022-07-27 PROBLEM — E66.813 CLASS 3 SEVERE OBESITY WITH SERIOUS COMORBIDITY AND BODY MASS INDEX (BMI) OF 50.0 TO 59.9 IN ADULT: Status: ACTIVE | Noted: 2022-07-27

## 2022-07-27 PROBLEM — D64.9 ANEMIA: Status: ACTIVE | Noted: 2022-07-27

## 2022-07-27 PROBLEM — M54.9 BACK PAIN: Status: ACTIVE | Noted: 2022-07-27

## 2022-07-27 PROCEDURE — 99215 OFFICE O/P EST HI 40 MIN: CPT | Performed by: NURSE PRACTITIONER

## 2022-07-27 PROCEDURE — 99417 PROLNG OP E/M EACH 15 MIN: CPT | Performed by: NURSE PRACTITIONER

## 2022-07-27 NOTE — PROGRESS NOTES
[facility]  [address]  [place] [address]  [phone]    Name: Nury Pineda  : 1980  Patient Care Team:  Acacia Lobato DO as PCP - General  Acacia Lobato DO as PCP - Family Medicine  Aruna Cotter DO as Obstetrician (Obstetrics and Gynecology)  Ele Thorne APRN as Nurse Practitioner (Neurology)    Chief Complaint   Patient presents with   • Obesity        HPI:   Ms. Nury Pineda is a 41 y.o. female presents for initiation of medically supervised weight loss. She has tried other methods/programs to lose weight including:  Joining a gym and has previously used medication to assist weight loss including:.      Lifetime non-pregnant maximum weight: 317  Weight 1 year ago: unsure   Weight 5 years ago: unsure      The following seem to sabotage weight loss efforts:comfort/stress eating, enjoyment of food, eating too fast, always hungry and boredom eating    Recent Weight History:   Wt Readings from Last 6 Encounters:   22 134 kg (296 lb 8 oz)   22 134 kg (295 lb)   22 133 kg (294 lb)   22 133 kg (292 lb 3.2 oz)   03/10/22 131 kg (288 lb 9.6 oz)   22 125 kg (275 lb)     Review of Systems:   Review of Systems   Constitutional: Negative for fatigue.        Positive for weight gain   HENT: Negative for trouble swallowing.         Negative for throat swelling   Respiratory: Negative for shortness of breath and wheezing.         Negative for snoring   Cardiovascular: Negative for chest pain, palpitations and leg swelling.   Gastrointestinal: Negative for abdominal pain, constipation, diarrhea, GERD and indigestion.   Endocrine: Negative for cold intolerance, heat intolerance, polydipsia, polyphagia and polyuria.        Negative for loss of hair  Negative for hirsutism     Genitourinary:        Denies menstrual irregularities   Musculoskeletal: Negative for arthralgias.        Denies  "exercise limitations  Denies chronic pain   Skin: Negative for dry skin.        Negative for acne   Neurological: Negative for headache and memory problem.        Negative for paresthesias   Psychiatric/Behavioral: Negative for self-injury, sleep disturbance, suicidal ideas and depressed mood. The patient is not nervous/anxious.        Allergies   Allergen Reactions   • Triptans Other (See Comments)     Chest pain, tightness in throat, vasocontriction   • Amoxicillin Swelling     Throat swells   • Penicillins Swelling     Throat swells       VS   Vitals:    07/27/22 1535   BP: 134/82   Pulse: 81   Temp: 97.9 °F (36.6 °C)   TempSrc: Temporal   SpO2: 100%   Weight: 134 kg (296 lb 8 oz)   Height: 154.9 cm (61\")     Start Weight/BMI: 296.5  %fat: 56.6    Measurements (in inches)  Neck: 15  Chest: 57.5  Waist: 51  Hips: 55  Thighs: 54    Physical Exam  Constitutional:       Appearance: Normal appearance. She is obese.   Cardiovascular:      Rate and Rhythm: Normal rate and regular rhythm.      Heart sounds: Normal heart sounds.   Pulmonary:      Effort: Pulmonary effort is normal. No respiratory distress.      Breath sounds: Normal breath sounds.   Skin:     General: Skin is warm and dry.   Neurological:      Mental Status: She is alert and oriented to person, place, and time.   Psychiatric:         Attention and Perception: Attention and perception normal.         Mood and Affect: Mood normal.         Speech: Speech normal.         Behavior: Behavior normal.        Patient viewed educational videos. Topics included obesity as a disease, nutritional education on food groups, exercise, and medications. Patient was instructed in adequate protein, controlled carb and controlled fat intake.   Patient received instructions on using the medicines as a tool in controlling their weight with nutritional and behavioral changes. Risks and benefits were discussed. I believe the potential benefits of medication helping to decrease " weight outweighs the risks. Patient is to try nutritonal/behavioral changes only first.   Patient received our clinic education booklet.   Our patient consent form was reviewed including potential risks of weight loss. We also reviewed our confidentiality and HIPPA statements.    Patient's past medical history was reviewed in detail and barriers to weight loss were identified and discussed. Past efforts at weight reduction on their own as well as under physician supervision were documented and discussed.  I advised patient to continue routine care with their Primary Care Provider.     Nutritional recommendations and goals were reviewed including Calories: 1550 daily adjusted for exercise calories burnt, Protein:35% daily, Net carbs (total carb - fiber) 30% daily.    Diagnoses and all orders for this visit:    1. Class 3 severe obesity with serious comorbidity and body mass index (BMI) of 50.0 to 59.9 in adult, unspecified obesity type (HCC) (Primary)  Assessment & Plan:  Patient's (Body mass index is 56.02 kg/m².) indicates that they are obese (BMI >30) with health conditions that include GERD and osteoarthritis . Weight is newly identified. BMI is is above average; BMI management plan is completed. We discussed low calorie, low carb based diet program, portion control and increasing exercise.   -- This is a initial visit.  She was referred by her primary care provider.  -- Body comp analysis was reviewed and calorie goal set based on basal metabolic rate.  My fitness pal was set up in office today and advised patient that her #1 goal over the next couple weeks is to start journaling.  Encouraged her that its not looking for perfection but instead looking to be very particular of describing anything and everything that has calories in it.  Asked her to bring in her food journal to next office visit for brief review  -- She is interested in pharmacology.  Her primary care provider had submitted for Saxenda and found  that insurance does not cover it.  She has used phentermine in her past multiple years ago with no adverse effects with successful weight loss.  --UDS was collected but told by lab staff it was unable to be processed. Need new UDS at next office visit.     Orders:  -     CBC & Differential; Future  -     Comprehensive Metabolic Panel; Future  -     Hemoglobin A1c; Future  -     Insulin, Total; Future  -     Lipid Panel; Future  -     T3, Free; Future  -     TSH; Future  -     Cancel: Urine Drug Screen - Urine, Clean Catch  -     Vitamin D 25 Hydroxy; Future    2. Encounter for therapeutic drug level monitoring  -     Urine Drug Screen - Urine, Clean Catch     Non-Weight Loss Goals: Improved reflux: has been addressed., Improved mobility: has been addressed.  and Improved energy: has been addressed.   Initial goal of 5-10% loss of beginning body weight    1. Personal Goals:  Size 12  2. Start changing nutrition to examples given to meet nutritional macronutrient individual ranges discussed. Titrate down 500 calories every 5 days as tolerated until range met. Titrate down 50g carbohydrates every 5 days as tolerated until range met. Inc exercise to the individualized FITT score discussed. Start to keep a food journal and bring into next visit in 2 weeks for review. Practice the behavioral modification technique of mindful eating. Take one MVI daily and 2000mg fish oil daily. Take other medications and supplements as directed.    I spent 75 minutes on this date of service. This time includes time spent by me in the following activities:preparing for the visit, counseling and educating the patient/family/caregiver, ordering medications, tests, or procedures and documenting information in the medical record.    Return in about 4 weeks (around 8/24/2022).  SYBIL Gill

## 2022-07-27 NOTE — ASSESSMENT & PLAN NOTE
Patient's (Body mass index is 56.02 kg/m².) indicates that they are obese (BMI >30) with health conditions that include GERD and osteoarthritis . Weight is newly identified. BMI is is above average; BMI management plan is completed. We discussed low calorie, low carb based diet program, portion control and increasing exercise.   -- This is a initial visit.  She was referred by her primary care provider.  -- Body comp analysis was reviewed and calorie goal set based on basal metabolic rate.  My fitness pal was set up in office today and advised patient that her #1 goal over the next couple weeks is to start journaling.  Encouraged her that its not looking for perfection but instead looking to be very particular of describing anything and everything that has calories in it.  Asked her to bring in her food journal to next office visit for brief review  -- She is interested in pharmacology.  Her primary care provider had submitted for Saxenda and found that insurance does not cover it.  She has used phentermine in her past multiple years ago with no adverse effects with successful weight loss.  --UDS was collected but told by lab staff it was unable to be processed. Need new UDS at next office visit.

## 2022-07-28 ENCOUNTER — LAB (OUTPATIENT)
Dept: LAB | Facility: HOSPITAL | Age: 42
End: 2022-07-28

## 2022-07-28 DIAGNOSIS — Z11.3 SCREEN FOR STD (SEXUALLY TRANSMITTED DISEASE): ICD-10-CM

## 2022-07-28 DIAGNOSIS — D50.0 IRON DEFICIENCY ANEMIA DUE TO CHRONIC BLOOD LOSS: ICD-10-CM

## 2022-07-28 DIAGNOSIS — E66.01 CLASS 3 SEVERE OBESITY WITH SERIOUS COMORBIDITY AND BODY MASS INDEX (BMI) OF 50.0 TO 59.9 IN ADULT, UNSPECIFIED OBESITY TYPE: ICD-10-CM

## 2022-07-28 LAB
25(OH)D3 SERPL-MCNC: 14.7 NG/ML (ref 30–100)
ALBUMIN SERPL-MCNC: 4.4 G/DL (ref 3.5–5.2)
ALBUMIN/GLOB SERPL: 1.5 G/DL
ALP SERPL-CCNC: 74 U/L (ref 39–117)
ALT SERPL W P-5'-P-CCNC: 9 U/L (ref 1–33)
ANION GAP SERPL CALCULATED.3IONS-SCNC: 12.1 MMOL/L (ref 5–15)
AST SERPL-CCNC: 9 U/L (ref 1–32)
BASOPHILS # BLD AUTO: 0.03 10*3/MM3 (ref 0–0.2)
BASOPHILS NFR BLD AUTO: 0.3 % (ref 0–1.5)
BILIRUB SERPL-MCNC: 0.5 MG/DL (ref 0–1.2)
BUN SERPL-MCNC: 12 MG/DL (ref 6–20)
BUN/CREAT SERPL: 17.9 (ref 7–25)
CALCIUM SPEC-SCNC: 9.2 MG/DL (ref 8.6–10.5)
CHLORIDE SERPL-SCNC: 100 MMOL/L (ref 98–107)
CHOLEST SERPL-MCNC: 192 MG/DL (ref 0–200)
CO2 SERPL-SCNC: 23.9 MMOL/L (ref 22–29)
CREAT SERPL-MCNC: 0.67 MG/DL (ref 0.57–1)
DEPRECATED RDW RBC AUTO: 46.9 FL (ref 37–54)
EGFRCR SERPLBLD CKD-EPI 2021: 113.5 ML/MIN/1.73
EOSINOPHIL # BLD AUTO: 0.11 10*3/MM3 (ref 0–0.4)
EOSINOPHIL NFR BLD AUTO: 1.3 % (ref 0.3–6.2)
ERYTHROCYTE [DISTWIDTH] IN BLOOD BY AUTOMATED COUNT: 15.6 % (ref 12.3–15.4)
FERRITIN SERPL-MCNC: 588 NG/ML (ref 13–150)
GLOBULIN UR ELPH-MCNC: 3 GM/DL
GLUCOSE SERPL-MCNC: 87 MG/DL (ref 65–99)
HBA1C MFR BLD: 6 % (ref 4.8–5.6)
HBV SURFACE AG SERPL QL IA: NORMAL
HCT VFR BLD AUTO: 39.1 % (ref 34–46.6)
HCV AB SER DONR QL: NORMAL
HDLC SERPL-MCNC: 83 MG/DL (ref 40–60)
HGB BLD-MCNC: 12.2 G/DL (ref 12–15.9)
HIV1+2 AB SER QL: NORMAL
IMM GRANULOCYTES # BLD AUTO: 0.03 10*3/MM3 (ref 0–0.05)
IMM GRANULOCYTES NFR BLD AUTO: 0.3 % (ref 0–0.5)
IRON 24H UR-MRATE: 53 MCG/DL (ref 37–145)
IRON SATN MFR SERPL: 16 % (ref 20–50)
LDLC SERPL CALC-MCNC: 97 MG/DL (ref 0–100)
LDLC/HDLC SERPL: 1.16 {RATIO}
LYMPHOCYTES # BLD AUTO: 2.89 10*3/MM3 (ref 0.7–3.1)
LYMPHOCYTES NFR BLD AUTO: 33.2 % (ref 19.6–45.3)
MCH RBC QN AUTO: 26.1 PG (ref 26.6–33)
MCHC RBC AUTO-ENTMCNC: 31.2 G/DL (ref 31.5–35.7)
MCV RBC AUTO: 83.7 FL (ref 79–97)
MONOCYTES # BLD AUTO: 0.64 10*3/MM3 (ref 0.1–0.9)
MONOCYTES NFR BLD AUTO: 7.3 % (ref 5–12)
NEUTROPHILS NFR BLD AUTO: 5.01 10*3/MM3 (ref 1.7–7)
NEUTROPHILS NFR BLD AUTO: 57.6 % (ref 42.7–76)
NRBC BLD AUTO-RTO: 0 /100 WBC (ref 0–0.2)
PLATELET # BLD AUTO: 332 10*3/MM3 (ref 140–450)
PMV BLD AUTO: 11.2 FL (ref 6–12)
POTASSIUM SERPL-SCNC: 4.4 MMOL/L (ref 3.5–5.2)
PROT SERPL-MCNC: 7.4 G/DL (ref 6–8.5)
RBC # BLD AUTO: 4.67 10*6/MM3 (ref 3.77–5.28)
SODIUM SERPL-SCNC: 136 MMOL/L (ref 136–145)
T3FREE SERPL-MCNC: 2.93 PG/ML (ref 2–4.4)
TIBC SERPL-MCNC: 322 MCG/DL (ref 298–536)
TRANSFERRIN SERPL-MCNC: 216 MG/DL (ref 200–360)
TRIGL SERPL-MCNC: 63 MG/DL (ref 0–150)
TSH SERPL DL<=0.05 MIU/L-ACNC: 1.05 UIU/ML (ref 0.27–4.2)
VLDLC SERPL-MCNC: 12 MG/DL (ref 5–40)
WBC NRBC COR # BLD: 8.71 10*3/MM3 (ref 3.4–10.8)

## 2022-07-28 PROCEDURE — 82728 ASSAY OF FERRITIN: CPT

## 2022-07-28 PROCEDURE — 84443 ASSAY THYROID STIM HORMONE: CPT

## 2022-07-28 PROCEDURE — 82306 VITAMIN D 25 HYDROXY: CPT

## 2022-07-28 PROCEDURE — G0432 EIA HIV-1/HIV-2 SCREEN: HCPCS

## 2022-07-28 PROCEDURE — 83525 ASSAY OF INSULIN: CPT

## 2022-07-28 PROCEDURE — 84466 ASSAY OF TRANSFERRIN: CPT

## 2022-07-28 PROCEDURE — 80061 LIPID PANEL: CPT

## 2022-07-28 PROCEDURE — 85025 COMPLETE CBC W/AUTO DIFF WBC: CPT

## 2022-07-28 PROCEDURE — 86592 SYPHILIS TEST NON-TREP QUAL: CPT

## 2022-07-28 PROCEDURE — 83540 ASSAY OF IRON: CPT

## 2022-07-28 PROCEDURE — 84481 FREE ASSAY (FT-3): CPT

## 2022-07-28 PROCEDURE — 87340 HEPATITIS B SURFACE AG IA: CPT

## 2022-07-28 PROCEDURE — 80053 COMPREHEN METABOLIC PANEL: CPT

## 2022-07-28 PROCEDURE — 36415 COLL VENOUS BLD VENIPUNCTURE: CPT

## 2022-07-28 PROCEDURE — 83036 HEMOGLOBIN GLYCOSYLATED A1C: CPT

## 2022-07-28 PROCEDURE — 86803 HEPATITIS C AB TEST: CPT

## 2022-07-29 LAB
INSULIN SERPL-ACNC: 23.4 UIU/ML (ref 2.6–24.9)
RPR SER QL: NORMAL

## 2022-08-01 PROBLEM — R73.09 ELEVATED HEMOGLOBIN A1C: Status: ACTIVE | Noted: 2022-08-01

## 2022-08-01 RX ORDER — ERGOCALCIFEROL 1.25 MG/1
CAPSULE ORAL
Qty: 8 CAPSULE | Refills: 0 | Status: SHIPPED | OUTPATIENT
Start: 2022-08-01 | End: 2022-09-12

## 2022-08-01 RX ORDER — CHOLECALCIFEROL (VITAMIN D3) 50 MCG
2000 TABLET ORAL DAILY
Qty: 365 TABLET | Refills: 0
Start: 2022-08-01 | End: 2022-08-11

## 2022-08-03 ENCOUNTER — PRIOR AUTHORIZATION (OUTPATIENT)
Dept: BARIATRICS/WEIGHT MGMT | Facility: CLINIC | Age: 42
End: 2022-08-03

## 2022-08-03 NOTE — TELEPHONE ENCOUNTER
Nury Pineda (Fernandez: [id number])  Ozempic (0.25 or 0.5 MG/DOSE) 2MG/1.5ML pen-injectors    Denied

## 2022-08-10 ENCOUNTER — TELEPHONE (OUTPATIENT)
Dept: OBSTETRICS AND GYNECOLOGY | Facility: CLINIC | Age: 42
End: 2022-08-10

## 2022-08-10 RX ORDER — NITROFURANTOIN 25; 75 MG/1; MG/1
100 CAPSULE ORAL 2 TIMES DAILY
Qty: 14 CAPSULE | Refills: 0 | Status: SHIPPED | OUTPATIENT
Start: 2022-08-10 | End: 2022-08-17

## 2022-08-10 NOTE — TELEPHONE ENCOUNTER
Dr. Perez's patient   [phone] patient called, complains of a UTI, her symptoms are: burning and pressure with urination ongoing for 3 days now and getting worse. Patient's pharmacy is [facility].

## 2022-08-11 ENCOUNTER — OFFICE VISIT (OUTPATIENT)
Dept: BARIATRICS/WEIGHT MGMT | Facility: CLINIC | Age: 42
End: 2022-08-11

## 2022-08-11 VITALS
BODY MASS INDEX: 55.32 KG/M2 | DIASTOLIC BLOOD PRESSURE: 80 MMHG | OXYGEN SATURATION: 99 % | HEIGHT: 61 IN | WEIGHT: 293 LBS | SYSTOLIC BLOOD PRESSURE: 122 MMHG | HEART RATE: 88 BPM

## 2022-08-11 DIAGNOSIS — R73.09 ELEVATED HEMOGLOBIN A1C: ICD-10-CM

## 2022-08-11 DIAGNOSIS — E55.9 VITAMIN D DEFICIENCY: ICD-10-CM

## 2022-08-11 DIAGNOSIS — E66.01 CLASS 3 SEVERE OBESITY WITH SERIOUS COMORBIDITY AND BODY MASS INDEX (BMI) OF 50.0 TO 59.9 IN ADULT, UNSPECIFIED OBESITY TYPE: Primary | ICD-10-CM

## 2022-08-11 PROCEDURE — 99215 OFFICE O/P EST HI 40 MIN: CPT | Performed by: NURSE PRACTITIONER

## 2022-08-11 RX ORDER — SEMAGLUTIDE 1.34 MG/ML
0.25 INJECTION, SOLUTION SUBCUTANEOUS WEEKLY
Qty: 1 PEN | Refills: 0 | Status: SHIPPED | OUTPATIENT
Start: 2022-08-11 | End: 2022-09-10

## 2022-08-11 RX ORDER — ONDANSETRON HYDROCHLORIDE 8 MG/1
8 TABLET, FILM COATED ORAL EVERY 8 HOURS PRN
Qty: 30 TABLET | Refills: 1 | Status: SHIPPED | OUTPATIENT
Start: 2022-08-11 | End: 2022-09-10

## 2022-08-11 NOTE — ASSESSMENT & PLAN NOTE
Patient's (Body mass index is 55.36 kg/m².) indicates that they are obese (BMI >30) with health conditions that include GERD and elevated A1C . Weight is improving with treatment. BMI is is above average; BMI management plan is completed. We discussed low calorie, low carb based diet program, portion control, increasing exercise and an madyson-based approach such as MyFitness Pal or Lose It.     -- This is an initial follow-up visit and patient is down 3.5 pounds since being seen just around 10 days ago.  -- She did bring in her food journal for brief review.  He is an excellent job on tracking only missing a few days and a few meals on the days that she tracked.  This continues to be her #1 focus just being mindful and analyzing where her food and calories are coming from.  She did a very good job on tracking of the days even when her calories and macros were not perfect.  This great effort and asked her to bring in food journal to next office visit in around a month again.  -- She does have a comorbid condition of elevated A1c.  Discussed the possibility of a GLP-1.  Consider adding Qsymia at next office visit.  -- We also reviewed labs which indicated vitamin D deficiency and she has started taking prescription sent in.  Advised to start 5000 IUs daily after completion of prescription vitamin D.

## 2022-08-11 NOTE — PROGRESS NOTES
[facility]  [address]  [place] [address]     Office Note      Date: 2022  Patient Name: Nury Pineda  MRN: [id number]  : 1980     ubjective  Subjective     Chief Complaint  Obesity Management follow-up          Nury Pineda presents to [facility] for obesity management.   Patient is unsure with weight loss progress. Appetite is moderately controlled. Reports no side effects of prescribed medications today. The patient is taking multivitamin and is taking fish oil.  The patient is using a food journal.  The patient rates current efforts as 5 out of 10.  The patient is exercising with a FITT score of:    Frequency Intensity Time Strength Training   [x]   0, none [x]   0 [x]   0 [x]   0   []   1 (1-2x/week) []   1 (light) []   1 (<10 min) []   1 (1x/week)   []   2 (3-5x/week) []   2 (moderate) []   2 (10-20 min) []   2 (2x/week)   []   3 (daily) []   3 (moderately hard)  []   4 (very hard) []   3 (20-30 min)  []   4 (>30 min) []   3 (3-4x/week)     Review of Systems   Constitutional: Negative for appetite change and fatigue.   Eyes: Negative for blurred vision and visual disturbance.   Cardiovascular: Negative for chest pain and palpitations.   Gastrointestinal: Negative for abdominal pain, constipation, diarrhea, nausea and GERD.   Endocrine: Negative for polydipsia, polyphagia and polyuria.   Musculoskeletal: Negative for arthralgias and myalgias.   Neurological: Negative for dizziness, tremors, light-headedness, headache and memory problem.        Parasthesias negative   Psychiatric/Behavioral: Negative for sleep disturbance and depressed mood. The patient is not nervous/anxious.        Objective   Start weight: 296.5 pounds.    Total Loss lb/%Loss of beginning body weight (BBW): -3.5lb/-1.18%  Change in weight since last visit: 3.5    Recent Weight History:   Wt Readings from Last 6 Encounters:   22  "133 kg (293 lb)   07/27/22 134 kg (296 lb 8 oz)   05/24/22 134 kg (295 lb)   04/29/22 133 kg (294 lb)   04/26/22 133 kg (292 lb 3.2 oz)   03/10/22 131 kg (288 lb 9.6 oz)       Body mass index is 55.36 kg/m².   Body composition analysis completed and showed:   %body fat: 56.4  Measurements (in inches)  Waist: 54.0    Vital Signs:   /80   Pulse 88   Ht 154.9 cm (61\")   Wt 133 kg (293 lb)   SpO2 99%   BMI 55.36 kg/m²     Physical Exam   Result Review :              Assessment / Plan        Diagnoses and all orders for this visit:    1. Class 3 severe obesity with serious comorbidity and body mass index (BMI) of 50.0 to 59.9 in adult, unspecified obesity type (HCC) (Primary)  Assessment & Plan:  Patient's (Body mass index is 55.36 kg/m².) indicates that they are obese (BMI >30) with health conditions that include GERD and elevated A1C . Weight is improving with treatment. BMI is is above average; BMI management plan is completed. We discussed low calorie, low carb based diet program, portion control, increasing exercise and an madyson-based approach such as [employer] Pal or Lose It.     -- This is an initial follow-up visit and patient is down 3.5 pounds since being seen just around 10 days ago.  -- She did bring in her food journal for brief review.  He is an excellent job on tracking only missing a few days and a few meals on the days that she tracked.  This continues to be her #1 focus just being mindful and analyzing where her food and calories are coming from.  She did a very good job on tracking of the days even when her calories and macros were not perfect.  This great effort and asked her to bring in food journal to next office visit in around a month again.  -- She does have a comorbid condition of elevated A1c.  Discussed the possibility of a GLP-1.  Consider adding Qsymia at next office visit.  -- We also reviewed labs which indicated vitamin D deficiency and she has started taking prescription sent " in.  Advised to start 5000 IUs daily after completion of prescription vitamin D.      2. Elevated hemoglobin A1c  -     Semaglutide,0.25 or 0.5MG/DOS, (Ozempic, 0.25 or 0.5 MG/DOSE,) 2 MG/1.5ML solution pen-injector; Inject 0.25 mg under the skin into the appropriate area as directed 1 (One) Time Per Week for 30 days.  Dispense: 1 pen; Refill: 0    3. Vitamin D deficiency  -     ondansetron (Zofran) 8 MG tablet; Take 1 tablet by mouth Every 8 (Eight) Hours As Needed for Nausea or Vomiting for up to 30 days.  Dispense: 30 tablet; Refill: 1    Other orders  -     vitamin D3 (Vitamin D) 125 MCG (5000 UT) capsule capsule; Take 1 capsule by mouth Daily.  Dispense: 100 capsule; Refill: 3      We discussed the risks, benefits, and limitations of treatments. Continue medications and OTC supplements as discussed. Patient verbalizes understanding of and agreement with management plan.     Follow Up   No follow-ups on file.  Patient was given instructions and counseling regarding her condition or for health maintenance advice. Please see specific information pulled into the AVS if appropriate.     I spent 40 minutes on this date of service. This time includes time spent by me in the following activities:preparing for the visit, counseling and educating the patient/family/caregiver, ordering medications, tests, or procedures and documenting information in the medical record.    Suzie Muir, APRN  08/11/2022

## 2022-09-02 ENCOUNTER — TELEPHONE (OUTPATIENT)
Dept: NEUROLOGY | Facility: CLINIC | Age: 42
End: 2022-09-02

## 2022-09-02 NOTE — TELEPHONE ENCOUNTER
Provider: AVINASH PARADA   Caller: JI   Relationship to Patient: PT   Reason for Call: PT CALLED AS SHE HAD AN INTENSE MIGRAINE LAST NIGHT AND THIS MORNING SHE STATES SHE STILL HAS HEADACHE AND HER FACE FEELS HEAVY. WARM TRANSFERRED TO [facility].

## 2022-09-02 NOTE — TELEPHONE ENCOUNTER
Pt called and said she had a migraine last night that came out of nowhere and this morning her left side of her face is tingling and heavy.  When she looks at face in mirror she does not see any drooping however; it is not going away.  She did take her migraine medication and it did seem to help but the tingling and heavy feeling is lingering.  Any recommendations?

## 2022-09-02 NOTE — TELEPHONE ENCOUNTER
If she is having persistent complex migraine symptoms, she can take a 2nd dose of nurtec odt right now along with either her zofran or her compazine and if symptoms persist for more than 1 hour, she needs to go to ER. Thanks, Ele

## 2022-09-02 NOTE — TELEPHONE ENCOUNTER
I SPOKE WITH PT GIVING HER THE RECOMMENDATIONS BELOW AND SHE SAID SHE WOULD TAKE THE NURTEC AND SEE HOW THAT WORKS FOR HER.

## 2022-09-15 ENCOUNTER — OFFICE VISIT (OUTPATIENT)
Dept: BARIATRICS/WEIGHT MGMT | Facility: CLINIC | Age: 42
End: 2022-09-15

## 2022-09-15 VITALS
HEIGHT: 61 IN | OXYGEN SATURATION: 99 % | BODY MASS INDEX: 54.66 KG/M2 | WEIGHT: 289.5 LBS | SYSTOLIC BLOOD PRESSURE: 130 MMHG | HEART RATE: 85 BPM | DIASTOLIC BLOOD PRESSURE: 82 MMHG

## 2022-09-15 DIAGNOSIS — E66.01 CLASS 3 SEVERE OBESITY WITH SERIOUS COMORBIDITY AND BODY MASS INDEX (BMI) OF 50.0 TO 59.9 IN ADULT, UNSPECIFIED OBESITY TYPE: Primary | ICD-10-CM

## 2022-09-15 DIAGNOSIS — R73.09 ELEVATED HEMOGLOBIN A1C: ICD-10-CM

## 2022-09-15 PROCEDURE — 99214 OFFICE O/P EST MOD 30 MIN: CPT | Performed by: NURSE PRACTITIONER

## 2022-09-15 RX ORDER — PHENTERMINE AND TOPIRAMATE 3.75; 23 MG/1; MG/1
1 CAPSULE, EXTENDED RELEASE ORAL DAILY
Qty: 14 CAPSULE | Refills: 0 | Status: SHIPPED | OUTPATIENT
Start: 2022-09-15 | End: 2022-09-19 | Stop reason: SDUPTHER

## 2022-09-15 RX ORDER — PHENTERMINE AND TOPIRAMATE 7.5; 46 MG/1; MG/1
1 CAPSULE, EXTENDED RELEASE ORAL DAILY
Qty: 30 CAPSULE | Refills: 0 | Status: SHIPPED | OUTPATIENT
Start: 2022-09-15 | End: 2022-09-19 | Stop reason: SDUPTHER

## 2022-09-15 NOTE — ASSESSMENT & PLAN NOTE
Patient's (Body mass index is 54.7 kg/m².) indicates that they are obese (BMI >30) with health conditions that include GERD and elevated A1C . Weight is improving with treatment. BMI is is above average; BMI management plan is completed. We discussed low calorie, low carb based diet program, portion control, increasing exercise and an madyson-based approach such as MyFitness Pal or Lose It.   -- This is a follow-up visit and patient is down around 3.5 pounds since last being seen approximately a month ago.   -- She does have a comorbidity of elevated A1c and Ozempic was approved but denied by insurance so she was unable to start that.  -- Discussed Qsymia as an option and mutually agreed to start this.  Sent to mail in pharmacy.  Patient advised she does need to fill out registration at [facility] in order for this to be sent to her.  -- Patient has not gotten the habit of food journaling.  Wants to make this a priority this month.  Goal to start journaling at minimum 3 to 4 days a week.  Just starting to see how many calories she is getting as well as how many grams of protein and carbs she is getting as well.  Asked to bring in food journal to next office visit for brief review.  -- Discussed having protein-based or nonstarchy vegetable snacks throughout the day to prevent extreme hunger at the end of her workday.

## 2022-09-15 NOTE — PROGRESS NOTES
[facility]  [address]  [place] [address]     Office Note      Date: 09/15/2022  Patient Name: Nury Pineda  MRN: [id number]  : 1980  Subjective  Subjective     Chief Complaint  Obesity Management follow-up          Nury Pineda presents to [facility] for obesity management.   Patient is Unsure with weight loss progress. Appetite is Moderate. Reports no side effects of prescribed medications today. The patient IS NOT taking multivitamin and IS NOT taking fish oil.  The patient SOMEWHAT using a food journal.  The patient rates current efforts as 5 out of 10.  The patient is exercising with a FITT score of:    Frequency Intensity Time Strength Training   [x]   0, none [x]   0 [x]   0 [x]   0   []   1 (1-2x/week) []   1 (light) []   1 (<10 min) []   1 (1x/week)   []   2 (3-5x/week) []   2 (moderate) []   2 (10-20 min) []   2 (2x/week)   []   3 (daily) []   3 (moderately hard)  []   4 (very hard) []   3 (20-30 min)  []   4 (>30 min) []   3 (3-4x/week)     Review of Systems   Constitutional: Negative for appetite change and fatigue.   Eyes: Negative for blurred vision and visual disturbance.   Cardiovascular: Negative for chest pain and palpitations.   Gastrointestinal: Negative for abdominal pain, constipation, diarrhea, nausea and GERD.   Endocrine: Negative for polydipsia, polyphagia and polyuria.   Musculoskeletal: Negative for arthralgias and myalgias.   Neurological: Negative for dizziness, tremors, light-headedness, headache and memory problem.        Parasthesias negative   Psychiatric/Behavioral: Negative for sleep disturbance and depressed mood. The patient is not nervous/anxious.        Objective   Start weight: 296.5 pounds.    Last Visit 293  Total Loss lb/%Loss of beginning body weight (BBW): 289.5lb/-2.36%  Change in weight since last visit: -3.5    Recent Weight History:   Wt Readings from Last 6  "Encounters:   09/15/22 131 kg (289 lb 8 oz)   08/11/22 133 kg (293 lb)   07/27/22 134 kg (296 lb 8 oz)   05/24/22 134 kg (295 lb)   04/29/22 133 kg (294 lb)   04/26/22 133 kg (292 lb 3.2 oz)       Body mass index is 54.7 kg/m².   Body composition analysis completed and showed:   %body fat: 56.7  Measurements (in inches)  Waist: 53.5    Vital Signs:   Blood Pressure: 130/82  Pulse: 85  O2: 99  Weight: 289.5  Height: 61\"    Physical Exam   Result Review :            Assessment / Plan        Diagnoses and all orders for this visit:    1. Class 3 severe obesity with serious comorbidity and body mass index (BMI) of 50.0 to 59.9 in adult, unspecified obesity type (HCC) (Primary)  Assessment & Plan:  Patient's (Body mass index is 54.7 kg/m².) indicates that they are obese (BMI >30) with health conditions that include GERD and elevated A1C . Weight is improving with treatment. BMI is is above average; BMI management plan is completed. We discussed low calorie, low carb based diet program, portion control, increasing exercise and an madyson-based approach such as Anapsis Pal or Lose It.   -- This is a follow-up visit and patient is down around 3.5 pounds since last being seen approximately a month ago.   -- She does have a comorbidity of elevated A1c and Ozempic was approved but denied by insurance so she was unable to start that.  -- Discussed Qsymia as an option and mutually agreed to start this.  Sent to mail in pharmacy.  Patient advised she does need to fill out registration at [employer] in order for this to be sent to her.  -- Patient has not gotten the habit of food journaling.  Wants to make this a priority this month.  Goal to start journaling at minimum 3 to 4 days a week.  Just starting to see how many calories she is getting as well as how many grams of protein and carbs she is getting as well.  Asked to bring in food journal to next office visit for brief review.  -- Discussed having protein-based or " nonstarchy vegetable snacks throughout the day to prevent extreme hunger at the end of her workday.      Orders:  -     Qsymia 7.5-46 MG capsule sustained-release 24 hr; Take 1 capsule by mouth Daily for 30 days.  Dispense: 30 capsule; Refill: 0  -     Qsymia 3.75-23 MG capsule sustained-release 24 hr; Take 1 capsule by mouth Daily for 14 days.  Dispense: 14 capsule; Refill: 0    2. Elevated hemoglobin A1c  We discussed the risks, benefits, and limitations of treatments. Continue medications and OTC supplements as discussed. Patient verbalizes understanding of and agreement with management plan.     Follow Up   No follow-ups on file.  Patient was given instructions and counseling regarding her condition or for health maintenance advice. Please see specific information pulled into the AVS if appropriate.     I spent 30 minutes on this date of service. This time includes time spent by me in the following activities:preparing for the visit, counseling and educating the patient/family/caregiver, ordering medications, tests, or procedures and documenting information in the medical record.    Suzie Muir, APRN  09/15/2022

## 2022-09-16 ENCOUNTER — PATIENT MESSAGE (OUTPATIENT)
Dept: BARIATRICS/WEIGHT MGMT | Facility: CLINIC | Age: 42
End: 2022-09-16

## 2022-09-16 DIAGNOSIS — E66.01 CLASS 3 SEVERE OBESITY WITH SERIOUS COMORBIDITY AND BODY MASS INDEX (BMI) OF 50.0 TO 59.9 IN ADULT, UNSPECIFIED OBESITY TYPE: ICD-10-CM

## 2022-09-18 DIAGNOSIS — J30.2 SEASONAL ALLERGIES: ICD-10-CM

## 2022-09-18 RX ORDER — MONTELUKAST SODIUM 10 MG/1
10 TABLET ORAL NIGHTLY
Qty: 90 TABLET | Refills: 0 | Status: SHIPPED | OUTPATIENT
Start: 2022-09-18

## 2022-09-19 RX ORDER — PHENTERMINE AND TOPIRAMATE 7.5; 46 MG/1; MG/1
1 CAPSULE, EXTENDED RELEASE ORAL DAILY
Qty: 30 CAPSULE | Refills: 0 | Status: SHIPPED | OUTPATIENT
Start: 2022-09-19 | End: 2022-10-18 | Stop reason: SDUPTHER

## 2022-09-19 RX ORDER — PHENTERMINE AND TOPIRAMATE 3.75; 23 MG/1; MG/1
1 CAPSULE, EXTENDED RELEASE ORAL DAILY
Qty: 14 CAPSULE | Refills: 0 | Status: SHIPPED | OUTPATIENT
Start: 2022-09-19 | End: 2022-10-03

## 2022-09-19 NOTE — TELEPHONE ENCOUNTER
From: Nury Pineda  To: SYBIL Gill  Sent: 9/16/2022 9:19 PM EDT  Subject: Pills    Hello. I downloaded the website for the home prescription delivery. Its saying that it doesn't have a prescription from you to fill.

## 2022-10-18 ENCOUNTER — OFFICE VISIT (OUTPATIENT)
Dept: BARIATRICS/WEIGHT MGMT | Facility: CLINIC | Age: 42
End: 2022-10-18

## 2022-10-18 VITALS
HEART RATE: 76 BPM | BODY MASS INDEX: 54.94 KG/M2 | DIASTOLIC BLOOD PRESSURE: 74 MMHG | HEIGHT: 61 IN | OXYGEN SATURATION: 100 % | SYSTOLIC BLOOD PRESSURE: 126 MMHG | WEIGHT: 291 LBS

## 2022-10-18 DIAGNOSIS — E66.01 CLASS 3 SEVERE OBESITY WITH SERIOUS COMORBIDITY AND BODY MASS INDEX (BMI) OF 50.0 TO 59.9 IN ADULT, UNSPECIFIED OBESITY TYPE: ICD-10-CM

## 2022-10-18 DIAGNOSIS — R73.09 ELEVATED HEMOGLOBIN A1C: Primary | ICD-10-CM

## 2022-10-18 PROCEDURE — 99215 OFFICE O/P EST HI 40 MIN: CPT | Performed by: NURSE PRACTITIONER

## 2022-10-18 PROCEDURE — 96372 THER/PROPH/DIAG INJ SC/IM: CPT | Performed by: NURSE PRACTITIONER

## 2022-10-18 RX ORDER — ORAL SEMAGLUTIDE 3 MG/1
3 TABLET ORAL DAILY
Qty: 30 TABLET | Refills: 0 | Status: SHIPPED | OUTPATIENT
Start: 2022-10-18 | End: 2022-11-17

## 2022-10-18 RX ORDER — ORAL SEMAGLUTIDE 3 MG/1
3 TABLET ORAL DAILY
Qty: 30 TABLET | Refills: 0 | COMMUNITY
Start: 2022-10-18 | End: 2022-10-18

## 2022-10-18 RX ORDER — CYANOCOBALAMIN 1000 UG/ML
1000 INJECTION, SOLUTION INTRAMUSCULAR; SUBCUTANEOUS
Status: SHIPPED | OUTPATIENT
Start: 2022-10-18

## 2022-10-18 RX ORDER — PHENTERMINE AND TOPIRAMATE 7.5; 46 MG/1; MG/1
1 CAPSULE, EXTENDED RELEASE ORAL DAILY
Qty: 30 CAPSULE | Refills: 0 | Status: SHIPPED | OUTPATIENT
Start: 2022-10-18 | End: 2022-11-17

## 2022-10-18 RX ADMIN — CYANOCOBALAMIN 1000 MCG: 1000 INJECTION, SOLUTION INTRAMUSCULAR; SUBCUTANEOUS at 07:59

## 2022-10-18 NOTE — ASSESSMENT & PLAN NOTE
Patient's (Body mass index is 54.98 kg/m².) indicates that they are obese (BMI >30) with health conditions that include GERD and elevated A1C . Weight is improving with treatment. BMI is is above average; BMI management plan is completed. We discussed low calorie, low carb based diet program, portion control, increasing exercise, pharmacologic options including Rhybelsus, qsymia and an madyson-based approach such as Cloud Takeoff Pal or Lose It.     --This is a follow-up visit and patient is up around 1.5 pounds since last being seen.  Feels that she is fallen off track related to his extremely busy work schedule.  Ready to refocus.  -- Be mindful of not skipping meals with a goal of eating 3 meals a day.  She should not go longer than every 4 hours without having a protein-based snack.   -- Continue Qsymia.

## 2022-10-18 NOTE — PROGRESS NOTES
[facility]  [address]  [place] [address]     Office Note      Date: 10/18/2022  Patient Name: Nury Pineda  MRN: [id number]  : 1980  Subjective  Subjective     Chief Complaint  Obesity Management follow-up          Nury Pineda presents to [facility] for obesity management.   Patient is unsure with weight loss progress. Appetite is well controlled. Reports no side effects of prescribed medications today. The patient is taking multivitamin and is not taking fish oil.  The patient is using a food journal. Had an extremely busy month and is did not eat well at all. Suppressed that her weight is not higher than it is.      The patient is exercising with a FITT score of:    Frequency Intensity Time Strength Training   []   0, none []   0 []   0 [x]   0   [x]   1 (1-2x/week) [x]   1 (light) []   1 (<10 min) []   1 (1x/week)   []   2 (3-5x/week) []   2 (moderate) [x]   2 (10-20 min) []   2 (2x/week)   []   3 (daily) []   3 (moderately hard)  []   4 (very hard) []   3 (20-30 min)  []   4 (>30 min) []   3 (3-4x/week)     Review of Systems   Constitutional: Negative for appetite change and fatigue.   Eyes: Negative for blurred vision, double vision and visual disturbance.   Cardiovascular: Negative for chest pain and palpitations.   Gastrointestinal: Negative for abdominal pain, constipation, diarrhea, nausea, vomiting and GERD.   Endocrine: Negative for polydipsia, polyphagia and polyuria.   Musculoskeletal: Negative for arthralgias, back pain and myalgias.   Neurological: Negative for dizziness, tremors, light-headedness, headache and memory problem.        Parasthesias negative   Psychiatric/Behavioral: Negative for sleep disturbance, depressed mood and stress. The patient is not nervous/anxious.    Denies multiple endocrine neoplasia, past pancreatitis, personal or family history of thyroid cancer.    Objective   Start  "weight: 296.5 pounds.    Total Loss lb/%Loss of beginning body weight (BBW): -5.5lb/-1.85%  Change in weight since last visit: +1.5    Recent Weight History:   Wt Readings from Last 6 Encounters:   10/18/22 132 kg (291 lb)   09/15/22 131 kg (289 lb 8 oz)   08/11/22 133 kg (293 lb)   07/27/22 134 kg (296 lb 8 oz)   05/24/22 134 kg (295 lb)   04/29/22 133 kg (294 lb)       Body mass index is 54.98 kg/m².   Body composition analysis completed and showed:   %body fat: 57.3  Measurements (in inches)  Waist: 43.5    Vital Signs:   /74   Pulse 76   Ht 154.9 cm (61\")   Wt 132 kg (291 lb)   SpO2 100%   BMI 54.98 kg/m²     Physical Exam  Constitutional:       Appearance: Normal appearance. She is obese.   Cardiovascular:      Rate and Rhythm: Normal rate and regular rhythm.      Heart sounds: Normal heart sounds.   Pulmonary:      Effort: Pulmonary effort is normal. No respiratory distress.      Breath sounds: Normal breath sounds.   Skin:     General: Skin is warm and dry.   Neurological:      Mental Status: She is alert and oriented to person, place, and time.   Psychiatric:         Attention and Perception: Attention and perception normal.         Mood and Affect: Mood normal.         Speech: Speech normal.         Behavior: Behavior normal.        Result Review :                Assessment / Plan        Diagnoses and all orders for this visit:    1. Elevated hemoglobin A1c (Primary)  -     Discontinue: Semaglutide (Rybelsus) 3 MG tablet; Take 1 tablet by mouth Daily for 30 days.  Dispense: 30 tablet; Refill: 0  -     Semaglutide (Rybelsus) 3 MG tablet; Take 1 tablet by mouth Daily for 30 days.  Dispense: 30 tablet; Refill: 0    2. Class 3 severe obesity with serious comorbidity and body mass index (BMI) of 50.0 to 59.9 in adult, unspecified obesity type (HCC)  Assessment & Plan:  Patient's (Body mass index is 54.98 kg/m².) indicates that they are obese (BMI >30) with health conditions that include GERD and " elevated A1C . Weight is improving with treatment. BMI is is above average; BMI management plan is completed. We discussed low calorie, low carb based diet program, portion control, increasing exercise, pharmacologic options including Rhybelsus, qsymia and an madyson-based approach such as NeuroDerm Pal or Lose It.     --This is a follow-up visit and patient is up around 1.5 pounds since last being seen.  Feels that she is fallen off track related to his extremely busy work schedule.  Ready to refocus.  -- Be mindful of not skipping meals with a goal of eating 3 meals a day.  She should not go longer than every 4 hours without having a protein-based snack.   -- Continue Qsymia.       Orders:  -     cyanocobalamin injection 1,000 mcg  -     Qsymia 7.5-46 MG capsule sustained-release 24 hr; Take 1 capsule by mouth Daily for 30 days.  Dispense: 30 capsule; Refill: 0  -     Semaglutide (Rybelsus) 3 MG tablet; Take 1 tablet by mouth Daily for 30 days.  Dispense: 30 tablet; Refill: 0    We discussed the risks, benefits, and limitations of treatments. Continue medications and OTC supplements as discussed. Patient verbalizes understanding of and agreement with management plan.     Follow Up   Return in about 4 weeks (around 11/15/2022).  Patient was given instructions and counseling regarding her condition or for health maintenance advice. Please see specific information pulled into the AVS if appropriate.     I spent 40 minutes on this date of service. This time includes time spent by me in the following activities:preparing for the visit, counseling and educating the patient/family/caregiver, ordering medications, tests, or procedures and documenting information in the medical record.    Suzie Muir, APRN  10/18/2022

## 2022-10-28 ENCOUNTER — TELEPHONE (OUTPATIENT)
Dept: NEUROLOGY | Facility: CLINIC | Age: 42
End: 2022-10-28

## 2022-10-28 NOTE — TELEPHONE ENCOUNTER
Caller: Edwin Nuryrashi San    Relationship: Self    Best call back number: [phone]    What was the call regarding: PT CALLED TO RESCHEDULE HER F/U APPT W/ SYBIL HERNANDEZ THIS MORNING, 10/28/22 @ 9AM. PT STATES HER DAUGHTER HAD TO GO TO THE ED LAST NIGHT SO SHE IS UNABLE TO MAKE HER APPT THIS MORNING. PT HAS BEEN RESCHEDULED FOR NEXT AVAILABLE APPT ON 4/17/22 & ADDED TO THE WAITLIST.    PLEASE BE AWARE THAT PT HAS BEEN RESCHEDULED OUTSIDE OF THE SUGGESTED F/U TIMEFRAME.    Do you require a callback: IF NEEDED.    PLEASE REVIEW AND ADVISE.

## 2022-10-28 NOTE — TELEPHONE ENCOUNTER
Thanks for the update. I am sorry to hear her daughter is ill. Unfortunately we have no sooner appointments. Hopefully a cancellation will occur and we can get her in sooner. She can always call each week too. Thanks, Ele

## 2022-11-08 DIAGNOSIS — D50.0 IRON DEFICIENCY ANEMIA DUE TO CHRONIC BLOOD LOSS: Primary | ICD-10-CM

## 2022-11-14 ENCOUNTER — LAB (OUTPATIENT)
Dept: LAB | Facility: HOSPITAL | Age: 42
End: 2022-11-14

## 2022-11-14 DIAGNOSIS — D50.0 IRON DEFICIENCY ANEMIA DUE TO CHRONIC BLOOD LOSS: ICD-10-CM

## 2022-11-14 LAB
BASOPHILS # BLD AUTO: 0.03 10*3/MM3 (ref 0–0.2)
BASOPHILS NFR BLD AUTO: 0.4 % (ref 0–1.5)
DEPRECATED RDW RBC AUTO: 46.6 FL (ref 37–54)
EOSINOPHIL # BLD AUTO: 0.08 10*3/MM3 (ref 0–0.4)
EOSINOPHIL NFR BLD AUTO: 1.1 % (ref 0.3–6.2)
ERYTHROCYTE [DISTWIDTH] IN BLOOD BY AUTOMATED COUNT: 15.2 % (ref 12.3–15.4)
FERRITIN SERPL-MCNC: 482.3 NG/ML (ref 13–150)
HCT VFR BLD AUTO: 37.2 % (ref 34–46.6)
HGB BLD-MCNC: 11.7 G/DL (ref 12–15.9)
IMM GRANULOCYTES # BLD AUTO: 0.01 10*3/MM3 (ref 0–0.05)
IMM GRANULOCYTES NFR BLD AUTO: 0.1 % (ref 0–0.5)
IRON 24H UR-MRATE: 45 MCG/DL (ref 37–145)
IRON SATN MFR SERPL: 16 % (ref 20–50)
LYMPHOCYTES # BLD AUTO: 2.45 10*3/MM3 (ref 0.7–3.1)
LYMPHOCYTES NFR BLD AUTO: 33.6 % (ref 19.6–45.3)
MCH RBC QN AUTO: 26.2 PG (ref 26.6–33)
MCHC RBC AUTO-ENTMCNC: 31.5 G/DL (ref 31.5–35.7)
MCV RBC AUTO: 83.4 FL (ref 79–97)
MONOCYTES # BLD AUTO: 0.58 10*3/MM3 (ref 0.1–0.9)
MONOCYTES NFR BLD AUTO: 8 % (ref 5–12)
NEUTROPHILS NFR BLD AUTO: 4.14 10*3/MM3 (ref 1.7–7)
NEUTROPHILS NFR BLD AUTO: 56.8 % (ref 42.7–76)
PLATELET # BLD AUTO: 280 10*3/MM3 (ref 140–450)
PMV BLD AUTO: 10.6 FL (ref 6–12)
RBC # BLD AUTO: 4.46 10*6/MM3 (ref 3.77–5.28)
TIBC SERPL-MCNC: 286 MCG/DL (ref 298–536)
TRANSFERRIN SERPL-MCNC: 192 MG/DL (ref 200–360)
WBC NRBC COR # BLD: 7.29 10*3/MM3 (ref 3.4–10.8)

## 2022-11-14 PROCEDURE — 84466 ASSAY OF TRANSFERRIN: CPT

## 2022-11-14 PROCEDURE — 85025 COMPLETE CBC W/AUTO DIFF WBC: CPT

## 2022-11-14 PROCEDURE — 82728 ASSAY OF FERRITIN: CPT

## 2022-11-14 PROCEDURE — 83540 ASSAY OF IRON: CPT

## 2022-11-14 PROCEDURE — 36415 COLL VENOUS BLD VENIPUNCTURE: CPT

## 2022-11-15 ENCOUNTER — OFFICE VISIT (OUTPATIENT)
Dept: ONCOLOGY | Facility: CLINIC | Age: 42
End: 2022-11-15

## 2022-11-15 VITALS
WEIGHT: 291 LBS | HEIGHT: 61 IN | OXYGEN SATURATION: 99 % | BODY MASS INDEX: 54.94 KG/M2 | RESPIRATION RATE: 18 BRPM | SYSTOLIC BLOOD PRESSURE: 126 MMHG | TEMPERATURE: 97.1 F | HEART RATE: 92 BPM | DIASTOLIC BLOOD PRESSURE: 75 MMHG

## 2022-11-15 DIAGNOSIS — D50.0 IRON DEFICIENCY ANEMIA DUE TO CHRONIC BLOOD LOSS: Primary | ICD-10-CM

## 2022-11-15 PROCEDURE — 99214 OFFICE O/P EST MOD 30 MIN: CPT | Performed by: INTERNAL MEDICINE

## 2022-11-15 RX ORDER — ONDANSETRON HYDROCHLORIDE 8 MG/1
8 TABLET, FILM COATED ORAL EVERY 8 HOURS PRN
COMMUNITY
Start: 2022-11-05

## 2022-11-15 NOTE — PROGRESS NOTES
DATE OF VISIT: 11/15/2022    REASON FOR VISIT: Followup for iron deficiency anemia     PROBLEM LIST:  1. Anemia  2.  Microcytosis  3.  Iron deficiency:  A.  EGD done 2020 revealed erosive esophagitis  4.  Positive JOSÉ MIGUEL with inflammation markers  A. Followed by rheumatology at     HISTORY OF PRESENT ILLNESS: The patient is a very pleasant 42 y.o. female  with past medical history significant for iron deficiency anemia diagnosed 2020.  The patient has been through with IV iron replacement. The patient is here today for scheduled follow-up visit.    SUBJECTIVE: The patient is here today by herself.  She is complaining of fatigue.  She denies any fever or chills no night the sweats.    Past History:  Medical History: has a past medical history of Absolute anemia, Anemia, Arthritis, Carpal tunnel syndrome, Cholelithiasis, Dizziness, Elevated C-reactive protein (CRP), Elevated erythrocyte sedimentation rate, Elevated hemoglobin A1c, GERD (gastroesophageal reflux disease), Glaucoma, Heart disease, Influenza, Iron deficiency, Left hip pain, Migraines, Numbness and tingling in right hand, Osteoarthritis, Pain and swelling of right forearm, Pain of left arm, Sleep disturbances, and Vitamin D deficiency.   Surgical History: has a past surgical history that includes Tonsillectomy (); Urachal cyst incision;  section (); Cholecystectomy (); Cyst Removal (); and Colonoscopy ().   Family History: family history includes Birth defects in her maternal grandmother; Cancer in an other family member; Cervical cancer in her mother; Diabetes in her father; Heart disease in her mother; Hyperlipidemia in her maternal grandmother; Hypertension in her father, maternal grandmother, and mother; Migraines in her mother; Osteoarthritis in her maternal grandmother and mother; Ovarian cancer in her maternal grandmother; Stroke in her mother and another family member; Transient ischemic attack in her maternal  "grandmother.   Social History: reports that she has never smoked. She has never used smokeless tobacco. She reports current alcohol use of about 2.0 standard drinks per week. She reports that she does not use drugs.    (Not in a hospital admission)     Allergies: Triptans, Amoxicillin, and Penicillins     Review of Systems   Constitutional: Positive for fatigue.   Cardiovascular: Negative.    Gastrointestinal: Negative.    Musculoskeletal: Positive for arthralgias and back pain.       PHYSICAL EXAMINATION:   /75   Pulse 92   Temp 97.1 °F (36.2 °C) (Temporal)   Resp 18   Ht 154.9 cm (60.98\")   Wt 132 kg (291 lb)   SpO2 99%   BMI 55.01 kg/m²    Pain Score    11/15/22 1530   PainSc: 0-No pain        ECOG score: 0            ECOG Performance Status: 1 - Symptomatic but completely ambulatory      General Appearance:      alert, cooperative, no apparent distress and appears stated age   Lungs:   Clear to auscultation bilaterally; respirations regular, even, and unlabored bilaterally   Heart:  Regular rate and rhythm, no murmurs appreciated   Abdomen:   Soft, non-tender, non-distended and no organomegaly                 Lab on 11/14/2022   Component Date Value Ref Range Status   • Iron 11/14/2022 45  37 - 145 mcg/dL Final   • Iron Saturation 11/14/2022 16 (L)  20 - 50 % Final   • Transferrin 11/14/2022 192 (L)  200 - 360 mg/dL Final   • TIBC 11/14/2022 286 (L)  298 - 536 mcg/dL Final   • Ferritin 11/14/2022 482.30 (H)  13.00 - 150.00 ng/mL Final   • WBC 11/14/2022 7.29  3.40 - 10.80 10*3/mm3 Final   • RBC 11/14/2022 4.46  3.77 - 5.28 10*6/mm3 Final   • Hemoglobin 11/14/2022 11.7 (L)  12.0 - 15.9 g/dL Final   • Hematocrit 11/14/2022 37.2  34.0 - 46.6 % Final   • MCV 11/14/2022 83.4  79.0 - 97.0 fL Final   • MCH 11/14/2022 26.2 (L)  26.6 - 33.0 pg Final   • MCHC 11/14/2022 31.5  31.5 - 35.7 g/dL Final   • RDW 11/14/2022 15.2  12.3 - 15.4 % Final   • RDW-SD 11/14/2022 46.6  37.0 - 54.0 fl Final   • MPV 11/14/2022 " 10.6  6.0 - 12.0 fL Final   • Platelets 11/14/2022 280  140 - 450 10*3/mm3 Final   • Neutrophil % 11/14/2022 56.8  42.7 - 76.0 % Final   • Lymphocyte % 11/14/2022 33.6  19.6 - 45.3 % Final   • Monocyte % 11/14/2022 8.0  5.0 - 12.0 % Final   • Eosinophil % 11/14/2022 1.1  0.3 - 6.2 % Final   • Basophil % 11/14/2022 0.4  0.0 - 1.5 % Final   • Immature Grans % 11/14/2022 0.1  0.0 - 0.5 % Final   • Neutrophils, Absolute 11/14/2022 4.14  1.70 - 7.00 10*3/mm3 Final   • Lymphocytes, Absolute 11/14/2022 2.45  0.70 - 3.10 10*3/mm3 Final   • Monocytes, Absolute 11/14/2022 0.58  0.10 - 0.90 10*3/mm3 Final   • Eosinophils, Absolute 11/14/2022 0.08  0.00 - 0.40 10*3/mm3 Final   • Basophils, Absolute 11/14/2022 0.03  0.00 - 0.20 10*3/mm3 Final   • Immature Grans, Absolute 11/14/2022 0.01  0.00 - 0.05 10*3/mm3 Final        No results found.    ASSESSMENT: The patient is a very pleasant 42 y.o. female  with anemia      PLAN:    1.  Anemia:  A.  I did go over the CBC result from November 14, 2022.  I reassured the patient her hemoglobin is stable at 11.7.    2.  Treatment history of iron deficiency:  A.  I did go over her iron profile from November 14, 2022 it is most consistent with chronic inflammation with elevated ferritin 4 and 82 and low iron saturation at 286.      FOLLOW UP: 1 year with CBC and iron profile    Michelle Eugene MD  11/15/2022

## 2023-01-09 ENCOUNTER — PRIOR AUTHORIZATION (OUTPATIENT)
Dept: NEUROLOGY | Facility: CLINIC | Age: 43
End: 2023-01-09
Payer: COMMERCIAL

## 2023-03-27 RX ORDER — HYDROXYZINE HYDROCHLORIDE 25 MG/1
25 TABLET, FILM COATED ORAL 3 TIMES DAILY PRN
Qty: 30 TABLET | Refills: 0 | Status: SHIPPED | OUTPATIENT
Start: 2023-03-27

## 2023-04-17 ENCOUNTER — OFFICE VISIT (OUTPATIENT)
Dept: NEUROLOGY | Facility: CLINIC | Age: 43
End: 2023-04-17
Payer: COMMERCIAL

## 2023-04-17 VITALS
OXYGEN SATURATION: 97 % | SYSTOLIC BLOOD PRESSURE: 128 MMHG | WEIGHT: 293 LBS | HEIGHT: 61 IN | BODY MASS INDEX: 55.32 KG/M2 | HEART RATE: 80 BPM | DIASTOLIC BLOOD PRESSURE: 88 MMHG

## 2023-04-17 DIAGNOSIS — G43.109 MIGRAINE WITH AURA AND WITHOUT STATUS MIGRAINOSUS, NOT INTRACTABLE: Primary | ICD-10-CM

## 2023-04-17 PROCEDURE — 1159F MED LIST DOCD IN RCRD: CPT | Performed by: NURSE PRACTITIONER

## 2023-04-17 PROCEDURE — 1160F RVW MEDS BY RX/DR IN RCRD: CPT | Performed by: NURSE PRACTITIONER

## 2023-04-17 PROCEDURE — 99213 OFFICE O/P EST LOW 20 MIN: CPT | Performed by: NURSE PRACTITIONER

## 2023-04-17 RX ORDER — RIMEGEPANT SULFATE 75 MG/75MG
75 TABLET, ORALLY DISINTEGRATING ORAL DAILY PRN
Qty: 16 TABLET | Refills: 11 | Status: SHIPPED | OUTPATIENT
Start: 2023-04-17

## 2023-04-17 RX ORDER — PREDNISONE 10 MG/1
TABLET ORAL
Qty: 42 TABLET | Refills: 0 | Status: SHIPPED | OUTPATIENT
Start: 2023-04-17

## 2023-04-17 RX ORDER — PROCHLORPERAZINE MALEATE 10 MG
10 TABLET ORAL EVERY 6 HOURS PRN
Qty: 15 TABLET | Refills: 5 | Status: SHIPPED | OUTPATIENT
Start: 2023-04-17

## 2023-04-17 NOTE — LETTER
"April 17, 2023     Acacia Lobato DO  [address] [facility] [address]    Patient: Nury Pineda   YOB: 1980   Date of Visit: 4/17/2023       Dear Acacia Lobato DO    Nury Pineda was in my office today. Below is a copy of my note.    If you have questions, please do not hesitate to call me. I look forward to following Nury along with you.         Sincerely,        SYBIL Macario        CC: No Recipients    Subjective:     Patient ID: Nury Pineda is a 42 y.o. female.    CC:   Chief Complaint   Patient presents with   • Migraine       HPI:   History of Present Illness   Today 4/17/2023-  This is a pleasant 42-year-old female who presents for 1-year neurology follow-up on migraine variant headaches with episodic complex migraines, intermittent left facial numbness, tingling, and blurred vision. She has had complex migraines intermittently since 2015. 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 My sincere condolences to her and her family. She has taken this quite hard as she was caring for her on a daily basis. Ms. Arrington currently takes Nurtec ODT at onset of migraine along with Compazine or Zofran as needed. She is not on any daily preventive medications at this time. She is here for follow-up and refills on her medications today. Of note, she also follows with Dr. Michelle Eugene, hematology oncology, for iron deficiency anemia.    Today, she reports she is doing \"okay.\" She states she has had a headache constantly for 6 weeks, which she believes is due to stress. She denies taking any medication for her headaches. She denies being diabetic. She states prednisone may have helped in the past. She localizes her headache to the frontal aspect of her head, but occasionally it radiates down the posterior aspect of her head. She confirms she is nauseous. She " "denies photophobia or phonophobia. She states before the past 6 weeks, her headaches were \"fine.\" She was experiencing headaches approximately 1 time per week. She confirms she still has Nurtec ODT. She has tightness in her neck. She states she takes Compazine when she has nausea. She receives 16 tablets of Nurtec ODT. She confirms she has experienced complex migraines with numbness, tingling, and weakness in her face a couple of times within the past 6 weeks. She has had symptoms in the left side of her face and in her hands. She reports the headache that has occurred over the past 6 weeks has not been strong enough that she feels she needs an infusion, it just has not resolved. It has been more tension and aching/pressure.    She denies any changes in her health. She reports she was told her iron was borderline low, so she did not have to receive an infusion. She will follow up in 10/2023 or 11/2023.    She confirms she saw rheumatology, who informed her everything was fine. She states they do not know if it was a false positive or not. She notes they did extra blood work, which came back normal.    She denies wearing a CPAP. She had a sleep study completed, which showed no severe sleep apnea.    She denies any new concerns.    She states her grandmother passed away on 03/27/2023. She reports she has chest pain and tightness intermittently from stress. These symptoms do not radiate. They come and go depending on the amount of stress she experiences. She has been currently prescribed hydroxyzine by PCP for the anxiety. She denies any current chest pain. She confirms she has family support.     Previous extensive work-up & history included below:  She has had previous atypical and complex migraines since 2015 which have presented with stroke like symptoms.  It has been challenging to treat her migraines since she has severe difficulty with trouble swallowing and esophageal spasms so she is really not able to swallow " most pills.  She cannot crush or open some pills to take.  She cannot swallow pills whole at this time.  Has used Fioricet in the past.  The Fioricet makes her very tired.  She does use Compazine as needed for the nausea and migraines and this does help slightly.  She has previously tried topiramate with intolerance, amitriptyline with sedation and intolerance, sumatriptan with tightening of the throat, aspirin and Cambia.  She has also used rizatriptan in the past but this caused her to feel like she was having spasms and tightening of her throat and chest as well.  Triptans have been poorly tolerated.  She is really hesitant to start daily preventive medications.  She tries to avoid medications if possible.     Prior imaging studies showed no acute process with MRI of the brain and C-spine as well as MRA of the brain in 2016 and 2015 but there was significant artifact due to her wearing braces.  CTA of the head and neck was normal in 2016. MRI of the brain with and without contrast on 2/3/2021 was a normal MRI with no abnormal contrast-enhancement and no acute intracranial abnormalities.  This was stable compared to January 18, 2019 imaging.    She did have a sleep study remotely and told that she had very mild sleep apnea but has not been treated with a CPAP.      Of note in 12/29/2021, she followed up with [facility] for an JOSÉ MIGUEL with a 1-1280 speckled pattern ratio. They did repeat labs during that visit for autoimmune disease with lupus. It does state in her work up in 06/2021, her work up was negative. The patient reports all the lab work from rheumatology shows elevated levels, however, she was told there is nothing of concern. She denies symptoms.     Complex migraine headaches with intermittent left facial numbness, tingling, and blurred vision. She has had complex migraines since 2015. Currently, she is prescribed Nurtec ODT along with Compazine to use at onset of migraine.    Follows with hem/Onc for  iron deficiency anemia-has required IV iron infusions in past.    The following portions of the patient's history were reviewed and updated as appropriate: allergies, current medications, past family history, past medical history, past social history, past surgical history and problem list.    Past Medical History:   Diagnosis Date   • Absolute anemia    • Anemia    • Arthritis     Right knee   • Carpal tunnel syndrome     RIGHT WRIST   • Cholelithiasis     Nausea related to stones has phenergan. Also has related RUQ pain.   • Dizziness    • Elevated C-reactive protein (CRP)    • Elevated erythrocyte sedimentation rate    • Elevated hemoglobin A1c     labs 2022 6.0   • GERD (gastroesophageal reflux disease)    • Glaucoma     negative per pt 2022   • Heart disease    • Influenza    • Iron deficiency     Will not take iron supplement but will take MVI   • Left hip pain    • Migraines    • Numbness and tingling in right hand     Has nerve conductions 3/23/2016, awaiting results. Along with Paresthesia.   • Osteoarthritis    • Pain and swelling of right forearm     Check u/s to r/o clot. Rash and redness are resolving, will continue to monitor.   • Pain of left arm    • Sleep disturbances    • Vitamin D deficiency     25 oh.       Past Surgical History:   Procedure Laterality Date   •  SECTION     • CHOLECYSTECTOMY     • COLONOSCOPY     • CYST REMOVAL      behind belly button   • TONSILLECTOMY     • URACHAL CYST INCISION      History of Excision Of Urachal Cyst.       Social History     Socioeconomic History   • Marital status: Single   Tobacco Use   • Smoking status: Never   • Smokeless tobacco: Never   Vaping Use   • Vaping Use: Never used   Substance and Sexual Activity   • Alcohol use: Yes     Alcohol/week: 2.0 standard drinks     Types: 2 Glasses of wine per week   • Drug use: No   • Sexual activity: Defer     Partners: Male     Birth control/protection: Condom, OCP       Family  History   Problem Relation Age of Onset   • Stroke Mother    • Hypertension Mother    • Migraines Mother    • Osteoarthritis Mother    • Cervical cancer Mother    • Heart disease Mother    • Hyperlipidemia Maternal Grandmother    • Osteoarthritis Maternal Grandmother    • Birth defects Maternal Grandmother    • Ovarian cancer Maternal Grandmother         PREMENOPAUSAL    • Transient ischemic attack Maternal Grandmother    • Hypertension Maternal Grandmother    • Stroke Other         CVA x 2. and TIA   • Cancer Other         Cervical, malignant neoplasm x2 , ovarian   • Hypertension Father    • Diabetes Father           Current Outpatient Medications:   •  cyclobenzaprine (FLEXERIL) 5 MG tablet, Take 1 tablet by mouth At Night As Needed for Muscle Spasms., Disp: 30 tablet, Rfl: 0  •  hydrOXYzine (ATARAX) 25 MG tablet, Take 1 tablet by mouth 3 (Three) Times a Day As Needed for Itching or Anxiety., Disp: 30 tablet, Rfl: 0  •  montelukast (SINGULAIR) 10 MG tablet, Take 1 tablet by mouth Every Night., Disp: 90 tablet, Rfl: 0  •  Multiple Vitamins-Minerals (EQ ONE DAILY WOMENS HEALTH PO), Take 2 tablets by mouth Daily., Disp: , Rfl:   •  Norethin-Eth Estrad-Fe Biphas (Lo Loestrin Fe) 1 MG-10 MCG / 10 MCG tablet, Take 1 tablet by mouth Daily., Disp: 90 tablet, Rfl: 4  •  Nurtec 75 MG dispersible tablet, Take 1 tablet by mouth Daily As Needed (migraine)., Disp: 16 tablet, Rfl: 11  •  omeprazole (priLOSEC) 40 MG capsule, Take 1 capsule by mouth Daily., Disp: 90 capsule, Rfl: 3  •  ondansetron (ZOFRAN) 8 MG tablet, Take 1 tablet by mouth Every 8 (Eight) Hours As Needed., Disp: , Rfl:   •  prochlorperazine (COMPAZINE) 10 MG tablet, Take 1 tablet by mouth Every 6 (Six) Hours As Needed for Nausea or Vomiting., Disp: 15 tablet, Rfl: 5  •  vitamin D3 (Vitamin D) 125 MCG (5000 UT) capsule capsule, Take 1 capsule by mouth Daily., Disp: 100 capsule, Rfl: 3  •  predniSONE (DELTASONE) 10 MG tablet, Take 6 tabs x 2 days, Take 5 tabs x 2  "days, take 4 tabs x 2 days, take 3 tabs x 2 days, take 2 tabs x 2 days and 1 tab x 2 days and stop, Disp: 42 tablet, Rfl: 0    Current Facility-Administered Medications:   •  cyanocobalamin injection 1,000 mcg, 1,000 mcg, Intramuscular, Q28 Days, Suzie Muir APRN, 1,000 mcg at 10/18/22 0759     Review of Systems   Constitutional: Negative for chills, fatigue, fever and unexpected weight change.   HENT: Negative for ear pain, hearing loss, nosebleeds, rhinorrhea and sore throat.    Eyes: Negative for photophobia, pain, discharge, itching and visual disturbance.   Respiratory: Negative for cough, chest tightness, shortness of breath and wheezing.    Cardiovascular: Negative for chest pain, palpitations and leg swelling.   Gastrointestinal: Negative for abdominal pain, blood in stool, constipation, diarrhea, nausea and vomiting.   Genitourinary: Negative for dysuria, frequency, hematuria and urgency.   Musculoskeletal: Negative for arthralgias, back pain, gait problem, joint swelling, myalgias, neck pain and neck stiffness.   Skin: Negative for rash and wound.   Allergic/Immunologic: Negative for environmental allergies and food allergies.   Neurological: Positive for headaches. Negative for dizziness, tremors, seizures, syncope, speech difficulty, weakness, light-headedness and numbness.   Hematological: Negative for adenopathy. Does not bruise/bleed easily.   Psychiatric/Behavioral: Negative for agitation, confusion, decreased concentration, hallucinations, sleep disturbance and suicidal ideas. The patient is not nervous/anxious.    All other systems reviewed and are negative.       Objective:  /88   Pulse 80   Ht 154.9 cm (61\")   Wt 133 kg (294 lb)   SpO2 97%   BMI 55.55 kg/m²     Neurologic Exam     Mental Status   Oriented to person, place, and time.   Speech: speech is normal   Level of consciousness: alert    Cranial Nerves   Cranial nerves II through XII intact.     Motor Exam   Muscle " bulk: normal  Overall muscle tone: normal    Strength   Strength 5/5 throughout.     Gait, Coordination, and Reflexes     Gait  Gait: normal    Coordination   Finger to nose coordination: normal    Tremor   Resting tremor: absent  Intention tremor: absent  Action tremor: absent    Reflexes   Right : 2+  Left : 2+      Physical Exam  Constitutional:       Appearance: Normal appearance.      Comments: BMI 55.6   Neurological:      Mental Status: She is alert and oriented to person, place, and time.      Cranial Nerves: Cranial nerves 2-12 are intact.      Motor: Motor strength is normal.      Coordination: Finger-Nose-Finger Test normal.      Gait: Gait is intact.   Psychiatric:         Mood and Affect: Mood is depressed. Affect is tearful (just lost her grandmother whom she was very close to).         Speech: Speech normal.         Assessment/Plan:      Diagnoses and all orders for this visit:    1. Migraine with aura and without status migrainosus, not intractable (Primary)  -     predniSONE (DELTASONE) 10 MG tablet; Take 6 tabs x 2 days, Take 5 tabs x 2 days, take 4 tabs x 2 days, take 3 tabs x 2 days, take 2 tabs x 2 days and 1 tab x 2 days and stop  Dispense: 42 tablet; Refill: 0  -     Nurtec 75 MG dispersible tablet; Take 1 tablet by mouth Daily As Needed (migraine).  Dispense: 16 tablet; Refill: 11  -     prochlorperazine (COMPAZINE) 10 MG tablet; Take 1 tablet by mouth Every 6 (Six) Hours As Needed for Nausea or Vomiting.  Dispense: 15 tablet; Refill: 5        She has had a daily tension type headache with intermittent migraine symptoms over the past 6 weeks. Her grandmother just passed away and has been very stressful on her. I have sent in a prednisone taper to stop this daily headache. Generally speaking, before that time, her migraines were 1 to 4 days per month and responding well to the Nurtec ODT and Compazine. I have refilled her medications. She will continue following with specialist. If she  is not doing better with the prednisone taper, she is to contact us for outpatient migraine infusion. Again, my sincere condolences on the loss of her grandmother. She will call us with any additional questions or concerns prior to follow up in clinic in 6 months.    Reviewed medications, potential side effects and signs and symptoms to report. Discussed risk versus benefits of treatment plan with patient and/or family-including medications, labs and radiology that may be ordered. Addressed questions and concerns during visit. Patient and/or family verbalized understanding and agree with plan.    During this visit the following were done:  Labs Reviewed [x]    Labs Ordered []    Radiology Reports Reviewed []    Radiology Ordered []    PCP Records Reviewed [x]    Referring Provider Records Reviewed []    ER Records Reviewed []    Hospital Records Reviewed []    History Obtained From Family []    Radiology Images Reviewed []    Other Reviewed [x] hem/onc notes reviewed   Records Requested []      Transcribed from ambient dictation for SYBIL Macario by Junie Pascal.  04/17/23   10:53 EDT    Patient or patient representative verbalized consent to the visit recording.  I have personally performed the services described in this document as transcribed by the above individual, and it is both accurate and complete.  SYBIL Macario  4/17/2023  12:42 EDT

## 2023-04-17 NOTE — PROGRESS NOTES
"Subjective:     Patient ID: Nury Pineda is a 42 y.o. female.    CC:   Chief Complaint   Patient presents with   • Migraine       HPI:   History of Present Illness   Today 4/17/2023-  This is a pleasant 42-year-old female who presents for 1-year neurology follow-up on migraine variant headaches with episodic complex migraines, intermittent left facial numbness, tingling, and blurred vision. She has had complex migraines intermittently since 2015. 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 My sincere condolences to her and her family. She has taken this quite hard as she was caring for her on a daily basis. Ms. Arrington currently takes Nurtec ODT at onset of migraine along with Compazine or Zofran as needed. She is not on any daily preventive medications at this time. She is here for follow-up and refills on her medications today. Of note, she also follows with Dr. Michelle Eugene, hematology oncology, for iron deficiency anemia.    Today, she reports she is doing \"okay.\" She states she has had a headache constantly for 6 weeks, which she believes is due to stress. She denies taking any medication for her headaches. She denies being diabetic. She states prednisone may have helped in the past. She localizes her headache to the frontal aspect of her head, but occasionally it radiates down the posterior aspect of her head. She confirms she is nauseous. She denies photophobia or phonophobia. She states before the past 6 weeks, her headaches were \"fine.\" She was experiencing headaches approximately 1 time per week. She confirms she still has Nurtec ODT. She has tightness in her neck. She states she takes Compazine when she has nausea. She receives 16 tablets of Nurtec ODT. She confirms she has experienced complex migraines with numbness, tingling, and weakness in her face a couple of times within the past 6 " weeks. She has had symptoms in the left side of her face and in her hands. She reports the headache that has occurred over the past 6 weeks has not been strong enough that she feels she needs an infusion, it just has not resolved. It has been more tension and aching/pressure.    She denies any changes in her health. She reports she was told her iron was borderline low, so she did not have to receive an infusion. She will follow up in 10/2023 or 11/2023.    She confirms she saw rheumatology, who informed her everything was fine. She states they do not know if it was a false positive or not. She notes they did extra blood work, which came back normal.    She denies wearing a CPAP. She had a sleep study completed, which showed no severe sleep apnea.    She denies any new concerns.    She states her grandmother passed away on 03/27/2023. She reports she has chest pain and tightness intermittently from stress. These symptoms do not radiate. They come and go depending on the amount of stress she experiences. She has been currently prescribed hydroxyzine by PCP for the anxiety. She denies any current chest pain. She confirms she has family support.     Previous extensive work-up & history included below:  She has had previous atypical and complex migraines since 2015 which have presented with stroke like symptoms.  It has been challenging to treat her migraines since she has severe difficulty with trouble swallowing and esophageal spasms so she is really not able to swallow most pills.  She cannot crush or open some pills to take.  She cannot swallow pills whole at this time.  Has used Fioricet in the past.  The Fioricet makes her very tired.  She does use Compazine as needed for the nausea and migraines and this does help slightly.  She has previously tried topiramate with intolerance, amitriptyline with sedation and intolerance, sumatriptan with tightening of the throat, aspirin and Cambia.  She has also used rizatriptan  in the past but this caused her to feel like she was having spasms and tightening of her throat and chest as well.  Triptans have been poorly tolerated.  She is really hesitant to start daily preventive medications.  She tries to avoid medications if possible.     Prior imaging studies showed no acute process with MRI of the brain and C-spine as well as MRA of the brain in 2016 and 2015 but there was significant artifact due to her wearing braces.  CTA of the head and neck was normal in 2016. MRI of the brain with and without contrast on 2/3/2021 was a normal MRI with no abnormal contrast-enhancement and no acute intracranial abnormalities.  This was stable compared to January 18, 2019 imaging.    She did have a sleep study remotely and told that she had very mild sleep apnea but has not been treated with a CPAP.      Of note in 12/29/2021, she followed up with [facility] for an JOSÉ MIGUEL with a 1-1280 speckled pattern ratio. They did repeat labs during that visit for autoimmune disease with lupus. It does state in her work up in 06/2021, her work up was negative. The patient reports all the lab work from rheumatology shows elevated levels, however, she was told there is nothing of concern. She denies symptoms.     Complex migraine headaches with intermittent left facial numbness, tingling, and blurred vision. She has had complex migraines since 2015. Currently, she is prescribed Nurtec ODT along with Compazine to use at onset of migraine.    Follows with hem/Onc for iron deficiency anemia-has required IV iron infusions in past.    The following portions of the patient's history were reviewed and updated as appropriate: allergies, current medications, past family history, past medical history, past social history, past surgical history and problem list.    Past Medical History:   Diagnosis Date   • Absolute anemia    • Anemia    • Arthritis     Right knee   • Carpal tunnel syndrome     RIGHT WRIST   • Cholelithiasis      Nausea related to stones has phenergan. Also has related RUQ pain.   • Dizziness    • Elevated C-reactive protein (CRP)    • Elevated erythrocyte sedimentation rate    • Elevated hemoglobin A1c     labs 2022 6.0   • GERD (gastroesophageal reflux disease)    • Glaucoma     negative per pt 2022   • Heart disease    • Influenza    • Iron deficiency     Will not take iron supplement but will take MVI   • Left hip pain    • Migraines    • Numbness and tingling in right hand     Has nerve conductions 3/23/2016, awaiting results. Along with Paresthesia.   • Osteoarthritis    • Pain and swelling of right forearm     Check u/s to r/o clot. Rash and redness are resolving, will continue to monitor.   • Pain of left arm    • Sleep disturbances    • Vitamin D deficiency     25 oh.       Past Surgical History:   Procedure Laterality Date   •  SECTION     • CHOLECYSTECTOMY     • COLONOSCOPY     • CYST REMOVAL      behind belly button   • TONSILLECTOMY     • URACHAL CYST INCISION      History of Excision Of Urachal Cyst.       Social History     Socioeconomic History   • Marital status: Single   Tobacco Use   • Smoking status: Never   • Smokeless tobacco: Never   Vaping Use   • Vaping Use: Never used   Substance and Sexual Activity   • Alcohol use: Yes     Alcohol/week: 2.0 standard drinks     Types: 2 Glasses of wine per week   • Drug use: No   • Sexual activity: Defer     Partners: Male     Birth control/protection: Condom, OCP       Family History   Problem Relation Age of Onset   • Stroke Mother    • Hypertension Mother    • Migraines Mother    • Osteoarthritis Mother    • Cervical cancer Mother    • Heart disease Mother    • Hyperlipidemia Maternal Grandmother    • Osteoarthritis Maternal Grandmother    • Birth defects Maternal Grandmother    • Ovarian cancer Maternal Grandmother         PREMENOPAUSAL    • Transient ischemic attack Maternal Grandmother    • Hypertension Maternal Grandmother     • Stroke Other         CVA x 2. and TIA   • Cancer Other         Cervical, malignant neoplasm x2 , ovarian   • Hypertension Father    • Diabetes Father           Current Outpatient Medications:   •  cyclobenzaprine (FLEXERIL) 5 MG tablet, Take 1 tablet by mouth At Night As Needed for Muscle Spasms., Disp: 30 tablet, Rfl: 0  •  hydrOXYzine (ATARAX) 25 MG tablet, Take 1 tablet by mouth 3 (Three) Times a Day As Needed for Itching or Anxiety., Disp: 30 tablet, Rfl: 0  •  montelukast (SINGULAIR) 10 MG tablet, Take 1 tablet by mouth Every Night., Disp: 90 tablet, Rfl: 0  •  Multiple Vitamins-Minerals (EQ ONE DAILY EchographS bulletn. PO), Take 2 tablets by mouth Daily., Disp: , Rfl:   •  Norethin-Eth Estrad-Fe Biphas (Lo Loestrin Fe) 1 MG-10 MCG / 10 MCG tablet, Take 1 tablet by mouth Daily., Disp: 90 tablet, Rfl: 4  •  Nurtec 75 MG dispersible tablet, Take 1 tablet by mouth Daily As Needed (migraine)., Disp: 16 tablet, Rfl: 11  •  omeprazole (priLOSEC) 40 MG capsule, Take 1 capsule by mouth Daily., Disp: 90 capsule, Rfl: 3  •  ondansetron (ZOFRAN) 8 MG tablet, Take 1 tablet by mouth Every 8 (Eight) Hours As Needed., Disp: , Rfl:   •  prochlorperazine (COMPAZINE) 10 MG tablet, Take 1 tablet by mouth Every 6 (Six) Hours As Needed for Nausea or Vomiting., Disp: 15 tablet, Rfl: 5  •  vitamin D3 (Vitamin D) 125 MCG (5000 UT) capsule capsule, Take 1 capsule by mouth Daily., Disp: 100 capsule, Rfl: 3  •  predniSONE (DELTASONE) 10 MG tablet, Take 6 tabs x 2 days, Take 5 tabs x 2 days, take 4 tabs x 2 days, take 3 tabs x 2 days, take 2 tabs x 2 days and 1 tab x 2 days and stop, Disp: 42 tablet, Rfl: 0    Current Facility-Administered Medications:   •  cyanocobalamin injection 1,000 mcg, 1,000 mcg, Intramuscular, Q28 Days, Suzie Muir APRN, 1,000 mcg at 10/18/22 0759     Review of Systems   Constitutional: Negative for chills, fatigue, fever and unexpected weight change.   HENT: Negative for ear pain, hearing loss,  "nosebleeds, rhinorrhea and sore throat.    Eyes: Negative for photophobia, pain, discharge, itching and visual disturbance.   Respiratory: Negative for cough, chest tightness, shortness of breath and wheezing.    Cardiovascular: Negative for chest pain, palpitations and leg swelling.   Gastrointestinal: Negative for abdominal pain, blood in stool, constipation, diarrhea, nausea and vomiting.   Genitourinary: Negative for dysuria, frequency, hematuria and urgency.   Musculoskeletal: Negative for arthralgias, back pain, gait problem, joint swelling, myalgias, neck pain and neck stiffness.   Skin: Negative for rash and wound.   Allergic/Immunologic: Negative for environmental allergies and food allergies.   Neurological: Positive for headaches. Negative for dizziness, tremors, seizures, syncope, speech difficulty, weakness, light-headedness and numbness.   Hematological: Negative for adenopathy. Does not bruise/bleed easily.   Psychiatric/Behavioral: Negative for agitation, confusion, decreased concentration, hallucinations, sleep disturbance and suicidal ideas. The patient is not nervous/anxious.    All other systems reviewed and are negative.       Objective:  /88   Pulse 80   Ht 154.9 cm (61\")   Wt 133 kg (294 lb)   SpO2 97%   BMI 55.55 kg/m²     Neurologic Exam     Mental Status   Oriented to person, place, and time.   Speech: speech is normal   Level of consciousness: alert    Cranial Nerves   Cranial nerves II through XII intact.     Motor Exam   Muscle bulk: normal  Overall muscle tone: normal    Strength   Strength 5/5 throughout.     Gait, Coordination, and Reflexes     Gait  Gait: normal    Coordination   Finger to nose coordination: normal    Tremor   Resting tremor: absent  Intention tremor: absent  Action tremor: absent    Reflexes   Right : 2+  Left : 2+      Physical Exam  Constitutional:       Appearance: Normal appearance.      Comments: BMI 55.6   Neurological:      Mental Status: " She is alert and oriented to person, place, and time.      Cranial Nerves: Cranial nerves 2-12 are intact.      Motor: Motor strength is normal.      Coordination: Finger-Nose-Finger Test normal.      Gait: Gait is intact.   Psychiatric:         Mood and Affect: Mood is depressed. Affect is tearful (just lost her grandmother whom she was very close to).         Speech: Speech normal.         Assessment/Plan:       Diagnoses and all orders for this visit:    1. Migraine with aura and without status migrainosus, not intractable (Primary)  -     predniSONE (DELTASONE) 10 MG tablet; Take 6 tabs x 2 days, Take 5 tabs x 2 days, take 4 tabs x 2 days, take 3 tabs x 2 days, take 2 tabs x 2 days and 1 tab x 2 days and stop  Dispense: 42 tablet; Refill: 0  -     Nurtec 75 MG dispersible tablet; Take 1 tablet by mouth Daily As Needed (migraine).  Dispense: 16 tablet; Refill: 11  -     prochlorperazine (COMPAZINE) 10 MG tablet; Take 1 tablet by mouth Every 6 (Six) Hours As Needed for Nausea or Vomiting.  Dispense: 15 tablet; Refill: 5         She has had a daily tension type headache with intermittent migraine symptoms over the past 6 weeks. Her grandmother just passed away and has been very stressful on her. I have sent in a prednisone taper to stop this daily headache. Generally speaking, before that time, her migraines were 1 to 4 days per month and responding well to the Nurtec ODT and Compazine. I have refilled her medications. She will continue following with specialist. If she is not doing better with the prednisone taper, she is to contact us for outpatient migraine infusion. Again, my sincere condolences on the loss of her grandmother. She will call us with any additional questions or concerns prior to follow up in clinic in 6 months.    Reviewed medications, potential side effects and signs and symptoms to report. Discussed risk versus benefits of treatment plan with patient and/or family-including medications, labs  and radiology that may be ordered. Addressed questions and concerns during visit. Patient and/or family verbalized understanding and agree with plan.    During this visit the following were done:  Labs Reviewed [x]    Labs Ordered []    Radiology Reports Reviewed []    Radiology Ordered []    PCP Records Reviewed [x]    Referring Provider Records Reviewed []    ER Records Reviewed []    Hospital Records Reviewed []    History Obtained From Family []    Radiology Images Reviewed []    Other Reviewed [x] hem/onc notes reviewed   Records Requested []      Transcribed from ambient dictation for SYBIL Macario by Junie Pascal.  04/17/23   10:53 EDT    Patient or patient representative verbalized consent to the visit recording.  I have personally performed the services described in this document as transcribed by the above individual, and it is both accurate and complete.  SYBIL Macario  4/17/2023  12:42 EDT

## 2023-04-21 ENCOUNTER — PATIENT ROUNDING (BHMG ONLY) (OUTPATIENT)
Dept: NEUROLOGY | Facility: CLINIC | Age: 43
End: 2023-04-21
Payer: COMMERCIAL

## 2023-04-21 NOTE — PROGRESS NOTES
April 21, 2023    Hello, may I speak with Nury Pineda?    My name is bradley      I am  with [facility]  [address]  [place] [address] [phone].    Before we get started may I verify your date of birth? 1980    I am calling to officially welcome you to our practice and ask about your recent visit. Is this a good time to talk? Patient rounding sent through [employer].

## 2023-07-26 ENCOUNTER — OFFICE VISIT (OUTPATIENT)
Dept: INTERNAL MEDICINE | Facility: CLINIC | Age: 43
End: 2023-07-26
Payer: COMMERCIAL

## 2023-07-26 VITALS
BODY MASS INDEX: 55.32 KG/M2 | TEMPERATURE: 97.8 F | SYSTOLIC BLOOD PRESSURE: 122 MMHG | HEART RATE: 79 BPM | DIASTOLIC BLOOD PRESSURE: 86 MMHG | WEIGHT: 293 LBS | OXYGEN SATURATION: 99 % | HEIGHT: 61 IN

## 2023-07-26 DIAGNOSIS — D50.8 OTHER IRON DEFICIENCY ANEMIA: Primary | ICD-10-CM

## 2023-07-26 DIAGNOSIS — K21.9 GASTROESOPHAGEAL REFLUX DISEASE WITHOUT ESOPHAGITIS: ICD-10-CM

## 2023-07-26 DIAGNOSIS — E66.01 MORBID OBESITY WITH BMI OF 50.0-59.9, ADULT: ICD-10-CM

## 2023-07-26 PROCEDURE — 1159F MED LIST DOCD IN RCRD: CPT | Performed by: INTERNAL MEDICINE

## 2023-07-26 PROCEDURE — 99214 OFFICE O/P EST MOD 30 MIN: CPT | Performed by: INTERNAL MEDICINE

## 2023-07-26 PROCEDURE — 1160F RVW MEDS BY RX/DR IN RCRD: CPT | Performed by: INTERNAL MEDICINE

## 2023-07-26 RX ORDER — OMEPRAZOLE 40 MG/1
40 CAPSULE, DELAYED RELEASE ORAL DAILY
Qty: 90 CAPSULE | Refills: 3 | Status: SHIPPED | OUTPATIENT
Start: 2023-07-26

## 2023-07-26 NOTE — PROGRESS NOTES
"Subjective   Nury Pineda is a 42 y.o. female.     History of Present Illness   F/u on weright. Used phentermine x month.  Iron def anemia is controlled with iron replacement. Has not need iron infusion in few years.  GERD that is controlled with Prilosec.   The following portions of the patient's history were reviewed and updated as appropriate: allergies, current medications, past family history, past medical history, past social history, past surgical history, and problem list.    Review of Systems   Constitutional:  Negative for fatigue and fever.   Respiratory:  Negative for cough and shortness of breath.    Cardiovascular:  Negative for chest pain and leg swelling.   Gastrointestinal:  Negative for constipation, diarrhea and vomiting.   Psychiatric/Behavioral:  Negative for dysphoric mood. The patient is not nervous/anxious.    /86   Pulse 79   Temp 97.8 °F (36.6 °C)   Ht 154.9 cm (60.98\")   Wt 134 kg (296 lb)   LMP 06/08/2023 (Exact Date)   SpO2 99%   BMI 55.97 kg/m²     Objective   Physical Exam  Vitals reviewed.   Constitutional:       Comments: Morbid obsese   Cardiovascular:      Rate and Rhythm: Normal rate.   Pulmonary:      Effort: No respiratory distress.      Breath sounds: No wheezing.   Neurological:      Mental Status: She is alert.   Psychiatric:         Behavior: Behavior normal.       Assessment & Plan   Diagnoses and all orders for this visit:    1. Other iron deficiency anemia (Primary)  -     CBC & Differential; Future  EGD and colonoscopy up to date. Related to iron malabsorption.   2. Morbid obesity with BMI of 50.0-59.9, adult   Will start month 2/2 of phentermine in few weeks.   3. Gastroesophageal reflux disease without esophagitis  -     omeprazole (priLOSEC) 40 MG capsule; Take 1 capsule by mouth Daily.  Dispense: 90 capsule; Refill: 3                 "

## 2023-09-28 ENCOUNTER — TELEPHONE (OUTPATIENT)
Dept: NEUROLOGY | Facility: CLINIC | Age: 43
End: 2023-09-28
Payer: COMMERCIAL

## 2023-09-28 NOTE — TELEPHONE ENCOUNTER
Text patient to move her follow up to 11/17/2023 at 9:30.  Please transfer to office if patient calls.

## 2023-10-11 ENCOUNTER — OFFICE VISIT (OUTPATIENT)
Dept: INTERNAL MEDICINE | Facility: CLINIC | Age: 43
End: 2023-10-11
Payer: COMMERCIAL

## 2023-10-11 VITALS
HEART RATE: 81 BPM | BODY MASS INDEX: 55.13 KG/M2 | HEIGHT: 61 IN | SYSTOLIC BLOOD PRESSURE: 118 MMHG | TEMPERATURE: 96.3 F | OXYGEN SATURATION: 100 % | WEIGHT: 292 LBS | DIASTOLIC BLOOD PRESSURE: 74 MMHG

## 2023-10-11 DIAGNOSIS — R09.81 NASAL CONGESTION: ICD-10-CM

## 2023-10-11 DIAGNOSIS — J02.9 SORE THROAT: ICD-10-CM

## 2023-10-11 DIAGNOSIS — J06.9 UPPER RESPIRATORY TRACT INFECTION, UNSPECIFIED TYPE: Primary | ICD-10-CM

## 2023-10-11 DIAGNOSIS — R05.9 COUGH IN ADULT: ICD-10-CM

## 2023-10-11 LAB
EXPIRATION DATE: NORMAL
EXPIRATION DATE: NORMAL
FLUAV AG UPPER RESP QL IA.RAPID: NOT DETECTED
FLUBV AG UPPER RESP QL IA.RAPID: NOT DETECTED
INTERNAL CONTROL: NORMAL
INTERNAL CONTROL: NORMAL
Lab: NORMAL
Lab: NORMAL
S PYO AG THROAT QL: NEGATIVE
SARS-COV-2 AG UPPER RESP QL IA.RAPID: NOT DETECTED

## 2023-10-11 RX ORDER — DOXYCYCLINE HYCLATE 100 MG/1
100 CAPSULE ORAL 2 TIMES DAILY
Qty: 10 CAPSULE | Refills: 0 | Status: SHIPPED | OUTPATIENT
Start: 2023-10-11 | End: 2023-10-16

## 2023-10-11 RX ORDER — DEXTROMETHORPHAN HYDROBROMIDE AND PROMETHAZINE HYDROCHLORIDE 15; 6.25 MG/5ML; MG/5ML
5 SYRUP ORAL 2 TIMES DAILY PRN
Qty: 180 ML | Refills: 0 | Status: SHIPPED | OUTPATIENT
Start: 2023-10-11

## 2023-10-11 RX ORDER — CEFTRIAXONE 1 G/1
1 INJECTION, POWDER, FOR SOLUTION INTRAMUSCULAR; INTRAVENOUS ONCE
Status: COMPLETED | OUTPATIENT
Start: 2023-10-11 | End: 2023-10-11

## 2023-10-11 RX ADMIN — CEFTRIAXONE 1 G: 1 INJECTION, POWDER, FOR SOLUTION INTRAMUSCULAR; INTRAVENOUS at 09:44

## 2023-10-11 NOTE — PROGRESS NOTES
Office Note     Name: Nury Pineda    : 1980     MRN: [id number]     Chief Complaint  Cough (Sx first started last Thursday. Went to [facility] last Friday. She received a steroid injection and cough symptoms. Sx came back on . ), Nasal Congestion, Headache, and Sore Throat (Only hurts on right side/)    Subjective     History of Present Illness:  Nury Pineda is a 43 y.o. female who presents today for upper respiratory complaints.  Patient reports onset of symptoms was last Thursday.  She did go to [facility] on Friday 10/6.  She was treated with a steroid injection and prednisone taper.  She was instructed not to start the prednisone taper for 3 days after the injection.  She reports on Saturday she felt better but then developed a cough.  She reports her other symptoms returned on .  Today she complains of a sore throat mainly to the right side of her throat.  It is painful to swallow.  She can also pinpoint the area of tenderness.  She has had a tonsillectomy in the past.  She also complains of right ear pain.  Along with these complaints she has been experiencing a headache, productive cough, nasal congestion, and fatigue.  She denies any known fever.  She does report her cough is productive with a thick, yellow mucus.  She has been taking the medications prescribed to urgent care.  She does not feel the Bromfed-DM has been effective in controlling her cough.  She is also taking ibuprofen as needed.  She continues to have ongoing symptoms.  He is concerned as she normally develops bronchitis.  COVID and flu were negative at urgent care last week.  She follows with Dr. Lobato for chronic conditions.  She did have an appointment earlier today with Dr. Lobato but was unable to make on time as she just came from work this morning.  She denies further complaints or concerns at this time.    Review of Systems   Constitutional:  Positive for fatigue. Negative for chills  and fever.   HENT:  Positive for congestion, ear pain, sinus pressure and sore throat. Negative for postnasal drip, rhinorrhea and sinus pain.    Respiratory:  Positive for cough. Negative for shortness of breath.    Cardiovascular: Negative.    Neurological:  Positive for headaches.          Past Medical History:   Diagnosis Date    Absolute anemia     Anemia     Arthritis     Right knee    Carpal tunnel syndrome     RIGHT WRIST    Cholelithiasis     Nausea related to stones has phenergan. Also has related RUQ pain.    Dizziness     Elevated C-reactive protein (CRP)     Elevated erythrocyte sedimentation rate     Elevated hemoglobin A1c     labs 2022 6.0    GERD (gastroesophageal reflux disease)     Glaucoma     negative per pt 2022    Heart disease     Influenza     Iron deficiency     Will not take iron supplement but will take MVI    Left hip pain     Migraines     Numbness and tingling in right hand     Has nerve conductions 3/23/2016, awaiting results. Along with Paresthesia.    Osteoarthritis     Pain and swelling of right forearm     Check u/s to r/o clot. Rash and redness are resolving, will continue to monitor.    Pain of left arm     Sleep disturbances     Vitamin D deficiency     25 oh.       Past Surgical History:   Procedure Laterality Date     SECTION  2006    CHOLECYSTECTOMY      COLONOSCOPY  2016    CYST REMOVAL  2011    behind belly button    TONSILLECTOMY  2008    URACHAL CYST INCISION      History of Excision Of Urachal Cyst.       Social History     Socioeconomic History    Marital status: Single   Tobacco Use    Smoking status: Never    Smokeless tobacco: Never   Vaping Use    Vaping Use: Never used   Substance and Sexual Activity    Alcohol use: Yes     Alcohol/week: 2.0 standard drinks of alcohol     Types: 2 Glasses of wine per week    Drug use: No    Sexual activity: Defer     Partners: Male     Birth control/protection: Condom, OCP         Current Outpatient Medications:      brompheniramine-pseudoephedrine-DM 30-2-10 MG/5ML syrup, Take 5 mL by mouth 4 (Four) Times a Day As Needed for Cough for up to 5 days., Disp: 240 mL, Rfl: 0    cyclobenzaprine (FLEXERIL) 5 MG tablet, Take 1 tablet by mouth At Night As Needed for Muscle Spasms., Disp: 30 tablet, Rfl: 0    hydrOXYzine (ATARAX) 25 MG tablet, Take 1 tablet by mouth 3 (Three) Times a Day As Needed for Itching or Anxiety., Disp: 30 tablet, Rfl: 0    methylPREDNISolone (MEDROL) 4 MG dose pack, Take as directed on package instructions., Disp: 21 tablet, Rfl: 0    montelukast (SINGULAIR) 10 MG tablet, Take 1 tablet by mouth Every Night., Disp: 90 tablet, Rfl: 0    Multiple Vitamins-Minerals (EQ ONE DAILY [employer] PO), Take 2 tablets by mouth Daily., Disp: , Rfl:     Norethin-Eth Estrad-Fe Biphas (Lo Loestrin Fe) 1 MG-10 MCG / 10 MCG tablet, Take 1 tablet by mouth Daily., Disp: 90 tablet, Rfl: 4    Nurtec 75 MG dispersible tablet, Take 1 tablet by mouth Daily As Needed (migraine)., Disp: 16 tablet, Rfl: 11    omeprazole (priLOSEC) 40 MG capsule, Take 1 capsule by mouth Daily., Disp: 90 capsule, Rfl: 3    ondansetron (ZOFRAN) 8 MG tablet, Take 1 tablet by mouth Every 8 (Eight) Hours As Needed., Disp: , Rfl:     phentermine (ADIPEX-P) 37.5 MG tablet, Take 1 tablet by mouth Every Morning Before Breakfast., Disp: 30 tablet, Rfl: 0    prochlorperazine (COMPAZINE) 10 MG tablet, Take 1 tablet by mouth Every 6 (Six) Hours As Needed for Nausea or Vomiting., Disp: 15 tablet, Rfl: 5    doxycycline (VIBRAMYCIN) 100 MG capsule, Take 1 capsule by mouth 2 (Two) Times a Day for 5 days., Disp: 10 capsule, Rfl: 0    promethazine-dextromethorphan (PROMETHAZINE-DM) 6.25-15 MG/5ML syrup, Take 5 mL by mouth 2 (Two) Times a Day As Needed for Cough., Disp: 180 mL, Rfl: 0    Current Facility-Administered Medications:     cyanocobalamin injection 1,000 mcg, 1,000 mcg, Intramuscular, Q28 Days, Suzie Muir APRN, 1,000 mcg at 10/18/22 0759    Objective  "    Vital Signs  /74   Pulse 81   Temp 96.3 øF (35.7 øC)   Ht 154.9 cm (60.98\")   Wt 132 kg (292 lb)   SpO2 100%   BMI 55.20 kg/mý   Estimated body mass index is 55.2 kg/mý as calculated from the following:    Height as of this encounter: 154.9 cm (60.98\").    Weight as of this encounter: 132 kg (292 lb).           Physical Exam  Constitutional:       General: She is not in acute distress.     Appearance: Normal appearance. She is not ill-appearing.   HENT:      Head: Normocephalic and atraumatic.      Right Ear: Tympanic membrane, ear canal and external ear normal.      Left Ear: Tympanic membrane, ear canal and external ear normal.      Nose: Nose normal.      Right Sinus: Frontal sinus tenderness present.      Left Sinus: Frontal sinus tenderness present.      Mouth/Throat:      Mouth: Mucous membranes are moist.      Pharynx: Oropharynx is clear. Posterior oropharyngeal erythema present. No oropharyngeal exudate.   Eyes:      Extraocular Movements: Extraocular movements intact.      Conjunctiva/sclera: Conjunctivae normal.      Pupils: Pupils are equal, round, and reactive to light.   Cardiovascular:      Rate and Rhythm: Normal rate and regular rhythm.      Heart sounds: Normal heart sounds.   Pulmonary:      Effort: Pulmonary effort is normal. No respiratory distress.      Breath sounds: Normal breath sounds. No wheezing or rhonchi.      Comments: Breath sounds diminished in all lobes bilaterally  Musculoskeletal:         General: Normal range of motion.      Cervical back: Neck supple.   Skin:     General: Skin is warm and dry.   Neurological:      General: No focal deficit present.      Mental Status: She is alert and oriented to person, place, and time. Mental status is at baseline.   Psychiatric:         Mood and Affect: Mood normal.         Behavior: Behavior normal.         Thought Content: Thought content normal.         Judgment: Judgment normal.          Assessment and Plan     Diagnoses " and all orders for this visit:    1. Upper respiratory tract infection, unspecified type (Primary)  -     cefTRIAXone (ROCEPHIN) injection 1 g  -     doxycycline (VIBRAMYCIN) 100 MG capsule; Take 1 capsule by mouth 2 (Two) Times a Day for 5 days.  Dispense: 10 capsule; Refill: 0    2. Cough in adult  -     POCT SARS-CoV-2 Antigen CLARICE + Flu  -     promethazine-dextromethorphan (PROMETHAZINE-DM) 6.25-15 MG/5ML syrup; Take 5 mL by mouth 2 (Two) Times a Day As Needed for Cough.  Dispense: 180 mL; Refill: 0    3. Nasal congestion  -     POCT SARS-CoV-2 Antigen CLARICE + Flu    4. Sore throat  -     POCT rapid strep A  -     POCT SARS-CoV-2 Antigen CLARICE + Flu    Plan:  Rapid strep swab in the office today negative.  COVID and flu swab in the office today negative.  Continue with prednisone taper prescribed by urgent care.  Patient reports difficulty swallowing pills.  She would like to have an antibiotic injection in the office today.  Rocephin 1 g IM x1 in the office today.  Promethazine DM sent to the pharmacy on file to use as needed for cough.  If symptoms do not improve, patient may  doxycycline to take twice daily for 5 days.  Continue with adequate oral hydration and rest.  May continue to take ibuprofen as needed.  Return to clinic if symptoms worsen or fail to improve with current plan of care.  Keep scheduled follow-up appointment with Dr. Lobato.    Follow Up  Return if symptoms worsen or fail to improve, for Follow up with Dr. Lobato.    SYBIL Pichardo    Part of this note may be an electronic transcription/translation of spoken language to printed text using the Dragon Dictation System.

## 2023-11-13 ENCOUNTER — OFFICE VISIT (OUTPATIENT)
Dept: ONCOLOGY | Facility: CLINIC | Age: 43
End: 2023-11-13
Payer: COMMERCIAL

## 2023-11-13 ENCOUNTER — LAB (OUTPATIENT)
Dept: LAB | Facility: HOSPITAL | Age: 43
End: 2023-11-13
Payer: COMMERCIAL

## 2023-11-13 VITALS
TEMPERATURE: 97.1 F | DIASTOLIC BLOOD PRESSURE: 79 MMHG | HEIGHT: 61 IN | WEIGHT: 293 LBS | HEART RATE: 75 BPM | RESPIRATION RATE: 18 BRPM | OXYGEN SATURATION: 100 % | SYSTOLIC BLOOD PRESSURE: 137 MMHG | BODY MASS INDEX: 55.32 KG/M2

## 2023-11-13 DIAGNOSIS — D50.0 IRON DEFICIENCY ANEMIA DUE TO CHRONIC BLOOD LOSS: ICD-10-CM

## 2023-11-13 DIAGNOSIS — E61.1 IRON DEFICIENCY: Primary | ICD-10-CM

## 2023-11-13 LAB
BASOPHILS # BLD AUTO: 0.03 10*3/MM3 (ref 0–0.2)
BASOPHILS NFR BLD AUTO: 0.3 % (ref 0–1.5)
DEPRECATED RDW RBC AUTO: 50.3 FL (ref 37–54)
EOSINOPHIL # BLD AUTO: 0.11 10*3/MM3 (ref 0–0.4)
EOSINOPHIL NFR BLD AUTO: 1.2 % (ref 0.3–6.2)
ERYTHROCYTE [DISTWIDTH] IN BLOOD BY AUTOMATED COUNT: 16.8 % (ref 12.3–15.4)
FERRITIN SERPL-MCNC: 294.9 NG/ML (ref 13–150)
HCT VFR BLD AUTO: 35.6 % (ref 34–46.6)
HGB BLD-MCNC: 11 G/DL (ref 12–15.9)
IMM GRANULOCYTES # BLD AUTO: 0.01 10*3/MM3 (ref 0–0.05)
IMM GRANULOCYTES NFR BLD AUTO: 0.1 % (ref 0–0.5)
IRON 24H UR-MRATE: 33 MCG/DL (ref 37–145)
IRON SATN MFR SERPL: 10 % (ref 20–50)
LYMPHOCYTES # BLD AUTO: 3.16 10*3/MM3 (ref 0.7–3.1)
LYMPHOCYTES NFR BLD AUTO: 34.6 % (ref 19.6–45.3)
MCH RBC QN AUTO: 25.2 PG (ref 26.6–33)
MCHC RBC AUTO-ENTMCNC: 30.9 G/DL (ref 31.5–35.7)
MCV RBC AUTO: 81.5 FL (ref 79–97)
MONOCYTES # BLD AUTO: 0.76 10*3/MM3 (ref 0.1–0.9)
MONOCYTES NFR BLD AUTO: 8.3 % (ref 5–12)
NEUTROPHILS NFR BLD AUTO: 5.07 10*3/MM3 (ref 1.7–7)
NEUTROPHILS NFR BLD AUTO: 55.5 % (ref 42.7–76)
PLATELET # BLD AUTO: 331 10*3/MM3 (ref 140–450)
PMV BLD AUTO: 10 FL (ref 6–12)
RBC # BLD AUTO: 4.37 10*6/MM3 (ref 3.77–5.28)
TIBC SERPL-MCNC: 317 MCG/DL (ref 298–536)
TRANSFERRIN SERPL-MCNC: 213 MG/DL (ref 200–360)
WBC NRBC COR # BLD: 9.14 10*3/MM3 (ref 3.4–10.8)

## 2023-11-13 PROCEDURE — 82728 ASSAY OF FERRITIN: CPT

## 2023-11-13 PROCEDURE — 36415 COLL VENOUS BLD VENIPUNCTURE: CPT

## 2023-11-13 PROCEDURE — 85025 COMPLETE CBC W/AUTO DIFF WBC: CPT

## 2023-11-13 PROCEDURE — 84466 ASSAY OF TRANSFERRIN: CPT

## 2023-11-13 PROCEDURE — 83540 ASSAY OF IRON: CPT

## 2023-11-13 NOTE — PROGRESS NOTES
DATE OF VISIT: 2023    REASON FOR VISIT: Followup for iron deficiency anemia     PROBLEM LIST:  1. Anemia  2.  Microcytosis  3.  Iron deficiency:  A.  EGD done 2020 revealed erosive esophagitis  4.  Positive JOSÉ MIGUEL with inflammation markers  A. Followed by rheumatology at     HISTORY OF PRESENT ILLNESS: The patient is a very pleasant 43 y.o. female  with past medical history significant for iron deficiency anemia diagnosed 2020.  The patient has been through with IV iron replacement. The patient is here today for scheduled follow-up visit.    SUBJECTIVE: The patient is here today by herself. She has been doing fair. She lost her grandmother earlier this year and has been dealing with some emotional distress as a result. She has gained some weight from eating more for comfort. She is having regular periods lasting 3-4 days in duration. She denies craving ice. She has occasional dizziness and constant fatigue. She has been on antibiotics and steroids recently for sore throat. She has still had some residual sore throat episodes over the last 4 weeks.      Past History:  Medical History: has a past medical history of Absolute anemia, Anemia, Arthritis, Carpal tunnel syndrome, Cholelithiasis, Dizziness, Elevated C-reactive protein (CRP), Elevated erythrocyte sedimentation rate, Elevated hemoglobin A1c, GERD (gastroesophageal reflux disease), Glaucoma, Heart disease, Influenza, Iron deficiency, Left hip pain, Migraines, Numbness and tingling in right hand, Osteoarthritis, Pain and swelling of right forearm, Pain of left arm, Sleep disturbances, and Vitamin D deficiency.   Surgical History: has a past surgical history that includes Tonsillectomy (); Urachal cyst incision;  section (); Cholecystectomy (); Cyst Removal (); and Colonoscopy ().   Family History: family history includes Birth defects in her maternal grandmother; Cancer in an other family member; Cervical cancer in her  "mother; Diabetes in her father; Heart disease in her mother; Hyperlipidemia in her maternal grandmother; Hypertension in her father, maternal grandmother, and mother; Migraines in her mother; Osteoarthritis in her maternal grandmother and mother; Ovarian cancer in her maternal grandmother; Stroke in her mother and another family member; Transient ischemic attack in her maternal grandmother.   Social History: reports that she has never smoked. She has never used smokeless tobacco. She reports current alcohol use of about 2.0 standard drinks of alcohol per week. She reports that she does not use drugs.    (Not in a hospital admission)     Allergies: Triptans, Amoxicillin, and Penicillins     Review of Systems   Constitutional:  Positive for fatigue.   HENT:  Positive for sore throat.    Musculoskeletal:  Positive for arthralgias.       PHYSICAL EXAMINATION:   /79   Pulse 75   Temp 97.1 °F (36.2 °C) (Infrared)   Resp 18   Ht 154.9 cm (60.98\")   Wt 133 kg (294 lb)   SpO2 100%   BMI 55.58 kg/m²    Pain Score    11/13/23 1543   PainSc: 0-No pain       ECOG score: 0        ECOG Performance Status: 0 - Asymptomatic      General Appearance:      alert, cooperative, no apparent distress and appears stated age   Lungs:   Clear to auscultation bilaterally; respirations regular, even, and unlabored bilaterally   Heart:  Regular rate and rhythm, no murmurs appreciated   Abdomen:   Soft, non-tender, non-distended, and no organomegaly                 Lab on 11/13/2023   Component Date Value Ref Range Status    WBC 11/13/2023 9.14  3.40 - 10.80 10*3/mm3 Final    RBC 11/13/2023 4.37  3.77 - 5.28 10*6/mm3 Final    Hemoglobin 11/13/2023 11.0 (L)  12.0 - 15.9 g/dL Final    Hematocrit 11/13/2023 35.6  34.0 - 46.6 % Final    MCV 11/13/2023 81.5  79.0 - 97.0 fL Final    MCH 11/13/2023 25.2 (L)  26.6 - 33.0 pg Final    MCHC 11/13/2023 30.9 (L)  31.5 - 35.7 g/dL Final    RDW 11/13/2023 16.8 (H)  12.3 - 15.4 % Final    RDW-SD " 11/13/2023 50.3  37.0 - 54.0 fl Final    MPV 11/13/2023 10.0  6.0 - 12.0 fL Final    Platelets 11/13/2023 331  140 - 450 10*3/mm3 Final    Neutrophil % 11/13/2023 55.5  42.7 - 76.0 % Final    Lymphocyte % 11/13/2023 34.6  19.6 - 45.3 % Final    Monocyte % 11/13/2023 8.3  5.0 - 12.0 % Final    Eosinophil % 11/13/2023 1.2  0.3 - 6.2 % Final    Basophil % 11/13/2023 0.3  0.0 - 1.5 % Final    Immature Grans % 11/13/2023 0.1  0.0 - 0.5 % Final    Neutrophils, Absolute 11/13/2023 5.07  1.70 - 7.00 10*3/mm3 Final    Lymphocytes, Absolute 11/13/2023 3.16 (H)  0.70 - 3.10 10*3/mm3 Final    Monocytes, Absolute 11/13/2023 0.76  0.10 - 0.90 10*3/mm3 Final    Eosinophils, Absolute 11/13/2023 0.11  0.00 - 0.40 10*3/mm3 Final    Basophils, Absolute 11/13/2023 0.03  0.00 - 0.20 10*3/mm3 Final    Immature Grans, Absolute 11/13/2023 0.01  0.00 - 0.05 10*3/mm3 Final        No results found.    ASSESSMENT: The patient is a very pleasant 43 y.o. female  with anemia      PLAN:    1.  Anemia:  MARY  I did go over the CBC result from today. I told her that her hemoglobin is down to 11 with MCV of 81.5 and normal WBC and platelet count.     2.  History of iron deficiency:  MIKAYLA.  I will follow up on the iron studies from today and notify her of results. We will arrange for repeat IV iron replacement in the future if she has ferritin less than 30 or saturation less than 10%.     3. Sore throat:  MARY I recommended she follow up with her PCP if she has recurrent symptoms. She may need referral to ENT if she continues to have sore throat episodes. Her strep, flu, and COVID testing done 10/11/2023 were all negative.     4. Positive JOSÉ MIGUEL:  A. She has been seen by rheumatology at  with labs that did not confirm a specific autoimmune process. She will follow up with them in 1 year.     FOLLOW UP: 6 months with CBC and iron profile    Dian Peck, APRN  11/13/2023

## 2023-11-14 ENCOUNTER — TELEPHONE (OUTPATIENT)
Dept: ONCOLOGY | Facility: CLINIC | Age: 43
End: 2023-11-14

## 2023-11-14 DIAGNOSIS — D50.0 IRON DEFICIENCY ANEMIA DUE TO CHRONIC BLOOD LOSS: ICD-10-CM

## 2023-11-14 DIAGNOSIS — K90.9 IRON MALABSORPTION: ICD-10-CM

## 2023-11-14 DIAGNOSIS — E61.1 IRON DEFICIENCY: Primary | ICD-10-CM

## 2023-11-14 RX ORDER — FAMOTIDINE 10 MG/ML
20 INJECTION, SOLUTION INTRAVENOUS ONCE
OUTPATIENT
Start: 2023-11-28

## 2023-11-14 RX ORDER — SODIUM CHLORIDE 9 MG/ML
20 INJECTION, SOLUTION INTRAVENOUS ONCE
Status: CANCELLED | OUTPATIENT
Start: 2023-11-21

## 2023-11-14 RX ORDER — FAMOTIDINE 10 MG/ML
20 INJECTION, SOLUTION INTRAVENOUS ONCE
Status: CANCELLED | OUTPATIENT
Start: 2023-11-21

## 2023-11-14 RX ORDER — DIPHENHYDRAMINE HCL 25 MG
25 CAPSULE ORAL ONCE
Status: CANCELLED | OUTPATIENT
Start: 2023-11-21

## 2023-11-14 RX ORDER — SODIUM CHLORIDE 9 MG/ML
20 INJECTION, SOLUTION INTRAVENOUS ONCE
OUTPATIENT
Start: 2023-11-28

## 2023-11-14 RX ORDER — DIPHENHYDRAMINE HCL 25 MG
25 CAPSULE ORAL ONCE
OUTPATIENT
Start: 2023-11-28

## 2023-11-14 NOTE — TELEPHONE ENCOUNTER
RN attempted to call patient, no answer. LVM communicating that she will need IV iron based off her lab results. Let her know that she received injectafer in the past, but her insurance no longer covers that - so she will have to receive feraheme or venofer. Advised we will attempt to do feraheme since that one is only 2 doses instead of 5-6. Advised to call with any questions.

## 2023-11-17 ENCOUNTER — OFFICE VISIT (OUTPATIENT)
Dept: NEUROLOGY | Facility: CLINIC | Age: 43
End: 2023-11-17
Payer: COMMERCIAL

## 2023-11-17 VITALS
DIASTOLIC BLOOD PRESSURE: 80 MMHG | WEIGHT: 293 LBS | HEART RATE: 81 BPM | SYSTOLIC BLOOD PRESSURE: 122 MMHG | OXYGEN SATURATION: 99 % | HEIGHT: 61 IN | BODY MASS INDEX: 55.32 KG/M2

## 2023-11-17 DIAGNOSIS — G43.809 MIGRAINE VARIANT WITH HEADACHE: Primary | Chronic | ICD-10-CM

## 2023-11-17 PROCEDURE — 1159F MED LIST DOCD IN RCRD: CPT | Performed by: NURSE PRACTITIONER

## 2023-11-17 PROCEDURE — 99213 OFFICE O/P EST LOW 20 MIN: CPT | Performed by: NURSE PRACTITIONER

## 2023-11-17 PROCEDURE — 1160F RVW MEDS BY RX/DR IN RCRD: CPT | Performed by: NURSE PRACTITIONER

## 2023-11-17 NOTE — PROGRESS NOTES
"Subjective:     Patient ID: Nury Pineda is a 43 y.o. female.    CC:   Chief Complaint   Patient presents with    Migraine       HPI:   History of Present Illness  Today 11/17/2023-    This is a pleasant 43-year-old female who presents for 6-month neurology follow-up on migraine variant headaches with episodic complex migraines coming on with intermittent left facial numbness, tingling, and blurred vision. She has had complex migraines intermittently since 2015. I last saw her in the clinic on 04/17/2023. She is currently taking Nurtec as needed at onset of migraine along with Compazine. I prescribed her a prednisone taper at her last visit, and she had been under a lot of stress with losing her grandmother, whom she was the primary caregiver for. She does follow with hematology oncology for iron deficiency anemia.     Her most recent labs performed on 11/13/2023, showed ferritin level 294.9, an iron profile that showed iron at 33 mcg/dL, iron saturation 10 percent, transferrin 213 mg/dL, TIBC 317 mcg/dL, CBC with hemoglobin 11.0, which has improved.      Today, she reports her headaches have been \"small.\" She states she has not had a migraine, but she has a small lingering headache, which she believes is due to her low iron. She reports she is scheduled to receive an iron infusion on Monday, 11/20/2023.     She states she will occasionally experience tingling, but the headache has not been present with the complex migraines. She confirms she is no longer taking phentermine. She states she has not had to take the Nurtec or Compazine. She believes she has enough of the Nurtec and will call the office if she needs a refill.     She reports she had to see Dr. Lobato, her primary care provider, because she became sick. She states she was tested for COVID-19, influenza, and bronchitis. She states her throat still does \"something random\", and when it does, she takes a steroid, as it is causing her lymph nodes to be " "inflamed. She confirms that it is just on the right side and at first, her neck was sore to palpation. She notes that her Hematology provider suggested seeing an ENT if it continues.     She had previously seen a rheumatologist but was told that she was \"fine\" and no other follow-up appointments were scheduled.     Previous extensive work-up & history included below:  She has had previous atypical and complex migraines since 2015 which have presented with stroke like symptoms.  It has been challenging to treat her migraines since she has severe difficulty with trouble swallowing and esophageal spasms so she is really not able to swallow most pills.  She cannot crush or open some pills to take.  She cannot swallow pills whole at this time.  Has used Fioricet in the past.  The Fioricet makes her very tired.  She does use Compazine as needed for the nausea and migraines and this does help slightly.  She has previously tried topiramate with intolerance, amitriptyline with sedation and intolerance, sumatriptan with tightening of the throat, aspirin and Cambia.  She has also used rizatriptan in the past but this caused her to feel like she was having spasms and tightening of her throat and chest as well.  Triptans have been poorly tolerated.  She is really hesitant to start daily preventive medications.  She tries to avoid medications if possible.      Prior imaging studies showed no acute process with MRI of the brain and C-spine as well as MRA of the brain in 2016 and 2015 but there was significant artifact due to her wearing braces.  CTA of the head and neck was normal in 2016. MRI of the brain with and without contrast on 2/3/2021 was a normal MRI with no abnormal contrast-enhancement and no acute intracranial abnormalities.  This was stable compared to January 18, 2019 imaging.     She did have a sleep study remotely and told that she had very mild sleep apnea but has not been treated with a CPAP.       Of note in " 12/29/2021, she followed up with [facility] for an JOSÉ MIGUEL with a 1-1280 speckled pattern ratio. They did repeat labs during that visit for autoimmune disease with lupus. It does state in her work up in 06/2021, her work up was negative. The patient reports all the lab work from rheumatology shows elevated levels, however, she was told there is nothing of concern. She denies symptoms.      Complex migraine headaches with intermittent left facial numbness, tingling, and blurred vision. She has had complex migraines since 2015. Currently, she is prescribed Nurtec ODT along with Compazine to use at onset of migraine.     Follows with hem/Onc for iron deficiency anemia-has required IV iron infusions.    Saw Rheumatology with negative workup.     She denies wearing a CPAP. She had a sleep study completed, which showed no severe sleep apnea.    The following portions of the patient's history were reviewed and updated as appropriate: allergies, current medications, past family history, past medical history, past social history, past surgical history, and problem list.    Past Medical History:   Diagnosis Date    Absolute anemia     Anemia     Arthritis     Right knee    Carpal tunnel syndrome     RIGHT WRIST    Cholelithiasis     Nausea related to stones has phenergan. Also has related RUQ pain.    Dizziness     Elevated C-reactive protein (CRP)     Elevated erythrocyte sedimentation rate     Elevated hemoglobin A1c     labs 7/2022 6.0    GERD (gastroesophageal reflux disease)     Glaucoma     negative per pt 7/2022    Heart disease     Influenza     Iron deficiency     Will not take iron supplement but will take MVI    Left hip pain     Migraines     Numbness and tingling in right hand     Has nerve conductions 3/23/2016, awaiting results. Along with Paresthesia.    Osteoarthritis     Pain and swelling of right forearm     Check u/s to r/o clot. Rash and redness are resolving, will continue to monitor.    Pain of left  arm     Sleep disturbances     Vitamin D deficiency     25 oh.       Past Surgical History:   Procedure Laterality Date     SECTION  2006    CHOLECYSTECTOMY  2014    COLONOSCOPY  2016    CYST REMOVAL  2011    behind belly button    TONSILLECTOMY  2008    URACHAL CYST INCISION      History of Excision Of Urachal Cyst.       Social History     Socioeconomic History    Marital status: Single   Tobacco Use    Smoking status: Never    Smokeless tobacco: Never   Vaping Use    Vaping Use: Never used   Substance and Sexual Activity    Alcohol use: Yes     Alcohol/week: 2.0 standard drinks of alcohol     Types: 2 Glasses of wine per week    Drug use: No    Sexual activity: Defer     Partners: Male     Birth control/protection: Condom, OCP       Family History   Problem Relation Age of Onset    Stroke Mother     Hypertension Mother     Migraines Mother     Osteoarthritis Mother     Cervical cancer Mother     Heart disease Mother     Hyperlipidemia Maternal Grandmother     Osteoarthritis Maternal Grandmother     Birth defects Maternal Grandmother     Ovarian cancer Maternal Grandmother         PREMENOPAUSAL     Transient ischemic attack Maternal Grandmother     Hypertension Maternal Grandmother     Stroke Other         CVA x 2. and TIA    Cancer Other         Cervical, malignant neoplasm x2 , ovarian    Hypertension Father     Diabetes Father           Current Outpatient Medications:     cyclobenzaprine (FLEXERIL) 5 MG tablet, Take 1 tablet by mouth At Night As Needed for Muscle Spasms., Disp: 30 tablet, Rfl: 0    hydrOXYzine (ATARAX) 25 MG tablet, Take 1 tablet by mouth 3 (Three) Times a Day As Needed for Itching or Anxiety., Disp: 30 tablet, Rfl: 0    methylPREDNISolone (MEDROL) 4 MG dose pack, Take as directed on package instructions., Disp: 21 tablet, Rfl: 0    montelukast (SINGULAIR) 10 MG tablet, Take 1 tablet by mouth Every Night., Disp: 90 tablet, Rfl: 0    Multiple Vitamins-Minerals ([facility]  "PO), Take 2 tablets by mouth Daily., Disp: , Rfl:     Norethin-Eth Estrad-Fe Biphas (Lo Loestrin Fe) 1 MG-10 MCG / 10 MCG tablet, Take 1 tablet by mouth Daily., Disp: 90 tablet, Rfl: 4    Nurtec 75 MG dispersible tablet, Take 1 tablet by mouth Daily As Needed (migraine)., Disp: 16 tablet, Rfl: 11    omeprazole (priLOSEC) 40 MG capsule, Take 1 capsule by mouth Daily., Disp: 90 capsule, Rfl: 3    ondansetron (ZOFRAN) 8 MG tablet, Take 1 tablet by mouth Every 8 (Eight) Hours As Needed., Disp: , Rfl:     prochlorperazine (COMPAZINE) 10 MG tablet, Take 1 tablet by mouth Every 6 (Six) Hours As Needed for Nausea or Vomiting., Disp: 15 tablet, Rfl: 5    promethazine-dextromethorphan (PROMETHAZINE-DM) 6.25-15 MG/5ML syrup, Take 5 mL by mouth 2 (Two) Times a Day As Needed for Cough., Disp: 180 mL, Rfl: 0    Current Facility-Administered Medications:     cyanocobalamin injection 1,000 mcg, 1,000 mcg, Intramuscular, Q28 Days, Suzie Muir APRN, 1,000 mcg at 10/18/22 0759     Review of Systems   Neurological:  Positive for headaches (no migraines).   All other systems reviewed and are negative.       Objective:  /80   Pulse 81   Ht 154.9 cm (61\")   Wt 134 kg (296 lb)   SpO2 99%   BMI 55.93 kg/m²     Neurologic Exam     Mental Status   Oriented to person, place, and time.   Speech: speech is normal   Level of consciousness: alert    Cranial Nerves     CN II   Visual fields full to confrontation.     CN III, IV, VI   Extraocular motions are normal.     CN VII   Facial expression full, symmetric.     CN VIII   CN VIII normal.     Gait, Coordination, and Reflexes     Gait  Gait: normal      Physical Exam  Constitutional:       Appearance: Normal appearance.   Eyes:      Extraocular Movements: EOM normal.   Neurological:      Mental Status: She is alert and oriented to person, place, and time.      Gait: Gait is intact.   Psychiatric:         Mood and Affect: Mood and affect normal.         Speech: Speech " normal.       Assessment/Plan:       Diagnoses and all orders for this visit:    1. Migraine variant with headache (Primary)  Comments:  continue nurtec odt and compazine prn-no refills needed today    She has not experienced any migraines. She has Nurtec and Compazine as needed. She will be getting iron infusions on Monday with hematology oncology. I also encouraged her to see ENT if she continues to have enlargements of the lymph nodes. She verbalizes understanding and agrees with the plan today. We will follow up in 6 months for reevaluation of symptoms or sooner if needed.       Reviewed medications, potential side effects and signs and symptoms to report. Discussed risk versus benefits of treatment plan with patient and/or family-including medications, labs and radiology that may be ordered. Addressed questions and concerns during visit. Patient and/or family verbalized understanding and agree with plan.    During this visit the following were done:  Labs Reviewed [x]    Labs Ordered []    Radiology Reports Reviewed []    Radiology Ordered []    PCP Records Reviewed [x]    Referring Provider Records Reviewed []    ER Records Reviewed []    Hospital Records Reviewed []    History Obtained From Family []    Radiology Images Reviewed []    Other Reviewed [x]  Hem/onc and rheumatology notes reviewed  Records Requested []      Transcribed from ambient dictation for SYBIL Macario by Sandra Alfaro.  11/17/23   12:13 EST    Patient or patient representative verbalized consent to the visit recording.  I have personally performed the services described in this document as transcribed by the above individual, and it is both accurate and complete.  SYBIL Macario  11/18/2023  07:59 EST    Note to patient: The 21st Century Cures Act makes medical notes like these available to patients in the interest of transparency. However, be advised this is a medical document. It is intended as peer to peer  communication. It is written in medical language and may contain abbreviations or verbiage that are unfamiliar. It may appear blunt or direct. Medical documents are intended to carry relevant information, facts as evident, and the clinical opinion of the provider.

## 2023-11-17 NOTE — LETTER
"November 18, 2023     Acacia Lobato DO  [address] [facility] [address]    Patient: Nury Pineda   YOB: 1980   Date of Visit: 11/17/2023       Dear Acacia Lobato DO    Nury Pineda was in my office today. Below is a copy of my note.    If you have questions, please do not hesitate to call me. I look forward to following Nury along with you.         Sincerely,        SYBIL Macario        CC: MD Dian Bronson APRN Leigha High, MD    Subjective:     Patient ID: Nury Pineda is a 43 y.o. female.    CC:   Chief Complaint   Patient presents with   • Migraine       HPI:   History of Present Illness  Today 11/17/2023-    This is a pleasant 43-year-old female who presents for 6-month neurology follow-up on migraine variant headaches with episodic complex migraines coming on with intermittent left facial numbness, tingling, and blurred vision. She has had complex migraines intermittently since 2015. I last saw her in the clinic on 04/17/2023. She is currently taking Nurtec as needed at onset of migraine along with Compazine. I prescribed her a prednisone taper at her last visit, and she had been under a lot of stress with losing her grandmother, whom she was the primary caregiver for. She does follow with hematology oncology for iron deficiency anemia.     Her most recent labs performed on 11/13/2023, showed ferritin level 294.9, an iron profile that showed iron at 33 mcg/dL, iron saturation 10 percent, transferrin 213 mg/dL, TIBC 317 mcg/dL, CBC with hemoglobin 11.0, which has improved.      Today, she reports her headaches have been \"small.\" She states she has not had a migraine, but she has a small lingering headache, which she believes is due to her low iron. She reports she is scheduled to receive an iron infusion on Monday, 11/20/2023.     She states she will occasionally experience tingling, but the headache has not been present with the complex " "migraines. She confirms she is no longer taking phentermine. She states she has not had to take the Nurtec or Compazine. She believes she has enough of the Nurtec and will call the office if she needs a refill.     She reports she had to see Dr. Lobato, her primary care provider, because she became sick. She states she was tested for COVID-19, influenza, and bronchitis. She states her throat still does \"something random\", and when it does, she takes a steroid, as it is causing her lymph nodes to be inflamed. She confirms that it is just on the right side and at first, her neck was sore to palpation. She notes that her Hematology provider suggested seeing an ENT if it continues.     She had previously seen a rheumatologist but was told that she was \"fine\" and no other follow-up appointments were scheduled.     Previous extensive work-up & history included below:  She has had previous atypical and complex migraines since 2015 which have presented with stroke like symptoms.  It has been challenging to treat her migraines since she has severe difficulty with trouble swallowing and esophageal spasms so she is really not able to swallow most pills.  She cannot crush or open some pills to take.  She cannot swallow pills whole at this time.  Has used Fioricet in the past.  The Fioricet makes her very tired.  She does use Compazine as needed for the nausea and migraines and this does help slightly.  She has previously tried topiramate with intolerance, amitriptyline with sedation and intolerance, sumatriptan with tightening of the throat, aspirin and Cambia.  She has also used rizatriptan in the past but this caused her to feel like she was having spasms and tightening of her throat and chest as well.  Triptans have been poorly tolerated.  She is really hesitant to start daily preventive medications.  She tries to avoid medications if possible.      Prior imaging studies showed no acute process with MRI of the brain and " C-spine as well as MRA of the brain in 2016 and 2015 but there was significant artifact due to her wearing braces.  CTA of the head and neck was normal in 2016. MRI of the brain with and without contrast on 2/3/2021 was a normal MRI with no abnormal contrast-enhancement and no acute intracranial abnormalities.  This was stable compared to January 18, 2019 imaging.     She did have a sleep study remotely and told that she had very mild sleep apnea but has not been treated with a CPAP.       Of note in 12/29/2021, she followed up with [facility] for an JOSÉ MIGUEL with a 1-1280 speckled pattern ratio. They did repeat labs during that visit for autoimmune disease with lupus. It does state in her work up in 06/2021, her work up was negative. The patient reports all the lab work from rheumatology shows elevated levels, however, she was told there is nothing of concern. She denies symptoms.      Complex migraine headaches with intermittent left facial numbness, tingling, and blurred vision. She has had complex migraines since 2015. Currently, she is prescribed Nurtec ODT along with Compazine to use at onset of migraine.     Follows with hem/Onc for iron deficiency anemia-has required IV iron infusions.    Saw Rheumatology with negative workup.     She denies wearing a CPAP. She had a sleep study completed, which showed no severe sleep apnea.    The following portions of the patient's history were reviewed and updated as appropriate: allergies, current medications, past family history, past medical history, past social history, past surgical history, and problem list.    Past Medical History:   Diagnosis Date   • Absolute anemia    • Anemia    • Arthritis     Right knee   • Carpal tunnel syndrome     RIGHT WRIST   • Cholelithiasis     Nausea related to stones has phenergan. Also has related RUQ pain.   • Dizziness    • Elevated C-reactive protein (CRP)    • Elevated erythrocyte sedimentation rate    • Elevated hemoglobin A1c      labs 2022 6.0   • GERD (gastroesophageal reflux disease)    • Glaucoma     negative per pt 2022   • Heart disease    • Influenza    • Iron deficiency     Will not take iron supplement but will take MVI   • Left hip pain    • Migraines    • Numbness and tingling in right hand     Has nerve conductions 3/23/2016, awaiting results. Along with Paresthesia.   • Osteoarthritis    • Pain and swelling of right forearm     Check u/s to r/o clot. Rash and redness are resolving, will continue to monitor.   • Pain of left arm    • Sleep disturbances    • Vitamin D deficiency     25 oh.       Past Surgical History:   Procedure Laterality Date   •  SECTION     • CHOLECYSTECTOMY     • COLONOSCOPY     • CYST REMOVAL      behind belly button   • TONSILLECTOMY     • URACHAL CYST INCISION      History of Excision Of Urachal Cyst.       Social History     Socioeconomic History   • Marital status: Single   Tobacco Use   • Smoking status: Never   • Smokeless tobacco: Never   Vaping Use   • Vaping Use: Never used   Substance and Sexual Activity   • Alcohol use: Yes     Alcohol/week: 2.0 standard drinks of alcohol     Types: 2 Glasses of wine per week   • Drug use: No   • Sexual activity: Defer     Partners: Male     Birth control/protection: Condom, OCP       Family History   Problem Relation Age of Onset   • Stroke Mother    • Hypertension Mother    • Migraines Mother    • Osteoarthritis Mother    • Cervical cancer Mother    • Heart disease Mother    • Hyperlipidemia Maternal Grandmother    • Osteoarthritis Maternal Grandmother    • Birth defects Maternal Grandmother    • Ovarian cancer Maternal Grandmother         PREMENOPAUSAL    • Transient ischemic attack Maternal Grandmother    • Hypertension Maternal Grandmother    • Stroke Other         CVA x 2. and TIA   • Cancer Other         Cervical, malignant neoplasm x2 , ovarian   • Hypertension Father    • Diabetes Father           Current Outpatient  "Medications:   •  cyclobenzaprine (FLEXERIL) 5 MG tablet, Take 1 tablet by mouth At Night As Needed for Muscle Spasms., Disp: 30 tablet, Rfl: 0  •  hydrOXYzine (ATARAX) 25 MG tablet, Take 1 tablet by mouth 3 (Three) Times a Day As Needed for Itching or Anxiety., Disp: 30 tablet, Rfl: 0  •  methylPREDNISolone (MEDROL) 4 MG dose pack, Take as directed on package instructions., Disp: 21 tablet, Rfl: 0  •  montelukast (SINGULAIR) 10 MG tablet, Take 1 tablet by mouth Every Night., Disp: 90 tablet, Rfl: 0  •  Multiple Vitamins-Minerals (EQ ONE DAILY BilneurS Gamar PO), Take 2 tablets by mouth Daily., Disp: , Rfl:   •  Norethin-Eth Estrad-Fe Biphas (Lo Loestrin Fe) 1 MG-10 MCG / 10 MCG tablet, Take 1 tablet by mouth Daily., Disp: 90 tablet, Rfl: 4  •  Nurtec 75 MG dispersible tablet, Take 1 tablet by mouth Daily As Needed (migraine)., Disp: 16 tablet, Rfl: 11  •  omeprazole (priLOSEC) 40 MG capsule, Take 1 capsule by mouth Daily., Disp: 90 capsule, Rfl: 3  •  ondansetron (ZOFRAN) 8 MG tablet, Take 1 tablet by mouth Every 8 (Eight) Hours As Needed., Disp: , Rfl:   •  prochlorperazine (COMPAZINE) 10 MG tablet, Take 1 tablet by mouth Every 6 (Six) Hours As Needed for Nausea or Vomiting., Disp: 15 tablet, Rfl: 5  •  promethazine-dextromethorphan (PROMETHAZINE-DM) 6.25-15 MG/5ML syrup, Take 5 mL by mouth 2 (Two) Times a Day As Needed for Cough., Disp: 180 mL, Rfl: 0    Current Facility-Administered Medications:   •  cyanocobalamin injection 1,000 mcg, 1,000 mcg, Intramuscular, Q28 Days, Suzie Muir APRN, 1,000 mcg at 10/18/22 0759     Review of Systems   Neurological:  Positive for headaches (no migraines).   All other systems reviewed and are negative.       Objective:  /80   Pulse 81   Ht 154.9 cm (61\")   Wt 134 kg (296 lb)   SpO2 99%   BMI 55.93 kg/m²     Neurologic Exam     Mental Status   Oriented to person, place, and time.   Speech: speech is normal   Level of consciousness: alert    Cranial Nerves "     CN II   Visual fields full to confrontation.     CN III, IV, VI   Extraocular motions are normal.     CN VII   Facial expression full, symmetric.     CN VIII   CN VIII normal.     Gait, Coordination, and Reflexes     Gait  Gait: normal      Physical Exam  Constitutional:       Appearance: Normal appearance.   Eyes:      Extraocular Movements: EOM normal.   Neurological:      Mental Status: She is alert and oriented to person, place, and time.      Gait: Gait is intact.   Psychiatric:         Mood and Affect: Mood and affect normal.         Speech: Speech normal.       Assessment/Plan:       Diagnoses and all orders for this visit:    1. Migraine variant with headache (Primary)  Comments:  continue nurtec odt and compazine prn-no refills needed today    She has not experienced any migraines. She has Nurtec and Compazine as needed. She will be getting iron infusions on Monday with hematology oncology. I also encouraged her to see ENT if she continues to have enlargements of the lymph nodes. She verbalizes understanding and agrees with the plan today. We will follow up in 6 months for reevaluation of symptoms or sooner if needed.       Reviewed medications, potential side effects and signs and symptoms to report. Discussed risk versus benefits of treatment plan with patient and/or family-including medications, labs and radiology that may be ordered. Addressed questions and concerns during visit. Patient and/or family verbalized understanding and agree with plan.    During this visit the following were done:  Labs Reviewed [x]    Labs Ordered []    Radiology Reports Reviewed []    Radiology Ordered []    PCP Records Reviewed [x]    Referring Provider Records Reviewed []    ER Records Reviewed []    Hospital Records Reviewed []    History Obtained From Family []    Radiology Images Reviewed []    Other Reviewed [x]  Hem/onc and rheumatology notes reviewed  Records Requested []      Transcribed from ambient dictation  for Ele Thorne, APRN by Sandra Alfaro.  11/17/23   12:13 EST    Patient or patient representative verbalized consent to the visit recording.  I have personally performed the services described in this document as transcribed by the above individual, and it is both accurate and complete.  Ele Thorne, SYBIL  11/18/2023  07:59 EST    Note to patient: The 21st Century Cures Act makes medical notes like these available to patients in the interest of transparency. However, be advised this is a medical document. It is intended as peer to peer communication. It is written in medical language and may contain abbreviations or verbiage that are unfamiliar. It may appear blunt or direct. Medical documents are intended to carry relevant information, facts as evident, and the clinical opinion of the provider.

## 2023-11-20 ENCOUNTER — HOSPITAL ENCOUNTER (OUTPATIENT)
Dept: ONCOLOGY | Facility: HOSPITAL | Age: 43
Discharge: HOME | End: 2023-11-20
Admitting: NURSE PRACTITIONER
Payer: COMMERCIAL

## 2023-11-20 VITALS
WEIGHT: 293 LBS | SYSTOLIC BLOOD PRESSURE: 126 MMHG | RESPIRATION RATE: 16 BRPM | TEMPERATURE: 96.7 F | HEIGHT: 61 IN | DIASTOLIC BLOOD PRESSURE: 64 MMHG | HEART RATE: 74 BPM | BODY MASS INDEX: 55.32 KG/M2

## 2023-11-20 DIAGNOSIS — E61.1 IRON DEFICIENCY: Primary | ICD-10-CM

## 2023-11-20 DIAGNOSIS — K90.9 IRON MALABSORPTION: ICD-10-CM

## 2023-11-20 DIAGNOSIS — D50.0 IRON DEFICIENCY ANEMIA DUE TO CHRONIC BLOOD LOSS: ICD-10-CM

## 2023-11-20 PROCEDURE — 96374 THER/PROPH/DIAG INJ IV PUSH: CPT

## 2023-11-20 PROCEDURE — 25810000003 SODIUM CHLORIDE 0.9 % SOLUTION: Performed by: NURSE PRACTITIONER

## 2023-11-20 PROCEDURE — 96375 TX/PRO/DX INJ NEW DRUG ADDON: CPT

## 2023-11-20 PROCEDURE — 63710000001 DIPHENHYDRAMINE PER 50 MG: Performed by: NURSE PRACTITIONER

## 2023-11-20 PROCEDURE — 25010000002 FERUMOXYTOL 510 MG/17ML SOLUTION 17 ML VIAL: Performed by: NURSE PRACTITIONER

## 2023-11-20 RX ORDER — FAMOTIDINE 10 MG/ML
20 INJECTION, SOLUTION INTRAVENOUS ONCE
Status: COMPLETED | OUTPATIENT
Start: 2023-11-20 | End: 2023-11-20

## 2023-11-20 RX ORDER — SODIUM CHLORIDE 9 MG/ML
20 INJECTION, SOLUTION INTRAVENOUS ONCE
Status: COMPLETED | OUTPATIENT
Start: 2023-11-20 | End: 2023-11-20

## 2023-11-20 RX ORDER — DIPHENHYDRAMINE HCL 25 MG
25 CAPSULE ORAL ONCE
Status: COMPLETED | OUTPATIENT
Start: 2023-11-20 | End: 2023-11-20

## 2023-11-20 RX ADMIN — DIPHENHYDRAMINE HYDROCHLORIDE 25 MG: 25 CAPSULE ORAL at 12:51

## 2023-11-20 RX ADMIN — SODIUM CHLORIDE 20 ML/HR: 9 INJECTION, SOLUTION INTRAVENOUS at 13:12

## 2023-11-20 RX ADMIN — FERUMOXYTOL 510 MG: 510 INJECTION INTRAVENOUS at 13:13

## 2023-11-20 RX ADMIN — FAMOTIDINE 20 MG: 10 INJECTION, SOLUTION INTRAVENOUS at 12:51

## 2023-11-21 ENCOUNTER — PATIENT ROUNDING (BHMG ONLY) (OUTPATIENT)
Dept: NEUROLOGY | Facility: CLINIC | Age: 43
End: 2023-11-21
Payer: COMMERCIAL

## 2023-11-21 NOTE — PROGRESS NOTES
A My-Chart message has been sent to the patient for PATIENT ROUNDING with [facility] [place]

## 2023-12-18 ENCOUNTER — HOSPITAL ENCOUNTER (OUTPATIENT)
Dept: MAMMOGRAPHY | Facility: HOSPITAL | Age: 43
Discharge: HOME | End: 2023-12-18
Admitting: STUDENT IN AN ORGANIZED HEALTH CARE EDUCATION/TRAINING PROGRAM
Payer: COMMERCIAL

## 2023-12-18 DIAGNOSIS — Z12.31 ENCOUNTER FOR SCREENING MAMMOGRAM FOR BREAST CANCER: ICD-10-CM

## 2023-12-18 PROCEDURE — 77067 SCR MAMMO BI INCL CAD: CPT

## 2023-12-18 PROCEDURE — 77063 BREAST TOMOSYNTHESIS BI: CPT

## 2023-12-28 ENCOUNTER — SPECIALTY PHARMACY (OUTPATIENT)
Dept: NEUROLOGY | Facility: CLINIC | Age: 43
End: 2023-12-28
Payer: COMMERCIAL

## 2024-02-15 ENCOUNTER — SPECIALTY PHARMACY (OUTPATIENT)
Dept: NEUROLOGY | Facility: CLINIC | Age: 44
End: 2024-02-15
Payer: COMMERCIAL

## 2024-02-15 ENCOUNTER — SPECIALTY PHARMACY (OUTPATIENT)
Dept: LAB | Facility: HOSPITAL | Age: 44
End: 2024-02-15
Payer: COMMERCIAL

## 2024-02-15 ENCOUNTER — OFFICE VISIT (OUTPATIENT)
Dept: NEUROLOGY | Facility: CLINIC | Age: 44
End: 2024-02-15
Payer: COMMERCIAL

## 2024-02-15 VITALS
HEIGHT: 61 IN | BODY MASS INDEX: 55.32 KG/M2 | DIASTOLIC BLOOD PRESSURE: 80 MMHG | SYSTOLIC BLOOD PRESSURE: 124 MMHG | HEART RATE: 89 BPM | OXYGEN SATURATION: 100 % | WEIGHT: 293 LBS

## 2024-02-15 DIAGNOSIS — R51.9 DAILY HEADACHE: ICD-10-CM

## 2024-02-15 DIAGNOSIS — G43.809 MIGRAINE VARIANT WITH HEADACHE: Primary | Chronic | ICD-10-CM

## 2024-02-15 DIAGNOSIS — G43.109 MIGRAINE WITH AURA AND WITHOUT STATUS MIGRAINOSUS, NOT INTRACTABLE: ICD-10-CM

## 2024-02-15 RX ORDER — KETOROLAC TROMETHAMINE 30 MG/ML
60 INJECTION, SOLUTION INTRAMUSCULAR; INTRAVENOUS ONCE
Status: COMPLETED | OUTPATIENT
Start: 2024-02-15 | End: 2024-02-15

## 2024-02-15 RX ORDER — RIMEGEPANT SULFATE 75 MG/75MG
75 TABLET, ORALLY DISINTEGRATING ORAL DAILY PRN
Qty: 16 TABLET | Refills: 11 | Status: SHIPPED | OUTPATIENT
Start: 2024-02-15

## 2024-02-15 RX ORDER — RIMEGEPANT SULFATE 75 MG/75MG
75 TABLET, ORALLY DISINTEGRATING ORAL DAILY PRN
Qty: 16 TABLET | Refills: 11 | Status: SHIPPED | OUTPATIENT
Start: 2024-02-15 | End: 2024-02-15

## 2024-02-15 RX ORDER — PREDNISONE 10 MG/1
TABLET ORAL
Qty: 42 TABLET | Refills: 0 | Status: SHIPPED | OUTPATIENT
Start: 2024-02-15

## 2024-02-15 RX ORDER — PROCHLORPERAZINE MALEATE 10 MG
10 TABLET ORAL EVERY 6 HOURS PRN
Qty: 15 TABLET | Refills: 5 | Status: SHIPPED | OUTPATIENT
Start: 2024-02-15

## 2024-02-15 RX ADMIN — KETOROLAC TROMETHAMINE 60 MG: 30 INJECTION, SOLUTION INTRAMUSCULAR; INTRAVENOUS at 08:40

## 2024-02-20 ENCOUNTER — TELEPHONE (OUTPATIENT)
Dept: NEUROLOGY | Facility: CLINIC | Age: 44
End: 2024-02-20
Payer: COMMERCIAL

## 2024-02-20 NOTE — TELEPHONE ENCOUNTER
PT BOSS CALLED TO VERIFY THAT PT WAS IN OFFICE 02.15.24 SAID  DR HAD NOT SIGNED AND LOOKED ALTERED.

## 2024-02-28 ENCOUNTER — PATIENT MESSAGE (OUTPATIENT)
Dept: INTERNAL MEDICINE | Facility: CLINIC | Age: 44
End: 2024-02-28
Payer: COMMERCIAL

## 2024-02-28 NOTE — TELEPHONE ENCOUNTER
From: Nury Pineda  To: Acacia Luis Alfredo  Sent: 2/28/2024 9:25 AM EST  Subject: Knot    Good morning. The other week I felt a knot in the back of my head. I didn't think much of it, then the other day my daughter felt it also. I contacted my neurologist because I just had a headache for 30 days, but she said to reach out to my PCP. I haven't fallen or hit my head. I just want to make sure this shouldn't be anything I should be concerned about. Thank you!   I'm off on Mondays, if I need to come in.

## 2024-03-04 ENCOUNTER — LAB (OUTPATIENT)
Dept: LAB | Facility: HOSPITAL | Age: 44
End: 2024-03-04
Payer: COMMERCIAL

## 2024-03-04 ENCOUNTER — OFFICE VISIT (OUTPATIENT)
Dept: INTERNAL MEDICINE | Facility: CLINIC | Age: 44
End: 2024-03-04
Payer: COMMERCIAL

## 2024-03-04 VITALS
DIASTOLIC BLOOD PRESSURE: 72 MMHG | HEIGHT: 67 IN | SYSTOLIC BLOOD PRESSURE: 128 MMHG | OXYGEN SATURATION: 99 % | HEART RATE: 79 BPM | WEIGHT: 293 LBS | BODY MASS INDEX: 45.99 KG/M2

## 2024-03-04 DIAGNOSIS — E66.01 MORBID OBESITY WITH BMI OF 45.0-49.9, ADULT: ICD-10-CM

## 2024-03-04 DIAGNOSIS — D50.8 OTHER IRON DEFICIENCY ANEMIA: ICD-10-CM

## 2024-03-04 DIAGNOSIS — D50.0 IRON DEFICIENCY ANEMIA DUE TO CHRONIC BLOOD LOSS: ICD-10-CM

## 2024-03-04 DIAGNOSIS — E61.1 IRON DEFICIENCY: ICD-10-CM

## 2024-03-04 DIAGNOSIS — N92.6 IRREGULAR MENSES: ICD-10-CM

## 2024-03-04 DIAGNOSIS — R22.0 HEAD LUMP: ICD-10-CM

## 2024-03-04 DIAGNOSIS — N92.6 IRREGULAR MENSES: Primary | ICD-10-CM

## 2024-03-04 LAB
FERRITIN SERPL-MCNC: 451.1 NG/ML (ref 13–150)
IRON 24H UR-MRATE: 37 MCG/DL (ref 37–145)
IRON SATN MFR SERPL: 14 % (ref 20–50)
TIBC SERPL-MCNC: 264 MCG/DL (ref 298–536)
TRANSFERRIN SERPL-MCNC: 177 MG/DL (ref 200–360)

## 2024-03-04 PROCEDURE — 99213 OFFICE O/P EST LOW 20 MIN: CPT | Performed by: INTERNAL MEDICINE

## 2024-03-04 PROCEDURE — 36415 COLL VENOUS BLD VENIPUNCTURE: CPT

## 2024-03-04 PROCEDURE — 85025 COMPLETE CBC W/AUTO DIFF WBC: CPT

## 2024-03-04 PROCEDURE — 84702 CHORIONIC GONADOTROPIN TEST: CPT

## 2024-03-04 PROCEDURE — 84466 ASSAY OF TRANSFERRIN: CPT

## 2024-03-04 PROCEDURE — 83540 ASSAY OF IRON: CPT

## 2024-03-04 PROCEDURE — 82728 ASSAY OF FERRITIN: CPT

## 2024-03-04 PROCEDURE — 1160F RVW MEDS BY RX/DR IN RCRD: CPT | Performed by: INTERNAL MEDICINE

## 2024-03-04 PROCEDURE — 84443 ASSAY THYROID STIM HORMONE: CPT

## 2024-03-04 PROCEDURE — 1159F MED LIST DOCD IN RCRD: CPT | Performed by: INTERNAL MEDICINE

## 2024-03-04 NOTE — PROGRESS NOTES
"Subjective   Nury Pineda is a 43 y.o. female.   Chief Complaint   Patient presents with    Lump on back of head       History of Present Illness   3 week lump on back of scalp. No HA today but has been having HA weekly.  No vision changes. No nausea or vomiting. No dizziness.  Her menses changed in last 2 m.  Had very light menses for 2 days in the last 2 months.   The following portions of the patient's history were reviewed and updated as appropriate: allergies, current medications, past family history, past medical history, past social history, past surgical history, and problem list.    Review of Systems   Constitutional:  Negative for fatigue and fever.   Respiratory:  Negative for cough and shortness of breath.    Genitourinary:  Positive for menstrual problem.   Skin:         Lump on skull   Neurological:  Positive for headaches. Negative for dizziness, facial asymmetry, weakness and numbness.     /72   Pulse 79   Ht 170.2 cm (67\")   Wt 134 kg (296 lb 4.8 oz)   SpO2 99%   BMI 46.41 kg/m²     Objective   Physical Exam  Vitals reviewed.   HENT:      Head:        Comments: Dime size hard palpable area on back of skull  Cardiovascular:      Rate and Rhythm: Normal rate and regular rhythm.   Pulmonary:      Effort: No respiratory distress.      Breath sounds: No wheezing.   Neurological:      Mental Status: She is alert.         Assessment & Plan   Diagnoses and all orders for this visit:    1. Irregular menses (Primary)  -     TSH Rfx On Abnormal To Free T4; Future  -     CBC & Differential; Future  -     hCG, Quantitative, Pregnancy; Future    2. Head lump  -     CT Head Without Contrast; Future( awaiting preg test).    3. Morbid Obesity  Weight loss and exercise             "

## 2024-03-05 ENCOUNTER — TELEPHONE (OUTPATIENT)
Dept: INTERNAL MEDICINE | Facility: CLINIC | Age: 44
End: 2024-03-05
Payer: COMMERCIAL

## 2024-03-05 LAB
BASOPHILS # BLD AUTO: 0.02 10*3/MM3 (ref 0–0.2)
BASOPHILS NFR BLD AUTO: 0.2 % (ref 0–1.5)
DEPRECATED RDW RBC AUTO: 43.3 FL (ref 37–54)
EOSINOPHIL # BLD AUTO: 0.07 10*3/MM3 (ref 0–0.4)
EOSINOPHIL NFR BLD AUTO: 0.8 % (ref 0.3–6.2)
ERYTHROCYTE [DISTWIDTH] IN BLOOD BY AUTOMATED COUNT: 15.3 % (ref 12.3–15.4)
HCG INTACT+B SERPL-ACNC: <1 MIU/ML
HCT VFR BLD AUTO: 36.5 % (ref 34–46.6)
HGB BLD-MCNC: 11.8 G/DL (ref 12–15.9)
IMM GRANULOCYTES # BLD AUTO: 0.03 10*3/MM3 (ref 0–0.05)
IMM GRANULOCYTES NFR BLD AUTO: 0.3 % (ref 0–0.5)
LYMPHOCYTES # BLD AUTO: 2.56 10*3/MM3 (ref 0.7–3.1)
LYMPHOCYTES NFR BLD AUTO: 29 % (ref 19.6–45.3)
MCH RBC QN AUTO: 25.5 PG (ref 26.6–33)
MCHC RBC AUTO-ENTMCNC: 32.3 G/DL (ref 31.5–35.7)
MCV RBC AUTO: 78.8 FL (ref 79–97)
MONOCYTES # BLD AUTO: 0.82 10*3/MM3 (ref 0.1–0.9)
MONOCYTES NFR BLD AUTO: 9.3 % (ref 5–12)
NEUTROPHILS NFR BLD AUTO: 5.33 10*3/MM3 (ref 1.7–7)
NEUTROPHILS NFR BLD AUTO: 60.4 % (ref 42.7–76)
NRBC BLD AUTO-RTO: 0 /100 WBC (ref 0–0.2)
PLATELET # BLD AUTO: 302 10*3/MM3 (ref 140–450)
PMV BLD AUTO: 11.1 FL (ref 6–12)
RBC # BLD AUTO: 4.63 10*6/MM3 (ref 3.77–5.28)
TSH SERPL DL<=0.05 MIU/L-ACNC: 0.93 UIU/ML (ref 0.27–4.2)
WBC NRBC COR # BLD AUTO: 8.83 10*3/MM3 (ref 3.4–10.8)

## 2024-03-05 NOTE — TELEPHONE ENCOUNTER
Spoke with patient, she states that she does not take an Iron pill daily and that she gets iron infusions. States that her last round was in Nov 2023. Would this be something that she can be referred for again?

## 2024-03-05 NOTE — TELEPHONE ENCOUNTER
Spoke with patient, she verbalized understanding. She will get her Iron rechecked in about 4 weeks at a  lab.

## 2024-03-05 NOTE — TELEPHONE ENCOUNTER
----- Message from Acacia Lobato, DO sent at 3/5/2024  7:59 AM EST -----  Let know preg test negative. Mild anemia. Make sure taking iron.

## 2024-03-06 ENCOUNTER — PRIOR AUTHORIZATION (OUTPATIENT)
Dept: INTERNAL MEDICINE | Facility: CLINIC | Age: 44
End: 2024-03-06
Payer: COMMERCIAL

## 2024-03-06 DIAGNOSIS — R22.0 HEAD LUMP: Primary | ICD-10-CM

## 2024-03-06 NOTE — TELEPHONE ENCOUNTER
Spoke with patient to let her know that her insurance had denied the order for a CT on her head. Asked her to go for Xray since insurance approved this, she will go at the earliest convenience. I let her know that we would reach out regarding Xray results and next steps and/or recommendations.

## 2024-03-11 ENCOUNTER — SPECIALTY PHARMACY (OUTPATIENT)
Dept: NEUROLOGY | Facility: CLINIC | Age: 44
End: 2024-03-11
Payer: COMMERCIAL

## 2024-03-11 NOTE — PROGRESS NOTES
Specialty Pharmacy Refill Coordination Note     Nury is a 43 y.o. female contacted today regarding refills of  [facility] specialty medication(s).    MEDICAID SIG REQUIRED. VERIFIED SOMEONE WILL BE HOME TO SIGN ON WEDNESDAY 3/13.    Reviewed and verified with patient: YES  Specialty medication(s) and dose(s) confirmed: yes    Refill Questions      Flowsheet Row Most Recent Value   Changes to allergies? No   Changes to medications? No   New conditions or infections since last clinic visit No   Unplanned office visit, urgent care, ED, or hospital admission in the last 4 weeks  No   How does patient/caregiver feel medication is working? Very good   Financial problems or insurance changes  No   Since the previous refill, were any specialty medication doses or scheduled injections missed or delayed?  No   Does this patient require a clinical escalation to a pharmacist? No            Delivery Questions      Flowsheet Row Most Recent Value   Delivery method [employer]   Delivery address verified with patient/caregiver? Yes   Delivery address Prescriptions   Number of medications in delivery 1   Medication(s) being filled and delivered Rimegepant Sulfate   Doses left of specialty medications 4   Copay verified? Yes   Copay amount 0   Copay form of payment No copayment ($0)                   Follow-up: 30 day(s)     Anisha Wilson, Pharmacy Technician  Specialty Pharmacy Technician

## 2024-03-12 ENCOUNTER — HOSPITAL ENCOUNTER (OUTPATIENT)
Dept: GENERAL RADIOLOGY | Facility: HOSPITAL | Age: 44
Discharge: HOME | End: 2024-03-12
Admitting: INTERNAL MEDICINE
Payer: COMMERCIAL

## 2024-03-12 DIAGNOSIS — R22.0 HEAD LUMP: ICD-10-CM

## 2024-03-12 PROCEDURE — 70260 X-RAY EXAM OF SKULL: CPT

## 2024-03-15 ENCOUNTER — TELEPHONE (OUTPATIENT)
Dept: INTERNAL MEDICINE | Facility: CLINIC | Age: 44
End: 2024-03-15
Payer: COMMERCIAL

## 2024-03-15 NOTE — TELEPHONE ENCOUNTER
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

## 2024-03-19 NOTE — TELEPHONE ENCOUNTER
Encounter Date: 3/19/2024    SCRIBE #1 NOTE: I, Angiwilbert Toro, am scribing for, and in the presence of,  Jana Joseph PA-C. I have scribed the following portions of the note - Other sections scribed: HPI, ROS.       History     Chief Complaint   Patient presents with    Puncture Wound     Per pt he stepped on wire that went through his Left boot causing Injury, job sent him for tetanus shot      CC: Puncture Wound    HPI: This 19 y.o male, with no medical history, presents to the ED c/o an acute puncture wound to the 5th digit of the left foot that occurred just PTA. [place] wire went through his boot and punctured his left 5th toe. He states that he was wearing steel toe boots at the time of injury. He denies any pain or numbness at this time. Of note, he reports that he last received a tetanus vaccination 5 years ago. There are no other associated symptoms.    The history is provided by the patient.     Review of patient's allergies indicates:  No Known Allergies  No past medical history on file.  No past surgical history on file.  No family history on file.     Review of Systems   Constitutional:  Negative for fever.   HENT:  Negative for sore throat.    Respiratory:  Negative for shortness of breath.    Cardiovascular:  Negative for chest pain.   Gastrointestinal:  Negative for nausea.   Genitourinary:  Negative for dysuria.   Musculoskeletal:  Negative for back pain.   Skin:  Positive for wound (puncture to the left 5th digit). Negative for rash.   Neurological:  Negative for weakness.   All other systems reviewed and are negative.      Physical Exam     Initial Vitals [03/19/24 1330]   BP Pulse Resp Temp SpO2   131/73 74 20 98.1 °F (36.7 °C) 97 %      MAP       --         Physical Exam    Nursing note and vitals reviewed.  Constitutional: He appears well-developed and well-nourished. No distress.   HENT:   Head: Atraumatic.   Eyes: EOM are normal.  PT CALLED AND STATED THAT SHE WOULD LIKE A CALL BACK. SHE DID NOT SPECIFY. PLEASE CALL PT.      Neck: Neck supple.   Pulmonary/Chest: Breath sounds normal. No respiratory distress.   Musculoskeletal:         General: Normal range of motion.      Cervical back: Neck supple.      Comments: There is a 1 cm laceration on the 5th toe of the left foot.  Bleeding is controlled.  Patient has normal sensation, strength in the left foot and toes.  No foreign bodies noted with thorough exploration of the wound.  Capillary refill is less than 2 seconds.     Neurological: He is alert and oriented to person, place, and time.   Skin: Skin is warm. Capillary refill takes less than 2 seconds.         ED Course   Procedures  Labs Reviewed - No data to display       Imaging Results               X-Ray Toe 2 or More Views Left (Final result)  Result time 03/19/24 14:09:41      Final result by Felipa Ramírez MD (03/19/24 14:09:41)                   Impression:      Abnormal study as above.    This report was flagged in Epic as abnormal.      Electronically signed by: Felipa Ramírez  Date:    03/19/2024  Time:    14:09               Narrative:    EXAMINATION:  XR TOE 2 OR MORE VIEWS LEFT    CLINICAL HISTORY:  puncture wound to pinky toe;    TECHNIQUE:  Three views of the left toes were performed    COMPARISON:  None.    FINDINGS:  No acute fracture.    Linear radiopaque retained foreign body estimated at 2 mm in the soft tissues adjacent to the 4th metatarsal head.                                       Medications   Tdap (BOOSTRIX) vaccine injection 0.5 mL (0.5 mLs Intramuscular Given 3/19/24 1344)   mupirocin 2 % ointment 1 Tube (1 Tube Topical (Top) Given 3/19/24 1411)     Medical Decision Making  Risk  Prescription drug management.         APC / Resident Notes:   This is an urgent evaluation of an 19-year-old male who presents to the emergency department after a metal wire cut his toe.  He has not up-to-date with his tetanus vaccine.      The patient is currently afebrile and nontoxic in appearance.  Vital signs are  stable.  On physical exam, there is a 1 cm laceration noted to the left great toe.  Pedal pulses are intact.  Capillary refill in the toes is less than 2 seconds.  Sensation is intact in all toes.  The remainder of the physical exam is unremarkable.  There is no foreign bodies noted with thorough exploration of the wound, however an x-ray was performed which revealed no foreign bodies directly over where the laceration has occurred.  There is appears to be an older retained metal foreign body in different portion of the foot.  The wound was cleaned with normal saline and antibiotic ointment was placed.  The wound was dressed.  Return precautions were thoroughly reviewed with him.  The patient declined closure with glue or stitches.  He will need to follow up with his primary care doctor.  He verbalized understanding and agreement and was discharged in stable condition.     Scribe Attestation:   Scribe #1: I performed the above scribed service and the documentation accurately describes the services I performed. I attest to the accuracy of the note.                         I, Jana Joseph PA-C, personally performed the services described in this documentation. All medical record entries made by the scribe were at my direction and in my presence. I have reviewed the chart and agree that the record reflects my personal performance and is accurate and complete.       Clinical Impression:  Final diagnoses:  [T14.8XXA] Puncture wound (Primary)  [S91.119A] Laceration of toe of left foot without foreign body present, nail damage status unspecified, unspecified toe, initial encounter          ED Disposition Condition    Discharge Stable          ED Prescriptions    None       Follow-up Information    None          Jana Joseph PA-C  03/19/24 1792

## 2024-05-09 ENCOUNTER — SPECIALTY PHARMACY (OUTPATIENT)
Dept: GENERAL RADIOLOGY | Facility: HOSPITAL | Age: 44
End: 2024-05-09
Payer: COMMERCIAL

## 2024-05-23 DIAGNOSIS — E61.1 IRON DEFICIENCY: Primary | ICD-10-CM

## 2024-05-24 ENCOUNTER — OFFICE VISIT (OUTPATIENT)
Dept: ONCOLOGY | Facility: CLINIC | Age: 44
End: 2024-05-24
Payer: COMMERCIAL

## 2024-05-24 ENCOUNTER — LAB (OUTPATIENT)
Dept: LAB | Facility: HOSPITAL | Age: 44
End: 2024-05-24
Payer: COMMERCIAL

## 2024-05-24 VITALS
DIASTOLIC BLOOD PRESSURE: 78 MMHG | OXYGEN SATURATION: 99 % | RESPIRATION RATE: 16 BRPM | TEMPERATURE: 97.3 F | HEIGHT: 67 IN | SYSTOLIC BLOOD PRESSURE: 133 MMHG | HEART RATE: 72 BPM | WEIGHT: 293 LBS | BODY MASS INDEX: 45.99 KG/M2

## 2024-05-24 DIAGNOSIS — D50.0 IRON DEFICIENCY ANEMIA DUE TO CHRONIC BLOOD LOSS: Primary | ICD-10-CM

## 2024-05-24 DIAGNOSIS — E61.1 IRON DEFICIENCY: ICD-10-CM

## 2024-05-24 LAB
BASOPHILS # BLD AUTO: 0.01 10*3/MM3 (ref 0–0.2)
BASOPHILS NFR BLD AUTO: 0.1 % (ref 0–1.5)
DEPRECATED RDW RBC AUTO: 47.5 FL (ref 37–54)
EOSINOPHIL # BLD AUTO: 0.09 10*3/MM3 (ref 0–0.4)
EOSINOPHIL NFR BLD AUTO: 1 % (ref 0.3–6.2)
ERYTHROCYTE [DISTWIDTH] IN BLOOD BY AUTOMATED COUNT: 15.7 % (ref 12.3–15.4)
FERRITIN SERPL-MCNC: 495.5 NG/ML (ref 13–150)
HCT VFR BLD AUTO: 35.2 % (ref 34–46.6)
HGB BLD-MCNC: 11 G/DL (ref 12–15.9)
IMM GRANULOCYTES # BLD AUTO: 0.01 10*3/MM3 (ref 0–0.05)
IMM GRANULOCYTES NFR BLD AUTO: 0.1 % (ref 0–0.5)
IRON 24H UR-MRATE: 45 MCG/DL (ref 37–145)
IRON SATN MFR SERPL: 14 % (ref 20–50)
LYMPHOCYTES # BLD AUTO: 2.96 10*3/MM3 (ref 0.7–3.1)
LYMPHOCYTES NFR BLD AUTO: 31.4 % (ref 19.6–45.3)
MCH RBC QN AUTO: 25.3 PG (ref 26.6–33)
MCHC RBC AUTO-ENTMCNC: 31.3 G/DL (ref 31.5–35.7)
MCV RBC AUTO: 81.1 FL (ref 79–97)
MONOCYTES # BLD AUTO: 0.85 10*3/MM3 (ref 0.1–0.9)
MONOCYTES NFR BLD AUTO: 9 % (ref 5–12)
NEUTROPHILS NFR BLD AUTO: 5.51 10*3/MM3 (ref 1.7–7)
NEUTROPHILS NFR BLD AUTO: 58.4 % (ref 42.7–76)
PLATELET # BLD AUTO: 338 10*3/MM3 (ref 140–450)
PMV BLD AUTO: 9.8 FL (ref 6–12)
RBC # BLD AUTO: 4.34 10*6/MM3 (ref 3.77–5.28)
TIBC SERPL-MCNC: 320 MCG/DL (ref 298–536)
TRANSFERRIN SERPL-MCNC: 215 MG/DL (ref 200–360)
WBC NRBC COR # BLD AUTO: 9.43 10*3/MM3 (ref 3.4–10.8)

## 2024-05-24 PROCEDURE — 85025 COMPLETE CBC W/AUTO DIFF WBC: CPT

## 2024-05-24 PROCEDURE — 84466 ASSAY OF TRANSFERRIN: CPT

## 2024-05-24 PROCEDURE — 83540 ASSAY OF IRON: CPT

## 2024-05-24 PROCEDURE — 1126F AMNT PAIN NOTED NONE PRSNT: CPT | Performed by: INTERNAL MEDICINE

## 2024-05-24 PROCEDURE — 36415 COLL VENOUS BLD VENIPUNCTURE: CPT

## 2024-05-24 PROCEDURE — 82728 ASSAY OF FERRITIN: CPT

## 2024-05-24 PROCEDURE — 99214 OFFICE O/P EST MOD 30 MIN: CPT | Performed by: INTERNAL MEDICINE

## 2024-05-24 NOTE — PROGRESS NOTES
DATE OF VISIT: 2024    REASON FOR VISIT: Followup for iron deficiency anemia     PROBLEM LIST:  1. Anemia  2.  Microcytosis  3.  Iron deficiency:  A.  EGD done 2020 revealed erosive esophagitis  4.  Positive JOSÉ MIGUEL with inflammation markers  A. Followed by rheumatology at     HISTORY OF PRESENT ILLNESS: The patient is a very pleasant 43 y.o. female  with past medical history significant for iron deficiency anemia diagnosed 2020.  The patient has been through with IV iron replacement. The patient is here today for scheduled follow-up visit.    SUBJECTIVE: Patient is here today by herself.  Denied any bleeding.  She does have mild fatigue.    Past History:  Medical History: has a past medical history of Absolute anemia, Anemia, Arthritis, Carpal tunnel syndrome, Cholelithiasis, Dizziness, Elevated C-reactive protein (CRP), Elevated erythrocyte sedimentation rate, Elevated hemoglobin A1c, GERD (gastroesophageal reflux disease), Glaucoma, Heart disease, Influenza, Iron deficiency, Left hip pain, Migraines, Numbness and tingling in right hand, Osteoarthritis, Pain and swelling of right forearm, Pain of left arm, Sleep disturbances, and Vitamin D deficiency.   Surgical History: has a past surgical history that includes Tonsillectomy (); Urachal cyst incision;  section (); Cholecystectomy (); Cyst Removal (); and Colonoscopy ().   Family History: family history includes Birth defects in her maternal grandmother; Cancer in an other family member; Cervical cancer in her mother; Diabetes in her father; Heart disease in her mother; Hyperlipidemia in her maternal grandmother; Hypertension in her father, maternal grandmother, and mother; Migraines in her mother; Osteoarthritis in her maternal grandmother and mother; Ovarian cancer in her maternal grandmother; Stroke in her mother and another family member; Transient ischemic attack in her maternal grandmother.   Social History: reports  "that she has never smoked. She has never used smokeless tobacco. She reports current alcohol use of about 2.0 standard drinks of alcohol per week. She reports that she does not use drugs.    (Not in a hospital admission)     Allergies: Triptans, Amoxicillin, and Penicillins     Review of Systems   Constitutional:  Positive for fatigue.   HENT:  Positive for sore throat.    Musculoskeletal:  Positive for arthralgias.       PHYSICAL EXAMINATION:   /78   Pulse 72   Temp 97.3 °F (36.3 °C)   Resp 16   Ht 170.2 cm (67.01\")   Wt 133 kg (293 lb)   SpO2 99%   BMI 45.88 kg/m²    Pain Score    05/24/24 1524   PainSc: 0-No pain         ECOG score: 0        ECOG Performance Status: 1 - Symptomatic but completely ambulatory      General Appearance:      alert, cooperative, no apparent distress and appears stated age   Lungs:   Clear to auscultation bilaterally; respirations regular, even, and unlabored bilaterally   Heart:  Regular rate and rhythm, no murmurs appreciated   Abdomen:   Soft, non-tender, non-distended, and no organomegaly                 Lab on 05/24/2024   Component Date Value Ref Range Status    WBC 05/24/2024 9.43  3.40 - 10.80 10*3/mm3 Final    RBC 05/24/2024 4.34  3.77 - 5.28 10*6/mm3 Final    Hemoglobin 05/24/2024 11.0 (L)  12.0 - 15.9 g/dL Final    Hematocrit 05/24/2024 35.2  34.0 - 46.6 % Final    MCV 05/24/2024 81.1  79.0 - 97.0 fL Final    MCH 05/24/2024 25.3 (L)  26.6 - 33.0 pg Final    MCHC 05/24/2024 31.3 (L)  31.5 - 35.7 g/dL Final    RDW 05/24/2024 15.7 (H)  12.3 - 15.4 % Final    RDW-SD 05/24/2024 47.5  37.0 - 54.0 fl Final    MPV 05/24/2024 9.8  6.0 - 12.0 fL Final    Platelets 05/24/2024 338  140 - 450 10*3/mm3 Final    Neutrophil % 05/24/2024 58.4  42.7 - 76.0 % Final    Lymphocyte % 05/24/2024 31.4  19.6 - 45.3 % Final    Monocyte % 05/24/2024 9.0  5.0 - 12.0 % Final    Eosinophil % 05/24/2024 1.0  0.3 - 6.2 % Final    Basophil % 05/24/2024 0.1  0.0 - 1.5 % Final    Immature Grans % " 05/24/2024 0.1  0.0 - 0.5 % Final    Neutrophils, Absolute 05/24/2024 5.51  1.70 - 7.00 10*3/mm3 Final    Lymphocytes, Absolute 05/24/2024 2.96  0.70 - 3.10 10*3/mm3 Final    Monocytes, Absolute 05/24/2024 0.85  0.10 - 0.90 10*3/mm3 Final    Eosinophils, Absolute 05/24/2024 0.09  0.00 - 0.40 10*3/mm3 Final    Basophils, Absolute 05/24/2024 0.01  0.00 - 0.20 10*3/mm3 Final    Immature Grans, Absolute 05/24/2024 0.01  0.00 - 0.05 10*3/mm3 Final        No results found.    ASSESSMENT: The patient is a very pleasant 43 y.o. female  with anemia      PLAN:    1.  Anemia:  A.  I did go over the CBC result with the patient her hemoglobin is down to 11.0 with normal MCV 81 normal white cells and platelets.    2.  History of iron deficiency:  A.  I will follow up on the iron studies from today and notify her of results.  B.  We will arrange for repeat IV iron replacement in the future if she has ferritin less than 30 or saturation less than 10%.     3. Positive JOSÉ MIGUEL:  A. She has been seen by rheumatology at  with labs that did not confirm a specific autoimmune process. She will follow up with them in 1 year.     FOLLOW UP: 6 months with CBC and iron profile    Michelle Eugene MD  5/24/2024

## 2024-07-10 ENCOUNTER — LAB (OUTPATIENT)
Dept: LAB | Facility: HOSPITAL | Age: 44
End: 2024-07-10
Payer: COMMERCIAL

## 2024-07-10 ENCOUNTER — OFFICE VISIT (OUTPATIENT)
Dept: INTERNAL MEDICINE | Facility: CLINIC | Age: 44
End: 2024-07-10
Payer: COMMERCIAL

## 2024-07-10 VITALS
SYSTOLIC BLOOD PRESSURE: 128 MMHG | TEMPERATURE: 96.9 F | WEIGHT: 293 LBS | HEART RATE: 76 BPM | DIASTOLIC BLOOD PRESSURE: 78 MMHG | OXYGEN SATURATION: 100 % | HEIGHT: 67 IN | BODY MASS INDEX: 45.99 KG/M2

## 2024-07-10 DIAGNOSIS — E55.9 VITAMIN D DEFICIENCY: ICD-10-CM

## 2024-07-10 DIAGNOSIS — R11.0 NAUSEA: ICD-10-CM

## 2024-07-10 DIAGNOSIS — Z00.00 HEALTHCARE MAINTENANCE: ICD-10-CM

## 2024-07-10 DIAGNOSIS — Z00.00 HEALTHCARE MAINTENANCE: Primary | ICD-10-CM

## 2024-07-10 DIAGNOSIS — R10.84 GENERALIZED ABDOMINAL PAIN: ICD-10-CM

## 2024-07-10 LAB
ALBUMIN SERPL-MCNC: 4.1 G/DL (ref 3.5–5.2)
ALBUMIN/GLOB SERPL: 1.2 G/DL
ALP SERPL-CCNC: 85 U/L (ref 39–117)
ALT SERPL W P-5'-P-CCNC: 11 U/L (ref 1–33)
ANION GAP SERPL CALCULATED.3IONS-SCNC: 10.5 MMOL/L (ref 5–15)
AST SERPL-CCNC: 12 U/L (ref 1–32)
BILIRUB BLD-MCNC: NEGATIVE MG/DL
BILIRUB SERPL-MCNC: 0.3 MG/DL (ref 0–1.2)
BUN SERPL-MCNC: 12 MG/DL (ref 6–20)
BUN/CREAT SERPL: 16 (ref 7–25)
CALCIUM SPEC-SCNC: 9.6 MG/DL (ref 8.6–10.5)
CHLORIDE SERPL-SCNC: 105 MMOL/L (ref 98–107)
CHOLEST SERPL-MCNC: 199 MG/DL (ref 0–200)
CLARITY, POC: CLEAR
CO2 SERPL-SCNC: 24.5 MMOL/L (ref 22–29)
COLOR UR: YELLOW
CREAT SERPL-MCNC: 0.75 MG/DL (ref 0.57–1)
EGFRCR SERPLBLD CKD-EPI 2021: 102.1 ML/MIN/1.73
EXPIRATION DATE: ABNORMAL
GLOBULIN UR ELPH-MCNC: 3.4 GM/DL
GLUCOSE SERPL-MCNC: 100 MG/DL (ref 65–99)
GLUCOSE UR STRIP-MCNC: NEGATIVE MG/DL
HBA1C MFR BLD: 5.9 % (ref 4.8–5.6)
HDLC SERPL-MCNC: 86 MG/DL (ref 40–60)
KETONES UR QL: NEGATIVE
LDLC SERPL CALC-MCNC: 101 MG/DL (ref 0–100)
LDLC/HDLC SERPL: 1.16 {RATIO}
LEUKOCYTE EST, POC: NEGATIVE
Lab: ABNORMAL
NITRITE UR-MCNC: NEGATIVE MG/ML
PH UR: 6 [PH] (ref 5–8)
POTASSIUM SERPL-SCNC: 4.1 MMOL/L (ref 3.5–5.2)
PROT SERPL-MCNC: 7.5 G/DL (ref 6–8.5)
PROT UR STRIP-MCNC: NEGATIVE MG/DL
RBC # UR STRIP: NEGATIVE /UL
SODIUM SERPL-SCNC: 140 MMOL/L (ref 136–145)
SP GR UR: 1.03 (ref 1–1.03)
TRIGL SERPL-MCNC: 65 MG/DL (ref 0–150)
TSH SERPL DL<=0.05 MIU/L-ACNC: 0.98 UIU/ML (ref 0.27–4.2)
UROBILINOGEN UR QL: NORMAL
VLDLC SERPL-MCNC: 12 MG/DL (ref 5–40)

## 2024-07-10 PROCEDURE — 84702 CHORIONIC GONADOTROPIN TEST: CPT

## 2024-07-10 PROCEDURE — 1126F AMNT PAIN NOTED NONE PRSNT: CPT | Performed by: INTERNAL MEDICINE

## 2024-07-10 PROCEDURE — 84443 ASSAY THYROID STIM HORMONE: CPT

## 2024-07-10 PROCEDURE — 82306 VITAMIN D 25 HYDROXY: CPT

## 2024-07-10 PROCEDURE — 80061 LIPID PANEL: CPT

## 2024-07-10 PROCEDURE — 1159F MED LIST DOCD IN RCRD: CPT | Performed by: INTERNAL MEDICINE

## 2024-07-10 PROCEDURE — 99396 PREV VISIT EST AGE 40-64: CPT | Performed by: INTERNAL MEDICINE

## 2024-07-10 PROCEDURE — 83036 HEMOGLOBIN GLYCOSYLATED A1C: CPT

## 2024-07-10 PROCEDURE — 1160F RVW MEDS BY RX/DR IN RCRD: CPT | Performed by: INTERNAL MEDICINE

## 2024-07-10 PROCEDURE — 80053 COMPREHEN METABOLIC PANEL: CPT

## 2024-07-10 NOTE — PROGRESS NOTES
Chief Complaint   Patient presents with    Annual Exam       Reported Health  Good Yes  FairNo  PoorNo      Dental,Vision,Hearing  Regular dental visitsYes  Vision ProblemsYes  Hearing LossNo      Immunization Status:  Up To DateYes        Lifestyle  Healthy DietYes  Weight ConcernsYes  Regular ExerciseYes  Tobacco UseNo  Alcohol UseNo  Drug AbuseNo      Screening  Cancer ScreeningYes  Metabolic ScreeningYes  Risk ScreeningYes  Past Medical History:   Diagnosis Date    Absolute anemia     Anemia     Arthritis     Right knee    Carpal tunnel syndrome     RIGHT WRIST    Cholelithiasis     Nausea related to stones has phenergan. Also has related RUQ pain.    Dizziness     Elevated C-reactive protein (CRP)     Elevated erythrocyte sedimentation rate     Elevated hemoglobin A1c     labs 2022 6.0    GERD (gastroesophageal reflux disease)     Glaucoma     negative per pt 2022    Heart disease     Influenza     Iron deficiency     Will not take iron supplement but will take MVI    Left hip pain     Migraines     Numbness and tingling in right hand     Has nerve conductions 3/23/2016, awaiting results. Along with Paresthesia.    Osteoarthritis     Pain and swelling of right forearm     Check u/s to r/o clot. Rash and redness are resolving, will continue to monitor.    Pain of left arm     Sleep disturbances     Vitamin D deficiency     25 oh.     Past Surgical History:   Procedure Laterality Date     SECTION  2006    CHOLECYSTECTOMY      COLONOSCOPY  2016    CYST REMOVAL      behind belly button    TONSILLECTOMY  2008    URACHAL CYST INCISION      History of Excision Of Urachal Cyst.     Family History   Problem Relation Age of Onset    Stroke Mother     Hypertension Mother     Migraines Mother     Osteoarthritis Mother     Cervical cancer Mother     Heart disease Mother     Hypertension Father     Diabetes Father     Hyperlipidemia Maternal Grandmother     Osteoarthritis Maternal Grandmother     Birth  "defects Maternal Grandmother     Ovarian cancer Maternal Grandmother         PREMENOPAUSAL     Transient ischemic attack Maternal Grandmother     Hypertension Maternal Grandmother     Stroke Other         CVA x 2. and TIA    Cancer Other         Cervical, malignant neoplasm x2 , ovarian    Breast cancer Neg Hx      Social History     Socioeconomic History    Marital status: Single   Tobacco Use    Smoking status: Never    Smokeless tobacco: Never   Vaping Use    Vaping status: Never Used   Substance and Sexual Activity    Alcohol use: Not Currently     Alcohol/week: 2.0 standard drinks of alcohol     Types: 2 Glasses of wine per week     Comment: MAYBE SOCIALLY    Drug use: No    Sexual activity: Not Currently     Partners: Male     Birth control/protection: Condom, OCP         Review of Systems   Constitutional:  Negative for fatigue and fever.   HENT:  Negative for nosebleeds, sinus pressure and sinus pain.    Respiratory:  Negative for shortness of breath.    Cardiovascular:  Negative for chest pain and leg swelling.   Gastrointestinal:  Positive for abdominal pain and nausea. Negative for blood in stool, constipation, diarrhea and vomiting.        Abdominal pain for few months, comes and goes upper right to lower at times. No blood in stools. No weight loss. No vomiting.      /78 (BP Location: Left arm, Patient Position: Sitting, Cuff Size: Large Adult)   Pulse 76   Temp 96.9 °F (36.1 °C) (Temporal)   Ht 170 cm (66.93\")   Wt 133 kg (293 lb 12.8 oz)   SpO2 100%   BMI 46.11 kg/m²     Physical Exam  Vitals reviewed.   HENT:      Right Ear: Tympanic membrane and ear canal normal.      Left Ear: Tympanic membrane and ear canal normal.   Eyes:      Pupils: Pupils are equal, round, and reactive to light.   Cardiovascular:      Rate and Rhythm: Normal rate and regular rhythm.      Heart sounds: No murmur heard.  Pulmonary:      Effort: No respiratory distress.      Breath sounds: No wheezing.   Abdominal:    "   Palpations: Abdomen is soft.      Tenderness: There is no abdominal tenderness. There is no guarding.   Musculoskeletal:      Right lower leg: No edema.      Left lower leg: No edema.   Neurological:      Mental Status: She is alert and oriented to person, place, and time.   Psychiatric:         Mood and Affect: Mood normal.         Behavior: Behavior normal.                   Diet and Exercise    Healthy Diet Yes  Adequate DietYes  Poor DietNo  Adequate Exercise RegimenYes  Inadequate Exercise RegimenNo      Cervical Cancer Screening    Risks and benefits discussedYes  Screening currentYes  PAP Done Today OrderedNo  Screening Not IndicatedNo  Pap Every 3 yearsYes  Screening Done By GYNNo    Breast Cancer screening  Risks and Benefits DiscussedYes  Self Breast Exam taughtNo  Monthly Self Exam AdvisedYes  Screening CurrentYes  Mammogram OrderedNo  Screening Not IndicatedNo  Screening Managed By GYNYes  Patient DeclinesNo      STD Testing  ChlamydiaNo  GonorrheaNo  HIVNo      Osteoporosis Screening  Not indicated    Colorectal Cancer Screening  Screen at 45    Metabolic Screening  GlucoseYes  LipidsYes  CBCYes  TSHYes  UAYes  CMPYes  25OHNo      Immunizations  Risks and benefits discussedYes  Immunizations Up To DateYes  Immunizations NeededNo  Immunizations Per OrdersNo  Patient DNoeclines      Preventative Counseling  NutritionYes  Aerobic ExerciseYes  Weight Bearing ExerciseYes  Weight LossYes  Calcium SupplementsNo  Vitamin D SupplementsYes  Reproductive HealthNo  Cardiovascular Risk ReductionYes  Tobacco CessationNo  Alcohol UseYes  Sunscreen UseYes  Self Skin ExaminationYes  Helmet UseNo  Seat Belt UseYes  Fall Risk ReductionNo  Advanced Directive PlanningNo      Patient Discussion  PatientYes  FamilyNo  CounselingYes  Diagnoses and all orders for this visit:    1. Healthcare maintenance (Primary)  -     POCT urinalysis dipstick, automated  -     Lipid Panel; Future  -     Comprehensive Metabolic Panel;  Future  -     TSH; Future  -     Hemoglobin A1c; Future    2. Generalized abdominal pain  -     US Abdomen Complete; Future    3. Nausea  -     US Abdomen Complete; Future  -     hCG, Quantitative, Pregnancy; Future    4. Vitamin D deficiency  -     Vitamin D,25-Hydroxy; Future

## 2024-07-11 ENCOUNTER — TELEPHONE (OUTPATIENT)
Dept: INTERNAL MEDICINE | Facility: CLINIC | Age: 44
End: 2024-07-11
Payer: COMMERCIAL

## 2024-07-11 LAB
25(OH)D3 SERPL-MCNC: 14.2 NG/ML (ref 30–100)
HCG INTACT+B SERPL-ACNC: <1 MIU/ML

## 2024-07-11 RX ORDER — ERGOCALCIFEROL 1.25 MG/1
50000 CAPSULE ORAL WEEKLY
Qty: 8 CAPSULE | Refills: 0 | Status: SHIPPED | OUTPATIENT
Start: 2024-07-11

## 2024-07-11 NOTE — TELEPHONE ENCOUNTER
Called and spoke to pt. Gave message from provider. Pt voiced understanding and appreciation.       ----- Message from Acacia Lobato sent at 7/11/2024 10:07 AM EDT -----  Vitamin d very low. Sent script in for 50,000 once a week x 8 weeks then on week 9 will change to OTC vitamin d 3 1,000 IU daily

## 2024-07-13 ENCOUNTER — HOSPITAL ENCOUNTER (OUTPATIENT)
Facility: HOSPITAL | Age: 44
Discharge: HOME | End: 2024-07-13
Payer: COMMERCIAL

## 2024-07-13 DIAGNOSIS — R11.0 NAUSEA: ICD-10-CM

## 2024-07-13 DIAGNOSIS — R10.84 GENERALIZED ABDOMINAL PAIN: ICD-10-CM

## 2024-07-13 PROCEDURE — 76700 US EXAM ABDOM COMPLETE: CPT

## 2024-07-15 ENCOUNTER — TELEPHONE (OUTPATIENT)
Dept: INTERNAL MEDICINE | Facility: CLINIC | Age: 44
End: 2024-07-15
Payer: COMMERCIAL

## 2024-07-15 NOTE — TELEPHONE ENCOUNTER
Left detailed voice message .. Patient to call back with further questions!  ----- Message from Acacia Lobato sent at 7/15/2024  7:40 AM EDT -----  No acute findings on ultrasound of abdomen

## 2024-08-07 ENCOUNTER — SPECIALTY PHARMACY (OUTPATIENT)
Dept: GENERAL RADIOLOGY | Facility: HOSPITAL | Age: 44
End: 2024-08-07
Payer: COMMERCIAL

## 2024-08-07 NOTE — PROGRESS NOTES
Specialty Pharmacy Patient Management Program  Neurology Reassessment     Nury Pineda is a 43 y.o. female with Chronic Migraines seen by a Neurology provider and enrolled in the Neurology Patient Management program offered by [facility].  A follow-up outreach was conducted, including assessment of continued therapy appropriateness, medication adherence, and side effect incidence and management for Nurtec.     Changes to Insurance Coverage or Financial Support  No changes, pt still has [place] Medicaid      Relevant Past Medical History and Comorbidities  Relevant medical history and concomitant health conditions were discussed with the patient. The patient's chart has been reviewed for relevant past medical history and comorbid health conditions and updated as necessary.   Past Medical History:   Diagnosis Date    Absolute anemia     Anemia     Arthritis     Right knee    Carpal tunnel syndrome     RIGHT WRIST    Cholelithiasis     Nausea related to stones has phenergan. Also has related RUQ pain.    Dizziness     Elevated C-reactive protein (CRP)     Elevated erythrocyte sedimentation rate     Elevated hemoglobin A1c     labs 7/2022 6.0    GERD (gastroesophageal reflux disease)     Glaucoma     negative per pt 7/2022    Heart disease     Influenza     Iron deficiency     Will not take iron supplement but will take MVI    Left hip pain     Migraines     Numbness and tingling in right hand     Has nerve conductions 3/23/2016, awaiting results. Along with Paresthesia.    Osteoarthritis     Pain and swelling of right forearm     Check u/s to r/o clot. Rash and redness are resolving, will continue to monitor.    Pain of left arm     Sleep disturbances     Vitamin D deficiency     25 oh.     Social History     Socioeconomic History    Marital status: Single   Tobacco Use    Smoking status: Never    Smokeless tobacco: Never   Vaping Use    Vaping status: Never Used   Substance and Sexual  Activity    Alcohol use: Not Currently     Alcohol/week: 2.0 standard drinks of alcohol     Types: 2 Glasses of wine per week     Comment: MAYBE SOCIALLY    Drug use: No    Sexual activity: Not Currently     Partners: Male     Birth control/protection: Condom, OCP     Problem list reviewed by Allen Cheng, PharmD on 8/7/2024 at 10:37 AM    Hospitalizations and Urgent Care Since Last Assessment  ED Visits, Admissions, or Hospitalizations: None   Urgent Office Visits: None     Allergies  Known allergies and reactions were discussed with the patient. The patient's chart has been reviewed for allergy information and updated as necessary.   Allergies   Allergen Reactions    Triptans Other (See Comments)     Chest pain, tightness in throat, vasocontriction    Amoxicillin Swelling     Throat swells    Penicillins Swelling     Throat swells     Allergies reviewed by Allen Cheng, PharmD on 8/7/2024 at 10:37 AM    Relevant Laboratory Values  Common labs          3/4/2024    12:52 5/24/2024    15:22 7/10/2024    10:50   Common Labs   Glucose   100    BUN   12    Creatinine   0.75    Sodium   140    Potassium   4.1    Chloride   105    Calcium   9.6    Albumin   4.1    Total Bilirubin   0.3    Alkaline Phosphatase   85    AST (SGOT)   12    ALT (SGPT)   11    WBC 8.83  9.43     Hemoglobin 11.8  11.0     Hematocrit 36.5  35.2     Platelets 302  338     Total Cholesterol   199    Triglycerides   65    HDL Cholesterol   86    LDL Cholesterol    101    Hemoglobin A1C   5.90        Lab Assessment  N/A.     Current Medication List  This medication list has been reviewed with the patient and evaluated for any interactions or necessary modifications/recommendations, and updated to include all prescription medications, OTC medications, and supplements the patient is currently taking.  This list reflects what is contained in the patient's profile, which has also been marked as reviewed to communicate to other providers it is  the most up to date version of the patient's current medication therapy.     Current Outpatient Medications:     hydrOXYzine (ATARAX) 25 MG tablet, Take 1 tablet by mouth 3 (Three) Times a Day As Needed for Itching or Anxiety., Disp: 30 tablet, Rfl: 0    montelukast (SINGULAIR) 10 MG tablet, Take 1 tablet by mouth Every Night., Disp: 90 tablet, Rfl: 0    Norethin-Eth Estrad-Fe Biphas (Lo Loestrin Fe) 1 MG-10 MCG / 10 MCG tablet, Take 1 tablet by mouth Daily., Disp: 90 tablet, Rfl: 4    omeprazole (priLOSEC) 40 MG capsule, Take 1 capsule by mouth Daily., Disp: 90 capsule, Rfl: 3    prochlorperazine (COMPAZINE) 10 MG tablet, Take 1 tablet by mouth Every 6 (Six) Hours As Needed for Nausea or Vomiting., Disp: 15 tablet, Rfl: 5    Rimegepant Sulfate (Nurtec) 75 MG tablet dispersible tablet, Place 1 tablet on the tongue As Needed for Migraine. Take 1 tablet at the onset of headache, Max of 75 mg/24 hours., Disp: 16 tablet, Rfl: 11    vitamin D (ERGOCALCIFEROL) 1.25 MG (83320 UT) capsule capsule, Take 1 capsule by mouth 1 (One) Time Per Week., Disp: 8 capsule, Rfl: 0  No current facility-administered medications for this visit.    Medicines reviewed by Allen Cheng, PharmD on 8/7/2024 at 10:37 AM    Drug Interactions  None     Adverse Drug Reactions  Medication tolerability: Tolerating with no to minimal ADRs          Medication plan: Continue therapy with normal follow-up  Plan for ADR Management: Not required      Adherence, Self-Administration, and Current Therapy Problems  Adherence related patient's specialty therapy was discussed with the patient. The Adherence segment of this outreach has been reviewed and updated.   Adherence Questions  Linked Medication(s) Assessed: Rimegepant Sulfate  On average, how many doses/injections does the patient miss per month?: 0  What are the identified reasons for non-adherence or missed doses? : no problems identified  What is the estimated medication adherence level?: %  (Nurtec-PRN)  Based on the patient/caregiver response and refill history, does this patient require an MTP to track adherence improvements?: no    Additional Barriers to Patient Self-Administration: None  Methods for Supporting Patient Self-Administration: Not required.    Recently Close Medication Therapy Problems  No medication therapy recommendations to display  Open Medication Therapy Problems  No medication therapy recommendations to display     Goals of Therapy  Goals related to the patient's specialty therapy was discussed with the patient. The Patient Goals segment of this outreach has been reviewed and updated.    Goals Addressed Today        Specialty Pharmacy General Goal      Decrease frequency, severity and duration of migraine attacks.                 Quality of Life Assessment   Quality of Life related to the patient's enrollment in the patient management program and services provided was discussed with the patient. The QOL segment of this outreach has been reviewed and updated.   Quality of Life Improvement Scale: 8-Moderately better    Reassessment Plan & Follow-Up  Medication Therapy Changes: Continue Nurtec 75mg- take 1 tab po prn onset of headache (max 1 tab/day)  Related Plans, Therapy Recommendations, or Issues to Be Addressed: Pt is doing well on Nurtec prn for treatment of migraines. She uses this sparingly and has not had a migraine over the last few months. Pt did not have any questions or concerns about the medication at this time. She will continue to fill through [facility] and ship via [facility] priority overnight in the future. She will call with any questions or concerns.   Pharmacist to perform regular reassessments no more than (6) months from the previous assessment.  Care Coordinator to set up future refill outreaches, coordinate prescription delivery, and escalate clinical questions to pharmacist.     Attestation  Therapeutic appropriateness: Appropriate  I attest the  patient was actively involved in and has agreed to the above plan of care. If the prescribed therapy is at any point deemed not appropriate based on the current or future assessments, a consultation will be initiated with the patient's specialty care provider to determine the best course of action. The revised plan of therapy will be documented along with any additional patient education provided. Discussed aforementioned material with patient by phone.    Allen Cheng, [facility]  [facility]inic Specialty Pharmacist, Neurology  8/7/2024  11:03 EDT

## 2024-09-04 RX ORDER — ERGOCALCIFEROL 1.25 MG/1
50000 CAPSULE, LIQUID FILLED ORAL WEEKLY
Qty: 8 CAPSULE | Refills: 0 | Status: SHIPPED | OUTPATIENT
Start: 2024-09-04

## 2024-11-06 ENCOUNTER — TELEPHONE (OUTPATIENT)
Dept: INTERNAL MEDICINE | Facility: CLINIC | Age: 44
End: 2024-11-06
Payer: COMMERCIAL

## 2024-11-06 NOTE — TELEPHONE ENCOUNTER
Caller: Nury Pineda    Relationship: Self    Best call back number: [phone]    What is the best time to reach you: ANYTIME     Who are you requesting to speak with (clinical staff, provider,  specific staff member): CLINICAL STAFF     PATIENT WANTS TO GET THE OK FOR HER TO GET COLON HYDROTHERAPY. PATIENT STATES SHE NEEDS THE OK FROM PROVIDER TO BE ABLE TO RECEIVE THIS.     FAX #[phone]   [email or web address]

## 2024-11-20 ENCOUNTER — PATIENT ROUNDING (BHMG ONLY) (OUTPATIENT)
Dept: URGENT CARE | Facility: CLINIC | Age: 44
End: 2024-11-20
Payer: COMMERCIAL

## 2024-11-25 ENCOUNTER — OFFICE VISIT (OUTPATIENT)
Age: 44
End: 2024-11-25
Payer: COMMERCIAL

## 2024-11-25 ENCOUNTER — LAB (OUTPATIENT)
Facility: HOSPITAL | Age: 44
End: 2024-11-25
Payer: COMMERCIAL

## 2024-11-25 VITALS
WEIGHT: 282.4 LBS | DIASTOLIC BLOOD PRESSURE: 89 MMHG | BODY MASS INDEX: 45.38 KG/M2 | SYSTOLIC BLOOD PRESSURE: 138 MMHG | HEART RATE: 77 BPM | TEMPERATURE: 98 F | HEIGHT: 66 IN | OXYGEN SATURATION: 97 % | RESPIRATION RATE: 18 BRPM

## 2024-11-25 DIAGNOSIS — D50.0 IRON DEFICIENCY ANEMIA DUE TO CHRONIC BLOOD LOSS: ICD-10-CM

## 2024-11-25 DIAGNOSIS — D50.0 IRON DEFICIENCY ANEMIA DUE TO CHRONIC BLOOD LOSS: Primary | ICD-10-CM

## 2024-11-25 DIAGNOSIS — K90.9 IRON MALABSORPTION: ICD-10-CM

## 2024-11-25 LAB
BASOPHILS # BLD AUTO: 0.02 10*3/MM3 (ref 0–0.2)
BASOPHILS NFR BLD AUTO: 0.3 % (ref 0–1.5)
DEPRECATED RDW RBC AUTO: 48 FL (ref 37–54)
EOSINOPHIL # BLD AUTO: 0.12 10*3/MM3 (ref 0–0.4)
EOSINOPHIL NFR BLD AUTO: 1.6 % (ref 0.3–6.2)
ERYTHROCYTE [DISTWIDTH] IN BLOOD BY AUTOMATED COUNT: 16 % (ref 12.3–15.4)
FERRITIN SERPL-MCNC: 447 NG/ML (ref 13–150)
HCT VFR BLD AUTO: 37 % (ref 34–46.6)
HGB BLD-MCNC: 11.3 G/DL (ref 12–15.9)
IMM GRANULOCYTES # BLD AUTO: 0.03 10*3/MM3 (ref 0–0.05)
IMM GRANULOCYTES NFR BLD AUTO: 0.4 % (ref 0–0.5)
IRON 24H UR-MRATE: 56 MCG/DL (ref 37–145)
IRON SATN MFR SERPL: 20 % (ref 20–50)
LYMPHOCYTES # BLD AUTO: 2.63 10*3/MM3 (ref 0.7–3.1)
LYMPHOCYTES NFR BLD AUTO: 34.6 % (ref 19.6–45.3)
MCH RBC QN AUTO: 25.3 PG (ref 26.6–33)
MCHC RBC AUTO-ENTMCNC: 30.5 G/DL (ref 31.5–35.7)
MCV RBC AUTO: 82.8 FL (ref 79–97)
MONOCYTES # BLD AUTO: 0.68 10*3/MM3 (ref 0.1–0.9)
MONOCYTES NFR BLD AUTO: 8.9 % (ref 5–12)
NEUTROPHILS NFR BLD AUTO: 4.12 10*3/MM3 (ref 1.7–7)
NEUTROPHILS NFR BLD AUTO: 54.2 % (ref 42.7–76)
NRBC BLD AUTO-RTO: 0 /100 WBC (ref 0–0.2)
PLATELET # BLD AUTO: 309 10*3/MM3 (ref 140–450)
PMV BLD AUTO: 11.5 FL (ref 6–12)
RBC # BLD AUTO: 4.47 10*6/MM3 (ref 3.77–5.28)
TIBC SERPL-MCNC: 286 MCG/DL (ref 298–536)
TRANSFERRIN SERPL-MCNC: 192 MG/DL (ref 200–360)
WBC NRBC COR # BLD AUTO: 7.6 10*3/MM3 (ref 3.4–10.8)

## 2024-11-25 PROCEDURE — 36415 COLL VENOUS BLD VENIPUNCTURE: CPT

## 2024-11-25 PROCEDURE — 83540 ASSAY OF IRON: CPT

## 2024-11-25 PROCEDURE — 99214 OFFICE O/P EST MOD 30 MIN: CPT | Performed by: NURSE PRACTITIONER

## 2024-11-25 PROCEDURE — 1126F AMNT PAIN NOTED NONE PRSNT: CPT | Performed by: NURSE PRACTITIONER

## 2024-11-25 PROCEDURE — 85025 COMPLETE CBC W/AUTO DIFF WBC: CPT

## 2024-11-25 PROCEDURE — 82728 ASSAY OF FERRITIN: CPT

## 2024-11-25 PROCEDURE — 84466 ASSAY OF TRANSFERRIN: CPT

## 2024-11-25 NOTE — PROGRESS NOTES
DATE OF VISIT: 2024    REASON FOR VISIT: Followup for iron deficiency anemia     PROBLEM LIST:  1. Anemia  2.  Microcytosis  3.  Iron deficiency:  A.  EGD done 2020 revealed erosive esophagitis  4.  Positive JOSÉ MIGUEL with inflammation markers  A. Followed by rheumatology at     HISTORY OF PRESENT ILLNESS: The patient is a very pleasant 44 y.o. female with past medical history significant for iron deficiency anemia diagnosed 2020.  The patient has been through with IV iron replacement. The patient is here today for scheduled follow-up visit.    SUBJECTIVE: The patient is here today by herself. She has been doing fairly well. She complains of fatigue and is wondering if her iron levels are low again. She denies changes in bowel function or stool color. She has been compliant with use of omperazole. She has been having some ear popping and sinus pressure along with dizziness for the past few days. She denies fever or chills. She recently had a viral illness.     Past History:  Medical History: has a past medical history of Absolute anemia, Anemia, Arthritis, Carpal tunnel syndrome, Cholelithiasis, Dizziness, Elevated C-reactive protein (CRP), Elevated erythrocyte sedimentation rate, Elevated hemoglobin A1c, GERD (gastroesophageal reflux disease), Glaucoma, Heart disease, Influenza, Iron deficiency, Left hip pain, Migraines, Numbness and tingling in right hand, Osteoarthritis, Pain and swelling of right forearm, Pain of left arm, Sleep disturbances, and Vitamin D deficiency.   Surgical History: has a past surgical history that includes Tonsillectomy (); Urachal cyst incision;  section (); Cholecystectomy (); Cyst Removal (); and Colonoscopy ().   Family History: family history includes Birth defects in her maternal grandmother; Cancer in an other family member; Cervical cancer in her mother; Diabetes in her father; Heart disease in her mother; Hyperlipidemia in her maternal  "grandmother; Hypertension in her father, maternal grandmother, and mother; Migraines in her mother; Osteoarthritis in her maternal grandmother and mother; Ovarian cancer in her maternal grandmother; Stroke in her mother and another family member; Transient ischemic attack in her maternal grandmother.   Social History: reports that she has never smoked. She has never used smokeless tobacco. She reports that she does not currently use alcohol after a past usage of about 2.0 standard drinks of alcohol per week. She reports that she does not use drugs.    (Not in a hospital admission)     Allergies: Triptans, Amoxicillin, and Penicillins     Review of Systems   Constitutional:  Positive for fatigue.   HENT:  Positive for congestion and tinnitus.    Neurological:  Positive for dizziness.       PHYSICAL EXAMINATION:   /89   Pulse 77   Temp 98 °F (36.7 °C) (Temporal)   Resp 18   Ht 167.6 cm (65.98\")   Wt 128 kg (282 lb 6.4 oz)   LMP 11/15/2024   SpO2 97%   BMI 45.60 kg/m²    Pain Score    11/25/24 1116   PainSc: 0-No pain         ECOG score: 0        ECOG Performance Status: 0 - Asymptomatic      General Appearance:      alert, cooperative, no apparent distress and appears stated age   Lungs:   Clear to auscultation bilaterally; respirations regular, even, and unlabored bilaterally   Heart:  Regular rate and rhythm, no murmurs appreciated   Abdomen:   Soft, non-tender, non-distended, and no organomegaly                 Admission on 11/19/2024, Discharged on 11/19/2024   Component Date Value Ref Range Status    Rapid Strep A Screen 11/19/2024 Negative   Final    Internal Control 11/19/2024 Passed   Final    Lot Number 11/19/2024 [id number]   Final    Expiration Date 11/19/2024 01/03/2027   Final        No results found.    ASSESSMENT: The patient is a very pleasant 44 y.o. female  with anemia      PLAN:    1.  Anemia:  A.  The patient has not yet had labs drawn today, but will get CBC, iron profile, and " ferritin completed. We will follow up on these results and notify her of findings.     2.  History of iron deficiency:  A.  I will follow up on the iron studies from today and notify her of results.  B.  We will arrange for repeat IV iron replacement in the future if she has ferritin less than 30 or saturation less than 10%.     3. Positive JOSÉ MIGUEL:  A. She is seeing rheumatology at  annually for follow up.     4. Dizziness and ear popping after viral illness:  A. I recommended she try decongestants and antihistamines scheduled for the next 3-5 days to see if this helps relieve the discomfort and dizziness. If it persists she was instructed to follow up with her PCP for possible referral to ENT if needed.     FOLLOW UP: 6 months with CBC and iron profile    Dian Peck, APRN  11/25/2024

## 2024-12-04 ENCOUNTER — TELEPHONE (OUTPATIENT)
Age: 44
End: 2024-12-04
Payer: COMMERCIAL

## 2024-12-04 NOTE — TELEPHONE ENCOUNTER
Spoke with the patient regarding iron studies showing iron saturation of 20% with ferritin still elevated to 447. I think her ferritin is elevated falsely due to inflammation from underlying autoimmune disease as opposed to iron excess. No need for IV iron at this time. We will keep scheduled follow up, but I asked her to call sooner with any new symptoms. She verbalized understanding.

## 2024-12-18 ENCOUNTER — SPECIALTY PHARMACY (OUTPATIENT)
Dept: NEUROLOGY | Facility: CLINIC | Age: 44
End: 2024-12-18
Payer: COMMERCIAL

## 2025-01-10 ENCOUNTER — OFFICE VISIT (OUTPATIENT)
Dept: INTERNAL MEDICINE | Facility: CLINIC | Age: 45
End: 2025-01-10
Payer: COMMERCIAL

## 2025-01-10 ENCOUNTER — LAB (OUTPATIENT)
Dept: LAB | Facility: HOSPITAL | Age: 45
End: 2025-01-10
Payer: COMMERCIAL

## 2025-01-10 VITALS
TEMPERATURE: 95.9 F | WEIGHT: 291.4 LBS | HEIGHT: 66 IN | DIASTOLIC BLOOD PRESSURE: 86 MMHG | BODY MASS INDEX: 46.83 KG/M2 | OXYGEN SATURATION: 99 % | SYSTOLIC BLOOD PRESSURE: 130 MMHG

## 2025-01-10 DIAGNOSIS — R73.03 PREDIABETES: ICD-10-CM

## 2025-01-10 DIAGNOSIS — R35.0 URINARY FREQUENCY: ICD-10-CM

## 2025-01-10 DIAGNOSIS — K21.9 GASTROESOPHAGEAL REFLUX DISEASE WITHOUT ESOPHAGITIS: Primary | ICD-10-CM

## 2025-01-10 DIAGNOSIS — M25.551 BILATERAL HIP PAIN: ICD-10-CM

## 2025-01-10 DIAGNOSIS — M25.552 BILATERAL HIP PAIN: ICD-10-CM

## 2025-01-10 LAB
BILIRUB BLD-MCNC: NEGATIVE MG/DL
CLARITY, POC: CLEAR
COLOR UR: YELLOW
EXPIRATION DATE: NORMAL
GLUCOSE UR STRIP-MCNC: NEGATIVE MG/DL
HBA1C MFR BLD: 5.7 % (ref 4.8–5.6)
KETONES UR QL: NEGATIVE
LEUKOCYTE EST, POC: NEGATIVE
Lab: NORMAL
NITRITE UR-MCNC: NEGATIVE MG/ML
PH UR: 6 [PH] (ref 5–8)
PROT UR STRIP-MCNC: NEGATIVE MG/DL
RBC # UR STRIP: NEGATIVE /UL
SP GR UR: 1.03 (ref 1–1.03)
UROBILINOGEN UR QL: NORMAL

## 2025-01-10 PROCEDURE — 83036 HEMOGLOBIN GLYCOSYLATED A1C: CPT

## 2025-01-10 PROCEDURE — 1125F AMNT PAIN NOTED PAIN PRSNT: CPT | Performed by: INTERNAL MEDICINE

## 2025-01-10 PROCEDURE — 1160F RVW MEDS BY RX/DR IN RCRD: CPT | Performed by: INTERNAL MEDICINE

## 2025-01-10 PROCEDURE — 1159F MED LIST DOCD IN RCRD: CPT | Performed by: INTERNAL MEDICINE

## 2025-01-10 PROCEDURE — 36415 COLL VENOUS BLD VENIPUNCTURE: CPT

## 2025-01-10 PROCEDURE — 99214 OFFICE O/P EST MOD 30 MIN: CPT | Performed by: INTERNAL MEDICINE

## 2025-01-10 NOTE — PROGRESS NOTES
"Subjective   Nury Pineda is a 44 y.o. female.     History of Present Illness   6 m f/u on GERD. GERD controlled with Prilosec. No CP or SOA. Urinary frequency and low back pain for few days. No fever.   The following portions of the patient's history were reviewed and updated as appropriate: allergies, current medications, past family history, past medical history, past social history, past surgical history, and problem list.    Review of Systems   Constitutional:  Negative for fatigue and fever.   Respiratory:  Negative for cough and shortness of breath.    Cardiovascular:  Negative for chest pain and leg swelling.   Gastrointestinal:  Negative for abdominal pain.   Genitourinary:  Positive for frequency. Negative for dysuria.   Musculoskeletal:  Positive for back pain.   Psychiatric/Behavioral:  Negative for confusion.      /86 (BP Location: Left arm, Patient Position: Sitting, Cuff Size: Adult)   Temp 95.9 °F (35.5 °C) (Temporal)   Ht 167 cm (65.75\")   Wt 132 kg (291 lb 6.4 oz)   SpO2 99%   BMI 47.39 kg/m²     Objective   Physical Exam  Vitals reviewed.   Cardiovascular:      Rate and Rhythm: Normal rate and regular rhythm.   Pulmonary:      Effort: No respiratory distress.      Breath sounds: No wheezing.   Abdominal:      Tenderness: There is no right CVA tenderness or left CVA tenderness.   Neurological:      General: No focal deficit present.      Mental Status: She is alert and oriented to person, place, and time.   Psychiatric:         Behavior: Behavior normal.         Assessment & Plan   Diagnoses and all orders for this visit:    1. Gastroesophageal reflux disease without esophagitis (Primary)  Continue Prilosec.  2. Bilateral hip pain  -     Ambulatory Referral to Orthopedic Surgery  Seen at  and had xr at  a year ago. Did PT with no improvement.   3. Prediabetes  -     Hemoglobin A1c; Future    4. Urinary frequency  -     POCT urinalysis dipstick, automated  UA negative. Will " check blood sugar.

## 2025-01-13 ENCOUNTER — OFFICE VISIT (OUTPATIENT)
Dept: ORTHOPEDIC SURGERY | Facility: CLINIC | Age: 45
End: 2025-01-13
Payer: COMMERCIAL

## 2025-01-13 VITALS
BODY MASS INDEX: 46.77 KG/M2 | HEIGHT: 66 IN | SYSTOLIC BLOOD PRESSURE: 126 MMHG | WEIGHT: 291 LBS | DIASTOLIC BLOOD PRESSURE: 86 MMHG

## 2025-01-13 DIAGNOSIS — M25.551 RIGHT HIP PAIN: Primary | ICD-10-CM

## 2025-01-13 DIAGNOSIS — E66.813 CLASS 3 SEVERE OBESITY DUE TO EXCESS CALORIES WITHOUT SERIOUS COMORBIDITY WITH BODY MASS INDEX (BMI) OF 45.0 TO 49.9 IN ADULT: ICD-10-CM

## 2025-01-13 DIAGNOSIS — E66.01 CLASS 3 SEVERE OBESITY DUE TO EXCESS CALORIES WITHOUT SERIOUS COMORBIDITY WITH BODY MASS INDEX (BMI) OF 45.0 TO 49.9 IN ADULT: ICD-10-CM

## 2025-01-13 PROCEDURE — 1159F MED LIST DOCD IN RCRD: CPT | Performed by: ORTHOPAEDIC SURGERY

## 2025-01-13 PROCEDURE — 1160F RVW MEDS BY RX/DR IN RCRD: CPT | Performed by: ORTHOPAEDIC SURGERY

## 2025-01-13 PROCEDURE — 99204 OFFICE O/P NEW MOD 45 MIN: CPT | Performed by: ORTHOPAEDIC SURGERY

## 2025-01-13 NOTE — PROGRESS NOTES
[facility] [place] [facility] Note    Subjective     Chief Complaint   Patient presents with    Right Hip - Pain, Initial Evaluation        HPI    Nury Pineda is a 44 y.o. female who presents with new problem of: right hip pain.  Onset: atraumatic and gradual in nature. The issue has been ongoing for 2 year(s). Pain is a 8/10 on the pain scale. Pain is described as aching. Associated symptoms include pain and stiffness. The pain is worse with walking, climbing stairs, sleeping, and rising from seated position; resting and pain medication and/or NSAID improve the pain. Previous treatments have included: NSAIDS and physical therapy.  Pain is located on the lateral aspect of the hip.  She tried a course of physical therapy for about 3 months, with no relief.  No groin pain.    I have reviewed the following portions of the patient's history and agree with: History of Present Illness and Review of Systems    Patient Active Problem List   Diagnosis    Daily headache    H/O atypical migraine    Transient vision disturbance of both eyes    Migraine variant with headache    Morbid obesity with BMI of 50.0-59.9, adult    Numbness and tingling in right hand    Facial tingling sensation    Sleep disturbance    Iron deficiency anemia    Iron deficiency anemia due to chronic blood loss    Knee pain    Dizziness, nonspecific    Encounter for gynecological examination without abnormal finding    Vitamin D deficiency    Iron deficiency    Morbid obesity    Gastroesophageal reflux disease without esophagitis    Hoarseness    Lt facial numbness    Iron malabsorption    Excessive daytime sleepiness    Anemia    Class 3 severe obesity with serious comorbidity and body mass index (BMI) of 50.0 to 59.9 in adult    Back pain    Elevated hemoglobin A1c    Migraine with aura and without status migrainosus, not intractable     Past Medical History:   Diagnosis Date    Absolute anemia     Anemia     Arthritis     Right knee     Carpal tunnel syndrome     RIGHT WRIST    Cholelithiasis     Nausea related to stones has phenergan. Also has related RUQ pain.    Dizziness     Elevated C-reactive protein (CRP)     Elevated erythrocyte sedimentation rate     Elevated hemoglobin A1c     labs 2022 6.0    GERD (gastroesophageal reflux disease)     Glaucoma     negative per pt 2022    Heart disease     Influenza     Iron deficiency     Will not take iron supplement but will take MVI    Left hip pain     Migraines     Numbness and tingling in right hand     Has nerve conductions 3/23/2016, awaiting results. Along with Paresthesia.    Osteoarthritis     Pain and swelling of right forearm     Check u/s to r/o clot. Rash and redness are resolving, will continue to monitor.    Pain of left arm     Sleep disturbances     Vitamin D deficiency     25 oh.      Past Surgical History:   Procedure Laterality Date     SECTION      CHOLECYSTECTOMY      COLONOSCOPY  2016    CYST REMOVAL      behind belly button    TONSILLECTOMY  2008    URACHAL CYST INCISION      History of Excision Of Urachal Cyst.      Family History   Problem Relation Age of Onset    Stroke Mother     Hypertension Mother     Migraines Mother     Osteoarthritis Mother     Cervical cancer Mother     Heart disease Mother     Hypertension Father     Diabetes Father     Hyperlipidemia Maternal Grandmother     Osteoarthritis Maternal Grandmother     Birth defects Maternal Grandmother     Ovarian cancer Maternal Grandmother         PREMENOPAUSAL     Transient ischemic attack Maternal Grandmother     Hypertension Maternal Grandmother     Stroke Other         CVA x 2. and TIA    Cancer Other         Cervical, malignant neoplasm x2 , ovarian    Breast cancer Neg Hx      Social History     Socioeconomic History    Marital status: Single   Tobacco Use    Smoking status: Never    Smokeless tobacco: Never   Vaping Use    Vaping status: Never Used   Substance and Sexual Activity     Alcohol use: Not Currently     Alcohol/week: 2.0 standard drinks of alcohol     Types: 2 Glasses of wine per week     Comment: MAYBE SOCIALLY    Drug use: No    Sexual activity: Yes     Partners: Male     Birth control/protection: Condom, OCP      Current Outpatient Medications on File Prior to Visit   Medication Sig Dispense Refill    hydrOXYzine (ATARAX) 25 MG tablet Take 1 tablet by mouth 3 (Three) Times a Day As Needed for Itching or Anxiety. 30 tablet 0    montelukast (SINGULAIR) 10 MG tablet Take 1 tablet by mouth Every Night. 90 tablet 0    Norethin-Eth Estrad-Fe Biphas (Lo Loestrin Fe) 1 MG-10 MCG / 10 MCG tablet Take 1 tablet by mouth Daily. 90 tablet 4    omeprazole (priLOSEC) 40 MG capsule Take 1 capsule by mouth Daily. 90 capsule 3    prochlorperazine (COMPAZINE) 10 MG tablet Take 1 tablet by mouth Every 6 (Six) Hours As Needed for Nausea or Vomiting. 15 tablet 5    Rimegepant Sulfate (Nurtec) 75 MG tablet dispersible tablet Place 1 tablet on the tongue As Needed for Migraine. Take 1 tablet at the onset of headache, Max of 75 mg/24 hours. 16 tablet 11    vitamin D (ERGOCALCIFEROL) 1.25 MG (09821 UT) capsule capsule Take 1 capsule by mouth once a week 8 capsule 0     No current facility-administered medications on file prior to visit.      Allergies   Allergen Reactions    Triptans Other (See Comments)     Chest pain, tightness in throat, vasocontriction    Amoxicillin Swelling     Throat swells    Penicillins Swelling     Throat swells        Review of Systems   Constitutional:  Negative for activity change, appetite change, chills, diaphoresis, fatigue, fever and unexpected weight change.   HENT:  Negative for congestion, dental problem, drooling, ear discharge, ear pain, facial swelling, hearing loss, mouth sores, nosebleeds, postnasal drip, rhinorrhea, sinus pressure, sneezing, sore throat, tinnitus, trouble swallowing and voice change.    Eyes:  Negative for photophobia, pain, discharge, redness,  "itching and visual disturbance.   Respiratory:  Negative for apnea, cough, choking, chest tightness, shortness of breath, wheezing and stridor.    Cardiovascular:  Negative for chest pain, palpitations and leg swelling.   Gastrointestinal:  Negative for abdominal distention, abdominal pain, anal bleeding, blood in stool, constipation, diarrhea, nausea, rectal pain and vomiting.   Endocrine: Negative for cold intolerance, heat intolerance, polydipsia, polyphagia and polyuria.   Genitourinary:  Negative for decreased urine volume, difficulty urinating, dysuria, enuresis, flank pain, frequency, genital sores, hematuria and urgency.   Musculoskeletal:  Positive for arthralgias. Negative for back pain, gait problem, joint swelling, myalgias, neck pain and neck stiffness.   Skin:  Negative for color change, pallor, rash and wound.   Allergic/Immunologic: Negative for environmental allergies, food allergies and immunocompromised state.   Neurological:  Negative for dizziness, tremors, seizures, syncope, facial asymmetry, speech difficulty, weakness, light-headedness, numbness and headaches.   Hematological:  Negative for adenopathy. Does not bruise/bleed easily.   Psychiatric/Behavioral:  Negative for agitation, behavioral problems, confusion, decreased concentration, dysphoric mood, hallucinations, self-injury, sleep disturbance and suicidal ideas. The patient is not nervous/anxious and is not hyperactive.         Objective      Physical Exam  /86   Ht 167 cm (65.75\")   Wt 132 kg (291 lb)   BMI 47.33 kg/m²     Body mass index is 47.33 kg/m².           General:   Mental Status:  Alert   Appearance: Cooperative, in no acute distress   Build and Nutrition: Obese by BMI female   Orientation: Alert and oriented to person, place and time   Posture: Normal   Gait: Nonantalgic    Integument:   Right hip: No skin lesions, no rash, no ecchymosis    Neurologic:   Motor:  Right lower extremity: 5/5 quadriceps, hamstrings, " ankle dorsiflexors, and ankle plantar flexors    Lower Extremities:   Right Hip:    Tenderness:  Lateral tenderness    Swelling: None    Crepitus:  None    Atrophy:  None    Range of motion:  External Rotation: 30°       Internal Rotation: 30°       Flexion:  100°       Extension:  0°   Instability:  None  Deformities:  None  Functional testing: Negative Stinchfield    No leg length discrepancy        Imaging/Studies      Imaging Results (Last 24 Hours)       Procedure Component Value Units Date/Time    XR Hip With or Without Pelvis 2 - 3 View Right [[id number]] Resulted: 01/13/25 0827     Updated: 01/13/25 0827    Narrative:      Right Hip Radiographs  Indication: right hip pain  Views: low AP pelvis and lateral of the right hip    Comparison: no prior studies available for review    Findings:   No acute bony abnormalities.  Good alignment.  Small spur superior lateral   acetabulum, pincer type.              Assessment and Plan     Diagnoses and all orders for this visit:    1. Right hip pain (Primary)  -     XR Hip With or Without Pelvis 2 - 3 View Right  -     MRI Hip Right Without Contrast; Future    2. Class 3 severe obesity due to excess calories without serious comorbidity with body mass index (BMI) of 45.0 to 49.9 in adult        1. Right hip pain    2. Class 3 severe obesity due to excess calories without serious comorbidity with body mass index (BMI) of 45.0 to 49.9 in adult          I reviewed my findings with the patient.  She has chronic right hip pain, that did not respond to physical therapy.  Location of the pain in her examination would suggest trochanteric bursitis and tendinitis.  At this point, given her lack of improvement with conservative treatment, I have recommended an MRI for further evaluation.  I will see her back after the MRI for review, but I will be happy to see her back sooner for any problems.    Return for after imaging study.      Sourav Peres MD  01/13/25  08:35  EST      Dictated Utilizing Dragon Dictation

## 2025-01-15 ENCOUNTER — SPECIALTY PHARMACY (OUTPATIENT)
Dept: GENERAL RADIOLOGY | Facility: HOSPITAL | Age: 45
End: 2025-01-15
Payer: COMMERCIAL

## 2025-01-15 NOTE — PROGRESS NOTES
Specialty Pharmacy Patient Management Program  Neurology Reassessment     Nury Pineda is a 44 y.o. female with Chronic Migraines seen by a Neurology provider and enrolled in the Neurology Patient Management program offered by [facility].  A follow-up outreach was conducted, including assessment of continued therapy appropriateness, medication adherence, and side effect incidence and management for Nurtec PRN.     Changes to Insurance Coverage or Financial Support  No change, pt still has [place] Medicaid     Relevant Past Medical History and Comorbidities  Relevant medical history and concomitant health conditions were discussed with the patient. The patient's chart has been reviewed for relevant past medical history and comorbid health conditions and updated as necessary.   Past Medical History:   Diagnosis Date    Absolute anemia     Anemia     Arthritis     Right knee    Carpal tunnel syndrome     RIGHT WRIST    Cholelithiasis     Nausea related to stones has phenergan. Also has related RUQ pain.    Dizziness     Elevated C-reactive protein (CRP)     Elevated erythrocyte sedimentation rate     Elevated hemoglobin A1c     labs 7/2022 6.0    GERD (gastroesophageal reflux disease)     Glaucoma     negative per pt 7/2022    Heart disease     Influenza     Iron deficiency     Will not take iron supplement but will take MVI    Left hip pain     Migraines     Numbness and tingling in right hand     Has nerve conductions 3/23/2016, awaiting results. Along with Paresthesia.    Osteoarthritis     Pain and swelling of right forearm     Check u/s to r/o clot. Rash and redness are resolving, will continue to monitor.    Pain of left arm     Sleep disturbances     Vitamin D deficiency     25 oh.     Social History     Socioeconomic History    Marital status: Single   Tobacco Use    Smoking status: Never    Smokeless tobacco: Never   Vaping Use    Vaping status: Never Used   Substance and Sexual  Activity    Alcohol use: Not Currently     Alcohol/week: 2.0 standard drinks of alcohol     Types: 2 Glasses of wine per week     Comment: MAYBE SOCIALLY    Drug use: No    Sexual activity: Yes     Partners: Male     Birth control/protection: Condom, OCP     Problem list reviewed by Allen Cheng, PharmD on 1/15/2025 at  9:24 AM    Hospitalizations and Urgent Care Since Last Assessment  ED Visits, Admissions, or Hospitalizations: None   Urgent Office Visits: Yes- pt sprained ankle in November 2024. She was given a brace and some Naproxen and Tylenol. Rph aware and no escalation is needed.    Allergies  Known allergies and reactions were discussed with the patient. The patient's chart has been reviewed for allergy information and updated as necessary.   Allergies   Allergen Reactions    Triptans Other (See Comments)     Chest pain, tightness in throat, vasocontriction    Amoxicillin Swelling     Throat swells    Penicillins Swelling     Throat swells     Allergies reviewed by Allen Cheng, PharmD on 1/15/2025 at  9:24 AM    Relevant Laboratory Values  Common labs          7/10/2024    10:50 11/25/2024    12:09 1/10/2025    10:32   Common Labs   Glucose 100      BUN 12      Creatinine 0.75      Sodium 140      Potassium 4.1      Chloride 105      Calcium 9.6      Albumin 4.1      Total Bilirubin 0.3      Alkaline Phosphatase 85      AST (SGOT) 12      ALT (SGPT) 11      WBC  7.60     Hemoglobin  11.3     Hematocrit  37.0     Platelets  309     Total Cholesterol 199      Triglycerides 65      HDL Cholesterol 86      LDL Cholesterol  101      Hemoglobin A1C 5.90   5.70        Lab Assessment  The above labs have been reviewed. No dose adjustments are needed for the specialty medication(s) based on the labs.     Current Medication List  This medication list has been reviewed with the patient and evaluated for any interactions or necessary modifications/recommendations, and updated to include all prescription  medications, OTC medications, and supplements the patient is currently taking.  This list reflects what is contained in the patient's profile, which has also been marked as reviewed to communicate to other providers it is the most up to date version of the patient's current medication therapy.     Current Outpatient Medications:     hydrOXYzine (ATARAX) 25 MG tablet, Take 1 tablet by mouth 3 (Three) Times a Day As Needed for Itching or Anxiety., Disp: 30 tablet, Rfl: 0    montelukast (SINGULAIR) 10 MG tablet, Take 1 tablet by mouth Every Night., Disp: 90 tablet, Rfl: 0    Norethin-Eth Estrad-Fe Biphas (Lo Loestrin Fe) 1 MG-10 MCG / 10 MCG tablet, Take 1 tablet by mouth Daily., Disp: 90 tablet, Rfl: 4    omeprazole (priLOSEC) 40 MG capsule, Take 1 capsule by mouth Daily., Disp: 90 capsule, Rfl: 3    prochlorperazine (COMPAZINE) 10 MG tablet, Take 1 tablet by mouth Every 6 (Six) Hours As Needed for Nausea or Vomiting., Disp: 15 tablet, Rfl: 5    Rimegepant Sulfate (Nurtec) 75 MG tablet dispersible tablet, Place 1 tablet on the tongue As Needed for Migraine. Take 1 tablet at the onset of headache, Max of 75 mg/24 hours., Disp: 16 tablet, Rfl: 11    vitamin D (ERGOCALCIFEROL) 1.25 MG (81194 UT) capsule capsule, Take 1 capsule by mouth once a week, Disp: 8 capsule, Rfl: 0    Medicines reviewed by Allen Cheng, PharmD on 1/15/2025 at  9:24 AM    Drug Interactions  None    Adverse Drug Reactions  Medication tolerability: Tolerating with no to minimal ADRs          Medication plan: Continue therapy with normal follow-up  Plan for ADR Management: Not required.      Adherence, Self-Administration, and Current Therapy Problems  Adherence related patient's specialty therapy was discussed with the patient. The Adherence segment of this outreach has been reviewed and updated.   Adherence Questions  Linked Medication(s) Assessed: Rimegepant Sulfate (Nurtec)  On average, how many doses/injections does the patient miss per  month?: 0  What are the identified reasons for non-adherence or missed doses? : no problems identified  What is the estimated medication adherence level?: % (Nurtec- PRN)  Based on the patient/caregiver response and refill history, does this patient require an MTP to track adherence improvements?: no    Additional Barriers to Patient Self-Administration: None  Methods for Supporting Patient Self-Administration: Not required    Recently Close Medication Therapy Problems  No medication therapy recommendations to display  Open Medication Therapy Problems  No medication therapy recommendations to display     Goals of Therapy  Goals related to the patient's specialty therapy was discussed with the patient. The Patient Goals segment of this outreach has been reviewed and updated.    Goals Addressed Today        Specialty Pharmacy General Goal      Decrease frequency, severity and duration of migraine attacks.    On Average, Reduce:   Frequency of migraines to 3 per month.   Symptom severity by 50% within 1 hour of taking acute therapy.   Duration of migraines to several hours     Baseline Values/Notes on Enrollment  Frequency: 6-7 migraine days/month  Symptom Severity: Moderate- severe  Duration:4+ hours    Date of Reassessment Notes on Progress Toward Above Goals   1/15/25 Overall, she is doing very well with Nurtec as needed for treatment of migraines. Pt denies any side effects. She experienced 0 migraine days over the past month and has not needed to take Nurtec in a long time. The patient has switched jobs and therefore her stress levels are down and much more manageable. Stress was a big trigger for her headaches, but this has improved greatly. The patient still has several boxes of Nurtec at home and did not need refilled. Pt will call if and when she needs a refill. She did not have any questions or concerns at this time and I recommend she continue on the current drug regimen. Pt acknowledged. On the  quality of life improvement scale, patient appreciates our pharmacy services and feels our help has improved their quality of life. The patient gave a score of 8. On track.                                                         Quality of Life Assessment   Quality of Life related to the patient's enrollment in the patient management program and services provided was discussed with the patient. The QOL segment of this outreach has been reviewed and updated.   Quality of Life Improvement Scale: 8-Moderately better    Reassessment Plan & Follow-Up  Medication Therapy Changes: Continue Nurtec 75mg- take 1 tab po prn onset of migraine  Related Plans, Therapy Recommendations, or Issues to Be Addressed: Overall, she is doing very well with Nurtec as needed for treatment of migraines. Pt denies any side effects. She experienced 0 migraine days over the past month and has not needed to take Nurtec in a long time. The patient has switched jobs and therefore her stress levels are down and much more manageable. Stress was a big trigger for her headaches, but this has improved greatly. The patient still has several boxes of Nurtec at home and did not need refilled. Pt will call if and when she needs a refill. She did not have any questions or concerns at this time and I recommend she continue on the current drug regimen. Pt acknowledged. On the quality of life improvement scale, patient appreciates our pharmacy services and feels our help has improved their quality of life. The patient gave a score of 8. On track. We will continue to fill through [facility] and ship via [employer] next day air in the future.    Pharmacist to perform regular reassessments no more than (6) months from the previous assessment.  Care Coordinator to set up future refill outreaches, coordinate prescription delivery, and escalate clinical questions to pharmacist.     Attestation  Therapeutic appropriateness: Appropriate  I attest the patient was actively  involved in and has agreed to the above plan of care. If the prescribed therapy is at any point deemed not appropriate based on the current or future assessments, a consultation will be initiated with the patient's specialty care provider to determine the best course of action. The revised plan of therapy will be documented along with any additional patient education provided. Discussed aforementioned material with patient via telemedicine.    Allen Cheng, PharmD  Clinic Specialty Pharmacist, Neurology  1/15/2025  09:29 EST

## 2025-01-25 ENCOUNTER — HOSPITAL ENCOUNTER (OUTPATIENT)
Facility: HOSPITAL | Age: 45
Discharge: HOME | End: 2025-01-25
Payer: COMMERCIAL

## 2025-01-26 ENCOUNTER — HOSPITAL ENCOUNTER (OUTPATIENT)
Dept: MRI IMAGING | Facility: HOSPITAL | Age: 45
Discharge: HOME | End: 2025-01-26
Admitting: ORTHOPAEDIC SURGERY
Payer: COMMERCIAL

## 2025-01-26 DIAGNOSIS — M25.551 RIGHT HIP PAIN: ICD-10-CM

## 2025-01-26 PROCEDURE — 73721 MRI JNT OF LWR EXTRE W/O DYE: CPT

## 2025-02-24 ENCOUNTER — OFFICE VISIT (OUTPATIENT)
Dept: ORTHOPEDIC SURGERY | Facility: CLINIC | Age: 45
End: 2025-02-24
Payer: COMMERCIAL

## 2025-02-24 VITALS
BODY MASS INDEX: 54.75 KG/M2 | HEIGHT: 61 IN | SYSTOLIC BLOOD PRESSURE: 130 MMHG | DIASTOLIC BLOOD PRESSURE: 80 MMHG | WEIGHT: 290 LBS

## 2025-02-24 DIAGNOSIS — M24.159 LABRAL TEAR OF HIP, DEGENERATIVE: Primary | ICD-10-CM

## 2025-02-24 DIAGNOSIS — E66.813 CLASS 3 SEVERE OBESITY DUE TO EXCESS CALORIES WITHOUT SERIOUS COMORBIDITY WITH BODY MASS INDEX (BMI) OF 50.0 TO 59.9 IN ADULT: ICD-10-CM

## 2025-02-24 DIAGNOSIS — E66.01 CLASS 3 SEVERE OBESITY DUE TO EXCESS CALORIES WITHOUT SERIOUS COMORBIDITY WITH BODY MASS INDEX (BMI) OF 50.0 TO 59.9 IN ADULT: ICD-10-CM

## 2025-02-24 PROCEDURE — 1160F RVW MEDS BY RX/DR IN RCRD: CPT | Performed by: ORTHOPAEDIC SURGERY

## 2025-02-24 PROCEDURE — 1159F MED LIST DOCD IN RCRD: CPT | Performed by: ORTHOPAEDIC SURGERY

## 2025-02-24 PROCEDURE — 99213 OFFICE O/P EST LOW 20 MIN: CPT | Performed by: ORTHOPAEDIC SURGERY

## 2025-02-24 NOTE — PROGRESS NOTES
[facility] Note    Subjective     Chief Complaint   Patient presents with    Follow-up     6 week follow up---Right hip pain        HPI    It has been 6  week(s) since Ms. Pineda's last visit. She returns to clinic today for follow-up of right hip pain. The issue has been ongoing for 2 year(s). She rates her pain a 6/10 on the pain scale. Previous/current treatments: NSAIDS and physical therapy. Current symptoms: pain and popping. The pain is worse with standing; stretching, NSAID improve the pain. Overall, she is doing the same.  Here today to review the MRI results.  No new complaints today.      I have reviewed the following portions of the patient's history and agree with: History of Present Illness and Review of Systems    Patient Active Problem List   Diagnosis    Daily headache    H/O atypical migraine    Transient vision disturbance of both eyes    Migraine variant with headache    Morbid obesity with BMI of 50.0-59.9, adult    Numbness and tingling in right hand    Facial tingling sensation    Sleep disturbance    Iron deficiency anemia    Iron deficiency anemia due to chronic blood loss    Knee pain    Dizziness, nonspecific    Encounter for gynecological examination without abnormal finding    Vitamin D deficiency    Iron deficiency    Morbid obesity    Gastroesophageal reflux disease without esophagitis    Hoarseness    Lt facial numbness    Iron malabsorption    Excessive daytime sleepiness    Anemia    Class 3 severe obesity with serious comorbidity and body mass index (BMI) of 50.0 to 59.9 in adult    Back pain    Elevated hemoglobin A1c    Migraine with aura and without status migrainosus, not intractable     Past Medical History:   Diagnosis Date    Absolute anemia     Anemia     Arthritis     Right knee    Carpal tunnel syndrome     RIGHT WRIST    Cholelithiasis     Nausea related to stones has phenergan. Also has related RUQ pain.    Dizziness     Elevated C-reactive  protein (CRP)     Elevated erythrocyte sedimentation rate     Elevated hemoglobin A1c     labs 2022 6.0    GERD (gastroesophageal reflux disease)     Glaucoma     negative per pt 2022    Heart disease     Influenza     Iron deficiency     Will not take iron supplement but will take MVI    Left hip pain     Migraines     Numbness and tingling in right hand     Has nerve conductions 3/23/2016, awaiting results. Along with Paresthesia.    Osteoarthritis     Pain and swelling of right forearm     Check u/s to r/o clot. Rash and redness are resolving, will continue to monitor.    Pain of left arm     Sleep disturbances     Vitamin D deficiency     25 oh.      Past Surgical History:   Procedure Laterality Date     SECTION      CHOLECYSTECTOMY      COLONOSCOPY      CYST REMOVAL      behind belly button    TONSILLECTOMY      URACHAL CYST INCISION      History of Excision Of Urachal Cyst.      Family History   Problem Relation Age of Onset    Stroke Mother     Hypertension Mother     Migraines Mother     Osteoarthritis Mother     Cervical cancer Mother     Heart disease Mother     Hypertension Father     Diabetes Father     Hyperlipidemia Maternal Grandmother     Osteoarthritis Maternal Grandmother     Birth defects Maternal Grandmother     Ovarian cancer Maternal Grandmother         PREMENOPAUSAL     Transient ischemic attack Maternal Grandmother     Hypertension Maternal Grandmother     Stroke Other         CVA x 2. and TIA    Cancer Other         Cervical, malignant neoplasm x2 , ovarian    Breast cancer Neg Hx      Social History     Socioeconomic History    Marital status: Single   Tobacco Use    Smoking status: Never    Smokeless tobacco: Never   Vaping Use    Vaping status: Never Used   Substance and Sexual Activity    Alcohol use: Not Currently     Alcohol/week: 2.0 standard drinks of alcohol     Types: 2 Glasses of wine per week     Comment: MAYBE SOCIALLY    Drug use: No    Sexual  activity: Yes     Partners: Male     Birth control/protection: Condom, OCP      Current Outpatient Medications on File Prior to Visit   Medication Sig Dispense Refill    hydrOXYzine (ATARAX) 25 MG tablet Take 1 tablet by mouth 3 (Three) Times a Day As Needed for Itching or Anxiety. 30 tablet 0    montelukast (SINGULAIR) 10 MG tablet Take 1 tablet by mouth Every Night. 90 tablet 0    Norethin-Eth Estrad-Fe Biphas (Lo Loestrin Fe) 1 MG-10 MCG / 10 MCG tablet Take 1 tablet by mouth Daily. 90 tablet 4    omeprazole (priLOSEC) 40 MG capsule Take 1 capsule by mouth Daily. 90 capsule 3    prochlorperazine (COMPAZINE) 10 MG tablet Take 1 tablet by mouth Every 6 (Six) Hours As Needed for Nausea or Vomiting. 15 tablet 5    Rimegepant Sulfate (Nurtec) 75 MG tablet dispersible tablet Place 1 tablet on the tongue As Needed for Migraine. Take 1 tablet at the onset of headache, Max of 75 mg/24 hours. 16 tablet 11    vitamin D (ERGOCALCIFEROL) 1.25 MG (68045 UT) capsule capsule Take 1 capsule by mouth once a week 8 capsule 0     No current facility-administered medications on file prior to visit.      Allergies   Allergen Reactions    Triptans Other (See Comments)     Chest pain, tightness in throat, vasocontriction    Amoxicillin Swelling     Throat swells    Penicillins Swelling     Throat swells        Review of Systems   Constitutional:  Negative for activity change, appetite change, chills, diaphoresis, fatigue, fever and unexpected weight change.   HENT:  Negative for congestion, dental problem, drooling, ear discharge, ear pain, facial swelling, hearing loss, mouth sores, nosebleeds, postnasal drip, rhinorrhea, sinus pressure, sneezing, sore throat, tinnitus, trouble swallowing and voice change.    Eyes:  Negative for photophobia, pain, discharge, redness, itching and visual disturbance.   Respiratory:  Negative for apnea, cough, choking, chest tightness, shortness of breath, wheezing and stridor.    Cardiovascular:   "Negative for chest pain, palpitations and leg swelling.   Gastrointestinal:  Negative for abdominal distention, abdominal pain, anal bleeding, blood in stool, constipation, diarrhea, nausea, rectal pain and vomiting.   Endocrine: Negative for cold intolerance, heat intolerance, polydipsia, polyphagia and polyuria.   Genitourinary:  Negative for decreased urine volume, difficulty urinating, dysuria, enuresis, flank pain, frequency, genital sores, hematuria and urgency.   Musculoskeletal:  Positive for arthralgias. Negative for back pain, gait problem, joint swelling, myalgias, neck pain and neck stiffness.   Skin:  Negative for color change, pallor, rash and wound.   Allergic/Immunologic: Negative for environmental allergies, food allergies and immunocompromised state.   Neurological:  Negative for dizziness, tremors, seizures, syncope, facial asymmetry, speech difficulty, weakness, light-headedness, numbness and headaches.   Hematological:  Negative for adenopathy. Does not bruise/bleed easily.   Psychiatric/Behavioral:  Negative for agitation, behavioral problems, confusion, decreased concentration, dysphoric mood, hallucinations, self-injury, sleep disturbance and suicidal ideas. The patient is not nervous/anxious and is not hyperactive.         Objective      Physical Exam  /80   Ht 154.9 cm (60.98\")   Wt 132 kg (290 lb)   BMI 54.82 kg/m²     Body mass index is 54.82 kg/m².         General:   Mental Status:  Alert   Appearance: Cooperative, in no acute distress   Build and Nutrition: Obese by BMI female   Orientation: Alert and oriented to person, place and time   Posture: Normal   Gait: Nonantalgic    Integument:   Right hip: No skin lesions, no rash, no ecchymosis    Lower Extremity:   Right Hip:    Tenderness:  None    Swelling:  None    Crepitus:  None    Range of motion:  External Rotation: 30°       Internal Rotation: 30°       Flexion:  100°       Extension:  0°    Deformities:  None  Functional " [facility]    No leg length discrepancy      Imaging/Studies  Imaging Results (Last 24 Hours)       ** No results found for the last 24 hours. **          Narrative & Impression   MRI HIP RIGHT WO CONTRAST     Date of Exam: 1/26/2025 9:35 AM EST     Indication: Chronic right lateral hip pain.     Comparison: 1/13/2025 radiographs     Technique:  Routine multiplanar/multisequence images of the right hip were obtained without contrast administration.          Findings:  There is no acute fracture or avascular necrosis. There is normal pelvic bone alignment. Moderate right and mild left SI joint arthritis. Degenerative changes of the pubic symphysis. No suspicious bone lesion. Partially visualized lumbar spondylosis.     There is degenerative tearing of the anterior superior acetabular labrum. A small paralabral cyst formation is present measuring 2 mm on series 8 image 4. The right hip cartilage is intact. No significant joint effusion.     There is mild bilateral distal gluteal tendinopathy without evidence of discrete tear. No bursitis or significant fatty muscle atrophy. The bilateral iliopsoas, adductor, and hamstrings myotendinous structures are intact. The pelvic wall soft tissues   show no acute abnormality.     The included intrapelvic structures show no acute abnormality. Leiomyomatous uterus.     IMPRESSION:  Impression:  1.Degenerative tearing of the right anterior superior acetabular labrum with a small paralabral cyst.  2.Mild bilateral distal gluteal tendinopathy without evidence of discrete tear.           Electronically Signed: Catracho Bales MD    1/28/2025 10:57 AM EST    Workstation ID: [id number]       I reviewed the above imaging, and agree with findings.    Assessment and Plan     Diagnoses and all orders for this visit:    1. Labral tear of hip, degenerative (Primary)    2. Class 3 severe obesity due to excess calories without serious comorbidity with body mass index (BMI) of 50.0  to 59.9 in adult        1. Labral tear of hip, degenerative    2. Class 3 severe obesity due to excess calories without serious comorbidity with body mass index (BMI) of 50.0 to 59.9 in adult          I reviewed my findings with the patient.  She does have a degenerative labral tear in her right hip, we discussed options.  She has tried physical therapy and takes anti-inflammatories regularly.  I do not believe that surgical intervention would be particularly helpful.  I will see her back if she has any worsening or problems in the future.  Weight loss would likely benefit.    Return if symptoms worsen or fail to improve.      Sourav Peres MD  02/24/25  09:13 EST    Dictated Utilizing Dragon Dictation

## 2025-04-08 DIAGNOSIS — K21.9 GASTROESOPHAGEAL REFLUX DISEASE WITHOUT ESOPHAGITIS: ICD-10-CM

## 2025-04-08 RX ORDER — OMEPRAZOLE 40 MG/1
40 CAPSULE, DELAYED RELEASE ORAL DAILY
Qty: 90 CAPSULE | Refills: 3 | Status: SHIPPED | OUTPATIENT
Start: 2025-04-08

## 2025-04-09 RX ORDER — MELOXICAM 15 MG/1
15 TABLET ORAL DAILY
Qty: 30 TABLET | Refills: 2 | Status: SHIPPED | OUTPATIENT
Start: 2025-04-09

## 2025-05-19 ENCOUNTER — OFFICE VISIT (OUTPATIENT)
Age: 45
End: 2025-05-19
Payer: COMMERCIAL

## 2025-05-19 ENCOUNTER — LAB (OUTPATIENT)
Facility: HOSPITAL | Age: 45
End: 2025-05-19
Payer: COMMERCIAL

## 2025-05-19 VITALS
WEIGHT: 293 LBS | HEART RATE: 70 BPM | OXYGEN SATURATION: 99 % | HEIGHT: 61 IN | SYSTOLIC BLOOD PRESSURE: 147 MMHG | TEMPERATURE: 97.1 F | BODY MASS INDEX: 55.32 KG/M2 | RESPIRATION RATE: 16 BRPM | DIASTOLIC BLOOD PRESSURE: 82 MMHG

## 2025-05-19 DIAGNOSIS — D50.0 IRON DEFICIENCY ANEMIA DUE TO CHRONIC BLOOD LOSS: Primary | ICD-10-CM

## 2025-05-19 DIAGNOSIS — D50.0 IRON DEFICIENCY ANEMIA DUE TO CHRONIC BLOOD LOSS: ICD-10-CM

## 2025-05-19 LAB
BASOPHILS # BLD AUTO: 0.03 10*3/MM3 (ref 0–0.2)
BASOPHILS NFR BLD AUTO: 0.4 % (ref 0–1.5)
DEPRECATED RDW RBC AUTO: 47.9 FL (ref 37–54)
EOSINOPHIL # BLD AUTO: 0.15 10*3/MM3 (ref 0–0.4)
EOSINOPHIL NFR BLD AUTO: 1.8 % (ref 0.3–6.2)
ERYTHROCYTE [DISTWIDTH] IN BLOOD BY AUTOMATED COUNT: 16.9 % (ref 12.3–15.4)
FERRITIN SERPL-MCNC: 272.2 NG/ML (ref 13–150)
HCT VFR BLD AUTO: 34.9 % (ref 34–46.6)
HGB BLD-MCNC: 10.9 G/DL (ref 12–15.9)
IMM GRANULOCYTES # BLD AUTO: 0.01 10*3/MM3 (ref 0–0.05)
IMM GRANULOCYTES NFR BLD AUTO: 0.1 % (ref 0–0.5)
IRON 24H UR-MRATE: 30 MCG/DL (ref 37–145)
IRON SATN MFR SERPL: 10 % (ref 20–50)
LYMPHOCYTES # BLD AUTO: 2.48 10*3/MM3 (ref 0.7–3.1)
LYMPHOCYTES NFR BLD AUTO: 29.8 % (ref 19.6–45.3)
MCH RBC QN AUTO: 24.2 PG (ref 26.6–33)
MCHC RBC AUTO-ENTMCNC: 31.2 G/DL (ref 31.5–35.7)
MCV RBC AUTO: 77.6 FL (ref 79–97)
MONOCYTES # BLD AUTO: 0.73 10*3/MM3 (ref 0.1–0.9)
MONOCYTES NFR BLD AUTO: 8.8 % (ref 5–12)
NEUTROPHILS NFR BLD AUTO: 4.92 10*3/MM3 (ref 1.7–7)
NEUTROPHILS NFR BLD AUTO: 59.1 % (ref 42.7–76)
PLATELET # BLD AUTO: 303 10*3/MM3 (ref 140–450)
PMV BLD AUTO: 10.5 FL (ref 6–12)
RBC # BLD AUTO: 4.5 10*6/MM3 (ref 3.77–5.28)
TIBC SERPL-MCNC: 295 MCG/DL (ref 298–536)
TRANSFERRIN SERPL-MCNC: 198 MG/DL (ref 200–360)
WBC NRBC COR # BLD AUTO: 8.32 10*3/MM3 (ref 3.4–10.8)

## 2025-05-19 PROCEDURE — 83540 ASSAY OF IRON: CPT

## 2025-05-19 PROCEDURE — 1126F AMNT PAIN NOTED NONE PRSNT: CPT | Performed by: INTERNAL MEDICINE

## 2025-05-19 PROCEDURE — 85025 COMPLETE CBC W/AUTO DIFF WBC: CPT

## 2025-05-19 PROCEDURE — 82728 ASSAY OF FERRITIN: CPT

## 2025-05-19 PROCEDURE — 84466 ASSAY OF TRANSFERRIN: CPT

## 2025-05-19 PROCEDURE — 99214 OFFICE O/P EST MOD 30 MIN: CPT | Performed by: INTERNAL MEDICINE

## 2025-05-19 PROCEDURE — 36415 COLL VENOUS BLD VENIPUNCTURE: CPT

## 2025-05-19 NOTE — PROGRESS NOTES
DATE OF VISIT: 2025    REASON FOR VISIT: Followup for iron deficiency anemia     PROBLEM LIST:  1. Anemia  2.  Microcytosis  3.  Iron deficiency:  A.  EGD done 2020 revealed erosive esophagitis  4.  Positive JOSÉ MIGUEL with inflammation markers  A. Followed by rheumatology at     HISTORY OF PRESENT ILLNESS: The patient is a very pleasant 44 y.o. female with past medical history significant for iron deficiency anemia diagnosed 2020.  The patient has been through with IV iron replacement. The patient is here today for scheduled follow-up visit.    SUBJECTIVE: Nury by herself but she is complaining of fatigue.  No active bleeding.  She continues to have regular monthly cycle.    Past History:  Medical History: has a past medical history of Absolute anemia, Anemia, Arthritis, Carpal tunnel syndrome, Cholelithiasis, Dizziness, Elevated C-reactive protein (CRP), Elevated erythrocyte sedimentation rate, Elevated hemoglobin A1c, GERD (gastroesophageal reflux disease), Glaucoma, Heart disease, Influenza, Iron deficiency, Left hip pain, Migraines, Numbness and tingling in right hand, Osteoarthritis, Pain and swelling of right forearm, Pain of left arm, Sleep disturbances, and Vitamin D deficiency.   Surgical History: has a past surgical history that includes Tonsillectomy (); Urachal cyst incision;  section (); Cholecystectomy (); Cyst Removal (); and Colonoscopy ().   Family History: family history includes Birth defects in her maternal grandmother; Cancer in an other family member; Cervical cancer in her mother; Diabetes in her father; Heart disease in her mother; Hyperlipidemia in her maternal grandmother; Hypertension in her father, maternal grandmother, and mother; Migraines in her mother; Osteoarthritis in her maternal grandmother and mother; Ovarian cancer in her maternal grandmother; Stroke in her mother and another family member; Transient ischemic attack in her maternal  "grandmother.   Social History: reports that she has never smoked. She has never used smokeless tobacco. She reports that she does not currently use alcohol after a past usage of about 2.0 standard drinks of alcohol per week. She reports that she does not use drugs.    (Not in a hospital admission)     Allergies: Triptans, Amoxicillin, and Penicillins     Review of Systems   Constitutional:  Positive for fatigue.   Psychiatric/Behavioral:  The patient is nervous/anxious.        PHYSICAL EXAMINATION:   /82   Pulse 70   Temp 97.1 °F (36.2 °C)   Resp 16   Ht 154.9 cm (61\")   Wt 134 kg (296 lb)   SpO2 99%   BMI 55.93 kg/m²    Pain Score    05/19/25 1133   PainSc: 0-No pain                    ECOG Performance Status: 1 - Symptomatic but completely ambulatory      General Appearance:      alert, cooperative, no apparent distress and appears stated age   Lungs:   Clear to auscultation bilaterally; respirations regular, even, and unlabored bilaterally   Heart:  Regular rate and rhythm, no murmurs appreciated   Abdomen:   Soft, non-tender, non-distended, and no organomegaly                 Lab on 05/19/2025   Component Date Value Ref Range Status    WBC 05/19/2025 8.32  3.40 - 10.80 10*3/mm3 Final    RBC 05/19/2025 4.50  3.77 - 5.28 10*6/mm3 Final    Hemoglobin 05/19/2025 10.9 (L)  12.0 - 15.9 g/dL Final    Hematocrit 05/19/2025 34.9  34.0 - 46.6 % Final    MCV 05/19/2025 77.6 (L)  79.0 - 97.0 fL Final    MCH 05/19/2025 24.2 (L)  26.6 - 33.0 pg Final    MCHC 05/19/2025 31.2 (L)  31.5 - 35.7 g/dL Final    RDW 05/19/2025 16.9 (H)  12.3 - 15.4 % Final    RDW-SD 05/19/2025 47.9  37.0 - 54.0 fl Final    MPV 05/19/2025 10.5  6.0 - 12.0 fL Final    Platelets 05/19/2025 303  140 - 450 10*3/mm3 Final    Neutrophil % 05/19/2025 59.1  42.7 - 76.0 % Final    Lymphocyte % 05/19/2025 29.8  19.6 - 45.3 % Final    Monocyte % 05/19/2025 8.8  5.0 - 12.0 % Final    Eosinophil % 05/19/2025 1.8  0.3 - 6.2 % Final    Basophil % " 05/19/2025 0.4  0.0 - 1.5 % Final    Immature Grans % 05/19/2025 0.1  0.0 - 0.5 % Final    Neutrophils, Absolute 05/19/2025 4.92  1.70 - 7.00 10*3/mm3 Final    Lymphocytes, Absolute 05/19/2025 2.48  0.70 - 3.10 10*3/mm3 Final    Monocytes, Absolute 05/19/2025 0.73  0.10 - 0.90 10*3/mm3 Final    Eosinophils, Absolute 05/19/2025 0.15  0.00 - 0.40 10*3/mm3 Final    Basophils, Absolute 05/19/2025 0.03  0.00 - 0.20 10*3/mm3 Final    Immature Grans, Absolute 05/19/2025 0.01  0.00 - 0.05 10*3/mm3 Final        No results found.    ASSESSMENT: The patient is a very pleasant 44 y.o. female  with anemia      PLAN:    1.  Anemia:  A.  I discussed with the patient CBC from today revealed anemia hemoglobin 11 MCV of 77.  Last hemoglobin November 25, 2024 was 11.3.    2.  History of iron deficiency:  A.  I will follow up on the iron studies from today and notify her of results.  B.  We will arrange for repeat IV iron replacement in the future if she has ferritin less than 30 or saturation less than 10%.     3. Positive JOSÉ MIGUEL:  A.  She is scheduled to follow-up with rheumatology.    FOLLOW UP: 6 months with CBC and iron profile    Michelle Eugene MD  5/19/2025

## 2025-05-20 DIAGNOSIS — D50.0 IRON DEFICIENCY ANEMIA DUE TO CHRONIC BLOOD LOSS: ICD-10-CM

## 2025-05-20 DIAGNOSIS — K90.9 IRON MALABSORPTION: ICD-10-CM

## 2025-05-20 DIAGNOSIS — E61.1 IRON DEFICIENCY: Primary | ICD-10-CM

## 2025-05-21 RX ORDER — FAMOTIDINE 10 MG/ML
20 INJECTION, SOLUTION INTRAVENOUS ONCE
OUTPATIENT
Start: 2025-06-17

## 2025-05-21 RX ORDER — SODIUM CHLORIDE 9 MG/ML
20 INJECTION, SOLUTION INTRAVENOUS ONCE
OUTPATIENT
Start: 2025-06-10

## 2025-05-21 RX ORDER — HYDROCORTISONE SODIUM SUCCINATE 100 MG/2ML
100 INJECTION INTRAMUSCULAR; INTRAVENOUS AS NEEDED
OUTPATIENT
Start: 2025-06-24

## 2025-05-21 RX ORDER — SODIUM CHLORIDE 9 MG/ML
20 INJECTION, SOLUTION INTRAVENOUS ONCE
OUTPATIENT
Start: 2025-06-24

## 2025-05-21 RX ORDER — FAMOTIDINE 10 MG/ML
20 INJECTION, SOLUTION INTRAVENOUS AS NEEDED
OUTPATIENT
Start: 2025-05-27

## 2025-05-21 RX ORDER — SODIUM CHLORIDE 9 MG/ML
20 INJECTION, SOLUTION INTRAVENOUS ONCE
OUTPATIENT
Start: 2025-05-27

## 2025-05-21 RX ORDER — FAMOTIDINE 10 MG/ML
20 INJECTION, SOLUTION INTRAVENOUS AS NEEDED
OUTPATIENT
Start: 2025-06-03

## 2025-05-21 RX ORDER — DIPHENHYDRAMINE HYDROCHLORIDE 50 MG/ML
50 INJECTION, SOLUTION INTRAMUSCULAR; INTRAVENOUS AS NEEDED
OUTPATIENT
Start: 2025-06-10

## 2025-05-21 RX ORDER — DIPHENHYDRAMINE HYDROCHLORIDE 50 MG/ML
50 INJECTION, SOLUTION INTRAMUSCULAR; INTRAVENOUS AS NEEDED
OUTPATIENT
Start: 2025-06-03

## 2025-05-21 RX ORDER — FAMOTIDINE 10 MG/ML
20 INJECTION, SOLUTION INTRAVENOUS ONCE
OUTPATIENT
Start: 2025-06-24

## 2025-05-21 RX ORDER — CETIRIZINE HYDROCHLORIDE 10 MG/1
10 TABLET ORAL ONCE
OUTPATIENT
Start: 2025-06-03

## 2025-05-21 RX ORDER — DIPHENHYDRAMINE HYDROCHLORIDE 50 MG/ML
50 INJECTION, SOLUTION INTRAMUSCULAR; INTRAVENOUS AS NEEDED
OUTPATIENT
Start: 2025-06-17

## 2025-05-21 RX ORDER — FAMOTIDINE 10 MG/ML
20 INJECTION, SOLUTION INTRAVENOUS ONCE
OUTPATIENT
Start: 2025-06-10

## 2025-05-21 RX ORDER — HYDROCORTISONE SODIUM SUCCINATE 100 MG/2ML
100 INJECTION INTRAMUSCULAR; INTRAVENOUS AS NEEDED
OUTPATIENT
Start: 2025-05-27

## 2025-05-21 RX ORDER — SODIUM CHLORIDE 9 MG/ML
20 INJECTION, SOLUTION INTRAVENOUS ONCE
OUTPATIENT
Start: 2025-06-17

## 2025-05-21 RX ORDER — FAMOTIDINE 10 MG/ML
20 INJECTION, SOLUTION INTRAVENOUS AS NEEDED
OUTPATIENT
Start: 2025-06-24

## 2025-05-21 RX ORDER — FAMOTIDINE 10 MG/ML
20 INJECTION, SOLUTION INTRAVENOUS AS NEEDED
OUTPATIENT
Start: 2025-06-17

## 2025-05-21 RX ORDER — FAMOTIDINE 10 MG/ML
20 INJECTION, SOLUTION INTRAVENOUS ONCE
OUTPATIENT
Start: 2025-06-03

## 2025-05-21 RX ORDER — DIPHENHYDRAMINE HYDROCHLORIDE 50 MG/ML
50 INJECTION, SOLUTION INTRAMUSCULAR; INTRAVENOUS AS NEEDED
OUTPATIENT
Start: 2025-05-27

## 2025-05-21 RX ORDER — DIPHENHYDRAMINE HYDROCHLORIDE 50 MG/ML
50 INJECTION, SOLUTION INTRAMUSCULAR; INTRAVENOUS AS NEEDED
OUTPATIENT
Start: 2025-06-24

## 2025-05-21 RX ORDER — FAMOTIDINE 10 MG/ML
20 INJECTION, SOLUTION INTRAVENOUS ONCE
OUTPATIENT
Start: 2025-05-27

## 2025-05-21 RX ORDER — HYDROCORTISONE SODIUM SUCCINATE 100 MG/2ML
100 INJECTION INTRAMUSCULAR; INTRAVENOUS AS NEEDED
OUTPATIENT
Start: 2025-06-10

## 2025-05-21 RX ORDER — CETIRIZINE HYDROCHLORIDE 10 MG/1
10 TABLET ORAL ONCE
OUTPATIENT
Start: 2025-06-24

## 2025-05-21 RX ORDER — CETIRIZINE HYDROCHLORIDE 10 MG/1
10 TABLET ORAL ONCE
OUTPATIENT
Start: 2025-06-17

## 2025-05-21 RX ORDER — CETIRIZINE HYDROCHLORIDE 10 MG/1
10 TABLET ORAL ONCE
OUTPATIENT
Start: 2025-05-27

## 2025-05-21 RX ORDER — CETIRIZINE HYDROCHLORIDE 10 MG/1
10 TABLET ORAL ONCE
OUTPATIENT
Start: 2025-06-10

## 2025-05-21 RX ORDER — FAMOTIDINE 10 MG/ML
20 INJECTION, SOLUTION INTRAVENOUS AS NEEDED
OUTPATIENT
Start: 2025-06-10

## 2025-05-21 RX ORDER — SODIUM CHLORIDE 9 MG/ML
20 INJECTION, SOLUTION INTRAVENOUS ONCE
OUTPATIENT
Start: 2025-06-03

## 2025-05-21 RX ORDER — HYDROCORTISONE SODIUM SUCCINATE 100 MG/2ML
100 INJECTION INTRAMUSCULAR; INTRAVENOUS AS NEEDED
OUTPATIENT
Start: 2025-06-03

## 2025-05-21 RX ORDER — HYDROCORTISONE SODIUM SUCCINATE 100 MG/2ML
100 INJECTION INTRAMUSCULAR; INTRAVENOUS AS NEEDED
OUTPATIENT
Start: 2025-06-17

## 2025-05-21 NOTE — ADDENDUM NOTE
Addended by: OBDULIA AKINS on: 5/21/2025 01:03 PM     Modules accepted: Orders     [No Acute Distress] : no acute distress [Normal Sclera/Conjunctiva] : normal sclera/conjunctiva [PERRL] : pupils equal round and reactive to light [EOMI] : extraocular movements intact [Normal Outer Ear/Nose] : the outer ears and nose were normal in appearance [Normal Oropharynx] : the oropharynx was normal [No JVD] : no jugular venous distention [No Lymphadenopathy] : no lymphadenopathy [Supple] : supple [Thyroid Normal, No Nodules] : the thyroid was normal and there were no nodules present [No Respiratory Distress] : no respiratory distress  [No Accessory Muscle Use] : no accessory muscle use [Clear to Auscultation] : lungs were clear to auscultation bilaterally [Normal Rate] : normal rate  [Regular Rhythm] : with a regular rhythm [Normal S1, S2] : normal S1 and S2 [No Murmur] : no murmur heard [No Varicosities] : no varicosities [Pedal Pulses Present] : the pedal pulses are present [No Edema] : there was no peripheral edema [No Palpable Aorta] : no palpable aorta [No Extremity Clubbing/Cyanosis] : no extremity clubbing/cyanosis [Soft] : abdomen soft [Non Tender] : non-tender [Non-distended] : non-distended [No Masses] : no abdominal mass palpated [No HSM] : no HSM [Normal Bowel Sounds] : normal bowel sounds [Normal Anterior Cervical Nodes] : no anterior cervical lymphadenopathy [No CVA Tenderness] : no CVA  tenderness [No Spinal Tenderness] : no spinal tenderness [No Joint Swelling] : no joint swelling [Grossly Normal Strength/Tone] : grossly normal strength/tone [No Rash] : no rash [Coordination Grossly Intact] : coordination grossly intact [No Focal Deficits] : no focal deficits [Normal Gait] : normal gait [Deep Tendon Reflexes (DTR)] : deep tendon reflexes were 2+ and symmetric [de-identified] : Left knee: swelling, no erythematous and tenderness [de-identified] : Anxious looking, good eye to eye contact and engaged conversation well.

## 2025-05-27 ENCOUNTER — HOSPITAL ENCOUNTER (OUTPATIENT)
Facility: HOSPITAL | Age: 45
Discharge: HOME | End: 2025-05-27
Admitting: INTERNAL MEDICINE
Payer: COMMERCIAL

## 2025-05-27 VITALS
HEIGHT: 61 IN | HEART RATE: 71 BPM | SYSTOLIC BLOOD PRESSURE: 117 MMHG | WEIGHT: 293 LBS | DIASTOLIC BLOOD PRESSURE: 77 MMHG | TEMPERATURE: 98 F | RESPIRATION RATE: 18 BRPM | BODY MASS INDEX: 55.32 KG/M2

## 2025-05-27 DIAGNOSIS — E61.1 IRON DEFICIENCY: Primary | ICD-10-CM

## 2025-05-27 DIAGNOSIS — K90.9 IRON MALABSORPTION: ICD-10-CM

## 2025-05-27 DIAGNOSIS — D50.0 IRON DEFICIENCY ANEMIA DUE TO CHRONIC BLOOD LOSS: ICD-10-CM

## 2025-05-27 PROCEDURE — 25810000003 SODIUM CHLORIDE 0.9 % SOLUTION: Performed by: INTERNAL MEDICINE

## 2025-05-27 PROCEDURE — 25010000002 FAMOTIDINE 10 MG/ML SOLUTION: Performed by: INTERNAL MEDICINE

## 2025-05-27 PROCEDURE — 96375 TX/PRO/DX INJ NEW DRUG ADDON: CPT

## 2025-05-27 PROCEDURE — 25010000002 IRON SUCROSE PER 1 MG: Performed by: INTERNAL MEDICINE

## 2025-05-27 PROCEDURE — 96374 THER/PROPH/DIAG INJ IV PUSH: CPT

## 2025-05-27 RX ORDER — CETIRIZINE HYDROCHLORIDE 10 MG/1
10 TABLET ORAL ONCE
Status: COMPLETED | OUTPATIENT
Start: 2025-05-27 | End: 2025-05-27

## 2025-05-27 RX ORDER — FAMOTIDINE 10 MG/ML
20 INJECTION, SOLUTION INTRAVENOUS ONCE
Status: COMPLETED | OUTPATIENT
Start: 2025-05-27 | End: 2025-05-27

## 2025-05-27 RX ORDER — FAMOTIDINE 10 MG/ML
20 INJECTION, SOLUTION INTRAVENOUS AS NEEDED
Status: DISCONTINUED | OUTPATIENT
Start: 2025-05-27 | End: 2025-05-28 | Stop reason: HOSPADM

## 2025-05-27 RX ORDER — DIPHENHYDRAMINE HYDROCHLORIDE 50 MG/ML
50 INJECTION, SOLUTION INTRAMUSCULAR; INTRAVENOUS AS NEEDED
Status: DISCONTINUED | OUTPATIENT
Start: 2025-05-27 | End: 2025-05-28 | Stop reason: HOSPADM

## 2025-05-27 RX ORDER — HYDROCORTISONE SODIUM SUCCINATE 100 MG/2ML
100 INJECTION INTRAMUSCULAR; INTRAVENOUS AS NEEDED
Status: DISCONTINUED | OUTPATIENT
Start: 2025-05-27 | End: 2025-05-28 | Stop reason: HOSPADM

## 2025-05-27 RX ORDER — SODIUM CHLORIDE 9 MG/ML
20 INJECTION, SOLUTION INTRAVENOUS ONCE
Status: COMPLETED | OUTPATIENT
Start: 2025-05-27 | End: 2025-05-27

## 2025-05-27 RX ADMIN — SODIUM CHLORIDE 20 ML/HR: 9 INJECTION, SOLUTION INTRAVENOUS at 10:15

## 2025-05-27 RX ADMIN — IRON SUCROSE 200 MG: 20 INJECTION, SOLUTION INTRAVENOUS at 10:48

## 2025-05-27 RX ADMIN — CETIRIZINE HYDROCHLORIDE 10 MG: 10 TABLET, FILM COATED ORAL at 10:17

## 2025-05-27 RX ADMIN — FAMOTIDINE 20 MG: 10 INJECTION, SOLUTION INTRAVENOUS at 10:17

## 2025-06-03 ENCOUNTER — HOSPITAL ENCOUNTER (OUTPATIENT)
Facility: HOSPITAL | Age: 45
Discharge: HOME | End: 2025-06-03
Admitting: INTERNAL MEDICINE
Payer: COMMERCIAL

## 2025-06-03 VITALS
WEIGHT: 293 LBS | TEMPERATURE: 98.1 F | DIASTOLIC BLOOD PRESSURE: 71 MMHG | HEART RATE: 71 BPM | BODY MASS INDEX: 55.32 KG/M2 | SYSTOLIC BLOOD PRESSURE: 113 MMHG | HEIGHT: 61 IN | RESPIRATION RATE: 18 BRPM

## 2025-06-03 DIAGNOSIS — K90.9 IRON MALABSORPTION: ICD-10-CM

## 2025-06-03 DIAGNOSIS — E61.1 IRON DEFICIENCY: Primary | ICD-10-CM

## 2025-06-03 DIAGNOSIS — D50.0 IRON DEFICIENCY ANEMIA DUE TO CHRONIC BLOOD LOSS: ICD-10-CM

## 2025-06-03 PROCEDURE — 25010000002 IRON SUCROSE PER 1 MG: Performed by: INTERNAL MEDICINE

## 2025-06-03 PROCEDURE — 25810000003 SODIUM CHLORIDE 0.9 % SOLUTION: Performed by: INTERNAL MEDICINE

## 2025-06-03 PROCEDURE — 96374 THER/PROPH/DIAG INJ IV PUSH: CPT

## 2025-06-03 PROCEDURE — 96365 THER/PROPH/DIAG IV INF INIT: CPT

## 2025-06-03 RX ORDER — FAMOTIDINE 10 MG/ML
20 INJECTION, SOLUTION INTRAVENOUS AS NEEDED
Status: DISCONTINUED | OUTPATIENT
Start: 2025-06-03 | End: 2025-06-04 | Stop reason: HOSPADM

## 2025-06-03 RX ORDER — CETIRIZINE HYDROCHLORIDE 10 MG/1
10 TABLET ORAL ONCE
Status: COMPLETED | OUTPATIENT
Start: 2025-06-03 | End: 2025-06-03

## 2025-06-03 RX ORDER — HYDROCORTISONE SODIUM SUCCINATE 100 MG/2ML
100 INJECTION INTRAMUSCULAR; INTRAVENOUS AS NEEDED
Status: DISCONTINUED | OUTPATIENT
Start: 2025-06-03 | End: 2025-06-04 | Stop reason: HOSPADM

## 2025-06-03 RX ORDER — DIPHENHYDRAMINE HYDROCHLORIDE 50 MG/ML
50 INJECTION, SOLUTION INTRAMUSCULAR; INTRAVENOUS AS NEEDED
Status: DISCONTINUED | OUTPATIENT
Start: 2025-06-03 | End: 2025-06-04 | Stop reason: HOSPADM

## 2025-06-03 RX ORDER — FAMOTIDINE 10 MG/ML
20 INJECTION, SOLUTION INTRAVENOUS ONCE
Status: DISCONTINUED | OUTPATIENT
Start: 2025-06-03 | End: 2025-06-04 | Stop reason: HOSPADM

## 2025-06-03 RX ORDER — SODIUM CHLORIDE 9 MG/ML
20 INJECTION, SOLUTION INTRAVENOUS ONCE
Status: COMPLETED | OUTPATIENT
Start: 2025-06-03 | End: 2025-06-03

## 2025-06-03 RX ADMIN — IRON SUCROSE 200 MG: 20 INJECTION, SOLUTION INTRAVENOUS at 10:58

## 2025-06-03 RX ADMIN — CETIRIZINE HYDROCHLORIDE 10 MG: 10 TABLET, FILM COATED ORAL at 10:23

## 2025-06-03 RX ADMIN — SODIUM CHLORIDE 20 ML/HR: 9 INJECTION, SOLUTION INTRAVENOUS at 10:23

## 2025-06-10 ENCOUNTER — HOSPITAL ENCOUNTER (OUTPATIENT)
Facility: HOSPITAL | Age: 45
Discharge: HOME | End: 2025-06-10
Admitting: INTERNAL MEDICINE
Payer: COMMERCIAL

## 2025-06-10 VITALS
BODY MASS INDEX: 55.32 KG/M2 | RESPIRATION RATE: 18 BRPM | TEMPERATURE: 98.7 F | HEIGHT: 61 IN | HEART RATE: 69 BPM | SYSTOLIC BLOOD PRESSURE: 138 MMHG | DIASTOLIC BLOOD PRESSURE: 80 MMHG | WEIGHT: 293 LBS

## 2025-06-10 DIAGNOSIS — D50.0 IRON DEFICIENCY ANEMIA DUE TO CHRONIC BLOOD LOSS: ICD-10-CM

## 2025-06-10 DIAGNOSIS — E61.1 IRON DEFICIENCY: Primary | ICD-10-CM

## 2025-06-10 DIAGNOSIS — K90.9 IRON MALABSORPTION: ICD-10-CM

## 2025-06-10 PROCEDURE — 25010000002 IRON SUCROSE PER 1 MG: Performed by: INTERNAL MEDICINE

## 2025-06-10 PROCEDURE — 25010000002 FAMOTIDINE 10 MG/ML SOLUTION: Performed by: INTERNAL MEDICINE

## 2025-06-10 PROCEDURE — 25810000003 SODIUM CHLORIDE 0.9 % SOLUTION: Performed by: INTERNAL MEDICINE

## 2025-06-10 PROCEDURE — 96365 THER/PROPH/DIAG IV INF INIT: CPT

## 2025-06-10 PROCEDURE — 96374 THER/PROPH/DIAG INJ IV PUSH: CPT

## 2025-06-10 PROCEDURE — 96375 TX/PRO/DX INJ NEW DRUG ADDON: CPT

## 2025-06-10 RX ORDER — FAMOTIDINE 10 MG/ML
20 INJECTION, SOLUTION INTRAVENOUS AS NEEDED
Status: DISCONTINUED | OUTPATIENT
Start: 2025-06-10 | End: 2025-06-11 | Stop reason: HOSPADM

## 2025-06-10 RX ORDER — SODIUM CHLORIDE 9 MG/ML
20 INJECTION, SOLUTION INTRAVENOUS ONCE
Status: COMPLETED | OUTPATIENT
Start: 2025-06-10 | End: 2025-06-10

## 2025-06-10 RX ORDER — HYDROCORTISONE SODIUM SUCCINATE 100 MG/2ML
100 INJECTION INTRAMUSCULAR; INTRAVENOUS AS NEEDED
Status: DISCONTINUED | OUTPATIENT
Start: 2025-06-10 | End: 2025-06-11 | Stop reason: HOSPADM

## 2025-06-10 RX ORDER — DIPHENHYDRAMINE HYDROCHLORIDE 50 MG/ML
50 INJECTION, SOLUTION INTRAMUSCULAR; INTRAVENOUS AS NEEDED
Status: DISCONTINUED | OUTPATIENT
Start: 2025-06-10 | End: 2025-06-11 | Stop reason: HOSPADM

## 2025-06-10 RX ORDER — CETIRIZINE HYDROCHLORIDE 10 MG/1
10 TABLET ORAL ONCE
Status: COMPLETED | OUTPATIENT
Start: 2025-06-10 | End: 2025-06-10

## 2025-06-10 RX ORDER — FAMOTIDINE 10 MG/ML
20 INJECTION, SOLUTION INTRAVENOUS ONCE
Status: COMPLETED | OUTPATIENT
Start: 2025-06-10 | End: 2025-06-10

## 2025-06-10 RX ADMIN — CETIRIZINE HYDROCHLORIDE 10 MG: 10 TABLET, FILM COATED ORAL at 10:25

## 2025-06-10 RX ADMIN — SODIUM CHLORIDE 20 ML/HR: 9 INJECTION, SOLUTION INTRAVENOUS at 10:25

## 2025-06-10 RX ADMIN — IRON SUCROSE 200 MG: 20 INJECTION, SOLUTION INTRAVENOUS at 10:59

## 2025-06-10 RX ADMIN — FAMOTIDINE 20 MG: 10 INJECTION, SOLUTION INTRAVENOUS at 10:25

## 2025-06-13 ENCOUNTER — SPECIALTY PHARMACY (OUTPATIENT)
Dept: GENERAL RADIOLOGY | Facility: HOSPITAL | Age: 45
End: 2025-06-13
Payer: COMMERCIAL

## 2025-06-13 NOTE — PROGRESS NOTES
Specialty Pharmacy Patient Management Program  Neurology Reassessment     Nury Pineda is a 44 y.o. female with Chronic Migraine seen by a Neurology provider and enrolled in the Neurology Patient Management program offered by [facility].  A follow-up outreach was conducted, including assessment of continued therapy appropriateness, medication adherence, and side effect incidence and management for Nurtec PRN.     Changes to Insurance Coverage or Financial Support  No changes, pt still has [place] Medicaid      Relevant Past Medical History and Comorbidities  Relevant medical history and concomitant health conditions were discussed with the patient. The patient's chart has been reviewed for relevant past medical history and comorbid health conditions and updated as necessary.   Past Medical History:   Diagnosis Date    Absolute anemia     Anemia     Arthritis     Right knee    Carpal tunnel syndrome     RIGHT WRIST    Cholelithiasis     Nausea related to stones has phenergan. Also has related RUQ pain.    Dizziness     Elevated C-reactive protein (CRP)     Elevated erythrocyte sedimentation rate     Elevated hemoglobin A1c     labs 7/2022 6.0    GERD (gastroesophageal reflux disease)     Glaucoma     negative per pt 7/2022    Heart disease     Influenza     Iron deficiency     Will not take iron supplement but will take MVI    Left hip pain     Migraines     Numbness and tingling in right hand     Has nerve conductions 3/23/2016, awaiting results. Along with Paresthesia.    Osteoarthritis     Pain and swelling of right forearm     Check u/s to r/o clot. Rash and redness are resolving, will continue to monitor.    Pain of left arm     Sleep disturbances     Vitamin D deficiency     25 oh.     Social History     Socioeconomic History    Marital status: Single   Tobacco Use    Smoking status: Never    Smokeless tobacco: Never   Vaping Use    Vaping status: Never Used   Substance and Sexual  Activity    Alcohol use: Not Currently     Alcohol/week: 2.0 standard drinks of alcohol     Types: 2 Glasses of wine per week     Comment: MAYBE SOCIALLY    Drug use: No    Sexual activity: Yes     Partners: Male     Birth control/protection: Condom, OCP     Problem list reviewed by Allen Cheng, Jagruti on 6/13/2025 at 10:41 AM    Hospitalizations and Urgent Care Since Last Assessment  ED Visits, Admissions, or Hospitalizations: None   Urgent Office Visits: None     Allergies  Known allergies and reactions were discussed with the patient. The patient's chart has been reviewed for allergy information and updated as necessary.   Allergies   Allergen Reactions    Triptans Other (See Comments)     Chest pain, tightness in throat, vasocontriction    Amoxicillin Swelling     Throat swells    Penicillins Swelling     Throat swells     Allergies reviewed by Allen Cheng, Jagruti on 6/13/2025 at 10:41 AM    Relevant Laboratory Values  Common labs          11/25/2024    12:09 1/10/2025    10:32 5/19/2025    11:20   Common Labs   WBC 7.60   8.32    Hemoglobin 11.3   10.9    Hematocrit 37.0   34.9    Platelets 309   303    Hemoglobin A1C  5.70         Lab Assessment  The above labs have been reviewed. No dose adjustments are needed for the specialty medication(s) based on the labs.     Current Medication List  This medication list has been reviewed with the patient and evaluated for any interactions or necessary modifications/recommendations, and updated to include all prescription medications, OTC medications, and supplements the patient is currently taking.  This list reflects what is contained in the patient's profile, which has also been marked as reviewed to communicate to other providers it is the most up to date version of the patient's current medication therapy.     Current Outpatient Medications:     rimegepant sulfate ODT (Nurtec) 75 MG disintegrating tablet, Place 1 tablet on the tongue As Needed at the  onset of headache. Max: 1 tablet/24 hours., Disp: 16 tablet, Rfl: 11    hydrOXYzine (ATARAX) 25 MG tablet, Take 1 tablet by mouth 3 (Three) Times a Day As Needed for Itching or Anxiety., Disp: 30 tablet, Rfl: 0    meloxicam (MOBIC) 15 MG tablet, Take 1 tablet by mouth Daily., Disp: 30 tablet, Rfl: 2    montelukast (SINGULAIR) 10 MG tablet, Take 1 tablet by mouth Every Night., Disp: 90 tablet, Rfl: 0    Norethin-Eth Estrad-Fe Biphas (Lo Loestrin Fe) 1 MG-10 MCG / 10 MCG tablet, Take 1 tablet by mouth Daily., Disp: 90 tablet, Rfl: 4    omeprazole (priLOSEC) 40 MG capsule, Take 1 capsule by mouth Daily., Disp: 90 capsule, Rfl: 3    prochlorperazine (COMPAZINE) 10 MG tablet, Take 1 tablet by mouth Every 6 (Six) Hours As Needed for Nausea or Vomiting., Disp: 15 tablet, Rfl: 5    vitamin D (ERGOCALCIFEROL) 1.25 MG (57723 UT) capsule capsule, Take 1 capsule by mouth once a week, Disp: 8 capsule, Rfl: 0  No current facility-administered medications for this visit.    Medicines reviewed by Allen Cheng, PharmD on 6/13/2025 at 10:41 AM    Drug Interactions  None     Adverse Drug Reactions  Medication tolerability: Tolerating with no to minimal ADRs          Medication plan: Continue therapy with normal follow-up  Plan for ADR Management: Call 911 or go to ED.      Adherence, Self-Administration, and Current Therapy Problems  Adherence related patient's specialty therapy was discussed with the patient. The Adherence segment of this outreach has been reviewed and updated.   Adherence Questions  Linked Medication(s) Assessed: Rimegepant Sulfate (NURTEC-ODT)  On average, how many doses/injections does the patient miss per month?: 0  What are the identified reasons for non-adherence or missed doses? : no problems identified  What is the estimated medication adherence level?: % (Nurtec- PRN)  Based on the patient/caregiver response and refill history, does this patient require an MTP to track adherence improvements?:  no    Additional Barriers to Patient Self-Administration: None  Methods for Supporting Patient Self-Administration: Not required.     Recently Close Medication Therapy Problems  No medication therapy recommendations to display  Open Medication Therapy Problems  No medication therapy recommendations to display     Goals of Therapy  Goals related to the patient's specialty therapy was discussed with the patient. The Patient Goals segment of this outreach has been reviewed and updated.    Goals Addressed Today        Specialty Pharmacy General Goal      Decrease frequency, severity and duration of migraine attacks.    On Average, Reduce:   Frequency of migraines to 3 per month.   Symptom severity by 50% within 1 hour of taking acute therapy.   Duration of migraines to several hours     Baseline Values/Notes on Enrollment  Frequency: 6-7 migraine days/month  Symptom Severity: Moderate- severe  Duration:4+ hours    Date of Reassessment Notes on Progress Toward Above Goals   1/15/25 Overall, she is doing very well with Nurtec as needed for treatment of migraines. Pt denies any side effects. She experienced 0 migraine days over the past month and has not needed to take Nurtec in a long time. The patient has switched jobs and therefore her stress levels are down and much more manageable. Stress was a big trigger for her headaches, but this has improved greatly. The patient still has several boxes of Nurtec at home and did not need refilled. Pt will call if and when she needs a refill. She did not have any questions or concerns at this time and I recommend she continue on the current drug regimen. Pt acknowledged. On the quality of life improvement scale, patient appreciates our pharmacy services and feels our help has improved their quality of life. The patient gave a score of 8. On track.    6/13/25 Overall, she is doing very well with Nurtec as needed for treatment of migraines. Pt denies any side effects. She experienced  1-3 migraine days over the past month and took Nurtec each time to treat these break through migraines. She still finds Nurtec to be an effective form of treatment. Stress was a big trigger for her headaches, but this has improved greatly. The patient still has several boxes of Nurtec at home and did not need refilled. Pt will call if and when she needs a refill. She did not have any questions or concerns at this time and I recommend she continue on the current drug regimen. Pt acknowledged. On the quality of life improvement scale, patient appreciates our pharmacy services and feels our help has improved their quality of life. The patient gave a score of 9. On track.                                                      Quality of Life Assessment   Quality of Life related to the patient's enrollment in the patient management program and services provided was discussed with the patient. The QOL segment of this outreach has been reviewed and updated.   Quality of Life Improvement Scale: 9-A good deal better    Reassessment Plan & Follow-Up  Medication Therapy Changes: Continue Nurtec 75mg- take 1 tab po prn onset of migraine (max 1 tab/day)  Related Plans, Therapy Recommendations, or Issues to Be Addressed: Overall, she is doing very well with Nurtec as needed for treatment of migraines. Pt denies any side effects. She experienced 1-3 migraine days over the past month and took Nurtec each time to treat these break through migraines. She still finds Nurtec to be an effective form of treatment. Stress was a big trigger for her headaches, but this has improved greatly. The patient still has several boxes of Nurtec at home and did not need refilled. Pt will call if and when she needs a refill. She did not have any questions or concerns at this time and I recommend she continue on the current drug regimen. Pt acknowledged. On the quality of life improvement scale, patient appreciates our pharmacy services and feels our  help has improved their quality of life. The patient gave a score of 9. On track. We will continue to fill through [facility] and ship via [employer] next day air with SIG REQ.   Pharmacist to perform regular reassessments no more than (6) months from the previous assessment.  Care Coordinator to set up future refill outreaches, coordinate prescription delivery, and escalate clinical questions to pharmacist.     Attestation  Therapeutic appropriateness: Appropriate  I attest the patient was actively involved in and has agreed to the above plan of care. If the prescribed therapy is at any point deemed not appropriate based on the current or future assessments, a consultation will be initiated with the patient's specialty care provider to determine the best course of action. The revised plan of therapy will be documented along with any additional patient education provided. Discussed aforementioned material with patient by phone.    Allen Cheng, PharmD  Clinic Specialty Pharmacist, Neurology  6/13/2025  10:43 EDT

## 2025-06-17 ENCOUNTER — HOSPITAL ENCOUNTER (OUTPATIENT)
Facility: HOSPITAL | Age: 45
Discharge: HOME | End: 2025-06-17
Admitting: INTERNAL MEDICINE
Payer: COMMERCIAL

## 2025-06-17 VITALS
TEMPERATURE: 98.6 F | SYSTOLIC BLOOD PRESSURE: 130 MMHG | HEIGHT: 61 IN | DIASTOLIC BLOOD PRESSURE: 76 MMHG | BODY MASS INDEX: 55.32 KG/M2 | WEIGHT: 293 LBS | HEART RATE: 71 BPM | RESPIRATION RATE: 16 BRPM

## 2025-06-17 DIAGNOSIS — D50.0 IRON DEFICIENCY ANEMIA DUE TO CHRONIC BLOOD LOSS: ICD-10-CM

## 2025-06-17 DIAGNOSIS — K90.9 IRON MALABSORPTION: ICD-10-CM

## 2025-06-17 DIAGNOSIS — E61.1 IRON DEFICIENCY: Primary | ICD-10-CM

## 2025-06-17 PROCEDURE — 96374 THER/PROPH/DIAG INJ IV PUSH: CPT

## 2025-06-17 PROCEDURE — 96375 TX/PRO/DX INJ NEW DRUG ADDON: CPT

## 2025-06-17 PROCEDURE — 25810000003 SODIUM CHLORIDE 0.9 % SOLUTION: Performed by: INTERNAL MEDICINE

## 2025-06-17 PROCEDURE — 25010000002 FAMOTIDINE 10 MG/ML SOLUTION: Performed by: INTERNAL MEDICINE

## 2025-06-17 PROCEDURE — 25010000002 IRON SUCROSE PER 1 MG: Performed by: INTERNAL MEDICINE

## 2025-06-17 RX ORDER — HYDROCORTISONE SODIUM SUCCINATE 100 MG/2ML
100 INJECTION INTRAMUSCULAR; INTRAVENOUS AS NEEDED
Status: DISCONTINUED | OUTPATIENT
Start: 2025-06-17 | End: 2025-06-18 | Stop reason: HOSPADM

## 2025-06-17 RX ORDER — CETIRIZINE HYDROCHLORIDE 10 MG/1
10 TABLET ORAL ONCE
Status: COMPLETED | OUTPATIENT
Start: 2025-06-17 | End: 2025-06-17

## 2025-06-17 RX ORDER — FAMOTIDINE 10 MG/ML
20 INJECTION, SOLUTION INTRAVENOUS AS NEEDED
Status: DISCONTINUED | OUTPATIENT
Start: 2025-06-17 | End: 2025-06-18 | Stop reason: HOSPADM

## 2025-06-17 RX ORDER — SODIUM CHLORIDE 9 MG/ML
20 INJECTION, SOLUTION INTRAVENOUS ONCE
Status: COMPLETED | OUTPATIENT
Start: 2025-06-17 | End: 2025-06-17

## 2025-06-17 RX ORDER — DIPHENHYDRAMINE HYDROCHLORIDE 50 MG/ML
50 INJECTION, SOLUTION INTRAMUSCULAR; INTRAVENOUS AS NEEDED
Status: DISCONTINUED | OUTPATIENT
Start: 2025-06-17 | End: 2025-06-18 | Stop reason: HOSPADM

## 2025-06-17 RX ORDER — FAMOTIDINE 10 MG/ML
20 INJECTION, SOLUTION INTRAVENOUS ONCE
Status: COMPLETED | OUTPATIENT
Start: 2025-06-17 | End: 2025-06-17

## 2025-06-17 RX ADMIN — IRON SUCROSE 200 MG: 20 INJECTION, SOLUTION INTRAVENOUS at 11:07

## 2025-06-17 RX ADMIN — FAMOTIDINE 20 MG: 10 INJECTION, SOLUTION INTRAVENOUS at 10:30

## 2025-06-17 RX ADMIN — CETIRIZINE HYDROCHLORIDE 10 MG: 10 TABLET, FILM COATED ORAL at 10:30

## 2025-06-17 RX ADMIN — SODIUM CHLORIDE 20 ML/HR: 9 INJECTION, SOLUTION INTRAVENOUS at 10:30

## 2025-06-19 DIAGNOSIS — K21.9 GASTROESOPHAGEAL REFLUX DISEASE WITHOUT ESOPHAGITIS: ICD-10-CM

## 2025-06-19 RX ORDER — OMEPRAZOLE 40 MG/1
40 CAPSULE, DELAYED RELEASE ORAL DAILY
Qty: 90 CAPSULE | Refills: 3 | Status: CANCELLED | OUTPATIENT
Start: 2025-06-19

## 2025-06-20 ENCOUNTER — PATIENT ROUNDING (BHMG ONLY) (OUTPATIENT)
Dept: URGENT CARE | Facility: CLINIC | Age: 45
End: 2025-06-20
Payer: COMMERCIAL

## 2025-06-25 ENCOUNTER — HOSPITAL ENCOUNTER (OUTPATIENT)
Facility: HOSPITAL | Age: 45
Discharge: HOME | End: 2025-06-25
Admitting: INTERNAL MEDICINE
Payer: COMMERCIAL

## 2025-06-25 VITALS
SYSTOLIC BLOOD PRESSURE: 128 MMHG | WEIGHT: 293 LBS | TEMPERATURE: 98.9 F | BODY MASS INDEX: 56.57 KG/M2 | DIASTOLIC BLOOD PRESSURE: 72 MMHG | RESPIRATION RATE: 18 BRPM | HEART RATE: 79 BPM

## 2025-06-25 DIAGNOSIS — E61.1 IRON DEFICIENCY: Primary | ICD-10-CM

## 2025-06-25 DIAGNOSIS — D50.0 IRON DEFICIENCY ANEMIA DUE TO CHRONIC BLOOD LOSS: ICD-10-CM

## 2025-06-25 DIAGNOSIS — K90.9 IRON MALABSORPTION: ICD-10-CM

## 2025-06-25 PROCEDURE — 25810000003 SODIUM CHLORIDE 0.9 % SOLUTION: Performed by: INTERNAL MEDICINE

## 2025-06-25 PROCEDURE — 96374 THER/PROPH/DIAG INJ IV PUSH: CPT

## 2025-06-25 PROCEDURE — 25010000002 IRON SUCROSE PER 1 MG: Performed by: INTERNAL MEDICINE

## 2025-06-25 PROCEDURE — 25010000002 FAMOTIDINE 10 MG/ML SOLUTION: Performed by: INTERNAL MEDICINE

## 2025-06-25 PROCEDURE — 96375 TX/PRO/DX INJ NEW DRUG ADDON: CPT

## 2025-06-25 RX ORDER — SODIUM CHLORIDE 9 MG/ML
20 INJECTION, SOLUTION INTRAVENOUS ONCE
Status: COMPLETED | OUTPATIENT
Start: 2025-06-25 | End: 2025-06-25

## 2025-06-25 RX ORDER — FAMOTIDINE 10 MG/ML
20 INJECTION, SOLUTION INTRAVENOUS ONCE
Status: COMPLETED | OUTPATIENT
Start: 2025-06-25 | End: 2025-06-25

## 2025-06-25 RX ORDER — CETIRIZINE HYDROCHLORIDE 10 MG/1
10 TABLET ORAL ONCE
Status: COMPLETED | OUTPATIENT
Start: 2025-06-25 | End: 2025-06-25

## 2025-06-25 RX ORDER — FAMOTIDINE 10 MG/ML
20 INJECTION, SOLUTION INTRAVENOUS AS NEEDED
Status: DISCONTINUED | OUTPATIENT
Start: 2025-06-25 | End: 2025-06-26 | Stop reason: HOSPADM

## 2025-06-25 RX ORDER — HYDROCORTISONE SODIUM SUCCINATE 100 MG/2ML
100 INJECTION INTRAMUSCULAR; INTRAVENOUS AS NEEDED
Status: DISCONTINUED | OUTPATIENT
Start: 2025-06-25 | End: 2025-06-26 | Stop reason: HOSPADM

## 2025-06-25 RX ORDER — DIPHENHYDRAMINE HYDROCHLORIDE 50 MG/ML
50 INJECTION, SOLUTION INTRAMUSCULAR; INTRAVENOUS AS NEEDED
Status: DISCONTINUED | OUTPATIENT
Start: 2025-06-25 | End: 2025-06-26 | Stop reason: HOSPADM

## 2025-06-25 RX ADMIN — FAMOTIDINE 20 MG: 10 INJECTION, SOLUTION INTRAVENOUS at 10:32

## 2025-06-25 RX ADMIN — CETIRIZINE HYDROCHLORIDE 10 MG: 10 TABLET, FILM COATED ORAL at 10:31

## 2025-06-25 RX ADMIN — IRON SUCROSE 200 MG: 20 INJECTION, SOLUTION INTRAVENOUS at 11:07

## 2025-06-25 RX ADMIN — SODIUM CHLORIDE 20 ML/HR: 9 INJECTION, SOLUTION INTRAVENOUS at 10:27

## 2025-07-08 RX ORDER — HYDROXYZINE HYDROCHLORIDE 25 MG/1
25 TABLET, FILM COATED ORAL 3 TIMES DAILY PRN
Qty: 30 TABLET | Refills: 0 | Status: CANCELLED | OUTPATIENT
Start: 2025-07-08

## 2025-07-08 RX ORDER — ERGOCALCIFEROL 1.25 MG/1
50000 CAPSULE, LIQUID FILLED ORAL WEEKLY
Qty: 12 CAPSULE | Refills: 0 | Status: CANCELLED | OUTPATIENT
Start: 2025-07-08

## 2025-07-15 ENCOUNTER — OFFICE VISIT (OUTPATIENT)
Dept: INTERNAL MEDICINE | Facility: CLINIC | Age: 45
End: 2025-07-15
Payer: COMMERCIAL

## 2025-07-15 VITALS
HEIGHT: 61 IN | BODY MASS INDEX: 55.32 KG/M2 | DIASTOLIC BLOOD PRESSURE: 68 MMHG | TEMPERATURE: 96.7 F | HEART RATE: 76 BPM | SYSTOLIC BLOOD PRESSURE: 120 MMHG | WEIGHT: 293 LBS | OXYGEN SATURATION: 100 %

## 2025-07-15 DIAGNOSIS — Z00.00 ROUTINE GENERAL MEDICAL EXAMINATION AT A HEALTH CARE FACILITY: ICD-10-CM

## 2025-07-15 DIAGNOSIS — Z00.00 HEALTHCARE MAINTENANCE: Primary | ICD-10-CM

## 2025-07-15 DIAGNOSIS — E04.9 ENLARGED THYROID: ICD-10-CM

## 2025-07-15 DIAGNOSIS — J30.2 SEASONAL ALLERGIES: ICD-10-CM

## 2025-07-15 DIAGNOSIS — K21.9 GASTROESOPHAGEAL REFLUX DISEASE WITHOUT ESOPHAGITIS: ICD-10-CM

## 2025-07-15 DIAGNOSIS — R49.0 HOARSENESS: ICD-10-CM

## 2025-07-15 LAB
BILIRUB BLD-MCNC: NEGATIVE MG/DL
CLARITY, POC: CLEAR
COLOR UR: YELLOW
EXPIRATION DATE: NORMAL
GLUCOSE UR STRIP-MCNC: NEGATIVE MG/DL
KETONES UR QL: NEGATIVE
LEUKOCYTE EST, POC: NEGATIVE
Lab: NORMAL
NITRITE UR-MCNC: NEGATIVE MG/ML
PH UR: 5.5 [PH] (ref 5–8)
PROT UR STRIP-MCNC: NEGATIVE MG/DL
RBC # UR STRIP: NEGATIVE /UL
SP GR UR: 1.02 (ref 1–1.03)
UROBILINOGEN UR QL: NORMAL

## 2025-07-15 PROCEDURE — 1159F MED LIST DOCD IN RCRD: CPT | Performed by: INTERNAL MEDICINE

## 2025-07-15 PROCEDURE — 1160F RVW MEDS BY RX/DR IN RCRD: CPT | Performed by: INTERNAL MEDICINE

## 2025-07-15 PROCEDURE — 1125F AMNT PAIN NOTED PAIN PRSNT: CPT | Performed by: INTERNAL MEDICINE

## 2025-07-15 PROCEDURE — 99396 PREV VISIT EST AGE 40-64: CPT | Performed by: INTERNAL MEDICINE

## 2025-07-15 PROCEDURE — 81003 URINALYSIS AUTO W/O SCOPE: CPT | Performed by: INTERNAL MEDICINE

## 2025-07-15 RX ORDER — MONTELUKAST SODIUM 10 MG/1
10 TABLET ORAL NIGHTLY
Qty: 90 TABLET | Refills: 0 | Status: SHIPPED | OUTPATIENT
Start: 2025-07-15

## 2025-07-15 RX ORDER — OMEPRAZOLE 40 MG/1
40 CAPSULE, DELAYED RELEASE ORAL DAILY
Qty: 90 CAPSULE | Refills: 0 | Status: CANCELLED | OUTPATIENT
Start: 2025-07-15

## 2025-07-15 NOTE — PROGRESS NOTES
Chief Complaint   Patient presents with    Annual Exam    Hoarse       Reported Health  Good Yes  FairNo  PoorYes      Dental,Vision,Hearing  Regular dental visitsYes  Vision ProblemsNo  Hearing LossNo      Immunization Status:  Up To DateYes        Lifestyle  Healthy DietYes  Weight ConcernsYes  Regular ExerciseYes  Tobacco UseNo  Alcohol UseYes  Drug AbuseNo      Screening  Cancer ScreeningYes  Metabolic ScreeningYes  Risk ScreeningYes  Past Medical History:   Diagnosis Date    Absolute anemia     Anemia     Arthritis     Right knee    Arthritis of back     Carpal tunnel syndrome     RIGHT WRIST    Cholelithiasis     Nausea related to stones has phenergan. Also has related RUQ pain.    Dizziness     Elevated C-reactive protein (CRP)     Elevated erythrocyte sedimentation rate     Elevated hemoglobin A1c     labs 2022 6.0    GERD (gastroesophageal reflux disease)     Glaucoma     negative per pt 2022    Heart disease     Influenza     Iron deficiency     Will not take iron supplement but will take MVI    Knee swelling     Left hip pain     Migraines     Numbness and tingling in right hand     Has nerve conductions 3/23/2016, awaiting results. Along with Paresthesia.    Osteoarthritis     Pain and swelling of right forearm     Check u/s to r/o clot. Rash and redness are resolving, will continue to monitor.    Pain of left arm     Sleep disturbances     Vitamin D deficiency     25 oh.     Past Surgical History:   Procedure Laterality Date    APPENDECTOMY       SECTION  2006    CHOLECYSTECTOMY      COLONOSCOPY  2016    CYST REMOVAL  2011    behind belly button    TONSILLECTOMY  2008    URACHAL CYST INCISION      History of Excision Of Urachal Cyst.     Family History   Problem Relation Age of Onset    Stroke Mother     Hypertension Mother     Migraines Mother     Osteoarthritis Mother     Cervical cancer Mother     Heart disease Mother     Cancer Mother     Arthritis Mother     Hypertension Father   "   Diabetes Father     Hyperlipidemia Maternal Grandmother     Osteoarthritis Maternal Grandmother     Birth defects Maternal Grandmother     Ovarian cancer Maternal Grandmother         PREMENOPAUSAL     Transient ischemic attack Maternal Grandmother     Hypertension Maternal Grandmother     Stroke Other         CVA x 2. and TIA    Cancer Other         Cervical, malignant neoplasm x2 , ovarian    Asthma Daughter     Breast cancer Neg Hx      Social History     Socioeconomic History    Marital status: Single   Tobacco Use    Smoking status: Never    Smokeless tobacco: Never   Vaping Use    Vaping status: Never Used   Substance and Sexual Activity    Alcohol use: Not Currently     Alcohol/week: 2.0 standard drinks of alcohol     Types: 2 Glasses of wine per week     Comment: MAYBE SOCIALLY    Drug use: No    Sexual activity: Yes     Partners: Male     Birth control/protection: Condom, OCP        Review of Systems   Constitutional:  Negative for fatigue and fever.   HENT:  Negative for sinus pressure, sinus pain, sneezing and sore throat.         Hoarseness on and off x few months.   Respiratory:  Negative for cough and shortness of breath.    Cardiovascular:  Negative for chest pain and leg swelling.   Gastrointestinal:  Negative for abdominal pain, constipation, diarrhea, nausea and vomiting.   Genitourinary:  Negative for dysuria and urgency.   Musculoskeletal:  Negative for back pain.   Neurological:  Negative for seizures and numbness.   Psychiatric/Behavioral:  Negative for confusion.    /68 (BP Location: Left arm, Patient Position: Sitting, Cuff Size: Adult)   Pulse 76   Temp 96.7 °F (35.9 °C) (Temporal)   Ht 155 cm (61.02\")   Wt 134 kg (296 lb)   LMP 06/17/2025 (Exact Date)   SpO2 100%   BMI 55.88 kg/m²       Physical Exam  Vitals reviewed.   HENT:      Right Ear: Tympanic membrane and ear canal normal.      Left Ear: Tympanic membrane and ear canal normal.      Mouth/Throat:      Pharynx: No " oropharyngeal exudate or posterior oropharyngeal erythema.   Eyes:      General: No scleral icterus.     Pupils: Pupils are equal, round, and reactive to light.   Neck:      Comments: Enlarged thyroid  Cardiovascular:      Rate and Rhythm: Normal rate and regular rhythm.      Heart sounds: No murmur heard.  Pulmonary:      Effort: No respiratory distress.      Breath sounds: No wheezing.   Abdominal:      General: There is no distension.      Palpations: Abdomen is soft.      Tenderness: There is no abdominal tenderness.   Neurological:      General: No focal deficit present.      Mental Status: She is alert and oriented to person, place, and time.   Psychiatric:         Mood and Affect: Mood normal.         Behavior: Behavior normal.                 Diet and Exercise    Healthy Diet Yes  Adequate DietYes  Poor DietNo  Adequate Exercise RegimenYes  Inadequate Exercise RegimenNo      Cervical Cancer Screening    Risks and benefits discussedYes  Screening currentYes  PAP Done Today OrderedNo  Screening Not IndicatedNo  Pap Every 3 yearsYes  Screening Done By GYNYes    Breast Cancer screening  Risks and Benefits DiscussedYes  Self Breast Exam taughtNo  Monthly Self Exam AdvisedYes  Screening CurrentYes  Mammogram OrderedNo  Screening Not IndicatedNo  Screening Managed By GYNYes  Patient DeclinesNo      STD Testing  ChlamydiaNo  GonorrheaNo  HIVNo      Osteoporosis Screening  Not indicated    Colorectal Cancer Screening  Screen at 46    Metabolic Screening  GlucoseYes  LipidsYes  CBCYes  TSHYes  UAYes  CMPYes  25OHNo      Immunizations  Risks and benefits discussedYes  Immunizations Up To Dateyes  Immunizations NeededNo  Immunizations Per OrdersNo  Patient DNoeclines      Preventative Counseling  NutritionYes  Aerobic ExerciseYes  Weight Bearing ExerciseYes  Weight LossYes  Calcium SupplementsNo  Vitamin D SupplementsYes  Reproductive HealthNo  Cardiovascular Risk ReductionYes  Tobacco CessationNo  Alcohol  UseYes  Sunscreen UseYes  Self Skin ExaminationYes  Helmet UseNo  Seat Belt UseYes  Fall Risk ReductionNo  Advanced Directive PlanningNo      Patient Discussion  PatientYes  FamilyNo  CounselingYes  Diagnoses and all orders for this visit:    1. Healthcare maintenance (Primary)  -     Comprehensive Metabolic Panel; Future  -     Lipid Panel; Future  -     TSH; Future  -     Hemoglobin A1c; Future    2. Gastroesophageal reflux disease without esophagitis    3. Seasonal allergies  -     montelukast (SINGULAIR) 10 MG tablet; Take 1 tablet by mouth Every Night.  Dispense: 90 tablet; Refill: 0    4. Hoarseness  -     Ambulatory Referral to ENT (Otolaryngology)    5. Enlarged thyroid  -     US Thyroid

## 2025-08-06 ENCOUNTER — TELEPHONE (OUTPATIENT)
Dept: INTERNAL MEDICINE | Facility: CLINIC | Age: 45
End: 2025-08-06